# Patient Record
Sex: FEMALE | Race: WHITE | Employment: FULL TIME | URBAN - METROPOLITAN AREA
[De-identification: names, ages, dates, MRNs, and addresses within clinical notes are randomized per-mention and may not be internally consistent; named-entity substitution may affect disease eponyms.]

---

## 2017-04-19 DIAGNOSIS — F41.1 ANXIETY STATE: ICD-10-CM

## 2017-04-19 RX ORDER — ALPRAZOLAM 0.5 MG
TABLET ORAL
Qty: 10 TABLET | Refills: 3 | Status: SHIPPED | OUTPATIENT
Start: 2017-04-19 | End: 2017-08-25

## 2017-04-19 NOTE — TELEPHONE ENCOUNTER
Controlled Substance Refill Request for ALPRAZolam (XANAX) 0.5 MG tablet  Problem List Complete:  Yes    Last Written Prescription Date:  9/29/2016  Last Fill Quantity: 10,   # refills: 5    Last Office Visit with [facility] primary care provider: 9/29/2016    Clinic visit frequency required: unspecified     Future Office visit:     Controlled substance agreement on file: No.     Processing:  May be called or faxed   checked in past 6 months?  No, route to RN Chronic pain not on problem list [place]  review not required    Thank you,  Cordell Cavazos RN

## 2017-04-19 NOTE — TELEPHONE ENCOUNTER
I faxed Rx for Alprazolam (Xanax) 0.5 MG tablet to [address].  Start Date: 04/19/2017  Fax #: [phone]    Codie Alves MA

## 2017-05-23 ENCOUNTER — TELEPHONE (OUTPATIENT)
Dept: FAMILY MEDICINE | Facility: CLINIC | Age: 52
End: 2017-05-23

## 2017-05-23 ENCOUNTER — OFFICE VISIT (OUTPATIENT)
Dept: FAMILY MEDICINE | Facility: CLINIC | Age: 52
End: 2017-05-23
Payer: COMMERCIAL

## 2017-05-23 VITALS
RESPIRATION RATE: 18 BRPM | HEART RATE: 74 BPM | WEIGHT: 179 LBS | BODY MASS INDEX: 29.56 KG/M2 | TEMPERATURE: 98.1 F | DIASTOLIC BLOOD PRESSURE: 80 MMHG | SYSTOLIC BLOOD PRESSURE: 121 MMHG | OXYGEN SATURATION: 100 %

## 2017-05-23 DIAGNOSIS — F41.1 ANXIETY STATE: ICD-10-CM

## 2017-05-23 DIAGNOSIS — Z12.11 SPECIAL SCREENING FOR MALIGNANT NEOPLASMS, COLON: ICD-10-CM

## 2017-05-23 DIAGNOSIS — F33.1 MAJOR DEPRESSIVE DISORDER, RECURRENT EPISODE, MODERATE (H): Primary | ICD-10-CM

## 2017-05-23 PROCEDURE — 99214 OFFICE O/P EST MOD 30 MIN: CPT | Performed by: NURSE PRACTITIONER

## 2017-05-23 RX ORDER — ALPRAZOLAM 0.5 MG
TABLET ORAL
Qty: 10 TABLET | Refills: 3 | Status: CANCELLED | OUTPATIENT
Start: 2017-05-23

## 2017-05-23 RX ORDER — BUPROPION HYDROCHLORIDE 150 MG/1
150 TABLET ORAL EVERY MORNING
Qty: 60 TABLET | Refills: 1 | Status: SHIPPED | OUTPATIENT
Start: 2017-05-23 | End: 2017-07-17

## 2017-05-23 NOTE — NURSING NOTE
"Chief Complaint   Patient presents with     Depression       Initial /80  Pulse 74  Temp 98.1  F (36.7  C) (Oral)  Resp 18  Wt 179 lb (81.2 kg)  SpO2 100%  BMI 29.56 kg/m2 Estimated body mass index is 29.56 kg/(m^2) as calculated from the following:    Height as of 11/8/16: 5' 5.25\" (1.657 m).    Weight as of this encounter: 179 lb (81.2 kg).  Medication Reconciliation: complete     Codie Alves MA      "

## 2017-05-23 NOTE — MR AVS SNAPSHOT
After Visit Summary   5/23/2017    Adrienne Ivory    MRN: [id number]           Patient Information     Date Of Birth          1965        Visit Information        Provider Department      5/23/2017 2:45 PM Kallie Ta NP [facility]        Today's Diagnoses     Major depressive disorder, recurrent episode, moderate (H)    -  1    Anxiety state        Special screening for malignant neoplasms, colon          Care Instructions    Start Wellbutrin one tablet daily in the AM for 1-2 weeks then increase to 2 tablets daily.  Follow up on MyChart in one month.         Follow-ups after your visit        Your next 10 appointments already scheduled     May 30, 2017  8:00 AM CDT   MA SCREENING DIGITAL BILATERAL with URBCMA1   [facility] ([facility])    [address] [place] [address], [address]  [facility] [address] [phone]           Do not use any powder, lotion or deodorant under your arms or on your breast. If you do, we will ask you to remove it before your exam.  Wear comfortable, two-piece clothing.  If you have any allergies, tell your care team.  Bring any previous mammograms from other facilities or have them mailed to the breast center.              Future tests that were ordered for you today     Open Future Orders        Priority Expected Expires Ordered    Fecal colorectal cancer screen (FIT) Routine 6/13/2017 8/15/2017 5/23/2017    *MA Screening Digital Bilateral Routine  5/23/2018 5/23/2017            Who to contact     If you have questions or need follow up information about today's clinic visit or your schedule please contact [facility] directly at [phone].  Normal or non-critical lab and imaging results will be communicated to you by MyChart, letter or phone within 4 business days after the clinic has received the results. If you do not hear from us within 7 days, please contact the clinic through MyChart or phone.  If you have a critical or abnormal lab result, we will notify you by phone as soon as possible.  Submit refill requests through [email or web address] or call your pharmacy and they will forward the refill request to us. Please allow 3 business days for your refill to be completed.          Additional Information About Your Visit        Art-Exchangehart Information     [email or web address] gives you secure access to your electronic health record. If you see a primary care provider, you can also send messages to your care team and make appointments. If you have questions, please call your primary care clinic.  If you do not have a primary care provider, please call [phone] and they will assist you.        Care EveryWhere ID     This is your Care EveryWhere ID. This could be used by other organizations to access your [facility] medical records  [id number]        Your Vitals Were     Pulse Temperature Respirations Pulse Oximetry BMI (Body Mass Index)       74 98.1  F (36.7  C) (Oral) 18 100% 29.56 kg/m2        Blood Pressure from Last 3 Encounters:   05/23/17 121/80   11/08/16 140/90   09/29/16 148/90    Weight from Last 3 Encounters:   05/23/17 179 lb (81.2 kg)   11/08/16 154 lb (69.9 kg)   09/29/16 155 lb (70.3 kg)                 Today's Medication Changes          These changes are accurate as of: 5/23/17  3:13 PM.  If you have any questions, ask your nurse or doctor.               Start taking these medicines.        Dose/Directions    buPROPion 150 MG 24 hr tablet   Commonly known as:  WELLBUTRIN XL   Used for:  Major depressive disorder, recurrent episode, moderate (H)   Started by:  Kallie Ta NP        Dose:  150 mg   Take 1 tablet (150 mg) by mouth every morning Take one tablet every morning for 1-2 weeks then increase to 2 tablets daily   Quantity:  60 tablet   Refills:  1            Where to get your medicines      Some of these will need a paper prescription and others can be bought over the counter.  Ask your nurse if you have  questions.     Bring a paper prescription for each of these medications     buPROPion 150 MG 24 hr tablet                Primary Care Provider Office Phone # Fax #    Kallie Ta [phone]       [facility] [address] [facility] [address]        Thank you!     Thank you for choosing [facility]  for your care. Our goal is always to provide you with excellent care. Hearing back from our patients is one way we can continue to improve our services. Please take a few minutes to complete the written survey that you may receive in the mail after your visit with us. Thank you!             Your Updated Medication List - Protect others around you: Learn how to safely use, store and throw away your medicines at www.disposemymeds.org.          This list is accurate as of: 5/23/17  3:13 PM.  Always use your most recent med list.                   Brand Name Dispense Instructions for use    ALPRAZolam 0.5 MG tablet    XANAX    10 tablet    TAKE 1 TABLET BY MOUTH AS NEEDED, NOT TO EXCEED 2 TABLETS PER WEEK       buPROPion 150 MG 24 hr tablet    WELLBUTRIN XL    60 tablet    Take 1 tablet (150 mg) by mouth every morning Take one tablet every morning for 1-2 weeks then increase to 2 tablets daily       MULTIVITAMIN PO      Take 1 tablet by mouth daily

## 2017-05-23 NOTE — TELEPHONE ENCOUNTER
Adrienne is calling requesting a visit today with Kallie Ta NP follow up for depression. Pt declines speaking with FNA. We did schedule pt for 11:30 today, but she is hoping she can be fit in this afternoon. Only same day slots avail/ short 15 slots. Please call her on Mobile at [phone] OK to SULEMA. Thank You.    Katerine Albert Scheduling

## 2017-05-23 NOTE — PROGRESS NOTES
SUBJECTIVE:                                                    Adrienne Ivory is a 51 year old female who presents to clinic today for the following health issues:      Depression Followup    Status since last visit: Worsened.     See PHQ-9 for current symptoms.  Other associated symptoms: None    Complicating factors:   Significant life event: emotional and not happy. Long term stress, stressful work environment    Current substance abuse:  None  Anxiety or Panic symptoms:  Yes-takes Xanax PRN    PHQ-9  English PHQ-9   Any Language        Fluoxetine was helpful in the past.       Amount of exercise or physical activity: 5 times per week.     Problems taking medications regularly: No    Medication side effects: none    Diet: eats mostly vegetarian and some beef    She feels that her depression has been worse over the past 6 weeks.  She was more blue over the past year.  She quit drinking last year and started eating more instead and gained weight.    She has been trying to work on her diet, is trying to do yoga.  She has less anxiety since she quit drinking.  Infrequently needs to use Xanax.   She is quite unhappy in her job.  Her partner is unemployed.  She denies any SI>       Problem list and histories reviewed & adjusted, as indicated.  Additional history: as documented        Reviewed and updated as needed this visit by clinical staff  Allergies  Meds       Reviewed and updated as needed this visit by Provider         ROS:  C: NEGATIVE for fever, chills, change in weight  E/M: NEGATIVE for ear, mouth and throat problems  R: NEGATIVE for significant cough or SOB  CV: NEGATIVE for chest pain, palpitations or peripheral edema  GI: NEGATIVE for nausea, abdominal pain, heartburn, or change in bowel habits  MUSCULOSKELETAL: NEGATIVE for significant arthralgias or myalgia  NEURO: NEGATIVE for weakness, dizziness or paresthesias  PSYCHIATRIC: see HPI    OBJECTIVE:                                                     /80  Pulse 74  Temp 98.1  F (36.7  C) (Oral)  Resp 18  Wt 179 lb (81.2 kg)  SpO2 100%  BMI 29.56 kg/m2  Body mass index is 29.56 kg/(m^2).  GENERAL: healthy, alert and no distress  PSYCH: mentation appears normal, affect weepy; PHQ-9 score of 21         ASSESSMENT/PLAN:                                                            1. Major depressive disorder, recurrent episode, moderate (H)  She is interested in trying Wellbutrin.  Discussed the use and indication of this medication as well as potential side effects.  She cannot afford to see a therapist at this time due to a large deductible.  She will follow up on Health system in one month or sooner PRN.   - buPROPion (WELLBUTRIN XL) 150 MG 24 hr tablet; Take 1 tablet (150 mg) by mouth every morning Take one tablet every morning for 1-2 weeks then increase to 2 tablets daily  Dispense: 60 tablet; Refill: 1    2. Anxiety state  She will take Xanax very sparingly.     3. Special screening for malignant neoplasms, colon    - Fecal colorectal cancer screen (FIT); Future        Kallie Ta NP  [facility]

## 2017-05-23 NOTE — PATIENT INSTRUCTIONS
Start Wellbutrin one tablet daily in the AM for 1-2 weeks then increase to 2 tablets daily.  Follow up on MyChart in one month.

## 2017-05-24 ASSESSMENT — PATIENT HEALTH QUESTIONNAIRE - PHQ9: SUM OF ALL RESPONSES TO PHQ QUESTIONS 1-9: 21

## 2017-06-26 ENCOUNTER — TELEPHONE (OUTPATIENT)
Dept: FAMILY MEDICINE | Facility: CLINIC | Age: 52
End: 2017-06-26

## 2017-06-26 NOTE — TELEPHONE ENCOUNTER
Call Regarding Preventive Health Screening Colonoscopy and Mammogram    Attempt 1    Message on voicemail     Comments:       Outreach   Danna Alvarenga

## 2017-07-13 ENCOUNTER — MYC MEDICAL ADVICE (OUTPATIENT)
Dept: FAMILY MEDICINE | Facility: CLINIC | Age: 52
End: 2017-07-13

## 2017-07-13 DIAGNOSIS — F33.1 MAJOR DEPRESSIVE DISORDER, RECURRENT EPISODE, MODERATE (H): ICD-10-CM

## 2017-07-13 RX ORDER — BUPROPION HYDROCHLORIDE 150 MG/1
150 TABLET ORAL EVERY MORNING
Qty: 60 TABLET | Refills: 1 | Status: CANCELLED | OUTPATIENT
Start: 2017-07-13

## 2017-07-13 NOTE — TELEPHONE ENCOUNTER
Medication Detail      Disp Refills Start End KYRA   buPROPion (WELLBUTRIN XL) 150 MG 24 hr tablet 60 tablet 1 5/23/2017  --   Sig: Take 1 tablet (150 mg) by mouth every morning Take one tablet every morning for 1-2 weeks then increase to 2 tablets daily       Last Office Visit with [facility], P or [facility] prescribing provider:  5-23-17        Last PHQ-9 score on record=   PHQ-9 SCORE 5/23/2017   Total Score -   Total Score 21       No results found for: AST  No results found for: ALT

## 2017-07-14 ASSESSMENT — PATIENT HEALTH QUESTIONNAIRE - PHQ9
SUM OF ALL RESPONSES TO PHQ QUESTIONS 1-9: 7
10. IF YOU CHECKED OFF ANY PROBLEMS, HOW DIFFICULT HAVE THESE PROBLEMS MADE IT FOR YOU TO DO YOUR WORK, TAKE CARE OF THINGS AT HOME, OR GET ALONG WITH OTHER PEOPLE: SOMEWHAT DIFFICULT
SUM OF ALL RESPONSES TO PHQ QUESTIONS 1-9: 7

## 2017-07-14 NOTE — TELEPHONE ENCOUNTER
Patient reports taking 300 mg daily of Wellbutrin.    PHQ-9 SCORE 9/29/2016 5/23/2017 7/14/2017   Total Score - - -   Total Score MyChart - - 7 (Mild depression)   Total Score 3 21 7     Nini-Please review.    Thank you!  SAMANTHA Gonzalez, BSN, RN

## 2017-07-15 ASSESSMENT — PATIENT HEALTH QUESTIONNAIRE - PHQ9: SUM OF ALL RESPONSES TO PHQ QUESTIONS 1-9: 7

## 2017-07-17 RX ORDER — BUPROPION HYDROCHLORIDE 150 MG/1
TABLET ORAL
Qty: 180 TABLET | Refills: 0 | Status: SHIPPED | OUTPATIENT
Start: 2017-07-17 | End: 2017-10-21

## 2017-08-02 NOTE — TELEPHONE ENCOUNTER
Type of outreach:  Sent letter.  Health Maintenance Due   Topic Date Due     MAMMO SCREEN Q2 YR (SYSTEM ASSIGNED)  12/18/2016     COLON CANCER SCREEN (SYSTEM ASSIGNED)  12/18/2016   sent mary Alves MA

## 2017-08-25 DIAGNOSIS — F41.1 ANXIETY STATE: ICD-10-CM

## 2017-08-25 NOTE — TELEPHONE ENCOUNTER
Controlled Substance Refill Request for ALPRAZOLAM  Problem List Complete:  No     PROVIDER TO CONSIDER COMPLETION OF PROBLEM LIST AND OVERVIEW/CONTROLLED SUBSTANCE AGREEMENT    Medication Detail      Disp Refills Start End KYRA   ALPRAZolam (XANAX) 0.5 MG tablet 10 tablet 3 4/19/2017  No   Sig: TAKE 1 TABLET BY MOUTH AS NEEDED, NOT TO EXCEED 2 TABLETS PER WEEK       Last Office Visit with [facility] primary care provider: 5-23-17    Future Office visit:     Controlled substance agreement on file: No.     Processing:  .     checked in past 6 months?  No, route to RN

## 2017-08-29 RX ORDER — ALPRAZOLAM 0.5 MG
TABLET ORAL
Qty: 10 TABLET | Refills: 0 | Status: SHIPPED | OUTPATIENT
Start: 2017-08-29 | End: 2017-09-28

## 2017-08-29 NOTE — TELEPHONE ENCOUNTER
Routing refill request to provider for review/approval because:  Drug not on the [facility] refill protocol     Nini-Please review and sign if agree.    Thank you!  SUHA KramerN, RN

## 2017-09-28 DIAGNOSIS — F41.1 ANXIETY STATE: ICD-10-CM

## 2017-09-28 RX ORDER — ALPRAZOLAM 0.5 MG
TABLET ORAL
Qty: 10 TABLET | Refills: 1 | Status: SHIPPED | OUTPATIENT
Start: 2017-09-28 | End: 2018-01-24

## 2017-09-28 NOTE — TELEPHONE ENCOUNTER
Faxed Rx for ALPRAZolam (XANAX) 0.5 MG tablet to [facility]. Fax:[phone].    Agnes Blanco MA

## 2017-10-21 DIAGNOSIS — F33.1 MAJOR DEPRESSIVE DISORDER, RECURRENT EPISODE, MODERATE (H): ICD-10-CM

## 2017-11-03 RX ORDER — BUPROPION HYDROCHLORIDE 150 MG/1
TABLET ORAL
Qty: 60 TABLET | Refills: 0 | Status: SHIPPED | OUTPATIENT
Start: 2017-11-03 | End: 2017-11-06

## 2017-11-03 NOTE — TELEPHONE ENCOUNTER
Medication is being filled for 1 time refill only due to:  needing a follo w up visit.sent a phq 9 and also asked her to make an E visit.  Sangita Gonzalez RN

## 2017-12-16 ENCOUNTER — HEALTH MAINTENANCE LETTER (OUTPATIENT)
Age: 52
End: 2017-12-16

## 2018-01-20 ENCOUNTER — HEALTH MAINTENANCE LETTER (OUTPATIENT)
Age: 53
End: 2018-01-20

## 2018-01-24 ENCOUNTER — OFFICE VISIT (OUTPATIENT)
Dept: FAMILY MEDICINE | Facility: CLINIC | Age: 53
End: 2018-01-24
Payer: COMMERCIAL

## 2018-01-24 VITALS
BODY MASS INDEX: 30.16 KG/M2 | HEIGHT: 65 IN | TEMPERATURE: 98.4 F | OXYGEN SATURATION: 97 % | DIASTOLIC BLOOD PRESSURE: 78 MMHG | SYSTOLIC BLOOD PRESSURE: 117 MMHG | RESPIRATION RATE: 18 BRPM | WEIGHT: 181 LBS | HEART RATE: 79 BPM

## 2018-01-24 DIAGNOSIS — Z00.00 ROUTINE GENERAL MEDICAL EXAMINATION AT A HEALTH CARE FACILITY: Primary | ICD-10-CM

## 2018-01-24 DIAGNOSIS — B97.7 HIGH RISK HPV INFECTION: ICD-10-CM

## 2018-01-24 DIAGNOSIS — F33.1 MAJOR DEPRESSIVE DISORDER, RECURRENT EPISODE, MODERATE (H): ICD-10-CM

## 2018-01-24 DIAGNOSIS — Z12.11 SCREEN FOR COLON CANCER: ICD-10-CM

## 2018-01-24 DIAGNOSIS — F41.9 ANXIETY: ICD-10-CM

## 2018-01-24 PROCEDURE — 87624 HPV HI-RISK TYP POOLED RSLT: CPT | Performed by: NURSE PRACTITIONER

## 2018-01-24 PROCEDURE — 88175 CYTOPATH C/V AUTO FLUID REDO: CPT | Performed by: NURSE PRACTITIONER

## 2018-01-24 PROCEDURE — 99396 PREV VISIT EST AGE 40-64: CPT | Performed by: NURSE PRACTITIONER

## 2018-01-24 RX ORDER — ALPRAZOLAM 0.5 MG
TABLET ORAL
Qty: 10 TABLET | Refills: 2 | Status: SHIPPED | OUTPATIENT
Start: 2018-01-24 | End: 2018-05-08

## 2018-01-24 NOTE — PROGRESS NOTES
SUBJECTIVE:   CC: Adrienne Ivory is an 52 year old woman who presents for preventive health visit.     Physical   Annual:     Getting at least 3 servings of Calcium per day::  Yes    Bi-annual eye exam::  Yes    Dental care twice a year::  NO    Sleep apnea or symptoms of sleep apnea::  Daytime drowsiness    Diet::  Regular (no restrictions)    Frequency of exercise::  2-3 days/week    Duration of exercise::  15-30 minutes    Taking medications regularly::  Yes    Medication side effects::  Lightheadedness and Other    Additional concerns today::  YES            Skin check  On left cheek and left ear    Medication check: buPROPion (WELLBUTRIN XL) 150 MG 24 hr tablet  She feels like Wellbutrin is causing some forgetfulness, thinks Prozac worked better and would like to change back.            Today's PHQ-2 Score:   PHQ-2 ( 1999 Pfizer) 1/24/2018   Q1: Little interest or pleasure in doing things 1   Q2: Feeling down, depressed or hopeless 1   PHQ-2 Score 2   Q1: Little interest or pleasure in doing things Several days   Q2: Feeling down, depressed or hopeless Several days   PHQ-2 Score 2       Abuse: Current or Past(Physical, Sexual or Emotional)- No  Do you feel safe in your environment - Yes    Social History   Substance Use Topics     Smoking status: Never Smoker     Smokeless tobacco: Never Used     Alcohol use No     Alcohol Use 1/24/2018   If you drink alcohol, do you typically have greater than 3 drinks per day OR greater than 7 drinks per week?   Not applicable   No flowsheet data found.    Reviewed orders with patient.  Reviewed health maintenance and updated orders accordingly - Yes          Pertinent mammograms are reviewed under the imaging tab.  History of abnormal Pap smear: YES - updated in Problem List and Health Maintenance accordingly    Reviewed and updated as needed this visit by clinical staff  Tobacco  Meds  Med Hx  Surg Hx  Fam Hx  Soc Hx        Reviewed and updated as needed this  "visit by Provider            Review of Systems  C: NEGATIVE for fever, chills, change in weight  I: NEGATIVE for worrisome rashes, moles or lesions  E: NEGATIVE for vision changes or irritation  ENT: NEGATIVE for ear, mouth and throat problems  R: NEGATIVE for significant cough or SOB  B: NEGATIVE for masses, tenderness or discharge  CV: NEGATIVE for chest pain, palpitations or peripheral edema  GI: NEGATIVE for nausea, abdominal pain, heartburn, or change in bowel habits  : NEGATIVE for unusual urinary or vaginal symptoms.   M: NEGATIVE for significant arthralgias or myalgia  N: NEGATIVE for weakness, dizziness or paresthesias  P: anxiety     OBJECTIVE:   /78  Pulse 79  Temp 98.4  F (36.9  C) (Oral)  Resp 18  Ht 5' 5\" (1.651 m)  Wt 181 lb (82.1 kg)  SpO2 97%  BMI 30.12 kg/m2  Physical Exam  GENERAL: healthy, alert and no distress  EYES: Eyes grossly normal to inspection, PERRL and conjunctivae and sclerae normal  HENT: ear canals and TM's normal, nose and mouth without ulcers or lesions  NECK: no adenopathy, no asymmetry, masses, or scars and thyroid normal to palpation  RESP: lungs clear to auscultation - no rales, rhonchi or wheezes  BREAST: normal without masses, tenderness or nipple discharge and no palpable axillary masses or adenopathy  CV: regular rate and rhythm, normal S1 S2, no S3 or S4, no murmur, click or rub, no peripheral edema and peripheral pulses strong  ABDOMEN: soft, nontender, no hepatosplenomegaly, no masses and bowel sounds normal   (female): normal female external genitalia, normal urethral meatus, vaginal mucosa pink, moist, well rugated, and normal cervix/adnexa/uterus without masses or discharge  MS: no gross musculoskeletal defects noted, no edema  SKIN: no suspicious lesions or rashes  NEURO: Normal strength and tone, mentation intact and speech normal  PSYCH: mentation appears normal, affect normal/bright    ASSESSMENT/PLAN:   1. Routine general medical examination at a " "health care facility    - DERMATOLOGY REFERRAL    2. Major depressive disorder, recurrent episode, moderate (H)  Will stop Wellbutrin and start Prozac again.   - FLUoxetine (PROZAC) 20 MG capsule; Take 1 capsule (20 mg) by mouth daily  Dispense: 90 capsule; Refill: PRN    3. Anxiety  She takes sparingly.  Refills given.   - ALPRAZolam (XANAX) 0.5 MG tablet; TAKE 1 TABLET BY MOUTH AS NEEDED. MAX OF 2 TABLETS PER WEEK.  Dispense: 10 tablet; Refill: 2    4. High risk HPV infection    - Pap imaged thin layer diagnostic with HPV (select HPV order below)  - HPV High Risk Types DNA Cervical    5. Screen for colon cancer    - Fecal colorectal cancer screen (FIT); Future    COUNSELING:  Reviewed preventive health counseling, as reflected in patient instructions         reports that she has never smoked. She has never used smokeless tobacco.    Estimated body mass index is 30.12 kg/(m^2) as calculated from the following:    Height as of this encounter: 5' 5\" (1.651 m).    Weight as of this encounter: 181 lb (82.1 kg).   Weight management plan: Discussed healthy diet and exercise guidelines and patient will follow up in 12 months in clinic to re-evaluate.    Counseling Resources:  ATP IV Guidelines  Pooled Cohorts Equation Calculator  Breast Cancer Risk Calculator  FRAX Risk Assessment  ICSI Preventive Guidelines  Dietary Guidelines for Americans, 2010  USDA's MyPlate  ASA Prophylaxis  Lung CA Screening    Kallie Ta NP  [facility]  Answers for HPI/ROS submitted by the patient on 1/24/2018   PHQ-2 Score: 2    "

## 2018-01-24 NOTE — LETTER
February 2, 2018    Adrienne Ivory  [address]  [place] [address]    Dear Adrienne,  We are happy to inform you that your PAP smear result from 01/24/18 is normal.  We are now able to do a follow up test on PAP smears. The DNA test is for HPV (Human Papilloma Virus). Cervical cancer is closely linked with certain types of HPV. Your result showed no evidence of high risk HPV.  Therefore we recommend you return in 1 year for your next pap smear and HPV test.  You will also need to return to the clinic every year for an annual exam and other preventive tests.  Please contact the clinic at [phone] with any questions.  Sincerely,    Kallie Ta NP/river

## 2018-01-29 LAB
COPATH REPORT: NORMAL
PAP: NORMAL

## 2018-01-31 LAB
FINAL DIAGNOSIS: NORMAL
HPV HR 12 DNA CVX QL NAA+PROBE: NEGATIVE
HPV16 DNA SPEC QL NAA+PROBE: NEGATIVE
HPV18 DNA SPEC QL NAA+PROBE: NEGATIVE
SPECIMEN DESCRIPTION: NORMAL
SPECIMEN SOURCE CVX/VAG CYTO: NORMAL

## 2018-02-12 ENCOUNTER — TRANSFERRED RECORDS (OUTPATIENT)
Dept: HEALTH INFORMATION MANAGEMENT | Facility: CLINIC | Age: 53
End: 2018-02-12

## 2018-02-27 ENCOUNTER — TRANSFERRED RECORDS (OUTPATIENT)
Dept: HEALTH INFORMATION MANAGEMENT | Facility: CLINIC | Age: 53
End: 2018-02-27

## 2018-03-07 ENCOUNTER — MYC MEDICAL ADVICE (OUTPATIENT)
Dept: FAMILY MEDICINE | Facility: CLINIC | Age: 53
End: 2018-03-07

## 2018-03-07 DIAGNOSIS — Z80.0 FAMILY HISTORY OF COLON CANCER: Primary | ICD-10-CM

## 2018-03-07 DIAGNOSIS — F33.1 MAJOR DEPRESSIVE DISORDER, RECURRENT EPISODE, MODERATE (H): ICD-10-CM

## 2018-03-07 RX ORDER — FLUOXETINE 40 MG/1
40 CAPSULE ORAL DAILY
Qty: 90 CAPSULE | Status: SHIPPED | OUTPATIENT
Start: 2018-03-07 | End: 2019-08-29

## 2018-03-07 NOTE — TELEPHONE ENCOUNTER
Sign off on colonoscopy. Do you want her to follow up with you via my chart for her increase in Prozac?

## 2018-03-16 ENCOUNTER — TRANSFERRED RECORDS (OUTPATIENT)
Dept: HEALTH INFORMATION MANAGEMENT | Facility: CLINIC | Age: 53
End: 2018-03-16

## 2018-05-08 DIAGNOSIS — F41.9 ANXIETY: ICD-10-CM

## 2018-05-08 RX ORDER — ALPRAZOLAM 0.5 MG
TABLET ORAL
Qty: 10 TABLET | Refills: 0 | Status: SHIPPED | OUTPATIENT
Start: 2018-05-08 | End: 2018-06-07

## 2018-05-08 NOTE — TELEPHONE ENCOUNTER
Alprazolam      Last Written Prescription Date:  1/24/18  Last Fill Quantity: 10,   # refills: 2  Last Office Visit: 1/24/18  Future Office visit:       Routing refill request to provider for review/approval because:  Drug not on the [facility], P or [facility] refill protocol or controlled substance

## 2018-05-09 NOTE — TELEPHONE ENCOUNTER
I faxed Rx for Xanax 0.5 MG tablet to [facility]  Start date: 05/08/2018    Codie Alves MA

## 2018-06-07 DIAGNOSIS — F41.9 ANXIETY: ICD-10-CM

## 2018-06-08 NOTE — TELEPHONE ENCOUNTER
Controlled Substance Refill Request for ALPRAZolam (XANAX) 0.5 MG tablet  Problem List Complete:  No     PROVIDER TO CONSIDER COMPLETION OF PROBLEM LIST AND OVERVIEW/CONTROLLED SUBSTANCE AGREEMENT    Last Written Prescription Date:  5/8/18  Last Fill Quantity: 10 TABLET,   # refills: 0    Last Office Visit with [facility] primary care provider: 01/24/2018 WITH BOTTEM    Future Office visit:     Controlled substance agreement on file: No.     Processing:  Fax Rx to [facility] [place] pharmacy   checked in past 6 months?  Yes 01/24/2018

## 2018-06-11 RX ORDER — ALPRAZOLAM 0.5 MG
TABLET ORAL
Qty: 10 TABLET | Refills: 2 | Status: SHIPPED | OUTPATIENT
Start: 2018-06-11 | End: 2018-10-29

## 2018-06-12 ENCOUNTER — OFFICE VISIT (OUTPATIENT)
Dept: FAMILY MEDICINE | Facility: CLINIC | Age: 53
End: 2018-06-12
Payer: COMMERCIAL

## 2018-06-12 VITALS
RESPIRATION RATE: 17 BRPM | HEART RATE: 76 BPM | TEMPERATURE: 97.7 F | OXYGEN SATURATION: 94 % | DIASTOLIC BLOOD PRESSURE: 70 MMHG | SYSTOLIC BLOOD PRESSURE: 126 MMHG | BODY MASS INDEX: 31.95 KG/M2 | WEIGHT: 192 LBS

## 2018-06-12 DIAGNOSIS — R05.9 COUGH: Primary | ICD-10-CM

## 2018-06-12 DIAGNOSIS — R09.82 POSTNASAL DRIP: ICD-10-CM

## 2018-06-12 PROCEDURE — 99213 OFFICE O/P EST LOW 20 MIN: CPT | Performed by: FAMILY MEDICINE

## 2018-06-12 RX ORDER — BENZONATATE 200 MG/1
200 CAPSULE ORAL 3 TIMES DAILY PRN
Qty: 30 CAPSULE | Refills: 3 | Status: SHIPPED | OUTPATIENT
Start: 2018-06-12 | End: 2018-09-09

## 2018-06-12 NOTE — TELEPHONE ENCOUNTER
I faxed Rx for Xanax 0.5 MG tablet to [facility]  Dispense quantity: 10 Tablets   Refill: 2  Fax #: [phone]    Codie Alves MA

## 2018-06-12 NOTE — PROGRESS NOTES
SUBJECTIVE:   Adrienne Ivory is a 52 year old female who presents to clinic today for the following health issues:    HPI     Presents with worsening cough that seems to be exacerbated when lying down in past months.  No productive cough.  No sinus congestion or URI symptoms.  A tickle in back of throat seems to be triggering this repeat dry cough.    Is on no new medications.  Does not have a history of seasonal allergies.  Otherwise feels well.    Problem list and histories reviewed & adjusted, as indicated.  Additional history: as documented        Patient Active Problem List   Diagnosis     Anxiety state     Premenstrual tension syndrome     High risk HPV infection     Herpes simplex virus (HSV) infection     Insomnia     Dysmenorrhea     ADHD (attention deficit hyperactivity disorder)     CARDIOVASCULAR SCREENING; LDL GOAL LESS THAN 160     Left ankle pain     Major depressive disorder, recurrent episode, moderate (H)     Anxiety     Past Surgical History:   Procedure Laterality Date     C IUD,MIRENA  2/08-3/11    removed for cramping     DILATION AND CURETTAGE, ABLATE ENDOMETRIUM NOVASURE, COMBINED N/A 12/31/2015    Procedure: COMBINED DILATION AND CURETTAGE, ABLATE ENDOMETRIUM NOVASURE;  Surgeon: Shakira Penaloza MD;  [facility]     HYSTEROSCOPY DIAGNOSTIC N/A 12/31/2015    Procedure: HYSTEROSCOPY DIAGNOSTIC;  Surgeon: Shakira Penaloza MD;  Location[facility]     LEEP TX, CERVICAL  4/3/15    ARNAV 2-3, negative margins     NO HISTORY OF SURGERY         Social History   Substance Use Topics     Smoking status: Never Smoker     Smokeless tobacco: Never Used     Alcohol use No     Family History   Problem Relation Age of Onset     C.A.D. Father      double bypass,preventative     Skin Cancer Father      Psychotic Disorder Mother      mild schizophrenia     DIABETES Mother      type 2     Hypertension Mother      ?     Skin Cancer Brother      CEREBROVASCULAR DISEASE Maternal Grandfather       MARGIE Paternal Grandfather      Cancer - colorectal No family hx of      Breast Cancer No family hx of          Current Outpatient Prescriptions   Medication Sig Dispense Refill     benzonatate (TESSALON) 200 MG capsule Take 1 capsule (200 mg) by mouth 3 times daily as needed for cough 30 capsule 3     ALPRAZolam (XANAX) 0.5 MG tablet TAKE 1 TABLET BY MOUTH AS NEEDED MAXIMUM OF 2 TABLETS PER WEEK 10 tablet 2     FLUoxetine (PROZAC) 40 MG capsule Take 1 capsule (40 mg) by mouth daily 90 capsule PRN     Multiple Vitamins-Minerals (MULTIVITAMIN PO) Take 1 tablet by mouth daily        Allergies   Allergen Reactions     Anesthetic Ether      Codeine      Gets depressed and clogs he rthoughts     BP Readings from Last 3 Encounters:   06/12/18 126/70   01/24/18 117/78   05/23/17 121/80    Wt Readings from Last 3 Encounters:   06/12/18 192 lb (87.1 kg)   01/24/18 181 lb (82.1 kg)   05/23/17 179 lb (81.2 kg)                    ROS:  Constitutional, HEENT, cardiovascular, pulmonary, gi and gu systems are negative, except as otherwise noted.    OBJECTIVE:     /70 (BP Location: Right arm, Patient Position: Chair, Cuff Size: Adult Regular)  Pulse 76  Temp 97.7  F (36.5  C) (Oral)  Resp 17  Wt 192 lb (87.1 kg)  SpO2 94%  BMI 31.95 kg/m2  Body mass index is 31.95 kg/(m^2).  GENERAL: healthy, alert and no distress  EYES: Eyes grossly normal to inspection, PERRL and conjunctivae and sclerae normal  HENT: ear canals and TM's normal, nose and mouth without ulcers or lesions, swollen anterior turbinates bilaterally with clear rhinorrhea, no sinus tenderness on palpation. + postnasal drip  NECK: no adenopathy, no asymmetry, masses, or scars and thyroid normal to palpation  RESP: lungs clear to auscultation - no rales, rhonchi or wheezes  CV: regular rate and rhythm, normal S1 S2, no S3 or S4, no murmur, click or rub, no peripheral edema and peripheral pulses strong  ABDOMEN: soft, nontender, no hepatosplenomegaly, no masses  and bowel sounds normal  MS: no gross musculoskeletal defects noted, no edema  PSYCH: mentation appears normal, affect normal/bright    Diagnostic Test Results:  none     ASSESSMENT/PLAN:     1. Cough    - benzonatate (TESSALON) 200 MG capsule; Take 1 capsule (200 mg) by mouth 3 times daily as needed for cough  Dispense: 30 capsule; Refill: 3    Recommend increased fluids, lozenges and Tessalon Perles to help decrease the cough reflex.    2. Postnasal drip    Would recommend using Claritin 10mg qAM or bid in addition to Flonase to decrease the allergic rhinitis causing the postnasal drip triggering this cough.  FU if no change or worsening symptoms prn.        Keke Mcmanus MD  [facility]

## 2018-06-12 NOTE — MR AVS SNAPSHOT
After Visit Summary   6/12/2018    Adrienne Ivory    MRN: [id number]           Patient Information     Date Of Birth          1965        Visit Information        Provider Department      6/12/2018 4:40 PM Keke Mcmanus MD [facility]        Today's Diagnoses     Cough    -  1      Care Instructions    Flonase 1 spray in each nostril in AM and PM  And   Claritin 10 mg in AM          Follow-ups after your visit        Your next 10 appointments already scheduled     Jun 12, 2018  4:40 PM CDT   Office Visit with Keke Mcmanus MD   [facility] ([facility])    [address] [address] [phone]           Bring a current list of meds and any records pertaining to this visit. For Physicals, please bring immunization records and any forms needing to be filled out. Please arrive 10 minutes early to complete paperwork.              Who to contact     If you have questions or need follow up information about today's clinic visit or your schedule please contact [facility] directly at [phone].  Normal or non-critical lab and imaging results will be communicated to you by [employer], letter or phone within 4 business days after the clinic has received the results. If you do not hear from us within 7 days, please contact the clinic through [employer] or phone. If you have a critical or abnormal lab result, we will notify you by phone as soon as possible.  Submit refill requests through [employer] or call your pharmacy and they will forward the refill request to us. Please allow 3 business days for your refill to be completed.          Additional Information About Your Visit        MyChart Information     [employer] gives you secure access to your electronic health record. If you see a primary care provider, you can also send messages to your care team and make appointments.  If you have questions, please call your primary care clinic.  If you do not have a primary care provider, please call [phone] and they will assist you.        Care EveryWhere ID     This is your Care EveryWhere ID. This could be used by other organizations to access your [facility] medical records  [id number]        Your Vitals Were     Pulse Temperature Respirations Pulse Oximetry BMI (Body Mass Index)       76 97.7  F (36.5  C) (Oral) 17 94% 31.95 kg/m2        Blood Pressure from Last 3 Encounters:   06/12/18 126/70   01/24/18 117/78   05/23/17 121/80    Weight from Last 3 Encounters:   06/12/18 192 lb (87.1 kg)   01/24/18 181 lb (82.1 kg)   05/23/17 179 lb (81.2 kg)              Today, you had the following     No orders found for display         Today's Medication Changes          These changes are accurate as of 6/12/18  4:39 PM.  If you have any questions, ask your nurse or doctor.               Start taking these medicines.        Dose/Directions    benzonatate 200 MG capsule   Commonly known as:  TESSALON   Used for:  Cough   Started by:  Keke Mcmanus MD        Dose:  200 mg   Take 1 capsule (200 mg) by mouth 3 times daily as needed for cough   Quantity:  30 capsule   Refills:  3            Where to get your medicines      These medications were sent to [id number] - [place], [place] - [address], [place] [address]     Phone:  [phone]     benzonatate 200 MG capsule                Primary Care Provider Office Phone # Fax #    Kallie Ta [phone] [address] [facility] [address]        Equal Access to Services     UNIQUE JOVEL : Quoc Rodriguez, adriana abbasi, freddie kaalmaofe henson, kee del angel. So [facility] [phone].    ATENCIÓN: Si habla español, tiene a ortiz disposición servicios gratuitos de asistencia lingüística.  Domingo hart [phone].    We comply with applicable federal civil rights laws and [place] laws. We do not discriminate on the basis of race, color, national origin, age, disability, sex, sexual orientation, or gender identity.            Thank you!     Thank you for choosing [facility]  for your care. Our goal is always to provide you with excellent care. Hearing back from our patients is one way we can continue to improve our services. Please take a few minutes to complete the written survey that you may receive in the mail after your visit with us. Thank you!             Your Updated Medication List - Protect others around you: Learn how to safely use, store and throw away your medicines at www.disposemymeds.org.          This list is accurate as of 6/12/18  4:39 PM.  Always use your most recent med list.                   Brand Name Dispense Instructions for use Diagnosis    ALPRAZolam 0.5 MG tablet    XANAX    10 tablet    TAKE 1 TABLET BY MOUTH AS NEEDED MAXIMUM OF 2 TABLETS PER WEEK    Anxiety       benzonatate 200 MG capsule    TESSALON    30 capsule    Take 1 capsule (200 mg) by mouth 3 times daily as needed for cough    Cough       FLUoxetine 40 MG capsule    PROzac    90 capsule    Take 1 capsule (40 mg) by mouth daily    Major depressive disorder, recurrent episode, moderate (H)       MULTIVITAMIN PO      Take 1 tablet by mouth daily

## 2018-06-13 ASSESSMENT — PATIENT HEALTH QUESTIONNAIRE - PHQ9: SUM OF ALL RESPONSES TO PHQ QUESTIONS 1-9: 14

## 2018-10-29 DIAGNOSIS — F41.9 ANXIETY: ICD-10-CM

## 2018-10-29 RX ORDER — ALPRAZOLAM 0.5 MG
TABLET ORAL
Qty: 10 TABLET | Refills: 2 | Status: SHIPPED | OUTPATIENT
Start: 2018-10-29 | End: 2019-08-29

## 2018-10-30 NOTE — TELEPHONE ENCOUNTER
I faxed Rx for Xanax 0.5 MG to [facility]  Start date: 10/29/2018    Codie Alves MA

## 2019-01-31 ENCOUNTER — TELEPHONE (OUTPATIENT)
Dept: OBGYN | Facility: CLINIC | Age: 54
End: 2019-01-31

## 2019-01-31 DIAGNOSIS — B97.7 HIGH RISK HPV INFECTION: ICD-10-CM

## 2019-03-09 ENCOUNTER — HEALTH MAINTENANCE LETTER (OUTPATIENT)
Age: 54
End: 2019-03-09

## 2019-04-19 ENCOUNTER — HEALTH MAINTENANCE LETTER (OUTPATIENT)
Age: 54
End: 2019-04-19

## 2019-05-28 ENCOUNTER — OFFICE VISIT (OUTPATIENT)
Dept: URGENT CARE | Facility: URGENT CARE | Age: 54
End: 2019-05-28
Payer: COMMERCIAL

## 2019-05-28 VITALS
HEART RATE: 94 BPM | DIASTOLIC BLOOD PRESSURE: 70 MMHG | OXYGEN SATURATION: 97 % | WEIGHT: 190 LBS | BODY MASS INDEX: 31.62 KG/M2 | TEMPERATURE: 98.7 F | SYSTOLIC BLOOD PRESSURE: 106 MMHG

## 2019-05-28 DIAGNOSIS — J06.9 VIRAL URI WITH COUGH: Primary | ICD-10-CM

## 2019-05-28 PROCEDURE — 99213 OFFICE O/P EST LOW 20 MIN: CPT | Performed by: PHYSICIAN ASSISTANT

## 2019-05-28 NOTE — PROGRESS NOTES
"SUBJECTIVE:   Adrienne Ivory is a 53 year old female presenting with a chief complaint of   Chief Complaint   Patient presents with     Urgent Care     URI     sick x 5 days, really bad coughing, feeling worse. post nasal drip   .    URI Adult    Onset of symptoms was 5 day(s) ago.  Course of illness is worsening.    Severity moderately severe  Current and Associated symptoms: cough, usually dry, comes in fits  Irritated by talking and laying down  Admits to postnasals drip.  No nasal congestion, runny nose. Admits to sneezing.  Feels achy all over- states \"due to coughing so hard.\"  No fever. Admits to feeling wheezy and more short of breath with activity.  No hemoptysis. No leg swelling, chest pain. No nausea, vomiting.  Treatment measures tried include: tessalon perles, without improvement.  Predisposing factors include: no known ill contacts.  Started to have seasonal allergies last year.    ROS   See HPI    PMH:  Past Medical History:   Diagnosis Date     Abnormal Pap smear, can't excl hi gd sq intraepithelial lesion (ASC-H) 3/2015    LSIL also     Anxiety state, unspecified      Diabetes (H)     Mother     Herpes simplex without mention of complication      HSIL on Pap smear of cervix 7/2014    colp - WNL     Human papillomavirus in conditions classified elsewhere and of unspecified site 11/2010    + HPV 45 & 82 with ASCUS     Hx of colposcopy with cervical biopsy 11/08/16    [place] ECC neg.      Hypertension     father     PONV (postoperative nausea and vomiting)      Premenstrual tension syndromes      Patient Active Problem List   Diagnosis     Anxiety state     Premenstrual tension syndrome     High risk HPV infection     Herpes simplex virus (HSV) infection     Insomnia     Dysmenorrhea     ADHD (attention deficit hyperactivity disorder)     CARDIOVASCULAR SCREENING; LDL GOAL LESS THAN 160     Left ankle pain     Major depressive disorder, recurrent episode, moderate (H)     Anxiety       Current " Medications:  Current Outpatient Medications   Medication Sig Dispense Refill     albuterol (PROAIR RESPICLICK) 108 (90 Base) MCG/ACT inhaler Inhale 2 puffs into the lungs every 6 hours as needed for shortness of breath / dyspnea or wheezing 1 each 0     benzonatate (TESSALON) 200 MG capsule Take 1 capsule (200 mg) by mouth 3 times daily as needed for cough 30 capsule 3     FLUoxetine (PROZAC) 40 MG capsule Take 1 capsule (40 mg) by mouth daily 90 capsule PRN     ALPRAZolam (XANAX) 0.5 MG tablet TAKE 1 TABLET BY MOUTH AS NEEDED. MAX OF 2 TABLETS PER WEEK. 10 tablet 2     Multiple Vitamins-Minerals (MULTIVITAMIN PO) Take 1 tablet by mouth daily          Surgical History:  Past Surgical History:   Procedure Laterality Date     C IUD,MIRENA  2/08-3/11    removed for cramping     DILATION AND CURETTAGE, ABLATE ENDOMETRIUM NOVASURE, COMBINED N/A 12/31/2015    Procedure: COMBINED DILATION AND CURETTAGE, ABLATE ENDOMETRIUM NOVASURE;  Surgeon: Shakira Penaloza MD;  [facility]     HYSTEROSCOPY DIAGNOSTIC N/A 12/31/2015    Procedure: HYSTEROSCOPY DIAGNOSTIC;  Surgeon: Shakira Penaloza MD;  [facility]     LEEP TX, CERVICAL  4/3/15    ARNAV 2-3, negative margins     NO HISTORY OF SURGERY         Family History:  Family History   Problem Relation Age of Onset     C.A.D. Father         double bypass,preventative     Skin Cancer Father      Psychotic Disorder Mother         mild schizophrenia     Diabetes Mother         type 2     Hypertension Mother         ?     Skin Cancer Brother      Cerebrovascular Disease Maternal Grandfather      C.A.D. Paternal Grandfather      Cancer - colorectal No family hx of      Breast Cancer No family hx of        Social History:  Social History     Tobacco Use     Smoking status: Never Smoker     Smokeless tobacco: Never Used   Substance Use Topics     Alcohol use: No            OBJECTIVE  /70   Pulse 94   Temp 98.7  F (37.1  C) (Oral)   Wt 86.2 kg (190 lb)   SpO2 97%    BMI 31.62 kg/m      General: alert, appears well but fatigued, NAD. Afebrile.  Skin: no suspicious lesions or rashes.  HEENT: Normocephalic.   Eyes: conjunctiva clear.   Ears: TMs with serous effusion bilaterally.   Nose: no nasal polyps. + erythema of nasal mucosa. No rhinorrhea.  Oropharynx: MMM. No posterior pharyngeal erythema, petechiae, or exudate. No tonsillar hypertrophy. Uvula midline.    Neck: supple, no lymphadenopathy.  Respiratory: No distress. Infrequent dry cough heard. Equal inspiration to bilateral bases. No crackles wheeze, rhonchi, rales.   Cardiovascular: RRR. No murmurs, clicks, gallups, or rub. No peripheral edema.         Labs:  No results found for this or any previous visit (from the past 24 hour(s)).      X-Ray was not done.      ASSESSMENT/PLAN:    ICD-10-CM    1. Viral URI with cough J06.9 albuterol (PROAIR RESPICLICK) 108 (90 Base) MCG/ACT inhaler    B97.89            Medical Decision Making:    Serious Comorbid Conditions: none affecting MDM today    Differential Diagnosis:   Viral URI with cough- most suspect this, in form of acute laryngotracheitis based on her history.  Pertussis- doubt - has only had sxs for 5 days. Did not test  Pneumonia- lungs completely clear, no fever. Do not think CXR is indicated today.  Bronchitis- is not wheezy on lung exam today.      Recommend fluids, rest, Tessalon Perles (she already has some). I did prescribe albuterol inhaler for coughing spells, subjective wheezing. Recommend prn use.  Follow up with PCP if no improvement in 7-10 days, sooner if worsening.      At the end of the encounter, I discussed all available results, as well as the diagnosis and any associated medications. I discussed red flags for immediate return to clinic/ER, as well as indications for follow up. Refer to patient instructions below, which were all addressed with patient. Patient understood and agreed to plan. Patient was appropriate for discharge.      Patient  Instructions   Your symptoms are most likely due to a viral self-limiting infection that should clear spontaneously in the next 3-7 days.    Symptomatic cares recommended:  -nasal saline rinses/sprays 1-2 times daily (use distilled or previously boiled water)  -adequate rest  -copious hydration  -ibuprofen or acetaminophen for comfort  -Robitussin or Mucinex as needed for cough, nasal congestion  -throat lozenges as needed for sore throat  -Albuterol inhaler 2 puffs every 4- 6 hours as needed for coughing fits  -Tessalon perles up to every 8 hours  -antihistamine such as Claritin or Zyrtec daily    Follow-up with primary care provider if not improving in 7-10 days, sooner if worsening.     If you develop high fevers that do not go down with ibuprofen/Tylenol, shortness of breath, cough up any blood, chest pain, or any other new, concerning symptoms, please go to the ER for further evaluation.                Nadja Cardenas PA-C

## 2019-05-28 NOTE — PATIENT INSTRUCTIONS
Your symptoms are most likely due to a viral self-limiting infection that should clear spontaneously in the next 3-7 days.    Symptomatic cares recommended:  -nasal saline rinses/sprays 1-2 times daily (use distilled or previously boiled water)  -adequate rest  -copious hydration  -ibuprofen or acetaminophen for comfort  -Robitussin or Mucinex as needed for cough, nasal congestion  -throat lozenges as needed for sore throat  -Albuterol inhaler 2 puffs every 4- 6 hours as needed for coughing fits  -Tessalon perles up to every 8 hours  -antihistamine such as Claritin or Zyrtec daily    Follow-up with primary care provider if not improving in 7-10 days, sooner if worsening.     If you develop high fevers that do not go down with ibuprofen/Tylenol, shortness of breath, cough up any blood, chest pain, or any other new, concerning symptoms, please go to the ER for further evaluation.

## 2019-08-29 ENCOUNTER — OFFICE VISIT (OUTPATIENT)
Dept: FAMILY MEDICINE | Facility: CLINIC | Age: 54
End: 2019-08-29
Payer: COMMERCIAL

## 2019-08-29 VITALS
TEMPERATURE: 98.4 F | OXYGEN SATURATION: 98 % | DIASTOLIC BLOOD PRESSURE: 78 MMHG | BODY MASS INDEX: 33.57 KG/M2 | WEIGHT: 201.5 LBS | HEART RATE: 86 BPM | HEIGHT: 65 IN | RESPIRATION RATE: 18 BRPM | SYSTOLIC BLOOD PRESSURE: 119 MMHG

## 2019-08-29 DIAGNOSIS — Z23 NEED FOR SHINGLES VACCINE: ICD-10-CM

## 2019-08-29 DIAGNOSIS — F33.1 MAJOR DEPRESSIVE DISORDER, RECURRENT EPISODE, MODERATE (H): ICD-10-CM

## 2019-08-29 DIAGNOSIS — F41.9 ANXIETY: ICD-10-CM

## 2019-08-29 DIAGNOSIS — E66.811 CLASS 1 OBESITY WITHOUT SERIOUS COMORBIDITY WITH BODY MASS INDEX (BMI) OF 30.0 TO 30.9 IN ADULT, UNSPECIFIED OBESITY TYPE: ICD-10-CM

## 2019-08-29 DIAGNOSIS — Z00.00 ROUTINE GENERAL MEDICAL EXAMINATION AT A HEALTH CARE FACILITY: Primary | ICD-10-CM

## 2019-08-29 LAB
CHOLEST SERPL-MCNC: 236 MG/DL
GLUCOSE SERPL-MCNC: 85 MG/DL (ref 70–99)
HDLC SERPL-MCNC: 58 MG/DL
LDLC SERPL CALC-MCNC: 138 MG/DL
NONHDLC SERPL-MCNC: 178 MG/DL
TRIGL SERPL-MCNC: 200 MG/DL

## 2019-08-29 PROCEDURE — 90750 HZV VACC RECOMBINANT IM: CPT | Performed by: NURSE PRACTITIONER

## 2019-08-29 PROCEDURE — 36415 COLL VENOUS BLD VENIPUNCTURE: CPT | Performed by: NURSE PRACTITIONER

## 2019-08-29 PROCEDURE — 87624 HPV HI-RISK TYP POOLED RSLT: CPT | Performed by: NURSE PRACTITIONER

## 2019-08-29 PROCEDURE — 80061 LIPID PANEL: CPT | Performed by: NURSE PRACTITIONER

## 2019-08-29 PROCEDURE — G0145 SCR C/V CYTO,THINLAYER,RESCR: HCPCS | Performed by: NURSE PRACTITIONER

## 2019-08-29 PROCEDURE — 99396 PREV VISIT EST AGE 40-64: CPT | Mod: 25 | Performed by: NURSE PRACTITIONER

## 2019-08-29 PROCEDURE — 82947 ASSAY GLUCOSE BLOOD QUANT: CPT | Performed by: NURSE PRACTITIONER

## 2019-08-29 PROCEDURE — 90471 IMMUNIZATION ADMIN: CPT | Performed by: NURSE PRACTITIONER

## 2019-08-29 RX ORDER — FLUOXETINE 40 MG/1
40 CAPSULE ORAL DAILY
Qty: 30 CAPSULE | Refills: 12 | Status: SHIPPED | OUTPATIENT
Start: 2019-08-29 | End: 2020-12-27

## 2019-08-29 RX ORDER — ALPRAZOLAM 0.5 MG
TABLET ORAL
Qty: 10 TABLET | Refills: 2 | Status: SHIPPED | OUTPATIENT
Start: 2019-08-29 | End: 2020-01-30

## 2019-08-29 ASSESSMENT — ENCOUNTER SYMPTOMS
DIARRHEA: 0
EYE PAIN: 0
CONSTIPATION: 0
MYALGIAS: 0
HEADACHES: 0
COUGH: 0
NAUSEA: 0
HEMATURIA: 0
SORE THROAT: 0
PALPITATIONS: 0
DIZZINESS: 1
ARTHRALGIAS: 0
BREAST MASS: 0
HEMATOCHEZIA: 0
PARESTHESIAS: 0
CHILLS: 0
FREQUENCY: 0
JOINT SWELLING: 0
DYSURIA: 0
HEARTBURN: 0
NERVOUS/ANXIOUS: 1
ABDOMINAL PAIN: 0
SHORTNESS OF BREATH: 0
FEVER: 0
WEAKNESS: 0

## 2019-08-29 ASSESSMENT — MIFFLIN-ST. JEOR: SCORE: 1524.88

## 2019-08-29 ASSESSMENT — PATIENT HEALTH QUESTIONNAIRE - PHQ9
SUM OF ALL RESPONSES TO PHQ QUESTIONS 1-9: 4
10. IF YOU CHECKED OFF ANY PROBLEMS, HOW DIFFICULT HAVE THESE PROBLEMS MADE IT FOR YOU TO DO YOUR WORK, TAKE CARE OF THINGS AT HOME, OR GET ALONG WITH OTHER PEOPLE: SOMEWHAT DIFFICULT
SUM OF ALL RESPONSES TO PHQ QUESTIONS 1-9: 4

## 2019-08-29 NOTE — PROGRESS NOTES
[facility] [address]    [address]    4TH FLOOR    [facility] [address]    Phone:  [phone]    Fax:  [phone]       Thank You for choosing us for your health care visit. We are glad to serve you and happy to provide you with this summary of your visit. Please help us to ensure we have accurate records. If you find anything that needs to be changed, please let our staff know as soon as possible.          Your Demographic Information     Patient Name Sex DOB Wegener, Samantha A Female 1994       Ethnic Group Patient Race    Not of  or  Origin White      Your Visit Details     Date & Time Provider Department    2017 8:20 AM Cindi Phan MD [facility] [address]      Your To Do List     Follow-Up    Return if symptoms worsen or fail to improve.      Conditions Discussed Today or Order-Related Diagnoses        Comments    Postop check    -  Primary       Your Vitals Were     BP Height Weight LMP BMI Smoking Status    98/60 5' 8\" (1.727 m) 136 lb 4.8 oz (61.8 kg) 2016 (Exact Date) 20.72 kg/m2 Never Smoker      Medications Prescribed or Re-Ordered Today     None      Your Current Medications Are        Disp Refills Start End    fluconazole (DIFLUCAN) 100 MG tablet 30 tablet 3 2016     Sig - Route: Take 1 tablet by mouth daily. - Oral    Class: Eprescribe    Probiotic Product (PROBIOTIC DAILY PO)        Sig - Route: Take 1 tablet by mouth daily.  - Oral    Class: Historical Med      Discontinued Medications        Reason for Discontinue    HYDROcodone-acetaminophen (NORCO) 5-325 MG per tablet Therapy Completed    ketorolac (TORADOL) 10 MG tablet Therapy Completed      Allergies     No Known Allergies      Immunizations History as of 2017     Name Date    DTaP 2000, 10/15/1996, 1996, 1995, 10/17/1995    HIB 10/15/1996, 1996, 1995, 10/17/1995    Hepatitis A - Adult 2014    Hepatitis B Adolescent     SUBJECTIVE:   CC: Adrienne Ivory is an 53 year old woman who presents for preventive health visit.     Healthy Habits:     Getting at least 3 servings of Calcium per day:  Yes    Bi-annual eye exam:  Yes    Dental care twice a year:  NO    Sleep apnea or symptoms of sleep apnea:  None    Diet:  Regular (no restrictions)    Frequency of exercise:  6-7 days/week    Duration of exercise:  Greater than 60 minutes    Taking medications regularly:  Yes    Medication side effects:  Not applicable    PHQ-2 Total Score: 2    Additional concerns today:  No  hard time losing weight: working out everyday and eating right     Her mood is stable.  She has stopped Fluoxetine on her own and takes it just PRN, which she states works for her.  She rarely needs to use Ativan.              Today's PHQ-2 Score:   PHQ-2 ( 1999 Pfizer) 8/29/2019   Q1: Little interest or pleasure in doing things 1   Q2: Feeling down, depressed or hopeless 1   PHQ-2 Score 2   Q1: Little interest or pleasure in doing things Several days   Q2: Feeling down, depressed or hopeless Several days   PHQ-2 Score 2       Abuse: Current or Past(Physical, Sexual or Emotional)- No  Do you feel safe in your environment? Yes    Social History     Tobacco Use     Smoking status: Never Smoker     Smokeless tobacco: Never Used   Substance Use Topics     Alcohol use: No         Alcohol Use 8/29/2019   Prescreen: >3 drinks/day or >7 drinks/week? Not Applicable   Prescreen: >3 drinks/day or >7 drinks/week? -       Reviewed orders with patient.  Reviewed health maintenance and updated orders accordingly - Yes          Pertinent mammograms are reviewed under the imaging tab.  History of abnormal Pap smear: NO - age 30-65 PAP every 5 years with negative HPV co-testing recommended  PAP / HPV Latest Ref Rng & Units 1/24/2018 9/29/2016 3/3/2015   PAP - NIL NIL ASC-H(A)   HPV 16 DNA NEG:Negative Negative Negative -   HPV 18 DNA NEG:Negative Negative Negative -   OTHER HR HPV  "NEG:Negative Negative Positive(A) -     Reviewed and updated as needed this visit by clinical staff  Tobacco  Allergies  Meds  Med Hx  Surg Hx  Fam Hx  Soc Hx        Reviewed and updated as needed this visit by Provider            Review of Systems   Constitutional: Negative for chills and fever.   HENT: Negative for congestion, ear pain, hearing loss and sore throat.    Eyes: Negative for pain and visual disturbance.   Respiratory: Negative for cough and shortness of breath.    Cardiovascular: Negative for chest pain, palpitations and peripheral edema.   Gastrointestinal: Negative for abdominal pain, constipation, diarrhea, heartburn, hematochezia and nausea.   Breasts:  Negative for tenderness, breast mass and discharge.   Genitourinary: Negative for dysuria, frequency, genital sores, hematuria, pelvic pain, urgency, vaginal bleeding and vaginal discharge.   Musculoskeletal: Negative for arthralgias, joint swelling and myalgias.   Skin: Negative for rash.   Neurological: Positive for dizziness. Negative for weakness, headaches and paresthesias.   Psychiatric/Behavioral: Negative for mood changes. The patient is nervous/anxious.           OBJECTIVE:   /78   Pulse 86   Temp 98.4  F (36.9  C) (Oral)   Resp 18   Ht 1.651 m (5' 5\")   Wt 91.4 kg (201 lb 8 oz)   SpO2 98%   BMI 33.53 kg/m    Physical Exam  GENERAL: healthy, alert and no distress  EYES: Eyes grossly normal to inspection, PERRL and conjunctivae and sclerae normal  HENT: ear canals and TM's normal, nose and mouth without ulcers or lesions  NECK: no adenopathy, no asymmetry, masses, or scars and thyroid normal to palpation  RESP: lungs clear to auscultation - no rales, rhonchi or wheezes  BREAST: normal without masses, tenderness or nipple discharge and no palpable axillary masses or adenopathy  CV: regular rate and rhythm, normal S1 S2, no S3 or S4, no murmur, click or rub, no peripheral edema and peripheral pulses strong  ABDOMEN: soft, " 10/15/1996, 12/19/1995, 10/17/1995    MMR 6/20/2000, 10/15/1996    Meningococcal Conjugate MCV4P (Menactra) 7/21/2008    Polio, ORAL 6/20/2000, 2/20/1996, 12/19/1995, 10/17/1995    Tdap 7/21/2008    Varicella 8/25/1998      Problem List as of 1/16/2017     Barbara noe            Patient Instructions     None       "nontender, no hepatosplenomegaly, no masses and bowel sounds normal   (female): normal female external genitalia, normal urethral meatus, vaginal mucosa pink, moist, well rugated, and normal cervix/adnexa/uterus without masses or discharge  MS: no gross musculoskeletal defects noted, no edema  SKIN: no suspicious lesions or rashes  NEURO: Normal strength and tone, mentation intact and speech normal  PSYCH: mentation appears normal, affect normal/bright        ASSESSMENT/PLAN:   1. Routine general medical examination at a health care facility    - Pap imaged thin layer screen with HPV - recommended age 30 - 65 years (select HPV order below)  - HPV High Risk Types DNA Cervical  - Fecal colorectal cancer screen (FIT); Future  - Lipid panel reflex to direct LDL Fasting  - Glucose    2. Anxiety  Continue to take sparingly.   - ALPRAZolam (XANAX) 0.5 MG tablet; TAKE 1 TABLET BY MOUTH AS NEEDED. MAX OF 2 TABLETS PER WEEK.  Dispense: 10 tablet; Refill: 2    3. Major depressive disorder, recurrent episode, moderate (H)  In remission  - FLUoxetine (PROZAC) 40 MG capsule; Take 1 capsule (40 mg) by mouth daily  Dispense: 30 capsule; Refill: 12    4. Class 1 obesity without serious comorbidity with body mass index (BMI) of 30.0 to 30.9 in adult, unspecified obesity type  Referral to weight management clinic given.   - BARIATRIC ADULT REFERRAL    5. Need for shingles vaccine    - ZOSTER VACCINE RECOMBINANT ADJUVANTED IM NJX  - ADMIN 1st VACCINE    COUNSELING:  Reviewed preventive health counseling, as reflected in patient instructions    Estimated body mass index is 33.53 kg/m  as calculated from the following:    Height as of this encounter: 1.651 m (5' 5\").    Weight as of this encounter: 91.4 kg (201 lb 8 oz).    Weight management plan: Discussed healthy diet and exercise guidelines     reports that she has never smoked. She has never used smokeless tobacco.      Counseling Resources:  ATP IV Guidelines  Pooled Cohorts " Equation Calculator  Breast Cancer Risk Calculator  FRAX Risk Assessment  ICSI Preventive Guidelines  Dietary Guidelines for Americans, 2010  USDA's MyPlate  ASA Prophylaxis  Lung CA Screening    Kallie Ta, CAROL  [facility]

## 2019-08-29 NOTE — NURSING NOTE
Clinic Administered Medication Documentation    MEDICATION LIST:   Injectable Medication Documentation    Patient was given Shingrix. Prior to medication administration, verified patients identity using patient s name and date of birth. Patient instructed to remain in clinic for 15 minutes.    Prior to immunization administration, verified patients identity using patient s name and date of birth. Please see Immunization Activity for additional information.     Screening Questionnaire for Adult Immunization    Are you sick today?   No   Do you have allergies to medications, food, a vaccine component or latex?   No   Have you ever had a serious reaction after receiving a vaccination?   No   Do you have a long-term health problem with heart disease, lung disease, asthma, kidney disease, metabolic disease (e.g. diabetes), anemia, or other blood disorder?   No   Do you have cancer, leukemia, HIV/AIDS, or any other immune system problem?   No   In the past 3 months, have you taken medications that affect  your immune system, such as prednisone, other steroids, or anticancer drugs; drugs for the treatment of rheumatoid arthritis, Crohn s disease, or psoriasis; or have you had radiation treatments?   No   Have you had a seizure, or a brain or other nervous system problem?   No   During the past year, have you received a transfusion of blood or blood     products, or been given immune (gamma) globulin or antiviral drug?   No   For women: Are you pregnant or is there a chance you could become        pregnant during the next month?   No   Have you received any vaccinations in the past 4 weeks?   No     Immunization questionnaire answers were all negative.        Per orders of Kallie Ta, injection of Shingrix given by Codie Alves. Patient instructed to remain in clinic for 15 minutes afterwards, and to report any adverse reaction to me immediately.       Screening performed by Codie Alves on 8/29/2019 at 11:50 AM.

## 2019-08-29 NOTE — LETTER
September 6, 2019    Adrienne RAJ Dianelys  [address]  [place] [address]    Dear ,  This letter is regarding your recent Pap smear (cervical cancer screening) and Human Papillomavirus (HPV) test.  We are happy to inform you that your Pap smear result is normal. Cervical cancer is closely linked with certain types of HPV. Your results showed no evidence of high-risk HPV.  We recommend you have your next PAP smear and HPV test in 3 years.  You will still need to return to the clinic every year for an annual exam and other preventive tests.  If you have additional questions regarding this result, please call our registered nurse, Brianna at [phone].  Sincerely,    Kallie Ta NP /[facility]

## 2019-08-30 ASSESSMENT — PATIENT HEALTH QUESTIONNAIRE - PHQ9: SUM OF ALL RESPONSES TO PHQ QUESTIONS 1-9: 4

## 2019-09-03 LAB
COPATH REPORT: NORMAL
PAP: NORMAL

## 2019-09-16 ENCOUNTER — ALLIED HEALTH/NURSE VISIT (OUTPATIENT)
Dept: SURGERY | Facility: CLINIC | Age: 54
End: 2019-09-16
Attending: NURSE PRACTITIONER
Payer: COMMERCIAL

## 2019-09-16 ENCOUNTER — DOCUMENTATION ONLY (OUTPATIENT)
Dept: CARE COORDINATION | Facility: CLINIC | Age: 54
End: 2019-09-16

## 2019-09-16 ENCOUNTER — OFFICE VISIT (OUTPATIENT)
Dept: ENDOCRINOLOGY | Facility: CLINIC | Age: 54
End: 2019-09-16
Attending: NURSE PRACTITIONER
Payer: COMMERCIAL

## 2019-09-16 VITALS
SYSTOLIC BLOOD PRESSURE: 110 MMHG | WEIGHT: 202.3 LBS | HEART RATE: 53 BPM | DIASTOLIC BLOOD PRESSURE: 69 MMHG | BODY MASS INDEX: 33.7 KG/M2 | HEIGHT: 65 IN | OXYGEN SATURATION: 98 %

## 2019-09-16 DIAGNOSIS — E66.811 CLASS 1 OBESITY DUE TO EXCESS CALORIES WITHOUT SERIOUS COMORBIDITY IN ADULT, UNSPECIFIED BMI: Primary | ICD-10-CM

## 2019-09-16 DIAGNOSIS — E66.09 CLASS 1 OBESITY DUE TO EXCESS CALORIES WITHOUT SERIOUS COMORBIDITY IN ADULT, UNSPECIFIED BMI: Primary | ICD-10-CM

## 2019-09-16 ASSESSMENT — MIFFLIN-ST. JEOR: SCORE: 1528.51

## 2019-09-16 ASSESSMENT — PAIN SCALES - GENERAL: PAINLEVEL: NO PAIN (0)

## 2019-09-16 NOTE — PATIENT INSTRUCTIONS
"Nutrition Goals  1) Eat slowly (20-30 minutes per meal), chewing foods well (25 chews per bite/applesauce consistency)  2) 9\" Plate method (1/2 plate non-starchy vegetables/fruit, 1/4 plate lean protein, 1/4 plate whole grain starch - no more than 1/2 cup carb/meal)  3) Reduce caffeine/carbonation/calorie containing beverages - reduce by 50% (2 cups of tea/day)  4) Continue current activity regimen    Follow up with RD in one month    Kami Hernandez MS, RD, LD  If you need to schedule or reschedule with a dietitian please call [phone].  "

## 2019-09-16 NOTE — LETTER
"2019       RE: Adrienne Ivory  [address]  [place] [address]     Dear Colleague,    Thank you for referring your patient, Adrienne Ivory, to the [facility] at [facility]. Please see a copy of my visit note below.        New Medical Weight Management Consult    PATIENT:  Adrienne Ivory  MRN:         [id number]  :         1965  YIMI:         2019    Dear Kallie Ta NP,    I had the pleasure of seeing your patient, Adrienne Ivory.  Full intake/assessment done to determine barriers to weight loss success and develop a treatment plan.  Adrienne Ivory is a 53 year old female interested in treatment of medical problems associated with weight.  Her weight today is 202 lbs 4.8 oz, Body mass index is 33.66 kg/m ., and she has the following co-morbidities:     2019   I have the following co-morbidities associated with obesity: None of the above       Patient Goals Reviewed With Patient 2019   I am interested in attaining a healthier weight to diminish current health problems related to co-morbid conditions: No   I am interested in attaining a healthier weight in order to prevent future health problems: Yes       Referring Provider 2019   Please name the provider who referred you to Medical Weight Management.  If you do not know, please answer: \"I Don't Know\". Kallie Bledsoe       Wt Readings from Last 4 Encounters:   19 91.8 kg (202 lb 4.8 oz)   19 91.4 kg (201 lb 8 oz)   19 86.2 kg (190 lb)   18 87.1 kg (192 lb)       Weight History Reviewed With Patient 2019   How concerned are you about your weight? Somewhat Concerned   Would you describe your weight gain as gradual? No   I became overweight: As an Adult   The following factors have contributed to my weight gain:  After Stopping an Addictive Drug or Alcohol, Eating Too Much   I have tried the following methods to lose " weight: Watching Portions or Calories, Exercise   I have the following family history of obesity/being overweight:  My mother is overwieght, One or more of my siblings are overweight       Diet Recall Reviewed With Patient 9/13/2019   How many glasses of juice do you drink in a typical day? 0   How many of glasses of milk do you drink in a typical day? 0.5   How many 8oz glasses of sugar containing drinks such as Mayo-Aid/sweet tea do you drink in a day? 0   How many cans/bottles of diet pop/soda/tea or sports drink do you drink in a day? 4   How often do you have a drink of alcohol? Never       Eating Habits Reviewed With Patient 9/13/2019   Generally, my meals include foods like these: bread, pasta, rice, potatoes, corn, crackers, sweet dessert, pop, or juice. A Few Times a Week   Generally, my meals include foods like these: fried meats, brats, burgers, french fries, pizza, cheese, chips, or ice cream. Never   Eat at a buffet or sit-down restaurant. Never   Eat most of my meals in front of the TV or computer. Almost Everyday   Often skip meals, eat at random times, have no regular eating times. A Few Times a Week   Rarely sit down for a meal but snack or graze throughout.  Half of the Week   Eat extra snacks between meals. Almost Everyday   Eat most of my food at the end of the day. Almost Everyday   Eat in the middle of the night or wake up at night to eat. Never   Eat extra snacks to prevent or correct low blood sugar. Never   Eat to prevent acid reflux or stomach pain. Never   Worry about not having enough food to eat. Never   Have you been to the food shelf at least a few times this year? No   I eat when I am depressed, stressed, anxious, or bored. Everyday   I eat when I am happy or as a reward. Almost Everyday   I feel hungry all the time even if I just have eaten. Half of the Week   Feeling full is important to me. A Few Times a Week   Once I start eating, it is hard to stop. Never   I finish all the food  on my plate even if I am already full. Almost Everyday   I can't resist eating delicious food or walk past the good food/smell. A Few Times a Week   I eat/snack without noticing that I am eating. Almost Everyday   I eat when I am preparing the meal. Never   I eat more than usual when I see others eating. Never   I have trouble not eating sweets, ice cream, cookies, or chips if they are around the house. Everyday   I think about food all day. Half of the Week   What foods, if any, do you crave? Sweets/Candy/Chocolate   I feel out of control when eating. Almost Everyday   I eat a large amount of food, like a loaf of bread, a box of cookies, a pint/quart of ice cream, all at once. Never   I eat a large amount of food even when I am not hungry. Monthly   I eat rapidly. Almost Everyday   I eat alone because I feel embarrassed and do not want others to see how much I have eaten. Weekly   I eat until I am uncomfortably full. Monthly   I feel bad, disgusted, or guilty after I overeat. Almost Everyday   I make myself vomit what I have eaten or use laxatives to get rid of food. Never       Activity/Exercise History Reviewed With Patient 9/13/2019   How much of a typical 12 hour day do you spend sitting? Most of the Day   How much of a typical 12 hour day do you spend lying down? Less Than Half the Day   How much of a typical day do you spend walking/standing? Less Than Half the Day   How many hours (not including work) do you spend on the TV/Video Games/Computer/Tablet/Phone? 1 Hour or Less   How many times a week are you active for the purpose of exercise? 6-7 Times a Week   How many total minutes do you spend doing some activity for the purpose of exercising when you exercise? None       PAST MEDICAL HISTORY:  Past Medical History:   Diagnosis Date     Abnormal Pap smear, can't excl hi gd sq intraepithelial lesion (ASC-H) 3/2015    LSIL also     Anxiety state, unspecified      Diabetes (H)     Mother     Herpes simplex  without mention of complication      HSIL on Pap smear of cervix 7/2014    colp - WNL     Human papillomavirus in conditions classified elsewhere and of unspecified site 11/2010    + HPV 45 & 82 with ASCUS     Hx of colposcopy with cervical biopsy 11/08/16    [place] ECC neg.      Hypertension     father     PONV (postoperative nausea and vomiting)      Premenstrual tension syndromes        Work/Social History Reviewed With Patient 9/13/2019   My employment status is: Full-Time   My job is: Customer Service   How much of your job is spent on the computer or phone? 100%   What is your marital status? /In a Relationship   If in a relationship, is your significant other overweight? No   Do you have children? No   If you have children, are they overweight? No       Mental Health History Reviewed With Patient 9/13/2019   Have you ever been physically or sexually abused? No   How often in the past 2 weeks have you felt little interest or pleasure in doing things? Not at all   Over the past 2 weeks how often have you felt down, depressed, or hopeless? For Several Days       Sleep History Reviewed With Patient 9/13/2019   How many hours do you sleep at night? 6   Do you think that you snore loudly or has anybody ever heard you snore loudly (louder than talking or so loud it can be heard behind a shut door)? Yes   Has anyone seen or heard you stop breathing during your sleep? No   Do you often feel tired, fatigued, or sleepy during the day? No       MEDICATIONS:   Current Outpatient Medications   Medication Sig Dispense Refill     ALPRAZolam (XANAX) 0.5 MG tablet TAKE 1 TABLET BY MOUTH AS NEEDED. MAX OF 2 TABLETS PER WEEK. 10 tablet 2     FLUoxetine (PROZAC) 40 MG capsule Take 1 capsule (40 mg) by mouth daily 30 capsule 12     Multiple Vitamins-Minerals (MULTIVITAMIN PO) Take 1 tablet by mouth daily          ALLERGIES:   Allergies   Allergen Reactions     Anesthetic Ether      Codeine      Gets depressed and clogs he  "rthoughts       PHYSICAL EXAM:  /69 (BP Location: Left arm, Patient Position: Chair, Cuff Size: Adult Regular)   Pulse 53   Ht 1.651 m (5' 5\")   Wt 91.8 kg (202 lb 4.8 oz)   SpO2 98%   BMI 33.66 kg/m      A & O x 3  HEENT: NCAT, mucous membranes moist  Respirations unlabored  Location of obesity: Peripheral Obesity    ASSESSMENT:  Adrienne is a patient with mature onset obesity without significant element of familial/genetic influence and without current health consequences. She does not need aggressive weight loss plan due to less severe weight.      Adrienne Ivory eats a high carb diet.    Her problem is complicated by gender and short stature    Her ability to lose weight is impacted by lack of confidence.    PLAN:    Volumetrics eating plan  Meal planning - focus on no between meal snacking, aggressive lowering of starches and cheese    Programmatic/Healthy Living  Ancillary testing:  N/A.  Food Plan:  Volumetrics and High protein/low carbohydrate.  Activity Plan:  Activity journal.  Supplementary:   Dispel the popular myths about weight loss (eating many small meals, etc).    Medication:  Deferred    RTC:    12 weeks.  I spent 45 minutes with this patient face to face and explained the conditions and plans (more than 50% of time was counseling/coordination of weight management).    Sincerely,    Jose Vargas MD        "

## 2019-09-16 NOTE — PROGRESS NOTES
"New Weight Management Nutrition Consultation    Adrienne Ivory is a 53 year old female presents today for new weight management nutrition consultation.  Patient referred by Dr. Vargas on September 16, 2019.    Patient with Co-morbidities of obesity including:  Type II DM no  Renal Failure no  Sleep apnea no  Hypertension no   Dyslipidemia no  Joint pain no  Back pain no  GERD no     Anthropometrics:  Estimated body mass index is 33.66 kg/m  as calculated from the following:    Height as of an earlier encounter on 9/16/19: 1.651 m (5' 5\").    Weight as of an earlier encounter on 9/16/19: 91.8 kg (202 lb 4.8 oz).    MEDICATIONS FOR WEIGHT LOSS:  None at this time.    Nutrition history  See MD note for details.    Recent food recall:  Breakfast: protein shake (whey pro powder + 1/2 banana + 7 blueberries + cocoa powder + 1/4 cup yogurt)  Lunch: PB and jelly sandwich  Dinner: meat (1-2 oz) + vegetable  Snacks: hard boiled egg, 10-15 nuts  Beverages: water, sparkling water, 4-5 black tea with cream without sweetener  Dining out: 1x/week  Alcohol: None at this time.    Cravings: chocolate/sweets (stress eater)    PHYSICAL ACTIVITY:  Walking/cardio 6-7 days/week 60 minutes    MALNUTRITION  % Intake: No decreased intake noted  % Weight Loss: None noted  Subcutaneous Fat Loss: None observed  Muscle Loss: None observed  Fluid Accumulation/Edema: None noted  Malnutrition Diagnosis: Patient does not meet two of the above criteria necessary for diagnosing malnutrition    Nutrition Prescription  Recommended energy/nutrient modification.    Nutrition Diagnosis  Obesity r/t long history of self-monitoring deficit and excessive energy intake aeb BMI >30.    Nutrition Intervention  Materials/education provided on Volumetric eating to help satiety level on fewer calories; portion control and healthy food choices (Plate Method handout), sources of protein, and mindful eating. Encouraged pt to include more vegetables at her lunch " "meal and stick to only one serving of nuts/nut butter per day (so either eliminating her PBJ sandwich or the nuts as a snack). Discussed sources of protein she could replace PBJ sandwich with at her lunch meal. Also recommended reducing tea/caffeine throughout the day to help with hunger/satiety cues. Encouraged pt to find an alternative way to deal with stress rather than food/chocolate and provided examples. Encouraged her to continue activity and could try adding more strength/weight bearing activities. Provided pt with list of goals and RD contact information.     Patient Understanding: good  Expected Compliance: good  Follow-Up Plans: less nuts/nut butters, more vegetables at lunch with another source of protein (ex: dinner leftovers)     Nutrition Goals  1) Eat slowly (20-30 minutes per meal), chewing foods well (25 chews per bite/applesauce consistency)  2) 9\" Plate method (1/2 plate non-starchy vegetables/fruit, 1/4 plate lean protein, 1/4 plate whole grain starch - no more than 1/2 cup carb/meal)  3) Reduce caffeine/carbonation/calorie containing beverages - reduce by 50% (2 cups of tea/day)  4) Continue current activity regimen    Follow-Up:  1 month    Time spent with patient: 30 minutes.  Kami Hernandez, MS, RD, LD  "

## 2019-09-16 NOTE — NURSING NOTE
"Chief Complaint   Patient presents with     Weight Problem     new MWM pt       Vitals:    09/16/19 1020   BP: 110/69   BP Location: Left arm   Patient Position: Chair   Cuff Size: Adult Regular   Pulse: 53   SpO2: 98%   Weight: 91.8 kg (202 lb 4.8 oz)   Height: 1.651 m (5' 5\")       Body mass index is 33.66 kg/m .    NAEEM Cano    "

## 2019-09-16 NOTE — PROGRESS NOTES
"    New Medical Weight Management Consult    PATIENT:  Adrienne Ivory  MRN:         [id number]  :         1965  YIMI:         2019    Dear Kallie Ta NP,    I had the pleasure of seeing your patient, Adrienne Ivory.  Full intake/assessment done to determine barriers to weight loss success and develop a treatment plan.  Adrienne Ivory is a 53 year old female interested in treatment of medical problems associated with weight.  Her weight today is 202 lbs 4.8 oz, Body mass index is 33.66 kg/m ., and she has the following co-morbidities:     2019   I have the following co-morbidities associated with obesity: None of the above       Patient Goals Reviewed With Patient 2019   I am interested in attaining a healthier weight to diminish current health problems related to co-morbid conditions: No   I am interested in attaining a healthier weight in order to prevent future health problems: Yes       Referring Provider 2019   Please name the provider who referred you to Medical Weight Management.  If you do not know, please answer: \"I Don't Know\". Kallie Peterson Readings from Last 4 Encounters:   19 91.8 kg (202 lb 4.8 oz)   19 91.4 kg (201 lb 8 oz)   19 86.2 kg (190 lb)   18 87.1 kg (192 lb)       Weight History Reviewed With Patient 2019   How concerned are you about your weight? Somewhat Concerned   Would you describe your weight gain as gradual? No   I became overweight: As an Adult   The following factors have contributed to my weight gain:  After Stopping an Addictive Drug or Alcohol, Eating Too Much   I have tried the following methods to lose weight: Watching Portions or Calories, Exercise   I have the following family history of obesity/being overweight:  My mother is overwieght, One or more of my siblings are overweight       Diet Recall Reviewed With Patient 2019   How many glasses of juice do you drink in a typical day? 0   How " many of glasses of milk do you drink in a typical day? 0.5   How many 8oz glasses of sugar containing drinks such as Mayo-Aid/sweet tea do you drink in a day? 0   How many cans/bottles of diet pop/soda/tea or sports drink do you drink in a day? 4   How often do you have a drink of alcohol? Never       Eating Habits Reviewed With Patient 9/13/2019   Generally, my meals include foods like these: bread, pasta, rice, potatoes, corn, crackers, sweet dessert, pop, or juice. A Few Times a Week   Generally, my meals include foods like these: fried meats, brats, burgers, french fries, pizza, cheese, chips, or ice cream. Never   Eat at a buffet or sit-down restaurant. Never   Eat most of my meals in front of the TV or computer. Almost Everyday   Often skip meals, eat at random times, have no regular eating times. A Few Times a Week   Rarely sit down for a meal but snack or graze throughout.  Half of the Week   Eat extra snacks between meals. Almost Everyday   Eat most of my food at the end of the day. Almost Everyday   Eat in the middle of the night or wake up at night to eat. Never   Eat extra snacks to prevent or correct low blood sugar. Never   Eat to prevent acid reflux or stomach pain. Never   Worry about not having enough food to eat. Never   Have you been to the food shelf at least a few times this year? No   I eat when I am depressed, stressed, anxious, or bored. Everyday   I eat when I am happy or as a reward. Almost Everyday   I feel hungry all the time even if I just have eaten. Half of the Week   Feeling full is important to me. A Few Times a Week   Once I start eating, it is hard to stop. Never   I finish all the food on my plate even if I am already full. Almost Everyday   I can't resist eating delicious food or walk past the good food/smell. A Few Times a Week   I eat/snack without noticing that I am eating. Almost Everyday   I eat when I am preparing the meal. Never   I eat more than usual when I see others  eating. Never   I have trouble not eating sweets, ice cream, cookies, or chips if they are around the house. Everyday   I think about food all day. Half of the Week   What foods, if any, do you crave? Sweets/Candy/Chocolate   I feel out of control when eating. Almost Everyday   I eat a large amount of food, like a loaf of bread, a box of cookies, a pint/quart of ice cream, all at once. Never   I eat a large amount of food even when I am not hungry. Monthly   I eat rapidly. Almost Everyday   I eat alone because I feel embarrassed and do not want others to see how much I have eaten. Weekly   I eat until I am uncomfortably full. Monthly   I feel bad, disgusted, or guilty after I overeat. Almost Everyday   I make myself vomit what I have eaten or use laxatives to get rid of food. Never       Activity/Exercise History Reviewed With Patient 9/13/2019   How much of a typical 12 hour day do you spend sitting? Most of the Day   How much of a typical 12 hour day do you spend lying down? Less Than Half the Day   How much of a typical day do you spend walking/standing? Less Than Half the Day   How many hours (not including work) do you spend on the TV/Video Games/Computer/Tablet/Phone? 1 Hour or Less   How many times a week are you active for the purpose of exercise? 6-7 Times a Week   How many total minutes do you spend doing some activity for the purpose of exercising when you exercise? None       PAST MEDICAL HISTORY:  Past Medical History:   Diagnosis Date     Abnormal Pap smear, can't excl hi gd sq intraepithelial lesion (ASC-H) 3/2015    LSIL also     Anxiety state, unspecified      Diabetes (H)     Mother     Herpes simplex without mention of complication      HSIL on Pap smear of cervix 7/2014    colp - WNL     Human papillomavirus in conditions classified elsewhere and of unspecified site 11/2010    + HPV 45 & 82 with ASCUS     Hx of colposcopy with cervical biopsy 11/08/16    [facility] ECC neg.      Hypertension     father  "    PONV (postoperative nausea and vomiting)      Premenstrual tension syndromes        Work/Social History Reviewed With Patient 9/13/2019   My employment status is: Full-Time   My job is: Customer Service   How much of your job is spent on the computer or phone? 100%   What is your marital status? /In a Relationship   If in a relationship, is your significant other overweight? No   Do you have children? No   If you have children, are they overweight? No       Mental Health History Reviewed With Patient 9/13/2019   Have you ever been physically or sexually abused? No   How often in the past 2 weeks have you felt little interest or pleasure in doing things? Not at all   Over the past 2 weeks how often have you felt down, depressed, or hopeless? For Several Days       Sleep History Reviewed With Patient 9/13/2019   How many hours do you sleep at night? 6   Do you think that you snore loudly or has anybody ever heard you snore loudly (louder than talking or so loud it can be heard behind a shut door)? Yes   Has anyone seen or heard you stop breathing during your sleep? No   Do you often feel tired, fatigued, or sleepy during the day? No       MEDICATIONS:   Current Outpatient Medications   Medication Sig Dispense Refill     ALPRAZolam (XANAX) 0.5 MG tablet TAKE 1 TABLET BY MOUTH AS NEEDED. MAX OF 2 TABLETS PER WEEK. 10 tablet 2     FLUoxetine (PROZAC) 40 MG capsule Take 1 capsule (40 mg) by mouth daily 30 capsule 12     Multiple Vitamins-Minerals (MULTIVITAMIN PO) Take 1 tablet by mouth daily          ALLERGIES:   Allergies   Allergen Reactions     Anesthetic Ether      Codeine      Gets depressed and clogs he rthoughts       PHYSICAL EXAM:  /69 (BP Location: Left arm, Patient Position: Chair, Cuff Size: Adult Regular)   Pulse 53   Ht 1.651 m (5' 5\")   Wt 91.8 kg (202 lb 4.8 oz)   SpO2 98%   BMI 33.66 kg/m     A & O x 3  HEENT: NCAT, mucous membranes moist  Respirations unlabored  Location of " obesity: Peripheral Obesity    ASSESSMENT:  Adrienne is a patient with mature onset obesity without significant element of familial/genetic influence and without current health consequences. She does not need aggressive weight loss plan due to less severe weight.      Adrienne Ivory eats a high carb diet.    Her problem is complicated by gender and short stature    Her ability to lose weight is impacted by lack of confidence.    PLAN:    Volumetrics eating plan  Meal planning - focus on no between meal snacking, aggressive lowering of starches and cheese    Programmatic/Healthy Living  Ancillary testing:  N/A.  Food Plan:  Volumetrics and High protein/low carbohydrate.  Activity Plan:  Activity journal.  Supplementary:   Dispel the popular myths about weight loss (eating many small meals, etc).    Medication:  Deferred    RTC:    12 weeks.  I spent 45 minutes with this patient face to face and explained the conditions and plans (more than 50% of time was counseling/coordination of weight management).    Sincerely,    Jose Vargas MD

## 2019-11-04 ENCOUNTER — HEALTH MAINTENANCE LETTER (OUTPATIENT)
Age: 54
End: 2019-11-04

## 2020-01-06 ENCOUNTER — MYC MEDICAL ADVICE (OUTPATIENT)
Dept: FAMILY MEDICINE | Facility: CLINIC | Age: 55
End: 2020-01-06

## 2020-01-07 NOTE — TELEPHONE ENCOUNTER
I am not concerned.  If she has a resting heart rate during the day of <50, then she should follow up.

## 2020-01-07 NOTE — TELEPHONE ENCOUNTER
If it's only at night, that is typically not a concern.  Does she have symptoms of sleep apnea, such as daytime fatigue, not feeling rested when she wakes?

## 2020-01-07 NOTE — TELEPHONE ENCOUNTER
If for now you don't think she should worry about the low heart rate, she will continue on her path.  If he thinks my snoring is getting worse, she will reach out to you.

## 2020-05-10 ENCOUNTER — NURSE TRIAGE (OUTPATIENT)
Dept: NURSING | Facility: CLINIC | Age: 55
End: 2020-05-10

## 2020-05-10 NOTE — TELEPHONE ENCOUNTER
FNA triage call :   Presenting problem : Pt called. Scraped with right eyeball the N95 mask yesterday ,  Vision is blurry and having no fever but   Guideline used : Eye Injury A    Disposition and recommendations : Have someone drvie you to ED and Pt agrees to go to [place] and having someone drive her and advised to bring cell phone and charge  with her due to no visitor restrictions and put mask on on arrival ED  to protect , due to covid 19  Pandemic .   Caller verbalizes understanding and denies further questions and will call back if further symptoms to triage or questions  . Polly Muro RN  - [facility] Nurse Advisor     Reason for Disposition    Vision is blurred or lost in either eye    Additional Information    Negative: [1] Major bleeding (e.g., actively dripping or spurting) AND [2] can't be stopped    Negative: Knocked out (unconscious) > 1 minute    Negative: Difficult to awaken or acting confused (e.g., disoriented, slurred speech)    Negative: Severe neck pain    Negative: Sounds like a life-threatening emergency to the triager    Negative: Wound looks infected    Negative: Foreign body in the eye    Negative: Head injury is the primary problem    Negative: Neck injury is the primary problem    Protocols used: EYE INJURY-A-

## 2020-06-28 DIAGNOSIS — F41.9 ANXIETY: ICD-10-CM

## 2020-06-29 RX ORDER — ALPRAZOLAM 0.5 MG
TABLET ORAL
Qty: 10 TABLET | Refills: 1 | Status: SHIPPED | OUTPATIENT
Start: 2020-06-29 | End: 2020-12-30

## 2020-06-29 NOTE — TELEPHONE ENCOUNTER
Routing refill request to provider for review/approval because:  Drug not on the [facility] refill protocol      Last filled: 1/30/2020 #10 x 2 refills    Last visit: 8/29/2019

## 2020-11-09 ENCOUNTER — VIRTUAL VISIT (OUTPATIENT)
Dept: FAMILY MEDICINE | Facility: OTHER | Age: 55
End: 2020-11-09
Payer: COMMERCIAL

## 2020-11-09 DIAGNOSIS — Z20.822 SUSPECTED COVID-19 VIRUS INFECTION: Primary | ICD-10-CM

## 2020-11-09 PROCEDURE — 99421 OL DIG E/M SVC 5-10 MIN: CPT | Performed by: PHYSICIAN ASSISTANT

## 2020-11-09 NOTE — PROGRESS NOTES
"Date: 2020 11:55:42  Clinician: Anthony Toussaint  Clinician NPI: [id number]  Patient: Adrienne Ivory  Patient : 1965  Patient Address: [address] [place] [address]  Patient Phone: [phone]  Visit Protocol: URI  Patient Summary:  Adrienne is a 54 year old ( : 1965 ) female who initiated a [facility] Visit for COVID-19 (Coronavirus) evaluation and screening. When asked the question \"Please sign me up to receive news, health information and promotions from [facility].\", Adrienne responded \"No\".    When asked when her symptoms started, Adrienne reported that she does not have any symptoms.   She denies taking antibiotic medication in the past month and having recent facial or sinus surgery in the past 60 days.    Pertinent COVID-19 (Coronavirus) information  Adrienne does not work or volunteer as healthcare worker or a . In the past 14 days, Adrienne has not worked or volunteered at a healthcare facility or group living setting.   In the past 14 days, she also has not lived in a congregate living setting.   Adrienne has had a close contact with a laboratory-confirmed COVID-19 patient in the last 14 days. She was exposed at her work. Date Adrienne was exposed to the laboratory-confirmed COVID-19 patient: 2020   Additional information about contact with COVID-19 (Coronavirus) patient as reported by the patient (free text): Soua [employer]   Adrienne is not living in the same household with the COVID-19 positive patient. She was not in an enclosed space for greater than 15 minutes with the COVID-19 patient.   Since 2019, Adrienne has not been tested for COVID-19 and has not had upper respiratory infection or influenza-like illness.   Pertinent medical history  Adrienne does not get yeast infections when she takes antibiotics.   Adrienne needs a return to work/school note.   Weight: 200 lbs   Adrienne does not smoke or use smokeless tobacco.   Weight: 200 lbs    MEDICATIONS: fluoxetine " oral, ALLERGIES: NKDA  Clinician Response:  Dear Adrienne,   Based on your exposure to COVID-19 (coronavirus), we would like to test you for this virus.  1. Please call [phone] to schedule your visit. Explain that you were referred by [facility] to have a COVID-19 test. Be ready to share your [facility] visit ID number.  * If you need to schedule in [facility] please call [phone] or for [employer] employees please call [phone].   * If you need to schedule in the [place] area please call [phone]. [employer] employees call [phone].   The following will serve as your written order for this COVID Test, ordered by me, for the indication of suspected COVID [Z20.828]: The test will be ordered in [facility], our electronic health record, after you are scheduled. It will show as ordered and authorized by Miguel Ángel Klein MD.  Order: COVID-19 (coronavirus) PCR for ASYMPTOMATIC EXPOSURE testing from [facility].   If you know you have had close contact with someone who tested positive, you should be quarantined for 14 days after this exposure. You should stay in quarantine for the14 days even if the covid test is negative, the optimal time to test after exposure is 5-7 days from the exposure  Quarantine means   What should I do?  For safety, it's very important to follow these rules. Do this for 14 days after the date you were last exposed to the virus..  Stay home and away from others. Don't go to school or anywhere else. Generally quarantine means staying home from work but there are some exceptions to this. Please contact your workplace.   No hugging, kissing or shaking hands.  Don't let anyone visit.  Cover your mouth and nose with a mask, tissue or washcloth to avoid spreading germs.  Wash your hands and face often. Use soap and water.  What are the symptoms of COVID-19?  The most common symptoms are cough, fever and trouble breathing. Less common symptoms include headache, body aches, fatigue (feeling very tired), chills,  sore throat, stuffy or runny nose, diarrhea (loose poop), loss of taste or smell, belly pain, and nausea or vomiting (feeling sick to your stomach or throwing up).  After 14 days, if you have still don't have symptoms, you likely don't have this virus.  If you develop symptoms, follow these guidelines.  If you're normally healthy: Please start another [facility] visit to report your symptoms. Go to [email or web address].  If you have a serious health problem (like cancer, heart failure, an organ transplant or kidney disease): Call your specialty clinic. Let them know that you might have COVID-19.  2. When it's time for your COVID test:  Stay at least 6 feet away from others. (If someone will drive you to your test, stay in the backseat, as far away from the  as you can.)  Cover your mouth and nose with a mask, tissue or washcloth.  Go straight to the testing site. Don't make any stops on the way there or back.  Please note  Caregivers in these groups are at risk for severe illness due to COVID-19:  o People 65 years and older  o People who live in a nursing home or long-term care facility  o People with chronic disease (lung, heart, cancer, diabetes, kidney, liver, immunologic)  o People who have a weakened immune system, including those who:  Are in cancer treatment  Take medicine that weakens the immune system, such as corticosteroids  Had a bone marrow or organ transplant  Have an immune deficiency  Have poorly controlled HIV or AIDS  Are obese (body mass index of 40 or higher)  Smoke regularly  Where can I get more information?  [facility] -- About COVID-19: [email or web address]  CDC -- What to Do If You're Sick: www.cdc.gov/coronavirus/2019-ncov/about/steps-when-sick.html  CDC -- Ending Home Isolation: www.cdc.gov/coronavirus/2019-ncov/hcp/disposition-in-home-patients.html  CDC -- Caring for Someone: www.cdc.gov/coronavirus/2019-ncov/if-you-are-sick/care-for-someone.html  [facility] -- Interim Guidance for  Hospital Discharge to Home: www.health.[facility].[place]/diseases/coronavirus/hcp/hospdischarge.pdf  [facility] clinical trials (COVID-19 research studies): clinicalaffairs.[facility]/n-clinical-trials  Below are the COVID-19 hotlines at the [place] Department of Health ([facility]). Interpreters are available.  For health questions: Call 497-587-3532 or 1-567.714.4371 (7 a.m. to 7 p.m.)  For questions about schools and childcare: Call 334-149-7212 or 1-946.825.7554 (7 a.m. to 7 p.m.)    Diagnosis: Contact with and (suspected) exposure to other viral communicable diseases  Diagnosis ICD: Z20.828

## 2020-11-22 ENCOUNTER — HEALTH MAINTENANCE LETTER (OUTPATIENT)
Age: 55
End: 2020-11-22

## 2020-11-27 ENCOUNTER — TELEPHONE (OUTPATIENT)
Dept: GASTROENTEROLOGY | Facility: CLINIC | Age: 55
End: 2020-11-27

## 2020-11-27 DIAGNOSIS — Z11.59 ENCOUNTER FOR SCREENING FOR OTHER VIRAL DISEASES: Primary | ICD-10-CM

## 2020-11-27 NOTE — TELEPHONE ENCOUNTER
1st schedule attempt    Procedure: Lower Endoscopy    Lower Endoscopy Type: Colonoscopy    Purpose of Colonoscopy Procedure: Screening    Colonoscopy Sedation: Conscious/Moderate    Preferred Location: [facility]    Scheduling Instructions: If you have not heard from the scheduling office within 2 business days, please call [phone].           Associated Diagnoses    Special screening for malignant neoplasms, colon [Z12.11]  - Primary

## 2020-12-02 DIAGNOSIS — Z11.59 ENCOUNTER FOR SCREENING FOR OTHER VIRAL DISEASES: ICD-10-CM

## 2020-12-02 LAB
LABORATORY COMMENT REPORT: NORMAL
SARS-COV-2 RNA SPEC QL NAA+PROBE: NEGATIVE
SARS-COV-2 RNA SPEC QL NAA+PROBE: NORMAL
SPECIMEN SOURCE: NORMAL
SPECIMEN SOURCE: NORMAL

## 2020-12-02 PROCEDURE — U0003 INFECTIOUS AGENT DETECTION BY NUCLEIC ACID (DNA OR RNA); SEVERE ACUTE RESPIRATORY SYNDROME CORONAVIRUS 2 (SARS-COV-2) (CORONAVIRUS DISEASE [COVID-19]), AMPLIFIED PROBE TECHNIQUE, MAKING USE OF HIGH THROUGHPUT TECHNOLOGIES AS DESCRIBED BY CMS-2020-01-R: HCPCS | Performed by: INTERNAL MEDICINE

## 2020-12-04 ENCOUNTER — HOSPITAL ENCOUNTER (OUTPATIENT)
Facility: AMBULATORY SURGERY CENTER | Age: 55
Discharge: HOME | End: 2020-12-04
Attending: INTERNAL MEDICINE | Admitting: INTERNAL MEDICINE
Payer: COMMERCIAL

## 2020-12-04 VITALS
TEMPERATURE: 97.4 F | SYSTOLIC BLOOD PRESSURE: 120 MMHG | HEART RATE: 90 BPM | WEIGHT: 199 LBS | DIASTOLIC BLOOD PRESSURE: 78 MMHG | HEIGHT: 66 IN | RESPIRATION RATE: 16 BRPM | BODY MASS INDEX: 31.98 KG/M2 | OXYGEN SATURATION: 98 %

## 2020-12-04 LAB — COLONOSCOPY: NORMAL

## 2020-12-04 PROCEDURE — 88305 TISSUE EXAM BY PATHOLOGIST: CPT | Performed by: PATHOLOGY

## 2020-12-04 PROCEDURE — 45380 COLONOSCOPY AND BIOPSY: CPT | Mod: 33

## 2020-12-04 RX ORDER — ONDANSETRON 2 MG/ML
4 INJECTION INTRAMUSCULAR; INTRAVENOUS
Status: DISCONTINUED | OUTPATIENT
Start: 2020-12-04 | End: 2020-12-05 | Stop reason: HOSPADM

## 2020-12-04 RX ORDER — DIPHENHYDRAMINE HYDROCHLORIDE 50 MG/ML
INJECTION INTRAMUSCULAR; INTRAVENOUS PRN
Status: DISCONTINUED | OUTPATIENT
Start: 2020-12-04 | End: 2020-12-04 | Stop reason: HOSPADM

## 2020-12-04 RX ORDER — SIMETHICONE
LIQUID (ML) MISCELLANEOUS PRN
Status: DISCONTINUED | OUTPATIENT
Start: 2020-12-04 | End: 2020-12-04 | Stop reason: HOSPADM

## 2020-12-04 RX ORDER — FENTANYL CITRATE 50 UG/ML
INJECTION, SOLUTION INTRAMUSCULAR; INTRAVENOUS PRN
Status: DISCONTINUED | OUTPATIENT
Start: 2020-12-04 | End: 2020-12-04 | Stop reason: HOSPADM

## 2020-12-04 RX ORDER — LIDOCAINE 40 MG/G
CREAM TOPICAL
Status: DISCONTINUED | OUTPATIENT
Start: 2020-12-04 | End: 2020-12-05 | Stop reason: HOSPADM

## 2020-12-04 ASSESSMENT — MIFFLIN-ST. JEOR: SCORE: 1524.41

## 2020-12-07 LAB — COPATH REPORT: NORMAL

## 2020-12-15 DIAGNOSIS — F33.1 MAJOR DEPRESSIVE DISORDER, RECURRENT EPISODE, MODERATE (H): ICD-10-CM

## 2020-12-15 NOTE — RESULT ENCOUNTER NOTE
Adrienne,  During your colonoscopy we removed a polyp.  This polyp was assessed by our pathologists and found to be a hyperplastic polyp.  This type of  polyp is completely innocent and have no potential to become cancer even if left in the colon.  This polyp was removed completely anyway.    This information would not change the timing of your follow up colonoscopy which is determined by your risk.  If you are average risk (no family history of history of colon cancer, adenomas, or serrated polyps) you do not need a colonoscopy for 10 years.    This information has been communicated to your usual physician or any doctor that directly referred you to see me.       Moises Pride MD    [facility]   Division of Gastroenterology, Hepatology, and Nutrition

## 2020-12-15 NOTE — LETTER
December 27, 2020      Adrienne Ivory  [address]  [place] [address]        Saranya Deleon,      We received a refill request for FLUoxetine (PROZAC) 40 MG capsule; however, you are due for a visit to receive a refill. Please call us at [phone] at your earliest convenience to schedule an appointment.       We greatly appreciate the opportunity to serve you and thank you for trusting us with your health care.        Your health care team at [facility]             Sincerely,        Kallie Ta NP

## 2020-12-17 NOTE — TELEPHONE ENCOUNTER
Sent [email or web address]. Please call and assist with an appointment. Pt has not been seen since August of 2019 and medication was filled for 1 year at that time. Pt would be out by now. Shari Roque RN

## 2020-12-27 RX ORDER — FLUOXETINE 40 MG/1
40 CAPSULE ORAL DAILY
Qty: 30 CAPSULE | Refills: 0 | Status: SHIPPED | OUTPATIENT
Start: 2020-12-27 | End: 2021-01-26

## 2020-12-27 NOTE — TELEPHONE ENCOUNTER
LM to call back and schedule a visit for further refills. Sent [employer] message and letter.      Nneka SINGLETON  sally Lindsey      Routing to nurse team due to pending medication.

## 2020-12-29 DIAGNOSIS — F41.9 ANXIETY: ICD-10-CM

## 2020-12-30 RX ORDER — ALPRAZOLAM 0.5 MG
TABLET ORAL
Qty: 10 TABLET | Refills: 0 | Status: SHIPPED | OUTPATIENT
Start: 2020-12-30 | End: 2021-06-28

## 2020-12-30 NOTE — TELEPHONE ENCOUNTER
Routing refill request to provider for review/approval because:  Drug not on the [facility] refill protocol     Last filled: 6/29/2020 #10 x 1 refill    Last visit: 8/29/2019    Thank you

## 2021-02-13 ENCOUNTER — HEALTH MAINTENANCE LETTER (OUTPATIENT)
Age: 56
End: 2021-02-13

## 2021-06-28 ENCOUNTER — OFFICE VISIT (OUTPATIENT)
Dept: FAMILY MEDICINE | Facility: CLINIC | Age: 56
End: 2021-06-28
Payer: COMMERCIAL

## 2021-06-28 VITALS
DIASTOLIC BLOOD PRESSURE: 74 MMHG | RESPIRATION RATE: 16 BRPM | WEIGHT: 212 LBS | HEART RATE: 77 BPM | BODY MASS INDEX: 34.07 KG/M2 | HEIGHT: 66 IN | OXYGEN SATURATION: 99 % | SYSTOLIC BLOOD PRESSURE: 112 MMHG | TEMPERATURE: 97 F

## 2021-06-28 DIAGNOSIS — F33.1 MAJOR DEPRESSIVE DISORDER, RECURRENT EPISODE, MODERATE (H): ICD-10-CM

## 2021-06-28 DIAGNOSIS — K21.9 GASTROESOPHAGEAL REFLUX DISEASE WITHOUT ESOPHAGITIS: ICD-10-CM

## 2021-06-28 DIAGNOSIS — F41.9 ANXIETY: ICD-10-CM

## 2021-06-28 DIAGNOSIS — Z00.00 ROUTINE GENERAL MEDICAL EXAMINATION AT A HEALTH CARE FACILITY: Primary | ICD-10-CM

## 2021-06-28 LAB
HCV AB SERPL QL IA: NONREACTIVE
HIV 1+2 AB+HIV1 P24 AG SERPL QL IA: NONREACTIVE

## 2021-06-28 PROCEDURE — 90472 IMMUNIZATION ADMIN EACH ADD: CPT | Performed by: NURSE PRACTITIONER

## 2021-06-28 PROCEDURE — 84443 ASSAY THYROID STIM HORMONE: CPT | Performed by: NURSE PRACTITIONER

## 2021-06-28 PROCEDURE — 86803 HEPATITIS C AB TEST: CPT | Performed by: NURSE PRACTITIONER

## 2021-06-28 PROCEDURE — 87389 HIV-1 AG W/HIV-1&-2 AB AG IA: CPT | Performed by: NURSE PRACTITIONER

## 2021-06-28 PROCEDURE — 80061 LIPID PANEL: CPT | Performed by: NURSE PRACTITIONER

## 2021-06-28 PROCEDURE — 90471 IMMUNIZATION ADMIN: CPT | Performed by: NURSE PRACTITIONER

## 2021-06-28 PROCEDURE — 99214 OFFICE O/P EST MOD 30 MIN: CPT | Mod: 25 | Performed by: NURSE PRACTITIONER

## 2021-06-28 PROCEDURE — 82947 ASSAY GLUCOSE BLOOD QUANT: CPT | Performed by: NURSE PRACTITIONER

## 2021-06-28 PROCEDURE — 90715 TDAP VACCINE 7 YRS/> IM: CPT | Performed by: NURSE PRACTITIONER

## 2021-06-28 PROCEDURE — 36415 COLL VENOUS BLD VENIPUNCTURE: CPT | Performed by: NURSE PRACTITIONER

## 2021-06-28 PROCEDURE — 99396 PREV VISIT EST AGE 40-64: CPT | Mod: 25 | Performed by: NURSE PRACTITIONER

## 2021-06-28 PROCEDURE — 90750 HZV VACC RECOMBINANT IM: CPT | Performed by: NURSE PRACTITIONER

## 2021-06-28 RX ORDER — FLUOXETINE 40 MG/1
CAPSULE ORAL
Qty: 90 CAPSULE | Refills: 3 | Status: SHIPPED | OUTPATIENT
Start: 2021-06-28 | End: 2021-06-28

## 2021-06-28 RX ORDER — ALPRAZOLAM 0.5 MG
TABLET ORAL
Qty: 10 TABLET | Refills: 0 | Status: SHIPPED | OUTPATIENT
Start: 2021-06-28 | End: 2021-11-12

## 2021-06-28 ASSESSMENT — ENCOUNTER SYMPTOMS
PALPITATIONS: 0
MYALGIAS: 0
BREAST MASS: 0
NERVOUS/ANXIOUS: 0
DIARRHEA: 0
HEMATOCHEZIA: 0
JOINT SWELLING: 0
ARTHRALGIAS: 0
EYE PAIN: 0
CONSTIPATION: 0
HEMATURIA: 0
CHILLS: 0
COUGH: 0
FEVER: 0
HEADACHES: 0
NAUSEA: 0
DYSURIA: 0
SORE THROAT: 1
WEAKNESS: 0
HEARTBURN: 0
ABDOMINAL PAIN: 0
PARESTHESIAS: 0
SHORTNESS OF BREATH: 0
FREQUENCY: 0
DIZZINESS: 0

## 2021-06-28 ASSESSMENT — MIFFLIN-ST. JEOR: SCORE: 1578.38

## 2021-06-28 NOTE — NURSING NOTE
Prior to immunization administration, verified patients identity using patient s name and date of birth. Please see Immunization Activity for additional information.     Screening Questionnaire for Adult Immunization    Are you sick today?   No   Do you have allergies to medications, food, a vaccine component or latex?   No   Have you ever had a serious reaction after receiving a vaccination?   No   Do you have a long-term health problem with heart, lung, kidney, or metabolic disease (e.g., diabetes), asthma, a blood disorder, no spleen, complement component deficiency, a cochlear implant, or a spinal fluid leak?  Are you on long-term aspirin therapy?   No   Do you have cancer, leukemia, HIV/AIDS, or any other immune system problem?   No   Do you have a parent, brother, or sister with an immune system problem?   No   In the past 3 months, have you taken medications that affect  your immune system, such as prednisone, other steroids, or anticancer drugs; drugs for the treatment of rheumatoid arthritis, Crohn s disease, or psoriasis; or have you had radiation treatments?   No   Have you had a seizure, or a brain or other nervous system problem?   No   During the past year, have you received a transfusion of blood or blood    products, or been given immune (gamma) globulin or antiviral drug?   No   For women: Are you pregnant or is there a chance you could become       pregnant during the next month?   No   Have you received any vaccinations in the past 4 weeks?   No     Immunization questionnaire answers were all negative.        Per orders of Dr. Ta, injection of Tdap, Shingrix given by Mariela Carbajal. Patient instructed to remain in clinic for 15 minutes afterwards, and to report any adverse reaction to me immediately.       Screening performed by Mariela Carbajal on 6/28/2021 at 8:56 AM.

## 2021-06-28 NOTE — PROGRESS NOTES
SUBJECTIVE:   CC: Adrienne Ivory is an 55 year old woman who presents for preventive health visit.       Patient has been advised of split billing requirements and indicates understanding: Yes  Healthy Habits:     Getting at least 3 servings of Calcium per day:  Yes    Bi-annual eye exam:  NO    Dental care twice a year:  NO    Sleep apnea or symptoms of sleep apnea:  None    Diet:  Regular (no restrictions)    Frequency of exercise:  6-7 days/week    Duration of exercise:  45-60 minutes    Taking medications regularly:  Yes    Medication side effects:  None    PHQ-2 Total Score: 2    Additional concerns today:  Yes       Feels like she has something caught in her throat at times.  Occasional burping.  She has gained weight.    She has only been taking her Fluoxetine as needed.  She is still having some anxiety.  She is having a difficult time trying to eliminate sugar from her diet.         Today's PHQ-2 Score:   PHQ-2 ( 1999 Pfizer) 6/28/2021   Q1: Little interest or pleasure in doing things 1   Q2: Feeling down, depressed or hopeless 1   PHQ-2 Score 2   Q1: Little interest or pleasure in doing things Several days   Q2: Feeling down, depressed or hopeless Several days   PHQ-2 Score 2       Abuse: Current or Past (Physical, Sexual or Emotional) - No  Do you feel safe in your environment? Yes    Have you ever done Advance Care Planning? (For example, a Health Directive, POLST, or a discussion with a medical provider or your loved ones about your wishes): No, advance care planning information given to patient to review.  Patient declined advance care planning discussion at this time.    Social History     Tobacco Use     Smoking status: Never Smoker     Smokeless tobacco: Never Used   Substance Use Topics     Alcohol use: No     If you drink alcohol do you typically have >3 drinks per day or >7 drinks per week? No    Alcohol Use 6/28/2021   Prescreen: >3 drinks/day or >7 drinks/week? Not Applicable   Prescreen:  >3 drinks/day or >7 drinks/week? -   No flowsheet data found.    Reviewed orders with patient.  Reviewed health maintenance and updated orders accordingly - Yes      Breast Cancer Screening:    Breast CA Risk Assessment (FHS-7) 6/28/2021   Do you have a family history of breast, colon, or ovarian cancer? No / Unknown           Pertinent mammograms are reviewed under the imaging tab.    History of abnormal Pap smear: NO - age 30-65 PAP every 5 years with negative HPV co-testing recommended  PAP / HPV Latest Ref Rng & Units 8/29/2019 1/24/2018 9/29/2016   PAP - NIL NIL NIL   HPV 16 DNA NEG:Negative Negative Negative Negative   HPV 18 DNA NEG:Negative Negative Negative Negative   OTHER HR HPV NEG:Negative Negative Negative Positive(A)     Reviewed and updated as needed this visit by clinical staff  Tobacco  Allergies  Meds   Med Hx  Surg Hx  Fam Hx  Soc Hx        Reviewed and updated as needed this visit by Provider                    Review of Systems   Constitutional: Negative for chills and fever.   HENT: Positive for sore throat. Negative for congestion, ear pain and hearing loss.    Eyes: Negative for pain and visual disturbance.   Respiratory: Negative for cough and shortness of breath.    Cardiovascular: Negative for chest pain, palpitations and peripheral edema.   Gastrointestinal: Negative for abdominal pain, constipation, diarrhea, heartburn, hematochezia and nausea.   Breasts:  Negative for tenderness, breast mass and discharge.   Genitourinary: Negative for dysuria, frequency, genital sores, hematuria, pelvic pain, urgency, vaginal bleeding and vaginal discharge.   Musculoskeletal: Negative for arthralgias, joint swelling and myalgias.   Neurological: Negative for dizziness, weakness, headaches and paresthesias.   Psychiatric/Behavioral: Negative for mood changes. The patient is not nervous/anxious.           OBJECTIVE:   /74 (BP Location: Right arm, Patient Position: Chair, Cuff Size: Adult  "Large)   Pulse 77   Temp 97  F (36.1  C) (Tympanic)   Resp 16   Ht 1.676 m (5' 6\")   Wt 96.2 kg (212 lb)   SpO2 99%   BMI 34.22 kg/m    Physical Exam  GENERAL: healthy, alert and no distress  EYES: Eyes grossly normal to inspection, PERRL and conjunctivae and sclerae normal  HENT: ear canals and TM's normal, nose and mouth without ulcers or lesions  NECK: no adenopathy, no asymmetry, masses, or scars and thyroid normal to palpation  RESP: lungs clear to auscultation - no rales, rhonchi or wheezes  CV: regular rate and rhythm, normal S1 S2, no S3 or S4, no murmur, click or rub, no peripheral edema and peripheral pulses strong  ABDOMEN: soft, nontender, no hepatosplenomegaly, no masses and bowel sounds normal  MS: no gross musculoskeletal defects noted, no edema  SKIN: no suspicious lesions or rashes  NEURO: Normal strength and tone, mentation intact and speech normal  PSYCH: mentation appears normal, affect normal/bright        ASSESSMENT/PLAN:   (Z00.00) Routine general medical examination at a health care facility  (primary encounter diagnosis)  Comment:   Plan: Glucose, Lipid panel reflex to direct LDL         Fasting, TSH with free T4 reflex, HIV Antigen         Antibody Combo, Hepatitis C Screen Reflex to         HCV RNA Quant and Genotype            (F33.1) Major depressive disorder, recurrent episode, moderate (H)  Comment:   Plan: FLUoxetine (PROZAC) 20 MG capsule,         DISCONTINUED: FLUoxetine (PROZAC) 40 MG capsule        Discussed taking fluoxetine daily.  Will lower dose to 20 mg.    (F41.9) Anxiety  Comment:   Plan: ALPRAZolam (XANAX) 0.5 MG tablet        See above.     (K21.9) Gastroesophageal reflux disease without esophagitis  Comment: globus  Plan: Will check TSH, thyroid exam is normal.  May be also related to anxiety.  Most likely GERD.  Discussed dietary modifications, weight loss.  May try Famotidine.     Patient has been advised of split billing requirements and indicates " "understanding:   COUNSELING:  Reviewed preventive health counseling, as reflected in patient instructions    Estimated body mass index is 34.22 kg/m  as calculated from the following:    Height as of this encounter: 1.676 m (5' 6\").    Weight as of this encounter: 96.2 kg (212 lb).    Weight management plan: Discussed healthy diet and exercise guidelines    She reports that she has never smoked. She has never used smokeless tobacco.      Counseling Resources:  ATP IV Guidelines  Pooled Cohorts Equation Calculator  Breast Cancer Risk Calculator  BRCA-Related Cancer Risk Assessment: FHS-7 Tool  FRAX Risk Assessment  ICSI Preventive Guidelines  Dietary Guidelines for Americans, 2010  USDA's MyPlate  ASA Prophylaxis  Lung CA Screening    Kallie Ta NP  [facility]  "

## 2021-06-29 LAB
CHOLEST SERPL-MCNC: 260 MG/DL
GLUCOSE SERPL-MCNC: 91 MG/DL (ref 70–99)
HDLC SERPL-MCNC: 73 MG/DL
LDLC SERPL CALC-MCNC: 165 MG/DL
NONHDLC SERPL-MCNC: 187 MG/DL
TRIGL SERPL-MCNC: 108 MG/DL
TSH SERPL DL<=0.005 MIU/L-ACNC: 2.23 MU/L (ref 0.4–4)

## 2021-09-19 ENCOUNTER — HEALTH MAINTENANCE LETTER (OUTPATIENT)
Age: 56
End: 2021-09-19

## 2021-11-22 ENCOUNTER — IMMUNIZATION (OUTPATIENT)
Dept: NURSING | Facility: CLINIC | Age: 56
End: 2021-11-22
Payer: COMMERCIAL

## 2021-11-22 PROCEDURE — 90682 RIV4 VACC RECOMBINANT DNA IM: CPT

## 2021-11-22 PROCEDURE — 90471 IMMUNIZATION ADMIN: CPT

## 2021-11-22 PROCEDURE — 91300 PR COVID VAC PFIZER DIL RECON 30 MCG/0.3 ML IM: CPT

## 2021-11-22 PROCEDURE — 0004A PR COVID VAC PFIZER DIL RECON 30 MCG/0.3 ML IM: CPT

## 2022-01-19 ENCOUNTER — MYC MEDICAL ADVICE (OUTPATIENT)
Dept: FAMILY MEDICINE | Facility: CLINIC | Age: 57
End: 2022-01-19
Payer: COMMERCIAL

## 2022-03-02 DIAGNOSIS — Z82.49 FAMILY HISTORY OF ISCHEMIC HEART DISEASE: Primary | ICD-10-CM

## 2022-03-08 ENCOUNTER — APPOINTMENT (OUTPATIENT)
Dept: URGENT CARE | Facility: CLINIC | Age: 57
End: 2022-03-08
Payer: COMMERCIAL

## 2022-04-07 NOTE — TELEPHONE ENCOUNTER
Routing refill request to provider for review/approval because:  PHQ-9 > 4 and patient overdue for follow up via [employer], per 5/23/17 office visit note    Nini-Please sign if agree. Writer sent a [employer] message asking patient to clarify daily dose and complete PHQ-9 questionnaire.    Thank you!  SUHA KramerN, RN       The saphenous vein graft was selectively engaged, injected and visualized  Multiple views of the injected vessel were taken   TO OM2

## 2022-04-12 ENCOUNTER — IMMUNIZATION (OUTPATIENT)
Dept: NURSING | Facility: CLINIC | Age: 57
End: 2022-04-12
Payer: COMMERCIAL

## 2022-04-12 PROCEDURE — 0054A COVID-19,PF,PFIZER (12+ YRS): CPT

## 2022-04-12 PROCEDURE — 91305 COVID-19,PF,PFIZER (12+ YRS): CPT

## 2022-05-27 DIAGNOSIS — F41.9 ANXIETY: ICD-10-CM

## 2022-05-27 RX ORDER — ALPRAZOLAM 0.5 MG
TABLET ORAL
Qty: 10 TABLET | Refills: 5 | Status: SHIPPED | OUTPATIENT
Start: 2022-05-27 | End: 2022-11-08

## 2022-05-27 NOTE — TELEPHONE ENCOUNTER
Routing refill request to provider for review/approval because:  Drug not on the [facility] refill protocol     Last Written Prescription Date:  11/15/21  Last Fill Quantity: 10,  # refills: 5   Last office visit: 3/2/2022 with prescribing provider:  Kallie Ta   Future Office Visit:      Shelley Shabazz RN  [facility]

## 2022-07-11 ENCOUNTER — ANCILLARY PROCEDURE (OUTPATIENT)
Dept: MAMMOGRAPHY | Facility: CLINIC | Age: 57
End: 2022-07-11
Attending: NURSE PRACTITIONER
Payer: COMMERCIAL

## 2022-07-11 DIAGNOSIS — Z12.31 VISIT FOR SCREENING MAMMOGRAM: ICD-10-CM

## 2022-07-11 PROCEDURE — 77067 SCR MAMMO BI INCL CAD: CPT | Mod: 26 | Performed by: RADIOLOGY

## 2022-07-11 PROCEDURE — 77067 SCR MAMMO BI INCL CAD: CPT

## 2022-08-15 ENCOUNTER — VIRTUAL VISIT (OUTPATIENT)
Dept: FAMILY MEDICINE | Facility: CLINIC | Age: 57
End: 2022-08-15
Payer: COMMERCIAL

## 2022-08-15 DIAGNOSIS — F90.0 ATTENTION DEFICIT HYPERACTIVITY DISORDER (ADHD), PREDOMINANTLY INATTENTIVE TYPE: Primary | ICD-10-CM

## 2022-08-15 PROCEDURE — 99213 OFFICE O/P EST LOW 20 MIN: CPT | Mod: GT | Performed by: FAMILY MEDICINE

## 2022-08-15 RX ORDER — DEXTROAMPHETAMINE SACCHARATE, AMPHETAMINE ASPARTATE, DEXTROAMPHETAMINE SULFATE AND AMPHETAMINE SULFATE 1.25; 1.25; 1.25; 1.25 MG/1; MG/1; MG/1; MG/1
5 TABLET ORAL 2 TIMES DAILY
Qty: 60 TABLET | Refills: 0 | Status: SHIPPED | OUTPATIENT
Start: 2022-08-15 | End: 2022-09-14

## 2022-08-15 ASSESSMENT — ANXIETY QUESTIONNAIRES
5. BEING SO RESTLESS THAT IT IS HARD TO SIT STILL: NOT AT ALL
7. FEELING AFRAID AS IF SOMETHING AWFUL MIGHT HAPPEN: SEVERAL DAYS
2. NOT BEING ABLE TO STOP OR CONTROL WORRYING: SEVERAL DAYS
7. FEELING AFRAID AS IF SOMETHING AWFUL MIGHT HAPPEN: SEVERAL DAYS
IF YOU CHECKED OFF ANY PROBLEMS ON THIS QUESTIONNAIRE, HOW DIFFICULT HAVE THESE PROBLEMS MADE IT FOR YOU TO DO YOUR WORK, TAKE CARE OF THINGS AT HOME, OR GET ALONG WITH OTHER PEOPLE: NOT DIFFICULT AT ALL
8. IF YOU CHECKED OFF ANY PROBLEMS, HOW DIFFICULT HAVE THESE MADE IT FOR YOU TO DO YOUR WORK, TAKE CARE OF THINGS AT HOME, OR GET ALONG WITH OTHER PEOPLE?: NOT DIFFICULT AT ALL
GAD7 TOTAL SCORE: 6
3. WORRYING TOO MUCH ABOUT DIFFERENT THINGS: SEVERAL DAYS
4. TROUBLE RELAXING: SEVERAL DAYS
GAD7 TOTAL SCORE: 6
6. BECOMING EASILY ANNOYED OR IRRITABLE: SEVERAL DAYS
1. FEELING NERVOUS, ANXIOUS, OR ON EDGE: SEVERAL DAYS
GAD7 TOTAL SCORE: 6

## 2022-08-15 ASSESSMENT — PATIENT HEALTH QUESTIONNAIRE - PHQ9
10. IF YOU CHECKED OFF ANY PROBLEMS, HOW DIFFICULT HAVE THESE PROBLEMS MADE IT FOR YOU TO DO YOUR WORK, TAKE CARE OF THINGS AT HOME, OR GET ALONG WITH OTHER PEOPLE: NOT DIFFICULT AT ALL
SUM OF ALL RESPONSES TO PHQ QUESTIONS 1-9: 5
SUM OF ALL RESPONSES TO PHQ QUESTIONS 1-9: 5

## 2022-08-15 NOTE — PROGRESS NOTES
Adrienne is a 56 year old who is being evaluated via a billable video visit.      How would you like to obtain your AVS? MyChart  If the video visit is dropped, the invitation should be resent by: Text to cell phone: [phone]     Will anyone else be joining your video visit? No          Assessment & Plan     Attention deficit hyperactivity disorder (ADHD), predominantly inattentive type  -Diagnosed about 10 years ago. On pt's problem list since 2009.  -Discussed trial of low dose adderall twice a day. Reviewed side effects.   -Follow-up in 4 weeks for monitoring with me  -Discussed CSA, if medication is effective and pt chooses to continue will have pt sign CSA with PCP. Pt has in person appt in 6 weeks with PCP.  - amphetamine-dextroamphetamine (ADDERALL) 5 MG tablet  Dispense: 60 tablet; Refill: 0    No follow-ups on file.   Follow-up Visit   Expected date:  Sep 15, 2022 (Approximate)      Follow Up Appointment Details:     Follow-up with whom?: Me    Follow-Up for what?: Other (Office Visit)    Additional Details: ADHD follow-up    How?: Video                    Kb Obrien DO  [facility]    Subjective   Adrienne is a 56 year old, presenting for the following health issues:  No chief complaint on file.    PCP is Kallie Ta NP.    Diagnosed with ADHD about 10 years ago, never tried medication.  Has coworker recently diagnosed with ADHD and started medication, pt noticed significant change.   Thinks that some of her depression symptoms may related to ADHD.  Complex customer service.  Worrying more about things than starting them.  Home not as organized.  Feels like sometimes brain is blocked.  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   Feels like she is mostly functional at work and home.  Interested in trial of stimulant medication.    A.D.H.D    History of Present Illness       Reason for visit:  ADHD    She eats 2-3 servings of fruits and vegetables daily.She consumes 0 sweetened  beverage(s) daily.She exercises with enough effort to increase her heart rate 30 to 60 minutes per day.  She exercises with enough effort to increase her heart rate 5 days per week.   She is taking medications regularly.    Today's PHQ-9         PHQ-9 Total Score: 5    PHQ-9 Q9 Thoughts of better off dead/self-harm past 2 weeks :   Not at all    How difficult have these problems made it for you to do your work, take care of things at home, or get along with other people: Not difficult at all  Today's ZBIGNIEW-7 Score: 6     Review of Systems         Objective           Vitals:  No vitals were obtained today due to virtual visit.    Physical Exam   GENERAL: Healthy, alert and no distress  RESP: No audible wheeze, cough, or visible cyanosis.  No visible retractions or increased work of breathing.    PSYCH: Mentation appears normal, affect normal/bright, judgement and insight intact, normal speech and appearance well-groomed.        Video-Visit Details    Video Start Time: 8:02 AM    Type of service:  Video Visit    Video End Time:8:02 AM    Originating Location (pt. Location): Home    Distant Location (provider location):  [facility]     Platform used for Video Visit: [facility]    .  ..

## 2022-08-20 ENCOUNTER — HEALTH MAINTENANCE LETTER (OUTPATIENT)
Age: 57
End: 2022-08-20

## 2022-09-19 ENCOUNTER — VIRTUAL VISIT (OUTPATIENT)
Dept: FAMILY MEDICINE | Facility: CLINIC | Age: 57
End: 2022-09-19
Attending: FAMILY MEDICINE
Payer: COMMERCIAL

## 2022-09-19 DIAGNOSIS — F90.0 ATTENTION DEFICIT HYPERACTIVITY DISORDER (ADHD), PREDOMINANTLY INATTENTIVE TYPE: Primary | ICD-10-CM

## 2022-09-19 DIAGNOSIS — F43.21 SITUATIONAL DEPRESSION: ICD-10-CM

## 2022-09-19 PROCEDURE — 99214 OFFICE O/P EST MOD 30 MIN: CPT | Mod: GT | Performed by: FAMILY MEDICINE

## 2022-09-19 RX ORDER — DEXTROAMPHETAMINE SACCHARATE, AMPHETAMINE ASPARTATE, DEXTROAMPHETAMINE SULFATE AND AMPHETAMINE SULFATE 1.25; 1.25; 1.25; 1.25 MG/1; MG/1; MG/1; MG/1
5 TABLET ORAL 2 TIMES DAILY
Qty: 60 TABLET | Refills: 0 | Status: SHIPPED | OUTPATIENT
Start: 2022-09-19 | End: 2022-10-19

## 2022-09-19 RX ORDER — FLUOXETINE 10 MG/1
10 TABLET, FILM COATED ORAL DAILY
Qty: 90 TABLET | Refills: 3 | Status: SHIPPED | OUTPATIENT
Start: 2022-09-19 | End: 2022-11-08

## 2022-09-19 RX ORDER — DEXTROAMPHETAMINE SACCHARATE, AMPHETAMINE ASPARTATE, DEXTROAMPHETAMINE SULFATE AND AMPHETAMINE SULFATE 1.25; 1.25; 1.25; 1.25 MG/1; MG/1; MG/1; MG/1
5 TABLET ORAL 2 TIMES DAILY
COMMUNITY
End: 2022-09-19

## 2022-09-19 NOTE — PROGRESS NOTES
Adrienne is a 56 year old who is being evaluated via a billable video visit.      How would you like to obtain your AVS? MyChart  If the video visit is dropped, the invitation should be resent by: Send to e-mail at: [email or web address]  Will anyone else be joining your video visit? No          Assessment & Plan     Attention deficit hyperactivity disorder (ADHD), predominantly inattentive type  -Stable continue with Adderall 5 mg BID prn  -Has visit with PCP in Nov. CSA and UDS with PCP then. Further refills and medication management with PCP.  - reviewed, no concerning activity  - amphetamine-dextroamphetamine (ADDERALL) 5 MG tablet  Dispense: 60 tablet; Refill: 0    Situational depression  -Discussed restarting fluoxetine at lower dosage  -Follow-up as scheduled with PCP for monitoring in about 6 weeks  - FLUoxetine (PROZAC) 10 MG tablet  Dispense: 90 tablet; Refill: 3      Kb Obrien DO  [facility]    Subjective   Adrienne is a 56 year old, presenting for the following health issues:  Medication Follow-up    Pt here to follow-up on starting medication for ADHD.   Partner with multiple myeloma. Has not been as focused with spouses office visits. Overall feels like the adderall 5 mg twice day is working. Wants to know if it is okay to skip doses if she does not need attention and focus.     Hx of depression. Stopped fluoxetine a few months back as she was doing better. States that mood has worsened slightly with spouses health issues. Would like to go back on fluoxetine. Was on 20 mg daily, states made her a bit mentally foggy but did help with mood.     History of Present Illness       Reason for visit:  Talk about the Aderall    She eats 2-3 servings of fruits and vegetables daily.She consumes 0 sweetened beverage(s) daily.She exercises with enough effort to increase her heart rate 20 to 29 minutes per day.  She exercises with enough effort to increase her heart rate 4 days per week.    She is taking medications regularly.       Medication Followup of Adderall and Prozac    Taking Medication as prescribed: NO- was concerned about taking the 2 together and unsure if she could take both at the same time    Side Effects:  None    Medication Helping Symptoms:  yes      Review of Systems         Objective           Vitals:  No vitals were obtained today due to virtual visit.    Physical Exam   GENERAL: Healthy, alert and no distress  EYES: Eyes grossly normal to inspection.  No discharge or erythema, or obvious scleral/conjunctival abnormalities.  RESP: No audible wheeze, cough, or visible cyanosis.  No visible retractions or increased work of breathing.    SKIN: Visible skin clear. No significant rash, abnormal pigmentation or lesions.  NEURO: Cranial nerves grossly intact.  Mentation and speech appropriate for age.  PSYCH: Mentation appears normal, affect normal/bright, judgement and insight intact, normal speech and appearance well-groomed.        Video-Visit Details    Video Start Time: 7:41 AM    Type of service:  Video Visit    Video End Time:7:41 AM    Originating Location (pt. Location): Home    Distant Location (provider location):  [facility]     Platform used for Video Visit: [employer]

## 2022-10-02 ENCOUNTER — NURSE TRIAGE (OUTPATIENT)
Dept: NURSING | Facility: CLINIC | Age: 57
End: 2022-10-02

## 2022-10-03 ENCOUNTER — VIRTUAL VISIT (OUTPATIENT)
Dept: URGENT CARE | Facility: CLINIC | Age: 57
End: 2022-10-03
Payer: COMMERCIAL

## 2022-10-03 DIAGNOSIS — U07.1 CLINICAL DIAGNOSIS OF COVID-19: Primary | ICD-10-CM

## 2022-10-03 PROCEDURE — 99213 OFFICE O/P EST LOW 20 MIN: CPT | Mod: CS

## 2022-10-03 NOTE — TELEPHONE ENCOUNTER
Coronavirus (COVID-19) Notification    Caller Name (Patient, parent, daughter/son, grandparent, etc)  Patient     Reason for call  Notify of Positive Coronavirus (COVID-19) lab results, assess symptoms,  review [facility] recommendations          Gather patient reported symptoms   Assessment   Current Symptoms at time of phone call, reported by patient: (if no symptoms, document No symptoms] Fever, cough    Date of Symptom(s) onset (if applicable) 9/30/22   Medical history: Anxiety, ADHD, depression   If at time of call, Patients symptoms hare worsened, the Patient should contact 911 or have someone drive them to Emergency Dept promptly:      If Patient calling 911, inform 911 personal that you have tested positive for the Coronavirus (COVID-19).  Place mask on and await 911 to arrive.    If Emergency Dept, If possible, please have another adult drive you to the Emergency Dept but you need to wear mask when in contact with other people.      Monoclonal Antibody Administration    You may be eligible to receive a new treatment with a monoclonal antibody for preventing hospitalization in patients at high risk for complications from COVID-19. This medication is still experimental and available on a limited basis; it is given through an IV and must be given at an infusion center. Please note that not all people who are eligible will receive the medication since it is in limited supply.  Is the patient symptomatic and onset of symptoms within the last 7 days?  Yes  Is the patient interested in a visit with a provider to discuss treatment options?: Yes  Is the patient seen at [facility]?  No: Warm transfer caller to [phone] to be scheduled with a virtual urgent provider.  During transfer, instruct  on appropriate time frame for visit     Review information with Patient    Your result was positive. This means you have COVID-19 (coronavirus).      How can I protect others?    These guidelines  are for isolating before returning to work, school or .       If you DO have symptoms:  o Stay home and away from others  - For at least 5 days after your symptoms started, AND   - You are fever free for 24 hours (with no medicine that reduces fever), AND  - Your other symptoms are better.  o Wear a mask for 10 full days any time you are around others.    If you DON'T have symptoms:  o Stay at home and away from others for at least 5 days after your positive test.  o Wear a mask for 10 full days any time you are around others.    There may be different guidelines for healthcare facilities. Please check with the specific sites before arriving.     If you've been told by a doctor that you were severely ill with COVID-19 or are immunocompromised, you should isolate for at least 10 days.    You should not go back to work until you meet the guidelines above for ending your home isolation. You don't need to be retested for COVID-19 before going back to work--studies show that you won't spread the virus if it's been at least 10 days since your symptoms started (or 20 days, if you have a weak immune system).    Employers, schools, and daycares: This is an official notice for this person's medical guidelines for returning in-person. They must meet the above guidelines before going back to work, school, or  in person.    You will receive a positive COVID-19 letter via [employer] or the mail soon with additional self-care information.      Would you like me to review some of that information with you now?  No    If you were tested for an upcoming procedure, please contact your provider for next steps.     Ruth Jacobo RN

## 2022-10-03 NOTE — PROGRESS NOTES
"Adrienne is a 56 year old who is being evaluated via a billable phone visit.      Sx started on Saturday.  Tested + last night.  COVID vaccinated and boosted.  Fever.  Poor sleep.    Assessment & Plan     Clinical diagnosis of COVID-19    - nirmatrelvir and ritonavir (PAXLOVID) therapy pack; Take 3 tablets by mouth 2 times daily for 5 days (Take 2 Nirmatrelvir tablets and 1 Ritonavir tablet twice daily for 5 days)[id number]}     COVID-19 positive patient.  Encounter for consideration of medication intervention. Patient does qualify for a prescription. Full discussion with patient including medication options, risks and benefits. Potential drug interactions reviewed with patient.     Treatment Planned Paxlovid RX sent to [facility] [address]    Temporary change to home medications:   Hold alprazolam while on Paxlovid x 5 days.    Estimated body mass index is 35.35 kg/m  as calculated from the following:    Height as of 6/28/21: 1.676 m (5' 6\").    Weight as of 3/2/22: 99.3 kg (219 lb).  GFR Estimate   Date Value Ref Range Status   09/28/2007 82 >60 mL/min/1.7m2 Final     Emiliana Mcmanus MD  Virtual Urgent Care  [facility] VIRTUAL [facility]RGENT [facility]ARE    Subjective   Adrienne is a 56 year old, presenting for the following health issues:  No chief complaint on file.      HPI     Sx started on Saturday.  Tested + last night.  COVID vaccinated and boosted.  Fever.  Poor sleep.      Review of Systems   Constitutional, HEENT, cardiovascular, pulmonary, GI, , musculoskeletal, neuro, skin, endocrine and psych systems are negative, except as otherwise noted.      Objective           Vitals:  No vitals were obtained today due to virtual visit.    Physical Exam   GENERAL: Healthy, alert and no distress  PSYCH: mentation appears normal and affect normal/bright    Phone duration #10 minutes.  "

## 2022-11-08 ENCOUNTER — OFFICE VISIT (OUTPATIENT)
Dept: FAMILY MEDICINE | Facility: CLINIC | Age: 57
End: 2022-11-08
Payer: COMMERCIAL

## 2022-11-08 VITALS
DIASTOLIC BLOOD PRESSURE: 79 MMHG | OXYGEN SATURATION: 96 % | WEIGHT: 229 LBS | BODY MASS INDEX: 38.15 KG/M2 | RESPIRATION RATE: 16 BRPM | SYSTOLIC BLOOD PRESSURE: 113 MMHG | TEMPERATURE: 97.9 F | HEART RATE: 82 BPM | HEIGHT: 65 IN

## 2022-11-08 DIAGNOSIS — F33.1 MAJOR DEPRESSIVE DISORDER, RECURRENT EPISODE, MODERATE (H): ICD-10-CM

## 2022-11-08 DIAGNOSIS — Z12.4 CERVICAL CANCER SCREENING: ICD-10-CM

## 2022-11-08 DIAGNOSIS — E66.01 MORBID OBESITY (H): ICD-10-CM

## 2022-11-08 DIAGNOSIS — Z00.00 ROUTINE GENERAL MEDICAL EXAMINATION AT A HEALTH CARE FACILITY: Primary | ICD-10-CM

## 2022-11-08 DIAGNOSIS — F41.9 ANXIETY: ICD-10-CM

## 2022-11-08 PROCEDURE — 87624 HPV HI-RISK TYP POOLED RSLT: CPT | Performed by: NURSE PRACTITIONER

## 2022-11-08 PROCEDURE — 99214 OFFICE O/P EST MOD 30 MIN: CPT | Mod: 25 | Performed by: NURSE PRACTITIONER

## 2022-11-08 PROCEDURE — G0145 SCR C/V CYTO,THINLAYER,RESCR: HCPCS | Performed by: NURSE PRACTITIONER

## 2022-11-08 PROCEDURE — 99396 PREV VISIT EST AGE 40-64: CPT | Mod: 25 | Performed by: NURSE PRACTITIONER

## 2022-11-08 PROCEDURE — 96127 BRIEF EMOTIONAL/BEHAV ASSMT: CPT | Performed by: NURSE PRACTITIONER

## 2022-11-08 PROCEDURE — 90682 RIV4 VACC RECOMBINANT DNA IM: CPT | Performed by: NURSE PRACTITIONER

## 2022-11-08 PROCEDURE — 90471 IMMUNIZATION ADMIN: CPT | Performed by: NURSE PRACTITIONER

## 2022-11-08 RX ORDER — SEMAGLUTIDE 0.25 MG/.5ML
0.25 INJECTION, SOLUTION SUBCUTANEOUS WEEKLY
Qty: 2 ML | Refills: 0 | Status: SHIPPED | OUTPATIENT
Start: 2022-11-08 | End: 2023-02-27

## 2022-11-08 RX ORDER — ALPRAZOLAM 0.5 MG
TABLET ORAL
Qty: 10 TABLET | Refills: 5 | Status: SHIPPED | OUTPATIENT
Start: 2022-11-08 | End: 2024-01-03

## 2022-11-08 ASSESSMENT — ENCOUNTER SYMPTOMS
PALPITATIONS: 0
WEAKNESS: 0
FREQUENCY: 0
NAUSEA: 0
HEMATOCHEZIA: 0
COUGH: 0
EYE PAIN: 0
HEADACHES: 0
DYSURIA: 0
HEARTBURN: 0
JOINT SWELLING: 0
CHILLS: 0
HEMATURIA: 0
ABDOMINAL PAIN: 0
PARESTHESIAS: 0
NERVOUS/ANXIOUS: 0
DIZZINESS: 0
SHORTNESS OF BREATH: 0
ARTHRALGIAS: 0
MYALGIAS: 0
DIARRHEA: 0
FEVER: 0
SORE THROAT: 0
CONSTIPATION: 0

## 2022-11-08 ASSESSMENT — PATIENT HEALTH QUESTIONNAIRE - PHQ9
SUM OF ALL RESPONSES TO PHQ QUESTIONS 1-9: 13
SUM OF ALL RESPONSES TO PHQ QUESTIONS 1-9: 13
10. IF YOU CHECKED OFF ANY PROBLEMS, HOW DIFFICULT HAVE THESE PROBLEMS MADE IT FOR YOU TO DO YOUR WORK, TAKE CARE OF THINGS AT HOME, OR GET ALONG WITH OTHER PEOPLE: NOT DIFFICULT AT ALL

## 2022-11-08 ASSESSMENT — PAIN SCALES - GENERAL: PAINLEVEL: NO PAIN (0)

## 2022-11-08 NOTE — PROGRESS NOTES
"   SUBJECTIVE:   CC: Adrienne is an 56 year old who presents for preventive health visit.   Patient has been advised of split billing requirements and indicates understanding: Yes  Healthy Habits:     Getting at least 3 servings of Calcium per day:  Yes    Bi-annual eye exam:  NO    Dental care twice a year:  NO    Sleep apnea or symptoms of sleep apnea:  None    Diet:  Regular (no restrictions)    Frequency of exercise:  2-3 days/week    Duration of exercise:  30-45 minutes    Taking medications regularly:  No    Barriers to taking medications:  Side effects    Medication side effects:  Lightheadedness    PHQ-2 Total Score: 4    Additional concerns today:  No  She has been struggling with several stressors in her life.  Her significant other recently had a bone marrow transplant, has been hospitalized.  Her cat, who was very much a mainstay of support,  suddenly 3 weeks ago.  She \"hates\" her job; struggles with any meaningful self-care.  She would very much like to see a therapist, saw one for awhile, but it wasn't a good fit.  She would prefer to see someone more in her own age cohort.   She is currently not on medication, fluoxetine caused dizziness.  She is needing Xanax infrequently.      She is frustrated regarding weight gain.  She has tried calorie counting in past.    Today's PHQ-2 Score:   PHQ-2 (  [employer]) 2022   Q1: Little interest or pleasure in doing things 2   Q2: Feeling down, depressed or hopeless 2   PHQ-2 Score 4   PHQ-2 Total Score (12-17 Years)- Positive if 3 or more points; Administer PHQ-A if positive -   Q1: Little interest or pleasure in doing things More than half the days   Q2: Feeling down, depressed or hopeless More than half the days   PHQ-2 Score 4     Answers for HPI/ROS submitted by the patient on 2022  If you checked off any problems, how difficult have these problems made it for you to do your work, take care of things at home, or get along with other people?: Not " difficult at all  PHQ9 TOTAL SCORE: 13      Abuse: Current or Past (Physical, Sexual or Emotional) - No  Do you feel safe in your environment? Yes        Social History     Tobacco Use     Smoking status: Never     Smokeless tobacco: Never   Substance Use Topics     Alcohol use: No     If you drink alcohol do you typically have >3 drinks per day or >7 drinks per week? No    Alcohol Use 11/8/2022   Prescreen: >3 drinks/day or >7 drinks/week? Not Applicable   Prescreen: >3 drinks/day or >7 drinks/week? -       Reviewed orders with patient.  Reviewed health maintenance and updated orders accordingly - Yes      Breast Cancer Screening:    Breast CA Risk Assessment (FHS-7) 6/28/2021   Do you have a family history of breast, colon, or ovarian cancer? No / Unknown           Pertinent mammograms are reviewed under the imaging tab.    History of abnormal Pap smear: NO - age 30-65 PAP every 5 years with negative HPV co-testing recommended  PAP / HPV Latest Ref Rng & Units 8/29/2019 1/24/2018 9/29/2016   PAP (Historical) - NIL NIL NIL   HPV16 NEG:Negative Negative Negative Negative   HPV18 NEG:Negative Negative Negative Negative   HRHPV NEG:Negative Negative Negative Positive(A)     Reviewed and updated as needed this visit by clinical staff   Tobacco  Allergies  Meds   Med Hx  Surg Hx  Fam Hx  Soc Hx        Reviewed and updated as needed this visit by Provider                     Review of Systems   Constitutional: Negative for chills and fever.   HENT: Negative for congestion, ear pain, hearing loss and sore throat.    Eyes: Negative for pain and visual disturbance.   Respiratory: Negative for cough and shortness of breath.    Cardiovascular: Negative for chest pain, palpitations and peripheral edema.   Gastrointestinal: Negative for abdominal pain, constipation, diarrhea, heartburn, hematochezia and nausea.   Genitourinary: Negative for dysuria, frequency, genital sores, hematuria and urgency.   Musculoskeletal:  "Negative for arthralgias, joint swelling and myalgias.   Skin: Negative for rash.   Neurological: Negative for dizziness, weakness, headaches and paresthesias.   Psychiatric/Behavioral: Negative for mood changes. The patient is not nervous/anxious.           OBJECTIVE:   /79   Pulse 82   Temp 97.9  F (36.6  C) (Temporal)   Resp 16   Ht 1.651 m (5' 5\")   Wt 103.9 kg (229 lb)   LMP  (LMP Unknown)   SpO2 96%   BMI 38.11 kg/m    Physical Exam  GENERAL: healthy, alert and no distress  EYES: Eyes grossly normal to inspection, PERRL and conjunctivae and sclerae normal  HENT: ear canals and TM's normal, nose and mouth without ulcers or lesions  NECK: no adenopathy, no asymmetry, masses, or scars and thyroid normal to palpation  RESP: lungs clear to auscultation - no rales, rhonchi or wheezes  BREAST: normal without masses, tenderness or nipple discharge and no palpable axillary masses or adenopathy  CV: regular rate and rhythm, normal S1 S2, no S3 or S4, no murmur, click or rub, no peripheral edema and peripheral pulses strong  ABDOMEN: soft, nontender, no hepatosplenomegaly, no masses and bowel sounds normal   (female): normal female external genitalia, normal urethral meatus, vaginal mucosa pink, moist, well rugated, and normal cervix/adnexa/uterus without masses or discharge  MS: no gross musculoskeletal defects noted, no edema  SKIN: no suspicious lesions or rashes  NEURO: Normal strength and tone, mentation intact and speech normal  PSYCH: mentation appears normal, affect teary        ASSESSMENT/PLAN:   (Z00.00) Routine general medical examination at a health care facility  (primary encounter diagnosis)  Comment:   Plan: Lipid panel reflex to direct LDL Fasting,         Glucose            (Z12.4) Cervical cancer screening  Comment:   Plan: Pap Screen with HPV - recommended age 30 - 65         years            (F33.1) Major depressive disorder, recurrent episode, moderate (H)  Comment:   Plan: Adult " "Mental Health  Referral        Will have her meet with Josse Murray.    (F41.9) Anxiety  Comment: stable  Plan: ALPRAZolam (XANAX) 0.5 MG tablet        Takes sparingly.     (E66.01) Morbid obesity (H)  Comment:   Plan: Semaglutide-Weight Management (WEGOVY) 0.25         MG/0.5ML SOAJ        Discussed the use and indication of this medication as well as potential side effects.  Follow up in one month via e-visit.             COUNSELING:  Reviewed preventive health counseling, as reflected in patient instructions    Estimated body mass index is 38.11 kg/m  as calculated from the following:    Height as of this encounter: 1.651 m (5' 5\").    Weight as of this encounter: 103.9 kg (229 lb).    Weight management plan: see above    She reports that she has never smoked. She has never used smokeless tobacco.      Counseling Resources:  ATP IV Guidelines  Pooled Cohorts Equation Calculator  Breast Cancer Risk Calculator  BRCA-Related Cancer Risk Assessment: FHS-7 Tool  FRAX Risk Assessment  ICSI Preventive Guidelines  Dietary Guidelines for Americans, 2010  USDA's MyPlate  ASA Prophylaxis  Lung CA Screening    Kallie Ta NP  [facility]  "

## 2022-11-10 LAB
BKR LAB AP GYN ADEQUACY: NORMAL
BKR LAB AP GYN INTERPRETATION: NORMAL
BKR LAB AP HPV REFLEX: NORMAL
BKR LAB AP PREVIOUS ABNORMAL: NORMAL
PATH REPORT.COMMENTS IMP SPEC: NORMAL
PATH REPORT.COMMENTS IMP SPEC: NORMAL
PATH REPORT.RELEVANT HX SPEC: NORMAL

## 2022-11-14 LAB
HUMAN PAPILLOMA VIRUS 16 DNA: NEGATIVE
HUMAN PAPILLOMA VIRUS 18 DNA: NEGATIVE
HUMAN PAPILLOMA VIRUS FINAL DIAGNOSIS: NORMAL
HUMAN PAPILLOMA VIRUS OTHER HR: NEGATIVE

## 2022-11-20 ENCOUNTER — MYC MEDICAL ADVICE (OUTPATIENT)
Dept: FAMILY MEDICINE | Facility: CLINIC | Age: 57
End: 2022-11-20

## 2022-11-20 DIAGNOSIS — E66.01 MORBID OBESITY (H): ICD-10-CM

## 2022-11-22 RX ORDER — SEMAGLUTIDE 1.34 MG/ML
INJECTION, SOLUTION SUBCUTANEOUS
Qty: 4.5 ML | Refills: 1 | Status: SHIPPED | OUTPATIENT
Start: 2022-11-22 | End: 2024-02-06

## 2022-11-22 NOTE — TELEPHONE ENCOUNTER
Nini- pt is wondering if there are other brand she could try for Wegovy. Pt wondering if she could try ozempic? Or if PCP has any other recommendation.    Payam Erazo Cem Say, BSN RN  [facility]

## 2022-11-23 ENCOUNTER — OFFICE VISIT (OUTPATIENT)
Dept: BEHAVIORAL HEALTH | Facility: CLINIC | Age: 57
End: 2022-11-23
Payer: COMMERCIAL

## 2022-11-23 DIAGNOSIS — F33.1 MAJOR DEPRESSIVE DISORDER, RECURRENT EPISODE, MODERATE (H): Primary | ICD-10-CM

## 2022-11-23 PROCEDURE — 90837 PSYTX W PT 60 MINUTES: CPT | Performed by: SOCIAL WORKER

## 2022-11-23 ASSESSMENT — COLUMBIA-SUICIDE SEVERITY RATING SCALE - C-SSRS
1. IN THE PAST MONTH, HAVE YOU WISHED YOU WERE DEAD OR WISHED YOU COULD GO TO SLEEP AND NOT WAKE UP?: NO
TOTAL  NUMBER OF INTERRUPTED ATTEMPTS LIFETIME: NO
ATTEMPT LIFETIME: NO
1. HAVE YOU WISHED YOU WERE DEAD OR WISHED YOU COULD GO TO SLEEP AND NOT WAKE UP?: YES
2. HAVE YOU ACTUALLY HAD ANY THOUGHTS OF KILLING YOURSELF?: NO
TOTAL  NUMBER OF ABORTED OR SELF INTERRUPTED ATTEMPTS LIFETIME: NO
REASONS FOR IDEATION LIFETIME: DOES NOT APPLY
REASONS FOR IDEATION PAST MONTH: DOES NOT APPLY
6. HAVE YOU EVER DONE ANYTHING, STARTED TO DO ANYTHING, OR PREPARED TO DO ANYTHING TO END YOUR LIFE?: NO
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
## 2022-11-25 ENCOUNTER — TELEPHONE (OUTPATIENT)
Dept: FAMILY MEDICINE | Facility: CLINIC | Age: 57
End: 2022-11-25

## 2022-11-25 NOTE — TELEPHONE ENCOUNTER
"PA started for semaglutide (OZEMPIC, 0.25 OR 0.5 MG/DOSE,) 2 MG/1.5ML SOPN pen.  To submit the PA:  1. Go to [email or web address] and click Enter a key  2. Enter the pt's last name and date of birth and the key   Patient last name:Dianelys   :-   Key:[id number]   3. Complete the form and click \"Send to Plan\"    "

## 2022-11-28 NOTE — TELEPHONE ENCOUNTER
Central Prior Authorization Team - Phone: [phone]     PA Initiation    Medication: semaglutide (OZEMPIC, 0.25 OR 0.5 MG/DOSE,) 2 MG/1.5ML SOPN- PA INITIATED  Insurance Company:    Pharmacy Filling the Rx: [id number] - [place], [place] - [address] [place] [address] & [facility]  Filling Pharmacy Phone: [phone]  Filling Pharmacy Fax:    Start Date: 11/28/2022

## 2022-11-29 NOTE — TELEPHONE ENCOUNTER
Insurance not paying for the ozempic. This is because semaglutide in the Ozempic is only for diabetes at this time. Correct?    Ashley Loza, RN, BSN  [facility]

## 2022-11-30 ENCOUNTER — OFFICE VISIT (OUTPATIENT)
Dept: BEHAVIORAL HEALTH | Facility: CLINIC | Age: 57
End: 2022-11-30
Payer: COMMERCIAL

## 2022-11-30 DIAGNOSIS — F33.1 MAJOR DEPRESSIVE DISORDER, RECURRENT EPISODE, MODERATE (H): Primary | ICD-10-CM

## 2022-11-30 PROCEDURE — 90834 PSYTX W PT 45 MINUTES: CPT | Performed by: SOCIAL WORKER

## 2022-11-30 NOTE — TELEPHONE ENCOUNTER
Writer responded via [employer].    SUHA OchoaN, RN-BC  [facility]

## 2022-11-30 NOTE — TELEPHONE ENCOUNTER
Central Prior Authorization Team - Phone: [phone]     Prior Authorization Follow Up    Called [facility] at [phone]  to check status of ozempic case ID [id number]    Request is in review. Insurance will have a determination by 1-4 business days.    [place] DEPT  will follow up again on status in 1 to 2 business days.
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
## 2022-12-02 NOTE — TELEPHONE ENCOUNTER
Central Prior Authorization Team - Phone: [phone]     Prior Authorization Follow Up    Called [facility]  to check status of Ozempic case ID [id number]    Request is still under review.     [facility] DEPT  will follow up again on status in 1 to 2 business days.

## 2022-12-06 NOTE — TELEPHONE ENCOUNTER
Central Prior Authorization Team - Phone: [phone]     Prior Authorization Follow Up    Called [facility] at [phone]  to check status of Ozempic  Representative stated request is pending final review.Patient plan has a dedicated PA team and they transferred me to number [phone] and spoke to another representative. They Stated they were waiting on clinical questions form that was faxed to provider office, I have not received such form so requested they refax and gave/verified PA dept fax number (also was on original PA request submitted)    Request is in review.     [facility]  will follow up again on status in 1 to 2 business days.

## 2022-12-07 ENCOUNTER — OFFICE VISIT (OUTPATIENT)
Dept: BEHAVIORAL HEALTH | Facility: CLINIC | Age: 57
End: 2022-12-07
Payer: COMMERCIAL

## 2022-12-07 DIAGNOSIS — F33.1 MAJOR DEPRESSIVE DISORDER, RECURRENT EPISODE, MODERATE (H): Primary | ICD-10-CM

## 2022-12-07 PROCEDURE — 90834 PSYTX W PT 45 MINUTES: CPT | Performed by: SOCIAL WORKER
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
## 2022-12-07 NOTE — TELEPHONE ENCOUNTER
Central Prior Authorization Team - Phone: [phone]     PRIOR AUTHORIZATION DENIED    Medication: semaglutide (OZEMPIC, 0.25 OR 0.5 MG/DOSE,) 2 MG/1.5ML SOPN- [place] denied    Denial Date: 12/7/2022    Denial Rational: not covered for diagnosis: this medication is for treatment of  Diabetes mellitus, not FDA approved for weight loss.            Appeal Information: available on [facility]

## 2022-12-15 ASSESSMENT — PATIENT HEALTH QUESTIONNAIRE - PHQ9
10. IF YOU CHECKED OFF ANY PROBLEMS, HOW DIFFICULT HAVE THESE PROBLEMS MADE IT FOR YOU TO DO YOUR WORK, TAKE CARE OF THINGS AT HOME, OR GET ALONG WITH OTHER PEOPLE: SOMEWHAT DIFFICULT
SUM OF ALL RESPONSES TO PHQ QUESTIONS 1-9: 4
SUM OF ALL RESPONSES TO PHQ QUESTIONS 1-9: 4

## 2022-12-16 ENCOUNTER — OFFICE VISIT (OUTPATIENT)
Dept: BEHAVIORAL HEALTH | Facility: CLINIC | Age: 57
End: 2022-12-16
Payer: COMMERCIAL

## 2022-12-16 DIAGNOSIS — F33.1 MAJOR DEPRESSIVE DISORDER, RECURRENT EPISODE, MODERATE (H): Primary | ICD-10-CM

## 2022-12-16 PROCEDURE — 90832 PSYTX W PT 30 MINUTES: CPT | Performed by: SOCIAL WORKER

## 2022-12-19 NOTE — PROGRESS NOTES
[facility]: Integrated Behavioral Health  December 16, 2022      Behavioral Health Clinician Progress Note    Patient Name: Adrienne Ivory           Service Type:  Individual      Service Location:   Face to Face in Clinic     Session Start Time: 230pm   Session End Time: 300pm      Session Length: 16 - 37      Attendees: Client     Service Modality:  In-person    Visit Activities (Refresh list every visit): NEW    Diagnostic Assessment Date: To be completed  Treatment Plan Date: To be completed  PROMIS (reviewed every 90 days):     Assessments:  The following assessments were completed by patient for this visit:  PHQ9:   PHQ-9 SCORE 12/11/2017 12/12/2017 6/12/2018 8/29/2019 8/15/2022 11/8/2022 12/15/2022   PHQ-9 Total Score - - - - - - -   PHQ-9 Total Score MyChart 5 (Mild depression) - - 4 (Minimal depression) 5 (Mild depression) 13 (Moderate depression) 4 (Minimal depression)   PHQ-9 Total Score 5 5 14 4 5 13 4     GAD7:   ZBIGNIEW-7 SCORE 2/29/2012 7/30/2012 8/6/2012 8/9/2012 11/25/2015 9/29/2016 8/15/2022   Total Score 12 11 - 11 - - -   Total Score - - 11 - - - 6 (mild anxiety)   Total Score - - - - 12 8 6     PROMIS 10-Global Health (only subscores and total score):   PROMIS-10 Scores Only 9/13/2019   Global Mental Health Score 12   Global Physical Health Score 16   PROMIS TOTAL - SUBSCORES 28     [place] Suicide Severity Rating Scale (Lifetime/Recent)  [place] Suicide Severity Rating (Lifetime/Recent) 11/23/2022   1. Wish to be Dead (Lifetime) 1   Wish to be Dead Description (Lifetime) overhwlmed of caring for others, feel i do not have a life   1. Wish to be Dead (Past 1 Month) 0   2. Non-Specific Active Suicidal Thoughts (Lifetime) 0   Most Severe Ideation Rating (Lifetime) 1   Most Severe Ideation Rating (Past 1 Month) (No Data)   Frequency (Lifetime) 1   Frequency (Past 1 Month) (No Data)   Duration (Lifetime) 1   Duration (Past 1 Month) (No Data)  "  Controllability (Lifetime) 1   Deterrents (Lifetime) 1   Deterrents (Past 1 Month) 0   Reasons for Ideation (Lifetime) 0   Reasons for Ideation (Past 1 Month) 0   Actual Attempt (Lifetime) 0   Has subject engaged in non-suicidal self-injurious behavior? (Lifetime) 0   Interrupted Attempts (Lifetime) 0   Aborted or Self-Interrupted Attempt (Lifetime) 0   Preparatory Acts or Behavior (Lifetime) 0   Calculated C-SSRS Risk Score (Lifetime/Recent) No Risk Indicated     Previous PHQ-9:   PHQ-9 SCORE 8/15/2022 11/8/2022 12/15/2022   PHQ-9 Total Score - - -   PHQ-9 Total Score MyChart 5 (Mild depression) 13 (Moderate depression) 4 (Minimal depression)   PHQ-9 Total Score 5 13 4     Previous ZBIGNIEW-7:   ZBIGNIEW-7 SCORE 11/25/2015 9/29/2016 8/15/2022   Total Score - - -   Total Score - - 6 (mild anxiety)   Total Score 12 8 6       JAYRO LEVEL:  JAYRO Score (Last Two) 3/3/2011 9/13/2019   JAYRO Raw Score 46 37   Activation Score 75.3 79.2   JAYRO Level 4 4       DATA  Extended Session (60+ minutes): No  Interactive Complexity: No  Crisis: No  [facility] Patient: No    Treatment Objective(s) Addressed in This Session:    Depressed Mood: Increase interest, engagement, and pleasure in doing things  Decrease frequency and intensity of feeling down, depressed, hopeless  Improve quantity and quality of night time sleep / decrease daytime naps  Identify negative self-talk and behaviors: challenge core beliefs, myths, and actions  Improve concentration, focus, and mindfulness in daily activities     Current Stressors / Issues:    Patient reports she is been focusing on performance and effort.  Patient is noticing how much effort and house chores she is being completing in their 30-year marriage.  Patient noted she \"lost it\" the other day when her partner made a comment about her body image.  Explored with patient underlying emotions that were being triggered by her.  In addition, discussed possible messages her body was trying to say to her.  Patient " began to realize that her body will try to protect her from being in an unhealthy unsupportive relationship.  Patient expressed disappointment in herself and what she has accomplished in the past 30 years.  Suggested to patient to reflect not so much on what she has done but who she is as a woman.  Patient began to notice and give herself credit for being caring, kind, compassionate, loyal etc.    Plan    Continue to engage patient in a mindful exercise to begin to start noticing the physical sensations of her thoughts and emotions.  Need to complete diagnostic assessment and treatment plan neck session      Progress on Treatment Objective(s) / Homework:  Minimal progress - ACTION (Actively working towards change); Intervened by reinforcing change plan / affirming steps taken    Motivational Interviewing    MI Intervention: Supported Autonomy, Collaboration, Evocation, Permission to raise concern or advise and Open-ended questions     Change Talk Expressed by the Patient: Need to change    Provider Response to Change Talk: E - Evoked more info from patient about behavior change, A - Affirmed patient's thoughts, decisions, or attempts at behavior change, R - Reflected patient's change talk and S - Summarized patient's change talk statements    MINDFULLNESS-BASED-STRATEGIES:  Discussed skills based on development and application of mindfulness, Skills drawn from dialectical behavior therapy, mindfulness-based stress reduction, mindfulness-based cognitive therapy, etc.  ACCEPTANCE AND COMMITMENT THERAPY: Explored and identified important values in patient's life, Discussed ways to commit to behavioral activation around these values    Care Plan review completed: No    Medication Review:  No changes to current psychiatric medication(s)    Medication Compliance:  Yes    Changes in Health Issues:   None reported    Chemical Use Review:   Substance Use: Chemical use reviewed, no active concerns identified      Tobacco Use:  No current tobacco use.      Patient reports a history of alcohol abuse to self manage her depression.  However, patient reports she supplemented sugar for self-care.  Patient reports she she is overweight due to her sugar intake.  Patient reports her partner is constantly critical of her weight.        Assessment: Current Emotional / Mental Status (status of significant symptoms):  Risk status (Self / Other harm or suicidal ideation)  Patient has had a history of suicidal ideation: See above  Patient denies current fears or concerns for personal safety.  Patient denies current or recent suicidal ideation or behaviors.  Patient denies current or recent homicidal ideation or behaviors.  Patient denies current or recent self injurious behavior or ideation.  Patient denies other safety concerns.  A safety and risk management plan has not been developed at this time, however patient was encouraged to call [place] [phone] should there be a change in any of these risk factors.    Patient declined the need for a safety plan.      Appearance:   Appropriate   Eye Contact:   Fair   Psychomotor Behavior: Retarded (Slowed)   Attitude:   Cooperative   Orientation:   All  Speech   Rate / Production: Talkative   Volume:  Soft   Mood:    Depressed  Sad   Affect:    Flat  Tearful  Thought Content:  Clear   Thought Form:  Coherent  Goal Directed  Logical   Insight:    Fair     Diagnoses:  1. Major depressive disorder, recurrent episode, moderate (H)        Collateral Reports Completed:  Routed note to PCP    Plan: (Homework, other):  Patient was given information about behavioral services and encouraged to schedule a follow up appointment with the clinic [facility] in 1 week.  She was also given dgmresources2: information about mental health symptoms and treatment options , information on ACT values exercise. and information on ACT -introduction and websites .  CD Recommendations: Maintain Sobriety.     MIGUELANGEL Tatum, [facility]

## 2022-12-21 ENCOUNTER — VIRTUAL VISIT (OUTPATIENT)
Dept: BEHAVIORAL HEALTH | Facility: CLINIC | Age: 57
End: 2022-12-21
Payer: COMMERCIAL

## 2022-12-21 DIAGNOSIS — F33.1 MAJOR DEPRESSIVE DISORDER, RECURRENT EPISODE, MODERATE (H): Primary | ICD-10-CM

## 2022-12-21 PROCEDURE — 90834 PSYTX W PT 45 MINUTES: CPT | Mod: GT | Performed by: SOCIAL WORKER

## 2022-12-23 NOTE — PROGRESS NOTES
[facility]: Integrated Behavioral Health  December 21, 2022      Behavioral Health Clinician Progress Note    Patient Name: Adrienne Ivory           Service Type:  Individual      Service Location:   Face to Face in Clinic     Session Start Time: 430pm   Session End Time: 515pm      Session Length: 38 - 52      Attendees: Client     Service Modality:  In-person    Visit Activities (Refresh list every visit): NEW    Diagnostic Assessment Date: To be completed  Treatment Plan Date: To be completed  PROMIS (reviewed every 90 days):     Assessments:  The following assessments were completed by patient for this visit:  PHQ9:   PHQ-9 SCORE 12/11/2017 12/12/2017 6/12/2018 8/29/2019 8/15/2022 11/8/2022 12/15/2022   PHQ-9 Total Score - - - - - - -   PHQ-9 Total Score MyChart 5 (Mild depression) - - 4 (Minimal depression) 5 (Mild depression) 13 (Moderate depression) 4 (Minimal depression)   PHQ-9 Total Score 5 5 14 4 5 13 4     GAD7:   ZBIGNIEW-7 SCORE 2/29/2012 7/30/2012 8/6/2012 8/9/2012 11/25/2015 9/29/2016 8/15/2022   Total Score 12 11 - 11 - - -   Total Score - - 11 - - - 6 (mild anxiety)   Total Score - - - - 12 8 6     PROMIS 10-Global Health (only subscores and total score):   PROMIS-10 Scores Only 9/13/2019   Global Mental Health Score 12   Global Physical Health Score 16   PROMIS TOTAL - SUBSCORES 28     [place] Suicide Severity Rating Scale (Lifetime/Recent)  [place] Suicide Severity Rating (Lifetime/Recent) 11/23/2022   1. Wish to be Dead (Lifetime) 1   Wish to be Dead Description (Lifetime) overhwlmed of caring for others, feel i do not have a life   1. Wish to be Dead (Past 1 Month) 0   2. Non-Specific Active Suicidal Thoughts (Lifetime) 0   Most Severe Ideation Rating (Lifetime) 1   Most Severe Ideation Rating (Past 1 Month) (No Data)   Frequency (Lifetime) 1   Frequency (Past 1 Month) (No Data)   Duration (Lifetime) 1   Duration (Past 1 Month) (No Data)    Controllability (Lifetime) 1   Deterrents (Lifetime) 1   Deterrents (Past 1 Month) 0   Reasons for Ideation (Lifetime) 0   Reasons for Ideation (Past 1 Month) 0   Actual Attempt (Lifetime) 0   Has subject engaged in non-suicidal self-injurious behavior? (Lifetime) 0   Interrupted Attempts (Lifetime) 0   Aborted or Self-Interrupted Attempt (Lifetime) 0   Preparatory Acts or Behavior (Lifetime) 0   Calculated C-SSRS Risk Score (Lifetime/Recent) No Risk Indicated     Previous PHQ-9:   PHQ-9 SCORE 8/15/2022 11/8/2022 12/15/2022   PHQ-9 Total Score - - -   PHQ-9 Total Score MyChart 5 (Mild depression) 13 (Moderate depression) 4 (Minimal depression)   PHQ-9 Total Score 5 13 4     Previous ZBIGNIEW-7:   ZBIGNIEW-7 SCORE 11/25/2015 9/29/2016 8/15/2022   Total Score - - -   Total Score - - 6 (mild anxiety)   Total Score 12 8 6       JAYRO LEVEL:  JAYRO Score (Last Two) 3/3/2011 9/13/2019   JAYRO Raw Score 46 37   Activation Score 75.3 79.2   JAYRO Level 4 4       DATA  Extended Session (60+ minutes): No  Interactive Complexity: No  Crisis: No  [facility] Patient: No    Treatment Objective(s) Addressed in This Session:    Depressed Mood: Increase interest, engagement, and pleasure in doing things  Decrease frequency and intensity of feeling down, depressed, hopeless  Improve quantity and quality of night time sleep / decrease daytime naps  Identify negative self-talk and behaviors: challenge core beliefs, myths, and actions  Improve concentration, focus, and mindfulness in daily activities     Current Stressors / Issues:    Patient reports she is continue to focus on self-care and that she deserves to feel validated and cared for.  Patient reports she had an enjoyable birthday.  Patient noted that she became irritable and frustrated and waiting for hours for a medical appointment with [facility].  Discussed different mindful exercises to be present in the moment and not caught up in 1 is negative, judgmental or self-defeating thoughts.  Discussed with  "patient the metaphor of the elephant taking her parking spot.  About perception.  Patient felt to be helpful to have cats and other pleasant pictures on her phone to change her \"internal channel\".    Patient wants to focus on how to listen to what she wants.  Discussed how to set goals that are attainable and provide a sense of purpose and meaning.    Plan    Continue to engage patient in a mindful exercise to begin to start noticing the physical sensations of her thoughts and emotions.  Need to complete diagnostic assessment and treatment plan neck session      Progress on Treatment Objective(s) / Homework:  Minimal progress - ACTION (Actively working towards change); Intervened by reinforcing change plan / affirming steps taken    Motivational Interviewing    MI Intervention: Supported Autonomy, Collaboration, Evocation, Permission to raise concern or advise and Open-ended questions     Change Talk Expressed by the Patient: Need to change    Provider Response to Change Talk: E - Evoked more info from patient about behavior change, A - Affirmed patient's thoughts, decisions, or attempts at behavior change, R - Reflected patient's change talk and S - Summarized patient's change talk statements    MINDFULLNESS-BASED-STRATEGIES:  Discussed skills based on development and application of mindfulness, Skills drawn from dialectical behavior therapy, mindfulness-based stress reduction, mindfulness-based cognitive therapy, etc.  ACCEPTANCE AND COMMITMENT THERAPY: Explored and identified important values in patient's life, Discussed ways to commit to behavioral activation around these values    Care Plan review completed: No    Medication Review:  No changes to current psychiatric medication(s)    Medication Compliance:  Yes    Changes in Health Issues:   None reported    Chemical Use Review:   Substance Use: Chemical use reviewed, no active concerns identified      Tobacco Use: No current tobacco use.      Patient reports a " history of alcohol abuse to self manage her depression.  However, patient reports she supplemented sugar for self-care.  Patient reports she she is overweight due to her sugar intake.  Patient reports her partner is constantly critical of her weight.        Assessment: Current Emotional / Mental Status (status of significant symptoms):  Risk status (Self / Other harm or suicidal ideation)  Patient has had a history of suicidal ideation: See above  Patient denies current fears or concerns for personal safety.  Patient denies current or recent suicidal ideation or behaviors.  Patient denies current or recent homicidal ideation or behaviors.  Patient denies current or recent self injurious behavior or ideation.  Patient denies other safety concerns.  A safety and risk management plan has not been developed at this time, however patient was encouraged to call [place] [phone] should there be a change in any of these risk factors.    Patient declined the need for a safety plan.      Appearance:   Appropriate   Eye Contact:   Fair   Psychomotor Behavior: Retarded (Slowed)   Attitude:   Cooperative   Orientation:   All  Speech   Rate / Production: Talkative   Volume:  Soft   Mood:    Depressed  Sad   Affect:    Flat  Tearful  Thought Content:  Clear   Thought Form:  Coherent  Goal Directed  Logical   Insight:    Fair     Diagnoses:  1. Major depressive disorder, recurrent episode, moderate (H)        Collateral Reports Completed:  Routed note to PCP    Plan: (Homework, other):  Patient was given information about behavioral services and encouraged to schedule a follow up appointment with the clinic [facility] in 1 week.  She was also given dgmresources2: information about mental health symptoms and treatment options , information on ACT values exercise. and information on ACT -introduction and websites .  CD Recommendations: Maintain Sobriety.     MIGUELANGEL Tatum, [facility]

## 2023-01-11 ENCOUNTER — VIRTUAL VISIT (OUTPATIENT)
Dept: BEHAVIORAL HEALTH | Facility: CLINIC | Age: 58
End: 2023-01-11
Payer: COMMERCIAL

## 2023-01-11 DIAGNOSIS — F33.1 MAJOR DEPRESSIVE DISORDER, RECURRENT EPISODE, MODERATE (H): Primary | ICD-10-CM

## 2023-01-11 PROCEDURE — 90832 PSYTX W PT 30 MINUTES: CPT | Mod: GT | Performed by: SOCIAL WORKER

## 2023-01-11 NOTE — PROGRESS NOTES
[facility] Primary Care: Integrated Behavioral Health  2022      Behavioral Health Clinician Progress Note    Patient Name: Adrienne Ivory           Service Type:  Individual      Service Location:   Face to Face in Clinic     Session Start Time: 200pm   Session End Time: 230pm      Session Length: 16 - 37      Attendees: Client     Service Modality:  Video Visit:      Provider verified identity through the following two step process.  Patient provided:  Patient photo and Patient     Telemedicine Visit: The patient's condition can be safely assessed and treated via synchronous audio and visual telemedicine encounter.      Reason for Telemedicine Visit: Patient has requested telehealth visit    Originating Site (Patient Location): Patient's place of employment    Distant Site (Provider Location): Provider Remote Setting- Home Office    Consent:  The patient/guardian has verbally consented to: the potential risks and benefits of telemedicine (video visit) versus in person care; bill my insurance or make self-payment for services provided; and responsibility for payment of non-covered services.     Patient would like the video invitation sent by:  Send to e-mail at: [email or web address]    Mode of Communication:  Video Conference via Amwell    Distant Location (Provider):  Off-site    As the provider I attest to compliance with applicable laws and regulations related to telemedicine.    Visit Activities (Refresh list every visit): [facility] Only    Diagnostic Assessment Date: To be completed  Treatment Plan Date: To be completed  PROMIS (reviewed every 90 days):     Assessments:  The following assessments were completed by patient for this visit:  PHQ9:   PHQ-9 SCORE 2017 2017 2018 2019 8/15/2022 2022 12/15/2022   PHQ-9 Total Score - - - - - - -   PHQ-9 Total Score [facility] 5 (Mild depression) - - 4 (Minimal depression) 5 (Mild depression) 13 (Moderate  depression) 4 (Minimal depression)   PHQ-9 Total Score 5 5 14 4 5 13 4     GAD7:   ZBIGNIEW-7 SCORE 2/29/2012 7/30/2012 8/6/2012 8/9/2012 11/25/2015 9/29/2016 8/15/2022   Total Score 12 11 - 11 - - -   Total Score - - 11 - - - 6 (mild anxiety)   Total Score - - - - 12 8 6     PROMIS 10-Global Health (only subscores and total score):   PROMIS-10 Scores Only 9/13/2019   Global Mental Health Score 12   Global Physical Health Score 16   PROMIS TOTAL - SUBSCORES 28     [place] Suicide Severity Rating Scale (Lifetime/Recent)  [place] Suicide Severity Rating (Lifetime/Recent) 11/23/2022   1. Wish to be Dead (Lifetime) 1   Wish to be Dead Description (Lifetime) overhwlmed of caring for others, feel i do not have a life   1. Wish to be Dead (Past 1 Month) 0   2. Non-Specific Active Suicidal Thoughts (Lifetime) 0   Most Severe Ideation Rating (Lifetime) 1   Most Severe Ideation Rating (Past 1 Month) (No Data)   Frequency (Lifetime) 1   Frequency (Past 1 Month) (No Data)   Duration (Lifetime) 1   Duration (Past 1 Month) (No Data)   Controllability (Lifetime) 1   Deterrents (Lifetime) 1   Deterrents (Past 1 Month) 0   Reasons for Ideation (Lifetime) 0   Reasons for Ideation (Past 1 Month) 0   Actual Attempt (Lifetime) 0   Has subject engaged in non-suicidal self-injurious behavior? (Lifetime) 0   Interrupted Attempts (Lifetime) 0   Aborted or Self-Interrupted Attempt (Lifetime) 0   Preparatory Acts or Behavior (Lifetime) 0   Calculated C-SSRS Risk Score (Lifetime/Recent) No Risk Indicated     Previous PHQ-9:   PHQ-9 SCORE 8/15/2022 11/8/2022 12/15/2022   PHQ-9 Total Score - - -   PHQ-9 Total Score MyChart 5 (Mild depression) 13 (Moderate depression) 4 (Minimal depression)   PHQ-9 Total Score 5 13 4     Previous ZBIGNIEW-7:   ZBIGNIEW-7 SCORE 11/25/2015 9/29/2016 8/15/2022   Total Score - - -   Total Score - - 6 (mild anxiety)   Total Score 12 8 6       JAYRO LEVEL:  JAYRO Score (Last Two) 3/3/2011 9/13/2019   JAYRO Raw Score 46 37   Activation  "Score 75.3 79.2   JAYRO Level 4 4       DATA  Extended Session (60+ minutes): No  Interactive Complexity: No  Crisis: No  H Patient: No    Treatment Objective(s) Addressed in This Session:    Depressed Mood: Increase interest, engagement, and pleasure in doing things  Decrease frequency and intensity of feeling down, depressed, hopeless  Improve quantity and quality of night time sleep / decrease daytime naps  Identify negative self-talk and behaviors: challenge core beliefs, myths, and actions  Improve concentration, focus, and mindfulness in daily activities     Current Stressors / Issues:    Patient reports she has been focusing on \"internal channel\" and finding different ways to nurture herself.  Patient ist exercising now not so much to lose weight but rather to get healthy.    Patient also shared the process of finding a new job.  Patient noted a lot of external influences but was able to tap into what is right for \"Adrienne\".    Patient adds that her partner Alexandre has to have colon surgery in the coming weeks and instead of being resentful, she is not doing it so much for him but rather as part of her value of caring and compassion.    Discussed with patient measuring her activity based on the energy she has that day.  Still give 100% but of the available energy of that day.  Patient felt this would be helpful reframe.    Encouraged patient to read wild speculum woman by [place] is a 2-year.  In addition, provided resources of get [email or web address].    The back to patient how her mood had changed.  Plan    Continue to engage patient in a mindful exercise to begin to start noticing the physical sensations of her thoughts and emotions.  Need to complete diagnostic assessment and treatment plan neck session      Progress on Treatment Objective(s) / Homework:  Minimal progress - ACTION (Actively working towards change); Intervened by reinforcing change plan / affirming steps taken    Motivational Interviewing    MI " Intervention: Supported Autonomy, Collaboration, Evocation, Permission to raise concern or advise and Open-ended questions     Change Talk Expressed by the Patient: Need to change    Provider Response to Change Talk: E - Evoked more info from patient about behavior change, A - Affirmed patient's thoughts, decisions, or attempts at behavior change, R - Reflected patient's change talk and S - Summarized patient's change talk statements    MINDFULLNESS-BASED-STRATEGIES:  Discussed skills based on development and application of mindfulness, Skills drawn from dialectical behavior therapy, mindfulness-based stress reduction, mindfulness-based cognitive therapy, etc.  ACCEPTANCE AND COMMITMENT THERAPY: Explored and identified important values in patient's life, Discussed ways to commit to behavioral activation around these values    Care Plan review completed: No    Medication Review:  No changes to current psychiatric medication(s)    Medication Compliance:  Yes    Changes in Health Issues:   None reported    Chemical Use Review:   Substance Use: Chemical use reviewed, no active concerns identified      Tobacco Use: No current tobacco use.      Patient reports a history of alcohol abuse to self manage her depression.  However, patient reports she supplemented sugar for self-care.  Patient reports she she is overweight due to her sugar intake.  Patient reports her partner is constantly critical of her weight.        Assessment: Current Emotional / Mental Status (status of significant symptoms):  Risk status (Self / Other harm or suicidal ideation)  Patient has had a history of suicidal ideation: See above  Patient denies current fears or concerns for personal safety.  Patient denies current or recent suicidal ideation or behaviors.  Patient denies current or recent homicidal ideation or behaviors.  Patient denies current or recent self injurious behavior or ideation.  Patient denies other safety concerns.  A safety and  risk management plan has not been developed at this time, however patient was encouraged to call [facility] should there be a change in any of these risk factors.    Patient declined the need for a safety plan.      Appearance:   Appropriate   Eye Contact:   Fair   Psychomotor Behavior: Normal   Attitude:   Cooperative   Orientation:   All  Speech   Rate / Production: Talkative   Volume:  Soft   Mood:    Elevated   Affect:    Appropriate  Bright   Thought Content:  Clear   Thought Form:  Coherent  Goal Directed  Logical   Insight:    Fair     Diagnoses:  1. Major depressive disorder, recurrent episode, moderate (H)        Collateral Reports Completed:  Routed note to PCP    Plan: (Homework, other):  Patient was given information about behavioral services and encouraged to schedule a follow up appointment with the clinic [facility] in 1 week.  She was also given dgmresources2: information about mental health symptoms and treatment options , information on ACT values exercise. and information on ACT -introduction and websites .  CD Recommendations: Maintain Sobriety.     MIGUELANGEL Tatum, [facility]

## 2023-01-26 ENCOUNTER — VIRTUAL VISIT (OUTPATIENT)
Dept: BEHAVIORAL HEALTH | Facility: CLINIC | Age: 58
End: 2023-01-26
Payer: COMMERCIAL

## 2023-01-26 DIAGNOSIS — F33.1 MAJOR DEPRESSIVE DISORDER, RECURRENT EPISODE, MODERATE (H): Primary | ICD-10-CM

## 2023-01-26 PROCEDURE — 90834 PSYTX W PT 45 MINUTES: CPT | Mod: GT | Performed by: SOCIAL WORKER

## 2023-01-26 ASSESSMENT — PATIENT HEALTH QUESTIONNAIRE - PHQ9
SUM OF ALL RESPONSES TO PHQ QUESTIONS 1-9: 2
10. IF YOU CHECKED OFF ANY PROBLEMS, HOW DIFFICULT HAVE THESE PROBLEMS MADE IT FOR YOU TO DO YOUR WORK, TAKE CARE OF THINGS AT HOME, OR GET ALONG WITH OTHER PEOPLE: NOT DIFFICULT AT ALL
SUM OF ALL RESPONSES TO PHQ QUESTIONS 1-9: 2
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
## 2023-02-08 ENCOUNTER — VIRTUAL VISIT (OUTPATIENT)
Dept: BEHAVIORAL HEALTH | Facility: CLINIC | Age: 58
End: 2023-02-08
Payer: COMMERCIAL

## 2023-02-08 DIAGNOSIS — F33.1 MAJOR DEPRESSIVE DISORDER, RECURRENT EPISODE, MODERATE (H): Primary | ICD-10-CM

## 2023-02-08 PROCEDURE — 90791 PSYCH DIAGNOSTIC EVALUATION: CPT | Mod: VID | Performed by: SOCIAL WORKER
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
## 2023-02-23 ENCOUNTER — VIRTUAL VISIT (OUTPATIENT)
Dept: BEHAVIORAL HEALTH | Facility: CLINIC | Age: 58
End: 2023-02-23
Payer: COMMERCIAL

## 2023-02-23 DIAGNOSIS — F33.1 MAJOR DEPRESSIVE DISORDER, RECURRENT EPISODE, MODERATE (H): Primary | ICD-10-CM

## 2023-02-23 PROCEDURE — 90832 PSYTX W PT 30 MINUTES: CPT | Mod: VID | Performed by: SOCIAL WORKER
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
## 2023-03-08 ENCOUNTER — VIRTUAL VISIT (OUTPATIENT)
Dept: BEHAVIORAL HEALTH | Facility: CLINIC | Age: 58
End: 2023-03-08
Payer: COMMERCIAL

## 2023-03-08 DIAGNOSIS — F33.1 MAJOR DEPRESSIVE DISORDER, RECURRENT EPISODE, MODERATE (H): Primary | ICD-10-CM

## 2023-03-08 PROCEDURE — 90834 PSYTX W PT 45 MINUTES: CPT | Mod: VID | Performed by: SOCIAL WORKER

## 2023-03-08 ASSESSMENT — PATIENT HEALTH QUESTIONNAIRE - PHQ9
10. IF YOU CHECKED OFF ANY PROBLEMS, HOW DIFFICULT HAVE THESE PROBLEMS MADE IT FOR YOU TO DO YOUR WORK, TAKE CARE OF THINGS AT HOME, OR GET ALONG WITH OTHER PEOPLE: NOT DIFFICULT AT ALL
SUM OF ALL RESPONSES TO PHQ QUESTIONS 1-9: 3
SUM OF ALL RESPONSES TO PHQ QUESTIONS 1-9: 3
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
## 2023-03-22 ENCOUNTER — VIRTUAL VISIT (OUTPATIENT)
Dept: BEHAVIORAL HEALTH | Facility: CLINIC | Age: 58
End: 2023-03-22
Payer: COMMERCIAL

## 2023-03-22 DIAGNOSIS — F33.1 MAJOR DEPRESSIVE DISORDER, RECURRENT EPISODE, MODERATE (H): Primary | ICD-10-CM

## 2023-03-22 PROCEDURE — 90832 PSYTX W PT 30 MINUTES: CPT | Mod: VID | Performed by: SOCIAL WORKER
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
## 2023-04-10 ENCOUNTER — VIRTUAL VISIT (OUTPATIENT)
Dept: BEHAVIORAL HEALTH | Facility: CLINIC | Age: 58
End: 2023-04-10
Payer: COMMERCIAL

## 2023-04-10 DIAGNOSIS — F33.1 MAJOR DEPRESSIVE DISORDER, RECURRENT EPISODE, MODERATE (H): Primary | ICD-10-CM

## 2023-04-10 PROCEDURE — 90834 PSYTX W PT 45 MINUTES: CPT | Mod: VID | Performed by: SOCIAL WORKER
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
## 2023-04-19 ENCOUNTER — OFFICE VISIT (OUTPATIENT)
Dept: BEHAVIORAL HEALTH | Facility: CLINIC | Age: 58
End: 2023-04-19
Payer: COMMERCIAL

## 2023-04-19 DIAGNOSIS — F33.1 MAJOR DEPRESSIVE DISORDER, RECURRENT EPISODE, MODERATE (H): Primary | ICD-10-CM

## 2023-04-19 PROCEDURE — 90834 PSYTX W PT 45 MINUTES: CPT | Performed by: SOCIAL WORKER
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
## 2023-05-19 ENCOUNTER — TELEPHONE (OUTPATIENT)
Dept: FAMILY MEDICINE | Facility: CLINIC | Age: 58
End: 2023-05-19
Payer: COMMERCIAL

## 2023-05-19 DIAGNOSIS — F41.9 ANXIETY: ICD-10-CM

## 2023-05-19 RX ORDER — ALPRAZOLAM 0.5 MG
TABLET ORAL
Qty: 10 TABLET | Refills: 5 | Status: CANCELLED | OUTPATIENT
Start: 2023-05-19

## 2023-05-30 ENCOUNTER — VIRTUAL VISIT (OUTPATIENT)
Dept: BEHAVIORAL HEALTH | Facility: CLINIC | Age: 58
End: 2023-05-30
Payer: COMMERCIAL

## 2023-05-30 DIAGNOSIS — F33.1 MAJOR DEPRESSIVE DISORDER, RECURRENT EPISODE, MODERATE (H): Primary | ICD-10-CM

## 2023-05-30 PROCEDURE — 90832 PSYTX W PT 30 MINUTES: CPT | Mod: VID | Performed by: SOCIAL WORKER
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
## 2023-06-05 ENCOUNTER — OFFICE VISIT (OUTPATIENT)
Dept: BEHAVIORAL HEALTH | Facility: CLINIC | Age: 58
End: 2023-06-05
Payer: COMMERCIAL

## 2023-06-05 DIAGNOSIS — F33.1 MAJOR DEPRESSIVE DISORDER, RECURRENT EPISODE, MODERATE (H): Primary | ICD-10-CM

## 2023-06-05 PROCEDURE — 90834 PSYTX W PT 45 MINUTES: CPT | Performed by: SOCIAL WORKER

## 2023-06-05 ASSESSMENT — PATIENT HEALTH QUESTIONNAIRE - PHQ9
SUM OF ALL RESPONSES TO PHQ QUESTIONS 1-9: 5
SUM OF ALL RESPONSES TO PHQ QUESTIONS 1-9: 5
10. IF YOU CHECKED OFF ANY PROBLEMS, HOW DIFFICULT HAVE THESE PROBLEMS MADE IT FOR YOU TO DO YOUR WORK, TAKE CARE OF THINGS AT HOME, OR GET ALONG WITH OTHER PEOPLE: SOMEWHAT DIFFICULT
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
## 2023-06-14 ENCOUNTER — OFFICE VISIT (OUTPATIENT)
Dept: BEHAVIORAL HEALTH | Facility: CLINIC | Age: 58
End: 2023-06-14
Payer: COMMERCIAL

## 2023-06-14 DIAGNOSIS — F33.1 MAJOR DEPRESSIVE DISORDER, RECURRENT EPISODE, MODERATE (H): Primary | ICD-10-CM

## 2023-06-14 PROCEDURE — 90834 PSYTX W PT 45 MINUTES: CPT | Performed by: SOCIAL WORKER
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
## 2023-06-21 ENCOUNTER — OFFICE VISIT (OUTPATIENT)
Dept: BEHAVIORAL HEALTH | Facility: CLINIC | Age: 58
End: 2023-06-21
Payer: COMMERCIAL

## 2023-06-21 DIAGNOSIS — F33.1 MAJOR DEPRESSIVE DISORDER, RECURRENT EPISODE, MODERATE (H): Primary | ICD-10-CM

## 2023-06-21 DIAGNOSIS — F90.0 ATTENTION DEFICIT HYPERACTIVITY DISORDER (ADHD), PREDOMINANTLY INATTENTIVE TYPE: ICD-10-CM

## 2023-06-21 PROCEDURE — 90834 PSYTX W PT 45 MINUTES: CPT | Performed by: SOCIAL WORKER
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
## 2023-06-30 ENCOUNTER — OFFICE VISIT (OUTPATIENT)
Dept: BEHAVIORAL HEALTH | Facility: CLINIC | Age: 58
End: 2023-06-30
Payer: COMMERCIAL

## 2023-06-30 DIAGNOSIS — F33.1 MAJOR DEPRESSIVE DISORDER, RECURRENT EPISODE, MODERATE (H): Primary | ICD-10-CM

## 2023-06-30 PROCEDURE — 90834 PSYTX W PT 45 MINUTES: CPT | Performed by: SOCIAL WORKER
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
## 2023-07-14 ENCOUNTER — OFFICE VISIT (OUTPATIENT)
Dept: BEHAVIORAL HEALTH | Facility: CLINIC | Age: 58
End: 2023-07-14
Payer: COMMERCIAL

## 2023-07-14 DIAGNOSIS — F33.1 MAJOR DEPRESSIVE DISORDER, RECURRENT EPISODE, MODERATE (H): Primary | ICD-10-CM

## 2023-07-14 PROCEDURE — 90832 PSYTX W PT 30 MINUTES: CPT | Performed by: SOCIAL WORKER
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
## 2023-07-28 ENCOUNTER — OFFICE VISIT (OUTPATIENT)
Dept: BEHAVIORAL HEALTH | Facility: CLINIC | Age: 58
End: 2023-07-28
Payer: COMMERCIAL

## 2023-07-28 DIAGNOSIS — F33.1 MAJOR DEPRESSIVE DISORDER, RECURRENT EPISODE, MODERATE (H): Primary | ICD-10-CM

## 2023-07-28 PROCEDURE — 90834 PSYTX W PT 45 MINUTES: CPT | Performed by: SOCIAL WORKER
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
## 2023-08-30 ENCOUNTER — OFFICE VISIT (OUTPATIENT)
Dept: BEHAVIORAL HEALTH | Facility: CLINIC | Age: 58
End: 2023-08-30
Payer: COMMERCIAL

## 2023-08-30 DIAGNOSIS — F33.1 MAJOR DEPRESSIVE DISORDER, RECURRENT EPISODE, MODERATE (H): Primary | ICD-10-CM

## 2023-08-30 PROCEDURE — 90832 PSYTX W PT 30 MINUTES: CPT | Performed by: SOCIAL WORKER
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
## 2023-09-06 ENCOUNTER — OFFICE VISIT (OUTPATIENT)
Dept: BEHAVIORAL HEALTH | Facility: CLINIC | Age: 58
End: 2023-09-06
Payer: COMMERCIAL

## 2023-09-06 DIAGNOSIS — F33.1 MAJOR DEPRESSIVE DISORDER, RECURRENT EPISODE, MODERATE (H): Primary | ICD-10-CM

## 2023-09-06 PROCEDURE — 90837 PSYTX W PT 60 MINUTES: CPT | Performed by: SOCIAL WORKER
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
## 2023-09-13 ENCOUNTER — OFFICE VISIT (OUTPATIENT)
Dept: BEHAVIORAL HEALTH | Facility: CLINIC | Age: 58
End: 2023-09-13
Payer: COMMERCIAL

## 2023-09-13 DIAGNOSIS — F33.1 MAJOR DEPRESSIVE DISORDER, RECURRENT EPISODE, MODERATE (H): Primary | ICD-10-CM

## 2023-09-13 PROCEDURE — 90837 PSYTX W PT 60 MINUTES: CPT | Performed by: SOCIAL WORKER
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
## 2023-09-20 ENCOUNTER — OFFICE VISIT (OUTPATIENT)
Dept: BEHAVIORAL HEALTH | Facility: CLINIC | Age: 58
End: 2023-09-20
Payer: COMMERCIAL

## 2023-09-20 DIAGNOSIS — F33.1 MAJOR DEPRESSIVE DISORDER, RECURRENT EPISODE, MODERATE (H): Primary | ICD-10-CM

## 2023-09-20 PROCEDURE — 90837 PSYTX W PT 60 MINUTES: CPT | Performed by: SOCIAL WORKER
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
## 2023-10-09 ENCOUNTER — PATIENT OUTREACH (OUTPATIENT)
Dept: CARE COORDINATION | Facility: CLINIC | Age: 58
End: 2023-10-09
Payer: COMMERCIAL

## 2023-10-23 ENCOUNTER — PATIENT OUTREACH (OUTPATIENT)
Dept: CARE COORDINATION | Facility: CLINIC | Age: 58
End: 2023-10-23
Payer: COMMERCIAL

## 2023-10-25 ENCOUNTER — OFFICE VISIT (OUTPATIENT)
Dept: BEHAVIORAL HEALTH | Facility: CLINIC | Age: 58
End: 2023-10-25
Payer: COMMERCIAL

## 2023-10-25 DIAGNOSIS — F33.1 MAJOR DEPRESSIVE DISORDER, RECURRENT EPISODE, MODERATE (H): Primary | ICD-10-CM

## 2023-10-25 PROCEDURE — 90837 PSYTX W PT 60 MINUTES: CPT | Performed by: SOCIAL WORKER

## 2023-10-25 ASSESSMENT — PATIENT HEALTH QUESTIONNAIRE - PHQ9
SUM OF ALL RESPONSES TO PHQ QUESTIONS 1-9: 6
10. IF YOU CHECKED OFF ANY PROBLEMS, HOW DIFFICULT HAVE THESE PROBLEMS MADE IT FOR YOU TO DO YOUR WORK, TAKE CARE OF THINGS AT HOME, OR GET ALONG WITH OTHER PEOPLE: SOMEWHAT DIFFICULT
SUM OF ALL RESPONSES TO PHQ QUESTIONS 1-9: 6
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
## 2023-11-01 ENCOUNTER — OFFICE VISIT (OUTPATIENT)
Dept: BEHAVIORAL HEALTH | Facility: CLINIC | Age: 58
End: 2023-11-01
Payer: COMMERCIAL

## 2023-11-01 DIAGNOSIS — F33.1 MAJOR DEPRESSIVE DISORDER, RECURRENT EPISODE, MODERATE (H): Primary | ICD-10-CM

## 2023-11-01 PROCEDURE — 90837 PSYTX W PT 60 MINUTES: CPT | Performed by: SOCIAL WORKER
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
## 2023-11-08 ENCOUNTER — OFFICE VISIT (OUTPATIENT)
Dept: BEHAVIORAL HEALTH | Facility: CLINIC | Age: 58
End: 2023-11-08
Payer: COMMERCIAL

## 2023-11-08 DIAGNOSIS — F90.0 ATTENTION DEFICIT HYPERACTIVITY DISORDER (ADHD), PREDOMINANTLY INATTENTIVE TYPE: ICD-10-CM

## 2023-11-08 DIAGNOSIS — F33.1 MAJOR DEPRESSIVE DISORDER, RECURRENT EPISODE, MODERATE (H): Primary | ICD-10-CM

## 2023-11-08 PROCEDURE — 90837 PSYTX W PT 60 MINUTES: CPT | Performed by: SOCIAL WORKER
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
## 2023-11-15 ENCOUNTER — OFFICE VISIT (OUTPATIENT)
Dept: BEHAVIORAL HEALTH | Facility: CLINIC | Age: 58
End: 2023-11-15
Payer: COMMERCIAL

## 2023-11-15 DIAGNOSIS — F90.8 ATTENTION DEFICIT HYPERACTIVITY DISORDER (ADHD), OTHER TYPE: ICD-10-CM

## 2023-11-15 DIAGNOSIS — N87.1 MODERATE DYSPLASIA OF CERVIX (CIN II): Primary | ICD-10-CM

## 2023-11-15 PROCEDURE — 90837 PSYTX W PT 60 MINUTES: CPT | Performed by: SOCIAL WORKER
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
## 2023-11-22 ENCOUNTER — OFFICE VISIT (OUTPATIENT)
Dept: BEHAVIORAL HEALTH | Facility: CLINIC | Age: 58
End: 2023-11-22
Payer: COMMERCIAL

## 2023-11-22 DIAGNOSIS — F33.1 MAJOR DEPRESSIVE DISORDER, RECURRENT EPISODE, MODERATE (H): Primary | ICD-10-CM

## 2023-11-22 PROCEDURE — 90837 PSYTX W PT 60 MINUTES: CPT | Performed by: SOCIAL WORKER
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
## 2023-11-29 ENCOUNTER — OFFICE VISIT (OUTPATIENT)
Dept: BEHAVIORAL HEALTH | Facility: CLINIC | Age: 58
End: 2023-11-29
Payer: COMMERCIAL

## 2023-11-29 DIAGNOSIS — F33.1 MAJOR DEPRESSIVE DISORDER, RECURRENT EPISODE, MODERATE (H): Primary | ICD-10-CM

## 2023-11-29 PROCEDURE — 90837 PSYTX W PT 60 MINUTES: CPT | Performed by: SOCIAL WORKER
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
## 2023-12-06 ENCOUNTER — OFFICE VISIT (OUTPATIENT)
Dept: BEHAVIORAL HEALTH | Facility: CLINIC | Age: 58
End: 2023-12-06
Payer: COMMERCIAL

## 2023-12-06 DIAGNOSIS — F90.8 ATTENTION DEFICIT HYPERACTIVITY DISORDER (ADHD), OTHER TYPE: ICD-10-CM

## 2023-12-06 DIAGNOSIS — F33.1 MAJOR DEPRESSIVE DISORDER, RECURRENT EPISODE, MODERATE (H): Primary | ICD-10-CM

## 2023-12-06 PROCEDURE — 90837 PSYTX W PT 60 MINUTES: CPT | Performed by: SOCIAL WORKER
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
## 2023-12-13 ENCOUNTER — OFFICE VISIT (OUTPATIENT)
Dept: BEHAVIORAL HEALTH | Facility: CLINIC | Age: 58
End: 2023-12-13
Payer: COMMERCIAL

## 2023-12-13 DIAGNOSIS — F33.1 MAJOR DEPRESSIVE DISORDER, RECURRENT EPISODE, MODERATE (H): Primary | ICD-10-CM

## 2023-12-13 PROCEDURE — 90837 PSYTX W PT 60 MINUTES: CPT | Performed by: SOCIAL WORKER
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
## 2024-01-03 ENCOUNTER — OFFICE VISIT (OUTPATIENT)
Dept: BEHAVIORAL HEALTH | Facility: CLINIC | Age: 59
End: 2024-01-03
Payer: COMMERCIAL

## 2024-01-03 DIAGNOSIS — F33.1 MAJOR DEPRESSIVE DISORDER, RECURRENT EPISODE, MODERATE (H): Primary | ICD-10-CM

## 2024-01-03 PROCEDURE — 90837 PSYTX W PT 60 MINUTES: CPT | Performed by: SOCIAL WORKER
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
## 2024-01-10 ENCOUNTER — OFFICE VISIT (OUTPATIENT)
Dept: BEHAVIORAL HEALTH | Facility: CLINIC | Age: 59
End: 2024-01-10
Payer: COMMERCIAL

## 2024-01-10 DIAGNOSIS — F33.1 MAJOR DEPRESSIVE DISORDER, RECURRENT EPISODE, MODERATE (H): Primary | ICD-10-CM

## 2024-01-10 DIAGNOSIS — F90.8 ATTENTION DEFICIT HYPERACTIVITY DISORDER (ADHD), OTHER TYPE: ICD-10-CM

## 2024-01-10 PROCEDURE — 90837 PSYTX W PT 60 MINUTES: CPT | Performed by: SOCIAL WORKER

## 2024-01-10 ASSESSMENT — ANXIETY QUESTIONNAIRES
GAD7 TOTAL SCORE: 13
7. FEELING AFRAID AS IF SOMETHING AWFUL MIGHT HAPPEN: SEVERAL DAYS
8. IF YOU CHECKED OFF ANY PROBLEMS, HOW DIFFICULT HAVE THESE MADE IT FOR YOU TO DO YOUR WORK, TAKE CARE OF THINGS AT HOME, OR GET ALONG WITH OTHER PEOPLE?: SOMEWHAT DIFFICULT
6. BECOMING EASILY ANNOYED OR IRRITABLE: NEARLY EVERY DAY
IF YOU CHECKED OFF ANY PROBLEMS ON THIS QUESTIONNAIRE, HOW DIFFICULT HAVE THESE PROBLEMS MADE IT FOR YOU TO DO YOUR WORK, TAKE CARE OF THINGS AT HOME, OR GET ALONG WITH OTHER PEOPLE: SOMEWHAT DIFFICULT
GAD7 TOTAL SCORE: 13
3. WORRYING TOO MUCH ABOUT DIFFERENT THINGS: SEVERAL DAYS
1. FEELING NERVOUS, ANXIOUS, OR ON EDGE: MORE THAN HALF THE DAYS
4. TROUBLE RELAXING: NEARLY EVERY DAY
7. FEELING AFRAID AS IF SOMETHING AWFUL MIGHT HAPPEN: SEVERAL DAYS
GAD7 TOTAL SCORE: 13
2. NOT BEING ABLE TO STOP OR CONTROL WORRYING: SEVERAL DAYS
5. BEING SO RESTLESS THAT IT IS HARD TO SIT STILL: MORE THAN HALF THE DAYS

## 2024-01-10 ASSESSMENT — PATIENT HEALTH QUESTIONNAIRE - PHQ9
SUM OF ALL RESPONSES TO PHQ QUESTIONS 1-9: 9
10. IF YOU CHECKED OFF ANY PROBLEMS, HOW DIFFICULT HAVE THESE PROBLEMS MADE IT FOR YOU TO DO YOUR WORK, TAKE CARE OF THINGS AT HOME, OR GET ALONG WITH OTHER PEOPLE: SOMEWHAT DIFFICULT
SUM OF ALL RESPONSES TO PHQ QUESTIONS 1-9: 9
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
## 2024-01-15 ASSESSMENT — ENCOUNTER SYMPTOMS
PARESTHESIAS: 0
FREQUENCY: 0
WEAKNESS: 0
HEMATURIA: 0
BREAST MASS: 0
HEADACHES: 0
ABDOMINAL PAIN: 0
HEARTBURN: 0
JOINT SWELLING: 0
FEVER: 0
NAUSEA: 0
NERVOUS/ANXIOUS: 1
MYALGIAS: 0
SORE THROAT: 0
PALPITATIONS: 0
ARTHRALGIAS: 0
DIZZINESS: 0
COUGH: 0
SHORTNESS OF BREATH: 0
EYE PAIN: 0
DIARRHEA: 0
DYSURIA: 0
CHILLS: 0
CONSTIPATION: 0
HEMATOCHEZIA: 0

## 2024-01-17 ENCOUNTER — OFFICE VISIT (OUTPATIENT)
Dept: BEHAVIORAL HEALTH | Facility: CLINIC | Age: 59
End: 2024-01-17
Payer: COMMERCIAL

## 2024-01-17 DIAGNOSIS — F33.1 MAJOR DEPRESSIVE DISORDER, RECURRENT EPISODE, MODERATE (H): Primary | ICD-10-CM

## 2024-01-17 PROCEDURE — 90837 PSYTX W PT 60 MINUTES: CPT | Performed by: SOCIAL WORKER
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
## 2024-01-24 ENCOUNTER — OFFICE VISIT (OUTPATIENT)
Dept: BEHAVIORAL HEALTH | Facility: CLINIC | Age: 59
End: 2024-01-24
Payer: COMMERCIAL

## 2024-01-24 DIAGNOSIS — F33.1 MAJOR DEPRESSIVE DISORDER, RECURRENT EPISODE, MODERATE (H): Primary | ICD-10-CM

## 2024-01-24 DIAGNOSIS — F90.8 ATTENTION DEFICIT HYPERACTIVITY DISORDER (ADHD), OTHER TYPE: ICD-10-CM

## 2024-01-24 PROCEDURE — 90837 PSYTX W PT 60 MINUTES: CPT | Performed by: SOCIAL WORKER
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
## 2024-02-03 ENCOUNTER — HEALTH MAINTENANCE LETTER (OUTPATIENT)
Age: 59
End: 2024-02-03

## 2024-02-06 DIAGNOSIS — F33.1 MAJOR DEPRESSIVE DISORDER, RECURRENT EPISODE, MODERATE (H): Primary | ICD-10-CM

## 2024-02-06 RX ORDER — FLUOXETINE 10 MG/1
10 CAPSULE ORAL DAILY
Qty: 90 CAPSULE | Refills: 1 | Status: SHIPPED | OUTPATIENT
Start: 2024-02-06 | End: 2024-02-21

## 2024-02-07 ENCOUNTER — OFFICE VISIT (OUTPATIENT)
Dept: BEHAVIORAL HEALTH | Facility: CLINIC | Age: 59
End: 2024-02-07
Payer: COMMERCIAL

## 2024-02-07 DIAGNOSIS — F33.1 MAJOR DEPRESSIVE DISORDER, RECURRENT EPISODE, MODERATE (H): Primary | ICD-10-CM

## 2024-02-07 DIAGNOSIS — F90.0 ATTENTION DEFICIT HYPERACTIVITY DISORDER (ADHD), PREDOMINANTLY INATTENTIVE TYPE: ICD-10-CM

## 2024-02-07 PROCEDURE — 90837 PSYTX W PT 60 MINUTES: CPT | Performed by: SOCIAL WORKER
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
## 2024-02-14 ENCOUNTER — OFFICE VISIT (OUTPATIENT)
Dept: BEHAVIORAL HEALTH | Facility: CLINIC | Age: 59
End: 2024-02-14
Payer: COMMERCIAL

## 2024-02-14 DIAGNOSIS — F33.1 MAJOR DEPRESSIVE DISORDER, RECURRENT EPISODE, MODERATE (H): Primary | ICD-10-CM

## 2024-02-14 PROCEDURE — 90834 PSYTX W PT 45 MINUTES: CPT | Performed by: SOCIAL WORKER

## 2024-02-14 ASSESSMENT — PATIENT HEALTH QUESTIONNAIRE - PHQ9
SUM OF ALL RESPONSES TO PHQ QUESTIONS 1-9: 11
10. IF YOU CHECKED OFF ANY PROBLEMS, HOW DIFFICULT HAVE THESE PROBLEMS MADE IT FOR YOU TO DO YOUR WORK, TAKE CARE OF THINGS AT HOME, OR GET ALONG WITH OTHER PEOPLE: SOMEWHAT DIFFICULT
SUM OF ALL RESPONSES TO PHQ QUESTIONS 1-9: 11
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
## 2024-02-20 SDOH — HEALTH STABILITY: PHYSICAL HEALTH: ON AVERAGE, HOW MANY MINUTES DO YOU ENGAGE IN EXERCISE AT THIS LEVEL?: 0 MIN

## 2024-02-20 SDOH — HEALTH STABILITY: PHYSICAL HEALTH: ON AVERAGE, HOW MANY DAYS PER WEEK DO YOU ENGAGE IN MODERATE TO STRENUOUS EXERCISE (LIKE A BRISK WALK)?: 0 DAYS

## 2024-02-20 ASSESSMENT — SOCIAL DETERMINANTS OF HEALTH (SDOH): HOW OFTEN DO YOU GET TOGETHER WITH FRIENDS OR RELATIVES?: THREE TIMES A WEEK

## 2024-02-20 NOTE — COMMUNITY RESOURCES LIST (ENGLISH)
02/20/2024   [facility] [employer]  N/A  For questions about this resource list or additional care needs, please contact your primary care clinic or care manager.  Phone: [phone]   Email: N/A   Address: [address] [place] [address]   Hours: N/A        Exercise and Recreation       Gym or workout facility  1  [employer] - [place] - [facility] [address] Distance: 3.4 miles      In-Person   [address] [place] [address]  Language: English  Hours: Mon - Sun Open 24 Hours  Fees: Free, Insurance, Self Pay   Phone: (945) 924-8393 Email: dayday@[employer] Website: https://www.[employer]/gyms/[facility]-[place]     2  [facility] [place] - [facility] - [employer] Distance: 3.55 miles      In-Person   [address] [place] [address]  Language: English  Hours: Mon - Thu 3:00 PM - 5:00 PM  Fees: Free, Self Pay   Phone: (836) 123-1507 Email: donna@.[facility]. Website: https://www.[facility]/facilities/bdwjk-rehw-jrnpqibwxc-Shannon          Hotlines and Helplines       Hotline - Housing crisis  3  [facility] Distance: 15.35 miles      Phone/Virtual   [address] [place] [address]  Language: English  Hours: Mon - Sun Open 24 Hours   Phone: (490) 605-2878 Email: communications@[facility].org Website: https://[facility]-[place].org/oursaviourshousing/     4  [facility] Distance: 16.95 miles      Phone/Virtual   [address] [place] [address]  Language: English  Hours: Mon - Sun Open 24 Hours   Phone: (819) 711-4988 Email: info@[facility].org Website: http://www.[facility].org          Housing       Coordinated Entry access point  [address] [place] [address] Distance: 7.76 miles      In-Person, Phone/Virtual   [address] [place] [address]  Language: English  Hours: Mon - Fri 8:30 AM - 4:30 PM  Fees: Free   Phone: (518) 455-4906 Email: info@[facility].org Website:  https://www.[facility].org/locations/[facility]-clinic/     6  [employer] [facility] - Housing Services Distance: 15.54 miles      In-Person   [address] [place] [address]  Language: English  Hours: Mon - Fri 9:00 AM - 5:00 PM  Fees: Free   Phone: (757) 499-9149 Email: housing@[facility].org Website: http://www.[facility].org/housing     Drop-in center or day shelter  7  [facility] Distance: 6.27 miles      In-Person   [address] [place] [address]  Language: English  Hours: Mon - Fri 9:00 AM - 4:00 PM  Fees: Free   Phone: (454) 915-6634 Email: frontcynthiak@[employer] Website: http://[employer]     8  [facility] Distance: 15.35 miles      In-Person   [address] [place] [address]  Language: English, Hungarian, Ukrainian  Hours: Mon - Sat 7:00 AM - 3:00 PM  Fees: Free, Self Pay   Phone: (169) 122-9315 Email: info@[employer] Website: https://www.[employer].org/locations/opportunity-center/     Housing search assistance  9  [facility] - Housing Resources Distance: 3.79 miles      In-Person, Phone/Virtual   [address] [place] [address]  Language: English  Hours: Mon - Fri 8:00 AM - 4:30 PM  Fees: Free   Phone: (898) 141-7422 Email: office@[employer] Website: http://[employer]     10  [employer] [facility] ([employer]) - Main Office Distance: 8.06 miles      Phone/Virtual   [address] [address]  Language: English, Lilli, [place] (Cook Islander), Ethiopian  Hours: Mon - Fri 9:00 AM - 5:00 PM Appt. Only  Fees: Free   Phone: (637) 239-6284 Email: info@[employer].org Website: http://www.[employer].org     Shelter for families  11  [facility] Distance: 12.6 miles      In-Person   [address] [facility] [place] [address]  Language: English  Hours: Mon - Fri 3:00 PM - 9:00 AM , Sat - Sun Open 24 Hours  Fees:  Free   Phone: (652) 717-5347 Ext.1 Website: https://www.[facility].org/2020/07/03/emergency-family-shelter/     12  [facility] Distance: 21.93 miles      In-Person   [address] [place] [address]  Language: English  Hours: Mon - Sun Open 24 Hours  Fees: Free, Self Pay   Phone: (583) 577-2104 Email: Nayana@[facility].org Website: http://www.[facility].org/     Shelter for individuals  13  [place] [facility] - Housing Help Distance: 7.44 miles      Phone/Virtual   [address] [facility] [address] [place] [address]  Language: English  Hours: Mon - Fri 8:00 AM - 5:00 PM   Phone: (449) 579-7197 Email: mn.concepción@[facility] Website: https://[facility].org/     14  [facility] [place] [place] [facility] - [facility] [place] [facility] Distance: 7.8 miles      In-Person   [address] [place] [address]  Language: English  Hours: Mon - Sun 5:00 PM - 10:00 AM  Fees: Free, Self Pay   Phone: (410) 624-9727 Email: info@[employer].org Website: https://www.[employer].org/locations/[facility]-[place]/          Important Numbers & Websites       Emergency Services   911  [facility]   311  Poison Control   (254) 560-7916  Suicide Prevention Lifeline   (788) 577-6998 (TALK)  Child Abuse Hotline   (400) 798-4562 (4-A-Child)  Sexual Assault Hotline   (482) 338-1028 (HOPE)  National Runaway Safeline   (755) 157-7798 (RUNAWAY)  All-Options Talkline   (699) 120-4967  Substance Abuse Referral   (102) 182-4444 (HELP)

## 2024-02-20 NOTE — COMMUNITY RESOURCES LIST (ENGLISH)
02/20/2024    [employer]  N/A  For questions about this resource list or additional care needs, please contact your primary care clinic or care manager.  Phone: [phone]   Email: N/A   Address: [address] [place] [address]   Hours: N/A        Exercise and Recreation       Gym or workout facility  1  [employer] - [place] - [address] Distance: 3.4 miles      In-Person   [address] [place] [address]  Language: English  Hours: Mon - Sun Open 24 Hours  Fees: Free, Insurance, Self Pay   Phone: (240) 406-3581 Email: ashishmn@[employer] Website: https://www.[employer]/gyms/[facility]-mn     2  [facility] [place] - [facility] - Open Gym Distance: 3.55 miles      In-Person   [address] [place] [address]  Language: English  Hours: Mon - Thu 3:00 PM - 5:00 PM  Fees: Free, Self Pay   Phone: (588) 470-4045 Email: donna@.[facility]. Website: https://www.[facility]/facilities/mcpmi-fhtl-dnaxzaeudn-[place]          Important Numbers & Websites       Emergency Services   911  City Services   311  Poison Control   (663) 536-5711  Suicide Prevention Lifeline   (802) 910-6309 (TALK)  Child Abuse Hotline   (295) 471-6528 (4-A-Child)  Sexual Assault Hotline   (880) 472-4773 (HOPE)  National Runaway Safeline   (893) 730-5821 (RUNAWAY)  All-Options Talkline   (830) 901-6451  Substance Abuse Referral   (829) 207-7997 (HELP)

## 2024-02-21 ENCOUNTER — OFFICE VISIT (OUTPATIENT)
Dept: BEHAVIORAL HEALTH | Facility: CLINIC | Age: 59
End: 2024-02-21
Payer: COMMERCIAL

## 2024-02-21 ENCOUNTER — OFFICE VISIT (OUTPATIENT)
Dept: FAMILY MEDICINE | Facility: CLINIC | Age: 59
End: 2024-02-21
Payer: COMMERCIAL

## 2024-02-21 VITALS
TEMPERATURE: 98.3 F | SYSTOLIC BLOOD PRESSURE: 118 MMHG | WEIGHT: 219 LBS | BODY MASS INDEX: 36.49 KG/M2 | DIASTOLIC BLOOD PRESSURE: 78 MMHG | RESPIRATION RATE: 18 BRPM | OXYGEN SATURATION: 97 % | HEIGHT: 65 IN | HEART RATE: 86 BPM

## 2024-02-21 DIAGNOSIS — F90.0 ATTENTION DEFICIT HYPERACTIVITY DISORDER (ADHD), PREDOMINANTLY INATTENTIVE TYPE: ICD-10-CM

## 2024-02-21 DIAGNOSIS — E66.812 CLASS 2 OBESITY DUE TO EXCESS CALORIES WITH BODY MASS INDEX (BMI) OF 36.0 TO 36.9 IN ADULT, UNSPECIFIED WHETHER SERIOUS COMORBIDITY PRESENT: ICD-10-CM

## 2024-02-21 DIAGNOSIS — F33.1 MAJOR DEPRESSIVE DISORDER, RECURRENT EPISODE, MODERATE (H): Primary | ICD-10-CM

## 2024-02-21 DIAGNOSIS — Z00.00 ROUTINE GENERAL MEDICAL EXAMINATION AT A HEALTH CARE FACILITY: Primary | ICD-10-CM

## 2024-02-21 DIAGNOSIS — F33.1 MAJOR DEPRESSIVE DISORDER, RECURRENT EPISODE, MODERATE (H): ICD-10-CM

## 2024-02-21 DIAGNOSIS — E66.09 CLASS 2 OBESITY DUE TO EXCESS CALORIES WITH BODY MASS INDEX (BMI) OF 36.0 TO 36.9 IN ADULT, UNSPECIFIED WHETHER SERIOUS COMORBIDITY PRESENT: ICD-10-CM

## 2024-02-21 LAB
ANION GAP SERPL CALCULATED.3IONS-SCNC: 10 MMOL/L (ref 7–15)
BUN SERPL-MCNC: 13.1 MG/DL (ref 6–20)
CALCIUM SERPL-MCNC: 9.6 MG/DL (ref 8.6–10)
CHLORIDE SERPL-SCNC: 104 MMOL/L (ref 98–107)
CHOLEST SERPL-MCNC: 247 MG/DL
CREAT SERPL-MCNC: 0.81 MG/DL (ref 0.51–0.95)
DEPRECATED HCO3 PLAS-SCNC: 26 MMOL/L (ref 22–29)
EGFRCR SERPLBLD CKD-EPI 2021: 84 ML/MIN/1.73M2
FASTING STATUS PATIENT QL REPORTED: YES
GLUCOSE SERPL-MCNC: 94 MG/DL (ref 70–99)
HDLC SERPL-MCNC: 63 MG/DL
LDLC SERPL CALC-MCNC: 160 MG/DL
NONHDLC SERPL-MCNC: 184 MG/DL
POTASSIUM SERPL-SCNC: 4 MMOL/L (ref 3.4–5.3)
SODIUM SERPL-SCNC: 140 MMOL/L (ref 135–145)
TRIGL SERPL-MCNC: 122 MG/DL

## 2024-02-21 PROCEDURE — 90837 PSYTX W PT 60 MINUTES: CPT | Performed by: SOCIAL WORKER

## 2024-02-21 PROCEDURE — 80048 BASIC METABOLIC PNL TOTAL CA: CPT | Performed by: NURSE PRACTITIONER

## 2024-02-21 PROCEDURE — 99214 OFFICE O/P EST MOD 30 MIN: CPT | Mod: 25 | Performed by: NURSE PRACTITIONER

## 2024-02-21 PROCEDURE — 90686 IIV4 VACC NO PRSV 0.5 ML IM: CPT | Performed by: NURSE PRACTITIONER

## 2024-02-21 PROCEDURE — 91320 SARSCV2 VAC 30MCG TRS-SUC IM: CPT | Performed by: NURSE PRACTITIONER

## 2024-02-21 PROCEDURE — 90480 ADMN SARSCOV2 VAC 1/ONLY CMP: CPT | Performed by: NURSE PRACTITIONER

## 2024-02-21 PROCEDURE — 99396 PREV VISIT EST AGE 40-64: CPT | Mod: 25 | Performed by: NURSE PRACTITIONER

## 2024-02-21 PROCEDURE — 90471 IMMUNIZATION ADMIN: CPT | Performed by: NURSE PRACTITIONER

## 2024-02-21 PROCEDURE — 80061 LIPID PANEL: CPT | Performed by: NURSE PRACTITIONER

## 2024-02-21 PROCEDURE — 36415 COLL VENOUS BLD VENIPUNCTURE: CPT | Performed by: NURSE PRACTITIONER

## 2024-02-21 RX ORDER — FLUOXETINE 10 MG/1
10 CAPSULE ORAL DAILY
Qty: 90 CAPSULE | Refills: 3 | Status: SHIPPED | OUTPATIENT
Start: 2024-02-21

## 2024-02-21 ASSESSMENT — PAIN SCALES - GENERAL: PAINLEVEL: MILD PAIN (2)

## 2024-02-21 NOTE — PATIENT INSTRUCTIONS
Preventive Care Advice   This is general advice given by our system to help you stay healthy. However, your care team may have specific advice just for you. Please talk to your care team about your preventive care needs.  Nutrition  Eat 5 or more servings of fruits and vegetables each day.  Try wheat bread, brown rice and whole grain pasta (instead of white bread, rice, and pasta).  Get enough calcium and vitamin D. Check the label on foods and aim for 100% of the RDA (recommended daily allowance).  Lifestyle  Exercise at least 150 minutes each week  (30 minutes a day, 5 days a week).  Do muscle strengthening activities 2 days a week. These help control your weight and prevent disease.  No smoking.  Wear sunscreen to prevent skin cancer.  Have a dental exam and cleaning every 6 months.  Yearly exams  See your health care team every year to talk about:  Any changes in your health.  Any medicines your care team has prescribed.  Preventive care, family planning, and ways to prevent chronic diseases.  Shots (vaccines)   HPV shots (up to age 26), if you've never had them before.  Hepatitis B shots (up to age 59), if you've never had them before.  COVID-19 shot: Get this shot when it's due.  Flu shot: Get a flu shot every year.  Tetanus shot: Get a tetanus shot every 10 years.  Pneumococcal, hepatitis A, and RSV shots: Ask your care team if you need these based on your risk.  Shingles shot (for age 50 and up)  General health tests  Diabetes screening:  Starting at age 35, Get screened for diabetes at least every 3 years.  If you are younger than age 35, ask your care team if you should be screened for diabetes.  Cholesterol test: At age 39, start having a cholesterol test every 5 years, or more often if advised.  Bone density scan (DEXA): At age 50, ask your care team if you should have this scan for osteoporosis (brittle bones).  Hepatitis C: Get tested at least once in your life.  STIs (sexually transmitted  infections)  Before age 24: Ask your care team if you should be screened for STIs.  After age 24: Get screened for STIs if you're at risk. You are at risk for STIs (including HIV) if:  You are sexually active with more than one person.  You don't use condoms every time.  You or a partner was diagnosed with a sexually transmitted infection.  If you are at risk for HIV, ask about PrEP medicine to prevent HIV.  Get tested for HIV at least once in your life, whether you are at risk for HIV or not.  Cancer screening tests  Cervical cancer screening: If you have a cervix, begin getting regular cervical cancer screening tests starting at age 21.  Breast cancer scan (mammogram): If you've ever had breasts, begin having regular mammograms starting at age 40. This is a scan to check for breast cancer.  Colon cancer screening: It is important to start screening for colon cancer at age 45.  Have a colonoscopy test every 10 years (or more often if you're at risk) Or, ask your provider about stool tests like a FIT test every year or Cologuard test every 3 years.  To learn more about your testing options, visit:   https://www.[email or web address]/504819.pdf.  For help making a decision, visit:   https://bit.ly/lb73075.  Prostate cancer screening test: If you have a prostate, ask your care team if a prostate cancer screening test (PSA) at age 55 is right for you.  Lung cancer screening: If you are a current or former smoker ages 50 to 80, ask your care team if ongoing lung cancer screenings are right for you.  For informational purposes only. Not to replace the advice of your health care provider. Copyright   2023 [facility]. All rights reserved. Clinically reviewed by the [facility]itions [facility]. Staaff [id number] - REV 01/24.    Learning About Stress  What is stress?     Stress is your body's response to a hard situation. Your body can have a physical, emotional, or mental response. Stress is a fact of life for  most people, and it affects everyone differently. What causes stress for you may not be stressful for someone else.  A lot of things can cause stress. You may feel stress when you go on a job interview, take a test, or run a race. This kind of short-term stress is normal and even useful. It can help you if you need to work hard or react quickly. For example, stress can help you finish an important job on time.  Long-term stress is caused by ongoing stressful situations or events. Examples of long-term stress include long-term health problems, ongoing problems at work, or conflicts in your family. Long-term stress can harm your health.  How does stress affect your health?  When you are stressed, your body responds as though you are in danger. It makes hormones that speed up your heart, make you breathe faster, and give you a burst of energy. This is called the fight-or-flight stress response. If the stress is over quickly, your body goes back to normal and no harm is done.  But if stress happens too often or lasts too long, it can have bad effects. Long-term stress can make you more likely to get sick, and it can make symptoms of some diseases worse. If you tense up when you are stressed, you may develop neck, shoulder, or low back pain. Stress is linked to high blood pressure and heart disease.  Stress also harms your emotional health. It can make you spicer, tense, or depressed. Your relationships may suffer, and you may not do well at work or school.  What can you do to manage stress?  You can try these things to help manage stress:   Do something active. Exercise or activity can help reduce stress. Walking is a great way to get started. Even everyday activities such as housecleaning or yard work can help.  Try yoga or humaira chi. These techniques combine exercise and meditation. You may need some training at first to learn them.  Do something you enjoy. For example, listen to music or go to a movie. Practice your  "hobby or do volunteer work.  Meditate. This can help you relax, because you are not worrying about what happened before or what may happen in the future.  Do guided imagery. Imagine yourself in any setting that helps you feel calm. You can use online videos, books, or a teacher to guide you.  Do breathing exercises. For example:  From a standing position, bend forward from the waist with your knees slightly bent. Let your arms dangle close to the floor.  Breathe in slowly and deeply as you return to a standing position. Roll up slowly and lift your head last.  Hold your breath for just a few seconds in the standing position.  Breathe out slowly and bend forward from the waist.  Let your feelings out. Talk, laugh, cry, and express anger when you need to. Talking with supportive friends or family, a counselor, or a jaclyn leader about your feelings is a healthy way to relieve stress. Avoid discussing your feelings with people who make you feel worse.  Write. It may help to write about things that are bothering you. This helps you find out how much stress you feel and what is causing it. When you know this, you can find better ways to cope.  What can you do to prevent stress?  You might try some of these things to help prevent stress:  Manage your time. This helps you find time to do the things you want and need to do.  Get enough sleep. Your body recovers from the stresses of the day while you are sleeping.  Get support. Your family, friends, and community can make a difference in how you experience stress.  Limit your news feed. Avoid or limit time on social media or news that may make you feel stressed.  Do something active. Exercise or activity can help reduce stress. Walking is a great way to get started.  Where can you learn more?  Go to https://www.healthwise.net/patiented  Enter N032 in the search box to learn more about \"Learning About Stress.\"  Current as of: February 26, 2023               Content Version: " 13.8    8291-3072 [employer].   Care instructions adapted under license by your healthcare professional. If you have questions about a medical condition or this instruction, always ask your healthcare professional. [employer] disclaims any warranty or liability for your use of this information.

## 2024-02-21 NOTE — PROGRESS NOTES
"Preventive Care Visit  [facility]  Kallie Ta, NP, Nurse Practitioner - Family  Feb 21, 2024    Assessment & Plan     (Z00.00) Routine general medical examination at a health care facility  (primary encounter diagnosis)  Comment:   Plan: Lipid panel reflex to direct LDL Fasting, Basic        metabolic panel  (Ca, Cl, CO2, Creat, Gluc, K,         Na, BUN)            (E66.09,  Z68.36) Class 2 obesity due to excess calories with body mass index (BMI) of 36.0 to 36.9 in adult, unspecified whether serious comorbidity present  Comment:   Plan: tirzepatide-Weight Management (ZEPBOUND) 2.5         MG/0.5ML prefilled pen, tirzepatide-Weight         Management (ZEPBOUND) 7.5 MG/0.5ML prefilled         pen, tirzepatide-Weight Management (ZEPBOUND) 5        MG/0.5ML prefilled pen        Will start Zepbound.  Discussed the use and indication of this medication as well as potential side effects.  Follow up 3 months after starting the 7.5 mg dose.     (F33.1) Major depressive disorder, recurrent episode, moderate (H)  Comment: stable  Plan: FLUoxetine (PROZAC) 10 MG capsule        The current medical regimen is effective;  continue present plan and medications.               BMI  Estimated body mass index is 36.62 kg/m  as calculated from the following:    Height as of this encounter: 1.647 m (5' 4.84\").    Weight as of this encounter: 99.3 kg (219 lb).       Counseling  Appropriate preventive services were discussed with this patient, including applicable screening as appropriate for fall prevention, nutrition, physical activity, Tobacco-use cessation, weight loss and cognition.  Checklist reviewing preventive services available has been given to the patient.  Reviewed patient's diet, addressing concerns and/or questions.   The patient was instructed to see the dentist every 6 months.           Wolfgang Deleon is a 58 year old, presenting for the following:  Physical        2/21/2024     " 6:56 AM   Additional Questions   Roomed by Kristen   Accompanied by Self         2/21/2024     6:56 AM   Patient Reported Additional Medications   Patient reports taking the following new medications None        Health Care Directive  Patient does not have a Health Care Directive or Living Will: Discussed advance care planning with patient; information given to patient to review.    Healthy Habits:     Getting at least 3 servings of Calcium per day:  Yes    Bi-annual eye exam:  Yes    Dental care twice a year:  NO    Sleep apnea or symptoms of sleep apnea:  None    Diet:  Vegetarian/vegan    Frequency of exercise:  4-5 days/week    Duration of exercise:  30-45 minutes    Taking medications regularly:  Yes    Medication side effects:  None    Additional concerns today:  Yes    Her  recently passed away from multiple myeloma.  She is coping well with grief - reading books, sees Josse Murray weekly, which is very helpful.  Trying to focus on her own health now and is interested in trying a medication to help with weight loss.  She has tried Weight Watchers in the past.             2/20/2024   General Health   How would you rate your overall physical health? (!) FAIR   Feel stress (tense, anxious, or unable to sleep) Rather much   (!) STRESS CONCERN      2/20/2024   Nutrition   Three or more servings of calcium each day? Yes   Diet: Vegetarian/vegan   How many servings of fruit and vegetables per day? (!) 2-3   How many sweetened beverages each day? 0-1         2/20/2024   Exercise   Days per week of moderate/strenous exercise 0 days   Average minutes spent exercising at this level 0 min   (!) EXERCISE CONCERN      2/20/2024   Social Factors   Frequency of gathering with friends or relatives Three times a week   Worry food won't last until get money to buy more No    No   Food not last or not have enough money for food? No    No   Do you have housing?  Yes    No   Are you worried about losing your housing? No    No    Lack of transportation? No    No   Unable to get utilities (heat,electricity)? No    No   Want help with housing or utility concern? No         2/20/2024   Fall Risk   Fallen 2 or more times in the past year? No   Trouble with walking or balance? No          2/20/2024   Dental   Dentist two times every year? (!) NO         2/20/2024   TB Screening   Were you born outside of [place]?  No                 2/20/2024   Substance Use   Alcohol more than 3/day or more than 7/wk No   Do you use any other substances recreationally? No     Social History     Tobacco Use    Smoking status: Never     Passive exposure: Past    Smokeless tobacco: Never   Vaping Use    Vaping Use: Never used   Substance Use Topics    Alcohol use: No    Drug use: No             2/20/2024   Breast Cancer Screening   Family history of breast, colon, or ovarian cancer? No / Unknown          No data to display                         2/20/2024   STI Screening   New sexual partner(s) since last STI/HIV test? No     History of abnormal Pap smear: NO - age 30-65 PAP every 5 years with negative HPV co-testing recommended        Latest Ref Rng & Units 11/8/2022     3:39 PM 8/29/2019    11:34 AM 8/29/2019    11:32 AM   PAP / HPV   PAP  Negative for Intraepithelial Lesion or Malignancy (NILM)      PAP (Historical)   NIL     HPV 16 DNA Negative Negative   Negative    HPV 18 DNA Negative Negative   Negative    Other HR HPV Negative Negative   Negative      The 10-year ASCVD risk score (Jabari ACUÑA, et al., 2019) is: 2.1%    Values used to calculate the score:      Age: 58 years      Sex: Female      Is Non- : No      Diabetic: No      Tobacco smoker: No      Systolic Blood Pressure: 118 mmHg      Is BP treated: No      HDL Cholesterol: 73 mg/dL      Total Cholesterol: 260 mg/dL           Reviewed and updated as needed this visit by Provider                             Objective    Exam  /78 (BP Location: Right arm, Patient Position:  "Sitting, Cuff Size: Adult Large)   Pulse 86   Temp 98.3  F (36.8  C) (Temporal)   Resp 18   Ht 1.647 m (5' 4.84\")   Wt 99.3 kg (219 lb)   SpO2 97%   Breastfeeding No   BMI 36.62 kg/m     Estimated body mass index is 36.62 kg/m  as calculated from the following:    Height as of this encounter: 1.647 m (5' 4.84\").    Weight as of this encounter: 99.3 kg (219 lb).    Physical Exam  GENERAL: alert and no distress  EYES: Eyes grossly normal to inspection, PERRL and conjunctivae and sclerae normal  HENT: ear canals and TM's normal, nose and mouth without ulcers or lesions  NECK: no adenopathy, no asymmetry, masses, or scars  RESP: lungs clear to auscultation - no rales, rhonchi or wheezes  BREAST: normal without masses, tenderness or nipple discharge and no palpable axillary masses or adenopathy  CV: regular rate and rhythm, normal S1 S2, no S3 or S4, no murmur, click or rub, no peripheral edema  ABDOMEN: soft, nontender, no hepatosplenomegaly, no masses and bowel sounds normal  MS: no gross musculoskeletal defects noted, no edema  SKIN: no suspicious lesions or rashes  NEURO: Normal strength and tone, mentation intact and speech normal  PSYCH: mentation appears normal, affect normal/bright      Signed Electronically by: Kallie Ta NP    Answers submitted by the patient for this visit:  Annual Preventive Visit (Submitted on 1/15/2024)  Chief Complaint: Annual Exam:    "
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
## 2024-02-28 ENCOUNTER — OFFICE VISIT (OUTPATIENT)
Dept: BEHAVIORAL HEALTH | Facility: CLINIC | Age: 59
End: 2024-02-28
Payer: COMMERCIAL

## 2024-02-28 DIAGNOSIS — F90.0 ATTENTION DEFICIT HYPERACTIVITY DISORDER (ADHD), PREDOMINANTLY INATTENTIVE TYPE: ICD-10-CM

## 2024-02-28 DIAGNOSIS — F33.1 MAJOR DEPRESSIVE DISORDER, RECURRENT EPISODE, MODERATE (H): Primary | ICD-10-CM

## 2024-02-28 PROCEDURE — 90837 PSYTX W PT 60 MINUTES: CPT | Performed by: SOCIAL WORKER
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
## 2024-03-06 ENCOUNTER — OFFICE VISIT (OUTPATIENT)
Dept: BEHAVIORAL HEALTH | Facility: CLINIC | Age: 59
End: 2024-03-06
Payer: COMMERCIAL

## 2024-03-06 DIAGNOSIS — F33.1 MAJOR DEPRESSIVE DISORDER, RECURRENT EPISODE, MODERATE (H): Primary | ICD-10-CM

## 2024-03-06 DIAGNOSIS — F90.0 ATTENTION DEFICIT HYPERACTIVITY DISORDER (ADHD), PREDOMINANTLY INATTENTIVE TYPE: ICD-10-CM

## 2024-03-06 DIAGNOSIS — Z63.4 BEREAVEMENT: ICD-10-CM

## 2024-03-06 PROCEDURE — 90837 PSYTX W PT 60 MINUTES: CPT | Performed by: SOCIAL WORKER

## 2024-03-06 SDOH — SOCIAL STABILITY - SOCIAL INSECURITY: DISSAPEARANCE AND DEATH OF FAMILY MEMBER: Z63.4
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
## 2024-03-11 ENCOUNTER — OFFICE VISIT (OUTPATIENT)
Dept: BEHAVIORAL HEALTH | Facility: CLINIC | Age: 59
End: 2024-03-11
Payer: COMMERCIAL

## 2024-03-11 DIAGNOSIS — F90.2 ATTENTION DEFICIT HYPERACTIVITY DISORDER (ADHD), COMBINED TYPE: ICD-10-CM

## 2024-03-11 DIAGNOSIS — F33.1 MAJOR DEPRESSIVE DISORDER, RECURRENT EPISODE, MODERATE (H): Primary | ICD-10-CM

## 2024-03-11 PROCEDURE — 90837 PSYTX W PT 60 MINUTES: CPT | Performed by: SOCIAL WORKER
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
## 2024-03-14 ENCOUNTER — MYC MEDICAL ADVICE (OUTPATIENT)
Dept: FAMILY MEDICINE | Facility: CLINIC | Age: 59
End: 2024-03-14
Payer: COMMERCIAL

## 2024-03-14 NOTE — LETTER
[facility]  [address]  [address]  [place] [address]  [phone]        March 14, 2024    Regarding:  Adrienne Ivory  [address]  [place] [address]              To Whom It May Concern:   As the primary care provider for this patient, it is medically necessary for therapeutic massage therapy to manage her stress and grief.          Sincerely,        Kallie Ta, CAROL/arl

## 2024-03-20 ENCOUNTER — OFFICE VISIT (OUTPATIENT)
Dept: BEHAVIORAL HEALTH | Facility: CLINIC | Age: 59
End: 2024-03-20
Payer: COMMERCIAL

## 2024-03-20 DIAGNOSIS — Z63.4 BEREAVEMENT: ICD-10-CM

## 2024-03-20 DIAGNOSIS — F33.1 MAJOR DEPRESSIVE DISORDER, RECURRENT EPISODE, MODERATE (H): Primary | ICD-10-CM

## 2024-03-20 DIAGNOSIS — F90.8 ATTENTION DEFICIT HYPERACTIVITY DISORDER (ADHD), OTHER TYPE: ICD-10-CM

## 2024-03-20 PROCEDURE — 90837 PSYTX W PT 60 MINUTES: CPT | Performed by: SOCIAL WORKER

## 2024-03-20 SDOH — SOCIAL STABILITY - SOCIAL INSECURITY: DISSAPEARANCE AND DEATH OF FAMILY MEMBER: Z63.4

## 2024-03-20 ASSESSMENT — PATIENT HEALTH QUESTIONNAIRE - PHQ9
SUM OF ALL RESPONSES TO PHQ QUESTIONS 1-9: 15
10. IF YOU CHECKED OFF ANY PROBLEMS, HOW DIFFICULT HAVE THESE PROBLEMS MADE IT FOR YOU TO DO YOUR WORK, TAKE CARE OF THINGS AT HOME, OR GET ALONG WITH OTHER PEOPLE: VERY DIFFICULT
SUM OF ALL RESPONSES TO PHQ QUESTIONS 1-9: 15
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
## 2024-03-27 ENCOUNTER — OFFICE VISIT (OUTPATIENT)
Dept: BEHAVIORAL HEALTH | Facility: CLINIC | Age: 59
End: 2024-03-27
Payer: COMMERCIAL

## 2024-03-27 DIAGNOSIS — Z63.4 BEREAVEMENT: ICD-10-CM

## 2024-03-27 DIAGNOSIS — F33.1 MAJOR DEPRESSIVE DISORDER, RECURRENT EPISODE, MODERATE (H): Primary | ICD-10-CM

## 2024-03-27 DIAGNOSIS — F90.8 ATTENTION DEFICIT HYPERACTIVITY DISORDER (ADHD), OTHER TYPE: ICD-10-CM

## 2024-03-27 PROCEDURE — 90837 PSYTX W PT 60 MINUTES: CPT | Performed by: SOCIAL WORKER

## 2024-03-27 SDOH — SOCIAL STABILITY - SOCIAL INSECURITY: DISSAPEARANCE AND DEATH OF FAMILY MEMBER: Z63.4
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
## 2024-04-03 ENCOUNTER — OFFICE VISIT (OUTPATIENT)
Dept: BEHAVIORAL HEALTH | Facility: CLINIC | Age: 59
End: 2024-04-03
Payer: COMMERCIAL

## 2024-04-03 DIAGNOSIS — F33.1 MAJOR DEPRESSIVE DISORDER, RECURRENT EPISODE, MODERATE (H): Primary | ICD-10-CM

## 2024-04-03 DIAGNOSIS — Z63.4 BEREAVEMENT: ICD-10-CM

## 2024-04-03 PROCEDURE — 90837 PSYTX W PT 60 MINUTES: CPT | Performed by: SOCIAL WORKER

## 2024-04-03 SDOH — SOCIAL STABILITY - SOCIAL INSECURITY: DISSAPEARANCE AND DEATH OF FAMILY MEMBER: Z63.4
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
## 2024-04-10 ENCOUNTER — OFFICE VISIT (OUTPATIENT)
Dept: BEHAVIORAL HEALTH | Facility: CLINIC | Age: 59
End: 2024-04-10
Payer: COMMERCIAL

## 2024-04-10 DIAGNOSIS — Z63.4 BEREAVEMENT: Primary | ICD-10-CM

## 2024-04-10 DIAGNOSIS — F33.1 MAJOR DEPRESSIVE DISORDER, RECURRENT EPISODE, MODERATE (H): ICD-10-CM

## 2024-04-10 DIAGNOSIS — F90.2 ATTENTION DEFICIT HYPERACTIVITY DISORDER (ADHD), COMBINED TYPE: ICD-10-CM

## 2024-04-10 PROCEDURE — 90837 PSYTX W PT 60 MINUTES: CPT | Performed by: SOCIAL WORKER

## 2024-04-10 SDOH — SOCIAL STABILITY - SOCIAL INSECURITY: DISSAPEARANCE AND DEATH OF FAMILY MEMBER: Z63.4
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
## 2024-04-17 ENCOUNTER — OFFICE VISIT (OUTPATIENT)
Dept: BEHAVIORAL HEALTH | Facility: CLINIC | Age: 59
End: 2024-04-17
Payer: COMMERCIAL

## 2024-04-17 DIAGNOSIS — F90.8 ATTENTION DEFICIT HYPERACTIVITY DISORDER (ADHD), OTHER TYPE: ICD-10-CM

## 2024-04-17 DIAGNOSIS — F33.1 MAJOR DEPRESSIVE DISORDER, RECURRENT EPISODE, MODERATE (H): Primary | ICD-10-CM

## 2024-04-17 PROCEDURE — 90837 PSYTX W PT 60 MINUTES: CPT | Performed by: SOCIAL WORKER
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
## 2024-04-24 ENCOUNTER — OFFICE VISIT (OUTPATIENT)
Dept: BEHAVIORAL HEALTH | Facility: CLINIC | Age: 59
End: 2024-04-24
Payer: COMMERCIAL

## 2024-04-24 DIAGNOSIS — F33.1 MAJOR DEPRESSIVE DISORDER, RECURRENT EPISODE, MODERATE (H): Primary | ICD-10-CM

## 2024-04-24 DIAGNOSIS — F90.8 ATTENTION DEFICIT HYPERACTIVITY DISORDER (ADHD), OTHER TYPE: ICD-10-CM

## 2024-04-24 PROCEDURE — 90837 PSYTX W PT 60 MINUTES: CPT | Performed by: SOCIAL WORKER

## 2024-04-24 ASSESSMENT — PATIENT HEALTH QUESTIONNAIRE - PHQ9
10. IF YOU CHECKED OFF ANY PROBLEMS, HOW DIFFICULT HAVE THESE PROBLEMS MADE IT FOR YOU TO DO YOUR WORK, TAKE CARE OF THINGS AT HOME, OR GET ALONG WITH OTHER PEOPLE: SOMEWHAT DIFFICULT
SUM OF ALL RESPONSES TO PHQ QUESTIONS 1-9: 6
SUM OF ALL RESPONSES TO PHQ QUESTIONS 1-9: 6
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
## 2024-05-08 ENCOUNTER — OFFICE VISIT (OUTPATIENT)
Dept: BEHAVIORAL HEALTH | Facility: CLINIC | Age: 59
End: 2024-05-08
Payer: COMMERCIAL

## 2024-05-08 DIAGNOSIS — F90.8 ATTENTION DEFICIT HYPERACTIVITY DISORDER (ADHD), OTHER TYPE: ICD-10-CM

## 2024-05-08 DIAGNOSIS — Z63.4 BEREAVEMENT: ICD-10-CM

## 2024-05-08 DIAGNOSIS — F33.1 MAJOR DEPRESSIVE DISORDER, RECURRENT EPISODE, MODERATE (H): Primary | ICD-10-CM

## 2024-05-08 PROCEDURE — 90837 PSYTX W PT 60 MINUTES: CPT | Performed by: SOCIAL WORKER

## 2024-05-08 SDOH — SOCIAL STABILITY - SOCIAL INSECURITY: DISSAPEARANCE AND DEATH OF FAMILY MEMBER: Z63.4
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
## 2024-05-22 ENCOUNTER — OFFICE VISIT (OUTPATIENT)
Dept: BEHAVIORAL HEALTH | Facility: CLINIC | Age: 59
End: 2024-05-22
Payer: COMMERCIAL

## 2024-05-22 DIAGNOSIS — Z63.4 BEREAVEMENT: ICD-10-CM

## 2024-05-22 DIAGNOSIS — F33.1 MAJOR DEPRESSIVE DISORDER, RECURRENT EPISODE, MODERATE (H): Primary | ICD-10-CM

## 2024-05-22 DIAGNOSIS — F90.8 ATTENTION DEFICIT HYPERACTIVITY DISORDER (ADHD), OTHER TYPE: ICD-10-CM

## 2024-05-22 PROCEDURE — 90837 PSYTX W PT 60 MINUTES: CPT | Performed by: SOCIAL WORKER

## 2024-05-22 SDOH — SOCIAL STABILITY - SOCIAL INSECURITY: DISSAPEARANCE AND DEATH OF FAMILY MEMBER: Z63.4
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
## 2024-06-05 ENCOUNTER — OFFICE VISIT (OUTPATIENT)
Dept: BEHAVIORAL HEALTH | Facility: CLINIC | Age: 59
End: 2024-06-05
Payer: COMMERCIAL

## 2024-06-05 DIAGNOSIS — F33.1 MAJOR DEPRESSIVE DISORDER, RECURRENT EPISODE, MODERATE (H): Primary | ICD-10-CM

## 2024-06-05 DIAGNOSIS — F90.8 ATTENTION DEFICIT HYPERACTIVITY DISORDER (ADHD), OTHER TYPE: ICD-10-CM

## 2024-06-05 PROCEDURE — 90837 PSYTX W PT 60 MINUTES: CPT | Performed by: SOCIAL WORKER

## 2024-06-05 ASSESSMENT — PATIENT HEALTH QUESTIONNAIRE - PHQ9
SUM OF ALL RESPONSES TO PHQ QUESTIONS 1-9: 7
SUM OF ALL RESPONSES TO PHQ QUESTIONS 1-9: 7
10. IF YOU CHECKED OFF ANY PROBLEMS, HOW DIFFICULT HAVE THESE PROBLEMS MADE IT FOR YOU TO DO YOUR WORK, TAKE CARE OF THINGS AT HOME, OR GET ALONG WITH OTHER PEOPLE: SOMEWHAT DIFFICULT
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
## 2024-06-11 ENCOUNTER — PATIENT OUTREACH (OUTPATIENT)
Dept: CARE COORDINATION | Facility: CLINIC | Age: 59
End: 2024-06-11
Payer: COMMERCIAL

## 2024-06-19 ENCOUNTER — OFFICE VISIT (OUTPATIENT)
Dept: BEHAVIORAL HEALTH | Facility: CLINIC | Age: 59
End: 2024-06-19
Payer: COMMERCIAL

## 2024-06-19 DIAGNOSIS — Z63.4 BEREAVEMENT: ICD-10-CM

## 2024-06-19 DIAGNOSIS — F33.1 MAJOR DEPRESSIVE DISORDER, RECURRENT EPISODE, MODERATE (H): Primary | ICD-10-CM

## 2024-06-19 DIAGNOSIS — F90.8 ATTENTION DEFICIT HYPERACTIVITY DISORDER (ADHD), OTHER TYPE: ICD-10-CM

## 2024-06-19 PROCEDURE — 90837 PSYTX W PT 60 MINUTES: CPT | Performed by: SOCIAL WORKER

## 2024-06-19 SDOH — SOCIAL STABILITY - SOCIAL INSECURITY: DISSAPEARANCE AND DEATH OF FAMILY MEMBER: Z63.4
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
## 2024-07-03 ENCOUNTER — OFFICE VISIT (OUTPATIENT)
Dept: BEHAVIORAL HEALTH | Facility: CLINIC | Age: 59
End: 2024-07-03
Payer: COMMERCIAL

## 2024-07-03 DIAGNOSIS — F33.1 MAJOR DEPRESSIVE DISORDER, RECURRENT EPISODE, MODERATE (H): Primary | ICD-10-CM

## 2024-07-03 DIAGNOSIS — F90.8 ATTENTION DEFICIT HYPERACTIVITY DISORDER (ADHD), OTHER TYPE: ICD-10-CM

## 2024-07-03 PROCEDURE — 90837 PSYTX W PT 60 MINUTES: CPT | Performed by: SOCIAL WORKER
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
## 2024-07-09 ENCOUNTER — PATIENT OUTREACH (OUTPATIENT)
Dept: CARE COORDINATION | Facility: CLINIC | Age: 59
End: 2024-07-09
Payer: COMMERCIAL

## 2024-07-17 ENCOUNTER — OFFICE VISIT (OUTPATIENT)
Dept: BEHAVIORAL HEALTH | Facility: CLINIC | Age: 59
End: 2024-07-17
Payer: COMMERCIAL

## 2024-07-17 DIAGNOSIS — F33.1 MAJOR DEPRESSIVE DISORDER, RECURRENT EPISODE, MODERATE (H): Primary | ICD-10-CM

## 2024-07-17 PROCEDURE — 90837 PSYTX W PT 60 MINUTES: CPT | Performed by: SOCIAL WORKER
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
## 2024-08-14 ENCOUNTER — OFFICE VISIT (OUTPATIENT)
Dept: BEHAVIORAL HEALTH | Facility: CLINIC | Age: 59
End: 2024-08-14
Payer: COMMERCIAL

## 2024-08-14 DIAGNOSIS — F33.1 MAJOR DEPRESSIVE DISORDER, RECURRENT EPISODE, MODERATE (H): Primary | ICD-10-CM

## 2024-08-14 PROCEDURE — 90837 PSYTX W PT 60 MINUTES: CPT | Performed by: SOCIAL WORKER

## 2024-08-14 ASSESSMENT — ANXIETY QUESTIONNAIRES
GAD7 TOTAL SCORE: 8
8. IF YOU CHECKED OFF ANY PROBLEMS, HOW DIFFICULT HAVE THESE MADE IT FOR YOU TO DO YOUR WORK, TAKE CARE OF THINGS AT HOME, OR GET ALONG WITH OTHER PEOPLE?: SOMEWHAT DIFFICULT
5. BEING SO RESTLESS THAT IT IS HARD TO SIT STILL: NOT AT ALL
2. NOT BEING ABLE TO STOP OR CONTROL WORRYING: SEVERAL DAYS
6. BECOMING EASILY ANNOYED OR IRRITABLE: MORE THAN HALF THE DAYS
1. FEELING NERVOUS, ANXIOUS, OR ON EDGE: MORE THAN HALF THE DAYS
4. TROUBLE RELAXING: SEVERAL DAYS
GAD7 TOTAL SCORE: 8
7. FEELING AFRAID AS IF SOMETHING AWFUL MIGHT HAPPEN: SEVERAL DAYS
GAD7 TOTAL SCORE: 8
7. FEELING AFRAID AS IF SOMETHING AWFUL MIGHT HAPPEN: SEVERAL DAYS
3. WORRYING TOO MUCH ABOUT DIFFERENT THINGS: SEVERAL DAYS
IF YOU CHECKED OFF ANY PROBLEMS ON THIS QUESTIONNAIRE, HOW DIFFICULT HAVE THESE PROBLEMS MADE IT FOR YOU TO DO YOUR WORK, TAKE CARE OF THINGS AT HOME, OR GET ALONG WITH OTHER PEOPLE: SOMEWHAT DIFFICULT

## 2024-08-14 ASSESSMENT — PATIENT HEALTH QUESTIONNAIRE - PHQ9
SUM OF ALL RESPONSES TO PHQ QUESTIONS 1-9: 3
10. IF YOU CHECKED OFF ANY PROBLEMS, HOW DIFFICULT HAVE THESE PROBLEMS MADE IT FOR YOU TO DO YOUR WORK, TAKE CARE OF THINGS AT HOME, OR GET ALONG WITH OTHER PEOPLE: SOMEWHAT DIFFICULT
SUM OF ALL RESPONSES TO PHQ QUESTIONS 1-9: 3
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
## 2024-08-28 ENCOUNTER — OFFICE VISIT (OUTPATIENT)
Dept: BEHAVIORAL HEALTH | Facility: CLINIC | Age: 59
End: 2024-08-28
Payer: COMMERCIAL

## 2024-08-28 DIAGNOSIS — F33.1 MAJOR DEPRESSIVE DISORDER, RECURRENT EPISODE, MODERATE (H): Primary | ICD-10-CM

## 2024-08-28 DIAGNOSIS — F90.8 ATTENTION DEFICIT HYPERACTIVITY DISORDER (ADHD), OTHER TYPE: ICD-10-CM

## 2024-08-28 PROCEDURE — 90837 PSYTX W PT 60 MINUTES: CPT | Performed by: SOCIAL WORKER
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
## 2024-09-20 ENCOUNTER — TRANSFERRED RECORDS (OUTPATIENT)
Dept: HEALTH INFORMATION MANAGEMENT | Facility: CLINIC | Age: 59
End: 2024-09-20

## 2024-10-03 ENCOUNTER — TELEPHONE (OUTPATIENT)
Dept: FAMILY MEDICINE | Facility: CLINIC | Age: 59
End: 2024-10-03
Payer: COMMERCIAL

## 2024-10-03 NOTE — TELEPHONE ENCOUNTER
General Call      Reason for Call:  RED Garcias from [facility] D[facility]atology.  [phone] called as an FYI.     What are your questions or concerns:  skin biopsy on left thigh. Result came back as stage 1 melanoma skin cancer.  Patient is scheduled for surgery with [facility] for 10/4/24.     Date of last appointment with provider: 2/21/2024       Augie Pimentel RN  [facility]

## 2024-10-04 ENCOUNTER — TRANSFERRED RECORDS (OUTPATIENT)
Dept: HEALTH INFORMATION MANAGEMENT | Facility: CLINIC | Age: 59
End: 2024-10-04

## 2024-10-22 ENCOUNTER — MYC MEDICAL ADVICE (OUTPATIENT)
Dept: FAMILY MEDICINE | Facility: CLINIC | Age: 59
End: 2024-10-22
Payer: COMMERCIAL

## 2024-10-22 DIAGNOSIS — B00.9 HERPES SIMPLEX: Primary | ICD-10-CM

## 2024-10-23 RX ORDER — VALACYCLOVIR HYDROCHLORIDE 500 MG/1
500 TABLET, FILM COATED ORAL DAILY
Qty: 90 TABLET | Refills: 4 | Status: SHIPPED | OUTPATIENT
Start: 2024-10-23

## 2024-10-23 NOTE — TELEPHONE ENCOUNTER
Nini - see MyChart and rx request. Appreciate advisement.    SAMANTHA Soliz, SUHAN, RN (she/her)  [facility] RN

## 2024-10-23 NOTE — TELEPHONE ENCOUNTER
See RN response to patient's mychart message re:antiviral for HSV    SUHA KoN RN  [facility]

## 2024-10-23 NOTE — TELEPHONE ENCOUNTER
Nini: I find results from 48 hours to the 10 days for acute outbreaks, at both 500 mg and 1 gram daily dosages.  Could you weigh in?     If she agrees without a visit do you know how long it takes on the more preventative medicine to kind of do it's job even though it's never 100%?    Aga DAVIS BSN, PHN, RN-[facility]  [phone]

## 2024-10-30 ENCOUNTER — TRANSFERRED RECORDS (OUTPATIENT)
Dept: HEALTH INFORMATION MANAGEMENT | Facility: CLINIC | Age: 59
End: 2024-10-30

## 2024-11-12 ENCOUNTER — ANCILLARY PROCEDURE (OUTPATIENT)
Dept: MAMMOGRAPHY | Facility: CLINIC | Age: 59
End: 2024-11-12
Attending: NURSE PRACTITIONER
Payer: COMMERCIAL

## 2024-11-12 DIAGNOSIS — Z12.31 VISIT FOR SCREENING MAMMOGRAM: ICD-10-CM

## 2024-11-12 PROCEDURE — 77063 BREAST TOMOSYNTHESIS BI: CPT | Mod: TC | Performed by: STUDENT IN AN ORGANIZED HEALTH CARE EDUCATION/TRAINING PROGRAM

## 2024-11-12 PROCEDURE — 77067 SCR MAMMO BI INCL CAD: CPT | Mod: TC | Performed by: STUDENT IN AN ORGANIZED HEALTH CARE EDUCATION/TRAINING PROGRAM

## 2024-11-15 ENCOUNTER — ANCILLARY PROCEDURE (OUTPATIENT)
Dept: MAMMOGRAPHY | Facility: CLINIC | Age: 59
End: 2024-11-15
Attending: NURSE PRACTITIONER
Payer: COMMERCIAL

## 2024-11-15 DIAGNOSIS — R92.8 ABNORMAL MAMMOGRAM: ICD-10-CM

## 2024-11-15 PROCEDURE — 76642 ULTRASOUND BREAST LIMITED: CPT | Mod: LT

## 2024-11-15 PROCEDURE — 77065 DX MAMMO INCL CAD UNI: CPT | Mod: LT

## 2024-11-15 PROCEDURE — 77061 BREAST TOMOSYNTHESIS UNI: CPT | Mod: LT

## 2024-11-18 ENCOUNTER — ANCILLARY PROCEDURE (OUTPATIENT)
Dept: MAMMOGRAPHY | Facility: CLINIC | Age: 59
End: 2024-11-18
Attending: NURSE PRACTITIONER
Payer: COMMERCIAL

## 2024-11-18 DIAGNOSIS — R92.8 ABNORMAL MAMMOGRAM: ICD-10-CM

## 2024-11-18 PROCEDURE — 272N000431

## 2024-11-18 PROCEDURE — A4648 IMPLANTABLE TISSUE MARKER: HCPCS

## 2024-11-18 PROCEDURE — 88360 TUMOR IMMUNOHISTOCHEM/MANUAL: CPT | Mod: 26 | Performed by: PATHOLOGY

## 2024-11-18 PROCEDURE — 272N000431 US BREAST BIOPSY CORE NEEDLE LEFT

## 2024-11-18 PROCEDURE — 999N000065 MA POST PROCEDURE LEFT

## 2024-11-18 PROCEDURE — 250N000009 HC RX 250: Performed by: NURSE PRACTITIONER

## 2024-11-18 PROCEDURE — 88342 IMHCHEM/IMCYTCHM 1ST ANTB: CPT | Mod: 26 | Performed by: PATHOLOGY

## 2024-11-18 PROCEDURE — 88341 IMHCHEM/IMCYTCHM EA ADD ANTB: CPT | Mod: TC | Performed by: NURSE PRACTITIONER

## 2024-11-18 PROCEDURE — 88341 IMHCHEM/IMCYTCHM EA ADD ANTB: CPT | Mod: 26 | Performed by: PATHOLOGY

## 2024-11-18 PROCEDURE — 88360 TUMOR IMMUNOHISTOCHEM/MANUAL: CPT | Mod: TC | Performed by: NURSE PRACTITIONER

## 2024-11-18 PROCEDURE — 88305 TISSUE EXAM BY PATHOLOGIST: CPT | Mod: 26 | Performed by: PATHOLOGY

## 2024-11-18 PROCEDURE — 19083 BX BREAST 1ST LESION US IMAG: CPT | Mod: LT

## 2024-11-18 PROCEDURE — 272N000031

## 2024-11-18 RX ADMIN — LIDOCAINE HYDROCHLORIDE 10 ML: 10 INJECTION, SOLUTION INFILTRATION; PERINEURAL at 08:25

## 2024-11-18 NOTE — DISCHARGE INSTRUCTIONS
After Your Breast Biopsy    Bleeding, bruising, and pain  Breast tenderness and some bruising is normal and may last several days. You may wear your bra overnight to support the breast.  You may use an ice pack for pain. Place it over the area for 15 to 20 minutes, several times a day.  You may take over-the-counter pain medicine:  On the day of the biopsy, we recommend Tylenol (acetaminophen) because it does not raise your risk of bleeding.  The next day, you may take an anti-inflammatory medicine (aspirin, ibuprofen, Motrin, Aleve, Advil), unless your doctor tells you not to.  Bandages and showering  Keep your bandage in place until tomorrow morning. Don't get it wet.  If you have small pieces of tape on the skin, leave them in place. They will fall off on their own, or you can remove them after 5 days.  You may shower the next morning after your biopsy.  Activity  No heavy activity (no running, no gym workouts, no lifting, no vacuuming, etc.) on the day of your biopsy.  You may go back to normal activity the next day. But limit what you do if you still have pain or discomfort.  Infection  Infection is rare. Signs of infection include:  Fever (including sweats and chills)  Redness  Pain that gets worse  Fluid draining from the biopsy site  Biopsy results  Results may take up to 5 business days.  A nurse or doctor from the Breast Center will call with your results. The system sends the results to the doctor that ordered your biopsy & MyChart automatically.     If you have not gotten your results in 5 days, please call the Breast Center.  Call the Breast Center with questions or if:   You have bleeding that lasts more than 20 minutes.  You have pain that you can't control.  You have signs of infection (fever, sweats, chills, redness, increasing pain, or drainage).  After hours, please call the doctor who ordered your biopsy.     [facility] Nurse  [phone]    For informational purposes only. Not to replace the  advice of your health care provider. Copyright   2010 [employer] [facility]. All rights reserved. Clinically reviewed by Noemi Flores, Director, [facility]. [employer] [id number] - REV 08/23.

## 2024-11-21 ENCOUNTER — TRANSFERRED RECORDS (OUTPATIENT)
Dept: HEALTH INFORMATION MANAGEMENT | Facility: CLINIC | Age: 59
End: 2024-11-21
Payer: COMMERCIAL

## 2024-11-21 LAB
PATH REPORT.COMMENTS IMP SPEC: ABNORMAL
PATH REPORT.COMMENTS IMP SPEC: YES
PATH REPORT.FINAL DX SPEC: ABNORMAL
PATH REPORT.GROSS SPEC: ABNORMAL
PATH REPORT.MICROSCOPIC SPEC OTHER STN: ABNORMAL
PATH REPORT.RELEVANT HX SPEC: ABNORMAL
PATHOLOGY SYNOPTIC REPORT: ABNORMAL
PHOTO IMAGE: ABNORMAL

## 2024-11-22 NOTE — H&P (VIEW-ONLY)
History:  This is a 58 year old female who I'm asked to see by Kallie Ta NP for evaluation of a left breast cancer.  She presents with partner, Desmond.  This was picked up by screening mammogram.  A new asymmetry was identified within the left breast compared to mammogram from 2022.  She then underwent diagnostic workup.  A needle biopsy was done which shows a grade II invasive ductal carcinoma.  It is estrogen receptor positive, progesterone receptor negative, and HER-2 positive.  She denies having any breast symptoms such as palpable masses, skin changes, or nipple discharge.  She has never needed any breast biopsies prior to this.    Past medical history:  Anxiety  Obesity  Melanoma    Past surgical history:  Endometrial ablation  LEEP    Medications:    ALPRAZolam (XANAX) 0.5 MG tablet, TAKE 1 TABLET BY MOUTH DAILY AS NEEDED. MAX 2 TABLETS EVERY WEEK (Patient taking differently: as needed. TAKE 1 TABLET BY MOUTH DAILY AS NEEDED. MAX 2 TABLETS EVERY WEEK), Disp: 10 tablet, Rfl: 5    dexAMETHasone (DECADRON) 4 MG tablet, Take 2 tablets (8 mg) by mouth 2 times daily (with meals). Start evening of Docetaxel infusion and continue for a total of 3 doses., Disp: 6 tablet, Rfl: 5    ondansetron (ZOFRAN) 8 MG tablet, Take 1 tablet (8 mg) by mouth every 8 hours as needed for nausea (vomiting)., Disp: 30 tablet, Rfl: 2    prochlorperazine (COMPAZINE) 10 MG tablet, Take 1 tablet (10 mg) by mouth every 6 hours as needed for nausea or vomiting., Disp: 30 tablet, Rfl: 2    valACYclovir (VALTREX) 500 MG tablet, Take 1 tablet (500 mg) by mouth daily., Disp: 90 tablet, Rfl: 4    Allergies:  Morphine  Codeine    Social History:  Denies alcohol, tobacco, marijuana, and illicit drug use    Family History:  She has no family history of breast cancer.  Father had prostate cancer.    Review of systems:  General ROS: No complaints or constitutional symptoms  Skin: No complaints or symptoms   Hematologic/Lymphatic: No symptoms or  "complaints  Psychiatric: No symptoms or complaints  Endocrine: No excessive fatigue, no hypermetabolic symptoms reported  Respiratory ROS: No cough, shortness of breath, or wheezing  Cardiovascular ROS: No chest pain or dyspnea on exertion  Breast ROS: No complaints  Gastrointestinal ROS: No abdominal pain, nausea, diarrhea, or constipation  Musculoskeletal ROS: No recent injuries reported  Neurological ROS: No focal neurologic defects reported.      Physical exam:  Ht 1.645 m (5' 4.75\")   Wt 99.3 kg (219 lb)   BMI 36.73 kg/m    General: Alert, cooperative, appears stated age   Skin: Skin color, texture, turgor normal, no rashes or lesions   Lymphatic: No obvious adenopathy, no swelling   Eyes: No scleral icterus, pupils equal  HENT: No traumatic injury to the head or face, no gross abnormalities  Lungs: Normal respiratory effort, breath sounds equal bilaterally  Heart: Regular rate and rhythm  Breasts: No visible abnormalities.  No palpable masses to the right breast.  Left breast 2:00 zone 2 has a 2 cm vague fullness consistent with her known carcinoma  Abdomen: Soft, non-distended and non-tender to palpation  Neurologic: Grossly intact    Imaging:  Pertinent images personally reviewed by myself and discussed with the patient.    Radiology reports:  EXAM: MA DIAGNOSTIC LEFT W/ ELDER, US BREAST LEFT LIMITED 1-3  QUADRANTS, 11/15/2024 10:57 AM     COMPARISONS: 11/12/2024, 7/11/2022     HISTORY: Possible asymmetry in the left breast in the 2:00 position in  the posterior depth     BREAST DENSITY: The breasts are heterogeneously dense, which may  obscure small masses.     FINDINGS: There are a few scattered benign-appearing round  calcifications. On the additional tomographic images persistent focal  asymmetry and subtle distortion are present in the 2:00 position in  the posterior depth.      ULTRASOUND: Targeted left breast ultrasound was performed by both the  ultrasonographer and the radiologist. In the 2:00 " position 7 cm from  the nipple there was focal dense glandular tissue with central  hypoechoic shadowing area measuring 20 x 17 x 24 mm correlating in  size, shape, and location with the mammographic abnormality.                                                                   IMPRESSION: BI-RADS CATEGORY: 4 - Suspicious.  Subtle increased  density in the left breast in the 12:00 position with correlating  abnormality seen on ultrasound. Ultrasound-guided core biopsy for  tissue diagnosis is recommended.    My interpretation:  Abnormal appearing mass seen in the left breast upper outer quadrant.  Biopsy clip in good location.    Pathology:  BREAST BIOPSY WITH NEOPLASTIC LESION:        -  BIOPSY TYPE: ULTRASOUND-GUIDED CORE BIOPSIES        -  BIOPSY SITE: LEFT BREAST, 2:00, 7 CM FROM NIPPLE        -  HISTOLOGIC TYPE: INVASIVE DUCTAL CARCINOMA        -  CRIS HISTOLOGIC SCORE:              -  TUBULAR DIFFERENTIATION SCORE 3              -  NUCLEAR PLEOMORPHISM SCORE 2              -  MITOTIC RATE SCORE 1              -  OVERALL GRADE 2 (TOTAL SCORE 6/9)        -  EXTENT OF TUMOR: 9 MM TUMOR LENGTH        -  DUCTAL CARCINOMA IN SITU (DCIS): NOT IDENTIFIED        -  MICROCALCIFICATIONS: RARE, ASSOCIATED WITH BENIGN EPITHELIUM        -  NEGATIVE FOR SMALL-VESSEL LYMPHATIC INVASION        -  ADDITIONAL FINDINGS: SCLEROSIS OF BENIGN STROMA        -  ADDITIONAL STUDIES: SEE BREAST BIOMARKER REPORTING TEMPLATE              -  SUMMARY OF TEMPLATE:                    -  ESTROGEN RECEPTORS POSITIVE (80% OF TUMOR NUCLEI STAIN STRONGLY)                    -  PROGESTERONE RECEPTORS NEGATIVE (NO STAINING OF TUMOR NUCLEI;                            BENIGN DUCT EPITHELIUM STAINS POSITIVELY)                    -  HER2/FIDE RECEPTORS POSITIVE (3+ MEMBRANE STAINING)    IMPRESSION:  Left breast invasive ductal carcinoma    - Grade II, T2, N0, ER+, SC-, HER2+    PLAN:   Discussed the surgical options for treatment of breast cancer  which generally are a lumpectomy (partial mastectomy) combined with radiation versus a mastectomy.  Explained that the survival benefit is the same for both.  The difference is in local recurrence risk.  The patient is a good candidate for a lumpectomy.  I ultimately anticipate it requiring wire localization.  Discussed SLN biopsy.  The procedure and rationale were explained.  Discussed that with her HER2 positivity, immunotherapy and chemotherapy will also be part of her treatment plan.  This can be administered in the neoadjuvant or adjuvant setting.  We discussed risks and benefits for both of these.  She understands that receiving these medications over the course of a year can be challenging on the peripheral veins, so a port placement is recommended.  Since the tumor is estrogen receptor positive, she will be a candidate for endocrine therapy.    After our discussion, all questions were answered to satisfaction.  She met with Dr. Harkins this morning to discuss systemic therapies.  They are planning neoadjuvant chemotherapy.  Her axilla needs to be imaged.  A breast MRI was scheduled.  She will also continue her workup with him.  Surgically she is interested in breast preservation therapy.  This will occur after neoadjuvant chemotherapy.  She is in agreement with having a port placed.  Preoperative orders have been placed for port placement under ultrasound and fluoroscopic guidance.  We will plan on this at the outpatient surgery center next week.  The risks and benefits of surgery were explained.  Also talked about expected recovery time.  She would then follow-up with myself around the last round of her chemotherapy to discuss and plan the breast surgery.    Katerine Matthews DO  General Surgeon  [facility]  Breast [address] [address]  Office: [phone]  Employed by - [employer] [facility]

## 2024-11-22 NOTE — PROGRESS NOTES
History:  This is a 58 year old female who I'm asked to see by Kallie Ta NP for evaluation of a left breast cancer.  She presents with partner, Desmond.  This was picked up by screening mammogram.  A new asymmetry was identified within the left breast compared to mammogram from 2022.  She then underwent diagnostic workup.  A needle biopsy was done which shows a grade II invasive ductal carcinoma.  It is estrogen receptor positive, progesterone receptor negative, and HER-2 positive.  She denies having any breast symptoms such as palpable masses, skin changes, or nipple discharge.  She has never needed any breast biopsies prior to this.    Past medical history:  Anxiety  Obesity  Melanoma    Past surgical history:  Endometrial ablation  LEEP    Medications:    ALPRAZolam (XANAX) 0.5 MG tablet, TAKE 1 TABLET BY MOUTH DAILY AS NEEDED. MAX 2 TABLETS EVERY WEEK (Patient taking differently: as needed. TAKE 1 TABLET BY MOUTH DAILY AS NEEDED. MAX 2 TABLETS EVERY WEEK), Disp: 10 tablet, Rfl: 5    dexAMETHasone (DECADRON) 4 MG tablet, Take 2 tablets (8 mg) by mouth 2 times daily (with meals). Start evening of Docetaxel infusion and continue for a total of 3 doses., Disp: 6 tablet, Rfl: 5    ondansetron (ZOFRAN) 8 MG tablet, Take 1 tablet (8 mg) by mouth every 8 hours as needed for nausea (vomiting)., Disp: 30 tablet, Rfl: 2    prochlorperazine (COMPAZINE) 10 MG tablet, Take 1 tablet (10 mg) by mouth every 6 hours as needed for nausea or vomiting., Disp: 30 tablet, Rfl: 2    valACYclovir (VALTREX) 500 MG tablet, Take 1 tablet (500 mg) by mouth daily., Disp: 90 tablet, Rfl: 4    Allergies:  Morphine  Codeine    Social History:  Denies alcohol, tobacco, marijuana, and illicit drug use    Family History:  She has no family history of breast cancer.  Father had prostate cancer.    Review of systems:  General ROS: No complaints or constitutional symptoms  Skin: No complaints or symptoms   Hematologic/Lymphatic: No symptoms or  "complaints  Psychiatric: No symptoms or complaints  Endocrine: No excessive fatigue, no hypermetabolic symptoms reported  Respiratory ROS: No cough, shortness of breath, or wheezing  Cardiovascular ROS: No chest pain or dyspnea on exertion  Breast ROS: No complaints  Gastrointestinal ROS: No abdominal pain, nausea, diarrhea, or constipation  Musculoskeletal ROS: No recent injuries reported  Neurological ROS: No focal neurologic defects reported.      Physical exam:  Ht 1.645 m (5' 4.75\")   Wt 99.3 kg (219 lb)   BMI 36.73 kg/m    General: Alert, cooperative, appears stated age   Skin: Skin color, texture, turgor normal, no rashes or lesions   Lymphatic: No obvious adenopathy, no swelling   Eyes: No scleral icterus, pupils equal  HENT: No traumatic injury to the head or face, no gross abnormalities  Lungs: Normal respiratory effort, breath sounds equal bilaterally  Heart: Regular rate and rhythm  Breasts: No visible abnormalities.  No palpable masses to the right breast.  Left breast 2:00 zone 2 has a 2 cm vague fullness consistent with her known carcinoma  Abdomen: Soft, non-distended and non-tender to palpation  Neurologic: Grossly intact    Imaging:  Pertinent images personally reviewed by myself and discussed with the patient.    Radiology reports:  EXAM: MA DIAGNOSTIC LEFT W/ ELDER, US BREAST LEFT LIMITED 1-3  QUADRANTS, 11/15/2024 10:57 AM     COMPARISONS: 11/12/2024, 7/11/2022     HISTORY: Possible asymmetry in the left breast in the 2:00 position in  the posterior depth     BREAST DENSITY: The breasts are heterogeneously dense, which may  obscure small masses.     FINDINGS: There are a few scattered benign-appearing round  calcifications. On the additional tomographic images persistent focal  asymmetry and subtle distortion are present in the 2:00 position in  the posterior depth.      ULTRASOUND: Targeted left breast ultrasound was performed by both the  ultrasonographer and the radiologist. In the 2:00 " position 7 cm from  the nipple there was focal dense glandular tissue with central  hypoechoic shadowing area measuring 20 x 17 x 24 mm correlating in  size, shape, and location with the mammographic abnormality.                                                                   IMPRESSION: BI-RADS CATEGORY: 4 - Suspicious.  Subtle increased  density in the left breast in the 12:00 position with correlating  abnormality seen on ultrasound. Ultrasound-guided core biopsy for  tissue diagnosis is recommended.    My interpretation:  Abnormal appearing mass seen in the left breast upper outer quadrant.  Biopsy clip in good location.    Pathology:  BREAST BIOPSY WITH NEOPLASTIC LESION:        -  BIOPSY TYPE: ULTRASOUND-GUIDED CORE BIOPSIES        -  BIOPSY SITE: LEFT BREAST, 2:00, 7 CM FROM NIPPLE        -  HISTOLOGIC TYPE: INVASIVE DUCTAL CARCINOMA        -  CRIS HISTOLOGIC SCORE:              -  TUBULAR DIFFERENTIATION SCORE 3              -  NUCLEAR PLEOMORPHISM SCORE 2              -  MITOTIC RATE SCORE 1              -  OVERALL GRADE 2 (TOTAL SCORE 6/9)        -  EXTENT OF TUMOR: 9 MM TUMOR LENGTH        -  DUCTAL CARCINOMA IN SITU (DCIS): NOT IDENTIFIED        -  MICROCALCIFICATIONS: RARE, ASSOCIATED WITH BENIGN EPITHELIUM        -  NEGATIVE FOR SMALL-VESSEL LYMPHATIC INVASION        -  ADDITIONAL FINDINGS: SCLEROSIS OF BENIGN STROMA        -  ADDITIONAL STUDIES: SEE BREAST BIOMARKER REPORTING TEMPLATE              -  SUMMARY OF TEMPLATE:                    -  ESTROGEN RECEPTORS POSITIVE (80% OF TUMOR NUCLEI STAIN STRONGLY)                    -  PROGESTERONE RECEPTORS NEGATIVE (NO STAINING OF TUMOR NUCLEI;                            BENIGN DUCT EPITHELIUM STAINS POSITIVELY)                    -  HER2/FIDE RECEPTORS POSITIVE (3+ MEMBRANE STAINING)    IMPRESSION:  Left breast invasive ductal carcinoma    - Grade II, T2, N0, ER+, IA-, HER2+    PLAN:   Discussed the surgical options for treatment of breast cancer  which generally are a lumpectomy (partial mastectomy) combined with radiation versus a mastectomy.  Explained that the survival benefit is the same for both.  The difference is in local recurrence risk.  The patient is a good candidate for a lumpectomy.  I ultimately anticipate it requiring wire localization.  Discussed SLN biopsy.  The procedure and rationale were explained.  Discussed that with her HER2 positivity, immunotherapy and chemotherapy will also be part of her treatment plan.  This can be administered in the neoadjuvant or adjuvant setting.  We discussed risks and benefits for both of these.  She understands that receiving these medications over the course of a year can be challenging on the peripheral veins, so a port placement is recommended.  Since the tumor is estrogen receptor positive, she will be a candidate for endocrine therapy.    After our discussion, all questions were answered to satisfaction.  She met with Dr. Harkins this morning to discuss systemic therapies.  They are planning neoadjuvant chemotherapy.  Her axilla needs to be imaged.  A breast MRI was scheduled.  She will also continue her workup with him.  Surgically she is interested in breast preservation therapy.  This will occur after neoadjuvant chemotherapy.  She is in agreement with having a port placed.  Preoperative orders have been placed for port placement under ultrasound and fluoroscopic guidance.  We will plan on this at the outpatient surgery center next week.  The risks and benefits of surgery were explained.  Also talked about expected recovery time.  She would then follow-up with myself around the last round of her chemotherapy to discuss and plan the breast surgery.    Katerine Matthews DO  General Surgeon  [facility]  Breast [address] [address]  Office: [phone]  Employed by - [facility]

## 2024-11-25 ENCOUNTER — OFFICE VISIT (OUTPATIENT)
Dept: SURGERY | Facility: CLINIC | Age: 59
End: 2024-11-25
Attending: NURSE PRACTITIONER
Payer: COMMERCIAL

## 2024-11-25 ENCOUNTER — PATIENT OUTREACH (OUTPATIENT)
Dept: ONCOLOGY | Facility: HOSPITAL | Age: 59
End: 2024-11-25

## 2024-11-25 ENCOUNTER — ONCOLOGY VISIT (OUTPATIENT)
Dept: ONCOLOGY | Facility: HOSPITAL | Age: 59
End: 2024-11-25
Attending: NURSE PRACTITIONER
Payer: COMMERCIAL

## 2024-11-25 VITALS — HEIGHT: 65 IN | WEIGHT: 219 LBS | BODY MASS INDEX: 36.49 KG/M2

## 2024-11-25 VITALS
HEART RATE: 89 BPM | RESPIRATION RATE: 16 BRPM | DIASTOLIC BLOOD PRESSURE: 92 MMHG | TEMPERATURE: 98.5 F | OXYGEN SATURATION: 98 % | WEIGHT: 219 LBS | HEIGHT: 65 IN | SYSTOLIC BLOOD PRESSURE: 175 MMHG | BODY MASS INDEX: 36.49 KG/M2

## 2024-11-25 DIAGNOSIS — F41.9 ANXIETY: ICD-10-CM

## 2024-11-25 DIAGNOSIS — Z17.0 MALIGNANT NEOPLASM OF UPPER-OUTER QUADRANT OF LEFT BREAST IN FEMALE, ESTROGEN RECEPTOR POSITIVE (H): Primary | ICD-10-CM

## 2024-11-25 DIAGNOSIS — C50.412 MALIGNANT NEOPLASM OF UPPER-OUTER QUADRANT OF LEFT BREAST IN FEMALE, ESTROGEN RECEPTOR POSITIVE (H): Primary | ICD-10-CM

## 2024-11-25 DIAGNOSIS — Z17.0 MALIGNANT NEOPLASM OF UPPER-INNER QUADRANT OF LEFT BREAST IN FEMALE, ESTROGEN RECEPTOR POSITIVE (H): Primary | ICD-10-CM

## 2024-11-25 DIAGNOSIS — C50.912 INVASIVE DUCTAL CARCINOMA OF LEFT BREAST (H): Primary | ICD-10-CM

## 2024-11-25 DIAGNOSIS — C50.212 MALIGNANT NEOPLASM OF UPPER-INNER QUADRANT OF LEFT BREAST IN FEMALE, ESTROGEN RECEPTOR POSITIVE (H): Primary | ICD-10-CM

## 2024-11-25 PROBLEM — C50.919 BREAST CANCER (H): Status: ACTIVE | Noted: 2024-11-25

## 2024-11-25 PROCEDURE — 99204 OFFICE O/P NEW MOD 45 MIN: CPT | Performed by: INTERNAL MEDICINE

## 2024-11-25 PROCEDURE — G2211 COMPLEX E/M VISIT ADD ON: HCPCS | Performed by: SURGERY

## 2024-11-25 PROCEDURE — 99204 OFFICE O/P NEW MOD 45 MIN: CPT | Performed by: SURGERY

## 2024-11-25 PROCEDURE — 99213 OFFICE O/P EST LOW 20 MIN: CPT | Mod: 27 | Performed by: SURGERY

## 2024-11-25 PROCEDURE — 99213 OFFICE O/P EST LOW 20 MIN: CPT | Performed by: INTERNAL MEDICINE

## 2024-11-25 RX ORDER — DEXAMETHASONE 4 MG/1
8 TABLET ORAL 2 TIMES DAILY WITH MEALS
Qty: 6 TABLET | Refills: 5 | Status: SHIPPED | OUTPATIENT
Start: 2024-11-25 | End: 2024-11-27

## 2024-11-25 RX ORDER — ALPRAZOLAM 0.5 MG
TABLET ORAL
Qty: 10 TABLET | Refills: 5 | Status: CANCELLED | OUTPATIENT
Start: 2024-11-25

## 2024-11-25 RX ORDER — ALPRAZOLAM 0.25 MG/1
TABLET ORAL
Qty: 30 TABLET | Refills: 0 | Status: SHIPPED | OUTPATIENT
Start: 2024-11-25

## 2024-11-25 RX ORDER — PROCHLORPERAZINE MALEATE 10 MG
10 TABLET ORAL EVERY 6 HOURS PRN
Qty: 30 TABLET | Refills: 2 | Status: SHIPPED | OUTPATIENT
Start: 2024-11-25 | End: 2024-11-27

## 2024-11-25 RX ORDER — ONDANSETRON 8 MG/1
8 TABLET, FILM COATED ORAL EVERY 8 HOURS PRN
Qty: 30 TABLET | Refills: 2 | Status: SHIPPED | OUTPATIENT
Start: 2024-11-25 | End: 2024-11-27

## 2024-11-25 ASSESSMENT — PAIN SCALES - GENERAL: PAINLEVEL_OUTOF10: NO PAIN (0)

## 2024-11-25 NOTE — NURSING NOTE
Adrienne presents to [facility] today for a surgical consult with Dr. Matthews  regarding her newly diagnosed left breast Her2+ cancer.  She is accompanied by her partner for consult.  RN assessment and EMR update.  Patient met with Dr. Harkins for medical oncology consult earlier today and plans NAC. Patient given a Breast Cancer Packet, contents reviewed.  She met with Dr. Matthews  see dictation for details of visit. She will plan breast MRI, scheduled this. Told her I will attempt to move up the MRI.  She will also plan port placement with Dr. Matthews.  Pre and post op teaching complete.  Walked her to Enio/Haresh for surgery scheduling.  Support provided, invited calls.

## 2024-11-25 NOTE — PROGRESS NOTES
[facility]: Cancer Care Plan of Care Education Note                                    Discussion with Patient:                                                    Patient was seen in clinic  today for  new patient consult, diagnosis Breast Cancer. Chemo orders placed for:  Neoadjuvant - DOCEtaxel / CARBOplatin / TRASTuzumab /     Order was placed for Port to be  placed . She will wait on  emla cream prescription  at this time and will send in the future if patient determines that it is needed. Port teaching done with the Demo  book      -Reviewed treatment plan, 21 day cycle, will need labs and provider visit prior to each cycle. Shared anticipated length of time for her chemo  and asked her to allow extra time as there may be a delay with getting chemo from pharmacy, waiting for labs to result, etc.  Recommended to have a  for her chemo apt. Educated about what to bring to her infusion visit, light lunch or snacks that are peanut free, any special drinks that she prefers. Shared that snacks, coffee, tea  juice and soda are available in the infusion area.  Also suggested that she may wish to bring light reading material, ear- phones, phone, computer and/or charging cords. Warm blankets are provided.     -Reviewed premeds that are given prior to her chemo to help manage nausea: aloxi, emend, tylenol, benadryl     -Reviewed take home meds listed in treatment plan: Ondansetron, Prochlorperazine, and dexamethasone and how/when to take.  Dexamethasone:  Take 2 tablets (8 mg) by mouth 2 times daily (with meals) Start evening of Docetaxel infusion and continue for a total of 3 doses . Instructed  should be taken with food as it may cause indigestion, may increase your appetite, can make you feel more alert and prevent sleep.  Dexamethasone tablets can alter your mood causing you to feel low, irritable or agitated     -Labs ordered for each cycle are: CBC, CMP  Explained rationale for checking these labs, bone  "marrow suppression, electrolyte changes,-Reviewed home Instructions after chemo handout and when to call your care team.  Contact information provided for nurse triage number at [facility] and after hour contact information.     -chemo folder provided at last clinic visit and includes :  Home instructions post chemo, Getting Ready for Chemo Comparing Chemotherapy, immunotherapy and Targeted Therapy, Understanding Clinical Trials, Your Cancer Care Team and My Chart, Integrative Therapies during Chemo, Oncology Supportive Care Services\", Honoring Choices and Hair loss resources.      Hair loss-she does not think that she will get a wig at this time, may change her mind  Copy of [facility] Drug information DOCEtaxel / CARBOplatin / TRASTuzumab  to be provided a    -Will call a couple days post chemo to check in to see how she is doing.    Imaging, port and chemo still need to be scheduled at time this note is being written. Will monitor for scheduling.               Assessment:                                                        Evaluation of Learning       Plan of Care Education Review:   Assessment completed with:: Caregiver    Plan of Care Education   Yearly learning assessment completed?: Yes (see Education tab)  Diagnosis:: Breast  Tx plan/regimen:: DOCEtaxel / CARBOplatin / TRASTuzumab  Does patient understand treatment plan/regimen?: Yes  Preparing for treatment:: Reviewed treatment preparation information with patient (vascular access, day of chemo, visitor policy, what to bring, etc.)  Vascular access education provided for:: Port (port teaching done with demo book)  Side effect education:: Diarrhea/Constipation;Fatigue;Hair loss;Immune-mediated effects;Infection;Lab value monitoring (anemia, neutropenia, thrombocytopenia);Mouth sores;Mylosuppression;Nausea/Vomiting;Neuropathy;Skin changes;Urinary  Supportive services education provided for:: Nutrition;Oncology behavioral health;Social work;Support " group  Safety/self care at home reviewed with patient:: Yes  Coping - concerns/fears reviewed with patient:: Yes  Plan of Care:: EFRAÍN follow-up appointment;Lab appointment;Imaging;MD follow-up appointment;Treatment schedule  When to call provider:: Increased shortness of breath;New/worsening pain;Bleeding;Shaking chills;Temperature >100.4F;Uncontrolled diarrhea/constipation;Uncontrolled nausea/vomiting  Reasons for deferring treatment reviewed with patient:: Yes  Additional education provided for: : Steroid therapy  Procedure education provided for: : Port/PICC placement    Evaluation of Learning  Patient Education Provided: Yes  Readiness:: Eager  Method:: Booklet/Handout;Literature;Demonstration  Response:: Verbalizes understanding           Intervention/Education provided during outreach:                                                       See above. Will continue to monitor for scheduling    Confirmed patient has clinic and triage numbers    Signature:  Radha Stephen RN

## 2024-11-25 NOTE — LETTER
"11/25/2024      Adrienne Ivory  [address]  [place] [address]      Dear Colleague,    Thank you for referring your patient, Adrienne Ivory, to the [facility]. Please see a copy of my visit note below.    Oncology Rooming Note    November 25, 2024 9:59 AM   Adrienne Ivory is a 58 year old female who presents for:    Chief Complaint   Patient presents with     Oncology Clinic Visit     New consult breast cancer     Initial Vitals: BP (!) 175/92 (BP Location: Left arm, Patient Position: Sitting, Cuff Size: Adult Large)   Pulse 89   Temp 98.5  F (36.9  C) (Tympanic)   Resp 16   Ht 1.645 m (5' 4.75\")   Wt 99.3 kg (219 lb)   SpO2 98%   BMI 36.73 kg/m   Estimated body mass index is 36.73 kg/m  as calculated from the following:    Height as of this encounter: 1.645 m (5' 4.75\").    Weight as of this encounter: 99.3 kg (219 lb). Body surface area is 2.13 meters squared.  No Pain (0) Comment: Data Unavailable   No LMP recorded. Patient has had an ablation.  Allergies reviewed: Yes  Medications reviewed: Yes    Medications: Medication refills not needed today.  Pharmacy name entered into [facility]:    [id number] - [place], [place] - [address] AT [facility]  [id number] - [place], [place] - [address] [facility] - [place], [place] - [address] [facility]    Frailty Screening:   Is the patient here for a new oncology consult visit in cancer care? 1. Yes. Over the past month, have you experienced difficulty or required a caregiver to assist with:   1. Balance, walking or general mobility (including any falls)? NO  2. Completion of self-care tasks such as bathing, dressing, toileting, grooming/hygiene?  NO  3. Concentration or memory that affects your daily life?  NO       Clinical concerns:  new consult breast cancer      Floridalma Navarrete              [facility] Consult " Note    Patient: Adrienne Ivory  MRN: [id number]  Date of Service: 2024       Reason for Visit    Chief Complaint   Patient presents with     Oncology Clinic Visit     New consult breast cancer             ECOG Performance 0 - Independent        _____________________________________________________________________________    History Of Present Illness    Ms. Adrienne Ivory is a very pleasant 58 year old female who was in her regular state of health when she had her yearly screening mammogram done on 2024.  A possible asymmetry was seen in the left breast at the 2 o'clock position and further imaging was recommended and patient had a diagnostic mammogram with ultrasound on 15 November which showed an abnormal area about 2.4 into 2.2 to 1.7 cm at the 2 o'clock position which was suspicious and biopsy was recommended.  This was done and was found to be consistent with invasive ductal carcinoma grade 2 ER positive ME negative HER2/wolf 3+.  Patient has now been referred to me for further workup and management.  She is accompanied by her partner and looks very anxious.  She denies having any significant family history for breast cancer or any other cancers.  She however does report that she had a skin lesion taken off from the back of her leg which was consistent with a melanoma.    Patient's menarche was at 12 she was having menstrual cycle at 51 when she had uterine ablation done.  She hasno history of using hormone replacement therapy she tried birth control pills for less than a year.  She has no pregnancies.    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  She was accompanied by her new partner.    Review of systems.  Apart from describing in history, the remainder of comprehensive ROS was negative.    Past History    Past Medical History:   Diagnosis Date     Abnormal Pap smear, can't excl hi gd sq intraepithelial lesion (ASC-H) 3/2015    LSIL also      Anxiety state, unspecified      Diabetes (H)     Mother     Herpes simplex without mention of complication      HSIL on Pap smear of cervix 7/2014    colp - WNL     Human papillomavirus in conditions classified elsewhere and of unspecified site 11/2010    + HPV 45 & 82 with ASCUS     Hx of colposcopy with cervical biopsy 11/08/16    [facility] ECC neg.      Hypertension     father     PONV (postoperative nausea and vomiting)      Premenstrual tension syndromes      Past Surgical History:   Procedure Laterality Date     C IUD,MIRENA  2/08-3/11    removed for cramping     COLONOSCOPY N/A 12/4/2020    Procedure: COLONOSCOPY, WITH POLYPECTOMY;  Surgeon: Moises Pride MD;  Location: [facility] OR     DILATION AND CURETTAGE, ABLATE ENDOMETRIUM NOVASURE, COMBINED N/A 12/31/2015    Procedure: COMBINED DILATION AND CURETTAGE, ABLATE ENDOMETRIUM NOVASURE;  Surgeon: Shakira Penaloza MD;  Location: [facility] OR     HYSTEROSCOPY DIAGNOSTIC N/A 12/31/2015    Procedure: HYSTEROSCOPY DIAGNOSTIC;  Surgeon: Shakira Penaloza MD;  Location: [facility] OR     LEEP TX, CERVICAL  4/3/15    ARNAV 2-3, negative margins     NO HISTORY OF SURGERY       Family History   Problem Relation Age of Onset     C.A.D. Father         double bypass,preventative     Skin Cancer Father      Psychotic Disorder Mother         mild schizophrenia     Diabetes Mother         type 2     Hypertension Mother         ?     Skin Cancer Brother      Cerebrovascular Disease Maternal Grandfather      C.A.D. Paternal Grandfather      Cancer - colorectal No family hx of      Breast Cancer No family hx of      Social History     Socioeconomic History     Marital status: Single     Number of children: 0   Occupational History     Occupation: new business rep/artist   Tobacco Use     Smoking status: Never     Passive exposure: Past     Smokeless tobacco: Never   Vaping Use     Vaping status: Never Used   Substance and Sexual Activity     Alcohol use: No     Drug use: No     Sexual  activity: Not Currently     Partners: Male     Birth control/protection: None   Other Topics Concern     Parent/sibling w/ CABG, MI or angioplasty before 65F 55M? No     Social Drivers of Health     Financial Resource Strain: Low Risk  (2/20/2024)    Financial Resource Strain      Within the past 12 months, have you or your family members you live with been unable to get utilities (heat, electricity) when it was really needed?: No   Food Insecurity: Low Risk  (2/20/2024)    Food Insecurity      Within the past 12 months, did you worry that your food would run out before you got money to buy more?: No      Within the past 12 months, did the food you bought just not last and you didn t have money to get more?: No   Transportation Needs: Low Risk  (2/20/2024)    Transportation Needs      Within the past 12 months, has lack of transportation kept you from medical appointments, getting your medicines, non-medical meetings or appointments, work, or from getting things that you need?: No   Physical Activity: Inactive (2/20/2024)    Exercise Vital Sign      Days of Exercise per Week: 0 days      Minutes of Exercise per Session: 0 min   Stress: Stress Concern Present (2/20/2024)    [place] [place] [employer] - Occupational Stress Questionnaire      Feeling of Stress : Rather much   Social Connections: Unknown (2/20/2024)    Social Connection and Isolation Panel [NHANES]      Frequency of Social Gatherings with Friends and Family: Three times a week   Interpersonal Safety: Low Risk  (2/21/2024)    Interpersonal Safety      Do you feel physically and emotionally safe where you currently live?: Yes      Within the past 12 months, have you been hit, slapped, kicked or otherwise physically hurt by someone?: No      Within the past 12 months, have you been humiliated or emotionally abused in other ways by your partner or ex-partner?: No   Housing Stability: Low Risk  (2/20/2024)    Housing Stability      Do you have  "housing? : Yes      Are you worried about losing your housing?: No       Allergies    No Known Allergies    Physical Exam    BP (!) 175/92 (BP Location: Left arm, Patient Position: Sitting, Cuff Size: Adult Large)   Pulse 89   Temp 98.5  F (36.9  C) (Tympanic)   Resp 16   Ht 1.645 m (5' 4.75\")   Wt 99.3 kg (219 lb)   SpO2 98%   BMI 36.73 kg/m      General: alert, awake, not in acute distress  HEENT: Head: Normal, normocephalic, atraumatic.  Eye: Normal external eye, conjunctiva, lids cornea, SITA.  Nose: Normal external nose, mucus membranes and septum.  Pharynx: Normal buccal mucosa. Normal pharynx.  Neck / Thyroid: Supple, no masses, nodes, nodules or enlargement.  Lymphatics: No abnormally enlarged lymph nodes.  Chest: Normal chest wall and respirations. Clear to auscultation.  Heart: S1 S2 RRR, no murmur.   Abdomen: abdomen is soft without significant tenderness, masses, organomegaly or guarding  Extremities: normal strength, tone, and muscle mass  Skin: normal. no rash or abnormalities  CNS: non focal.    Bilateral breast exam was done and faint deep mass was palpable in the upper outer quadrant of her left breast    Lab Results    No results found for this or any previous visit (from the past week).    Imaging Results    US Breast Biopsy Core Needle Left    Addendum Date: 11/25/2024    BRYAN SHI [id number] FINAL DIAGNOSIS: Invasive ductal carcinoma. Please see final path report. Result is concordant.  Surgical and oncologic consultation will be arranged.  Geovanny Bhandari MD   Date of Addendum: 11/25/2024     Result Date: 11/25/2024  US Breast Biopsy Core Needle Left INDICATION: Left breast mass. PROCEDURE: Informed consent was obtained from the patient. Ultrasound was used to localize the mass at the 2 o'clock position of the left breast, 7 cm from the nipple.  The skin was marked, then prepped and draped in a sterile fashion. 1% lidocaine was used for local anesthesia. Under direct sonographic " guidance, a 14-gauge coaxial needle was used to obtain 4 core biopsies.  A biopsy marking clip was then placed.  Digital mammograms performed in a separate room, using separate equipment, to document clip placement. The mammogram showed the clip to be in good position. The patient tolerated this well; there are no immediate complications.    IMPRESSION: 1. Ultrasound-guided biopsy of mass in the left breast. Pathology pending. 2. Post procedure mammogram for marker placement     MA Post Procedure Left    Addendum Date: 11/25/2024    BRYAN SHI [id number] FINAL DIAGNOSIS: Invasive ductal carcinoma. Please see final path report. Result is concordant.  Surgical and oncologic consultation will be arranged.  Geovanny Bhandari MD   Date of Addendum: 11/25/2024     Result Date: 11/25/2024  US Breast Biopsy Core Needle Left INDICATION: Left breast mass. PROCEDURE: Informed consent was obtained from the patient. Ultrasound was used to localize the mass at the 2 o'clock position of the left breast, 7 cm from the nipple.  The skin was marked, then prepped and draped in a sterile fashion. 1% lidocaine was used for local anesthesia. Under direct sonographic guidance, a 14-gauge coaxial needle was used to obtain 4 core biopsies.  A biopsy marking clip was then placed.  Digital mammograms performed in a separate room, using separate equipment, to document clip placement. The mammogram showed the clip to be in good position. The patient tolerated this well; there are no immediate complications.    IMPRESSION: 1. Ultrasound-guided biopsy of mass in the left breast. Pathology pending. 2. Post procedure mammogram for marker placement     US Breast Left    Result Date: 11/15/2024  EXAM: MA DIAGNOSTIC LEFT W/ ELDER, US BREAST LEFT LIMITED 1-3 QUADRANTS, 11/15/2024 10:57 AM COMPARISONS: 11/12/2024, 7/11/2022 HISTORY: Possible asymmetry in the left breast in the 2:00 position in the posterior depth BREAST DENSITY: The breasts are  heterogeneously dense, which may obscure small masses. FINDINGS: There are a few scattered benign-appearing round calcifications. On the additional tomographic images persistent focal asymmetry and subtle distortion are present in the 2:00 position in the posterior depth. ULTRASOUND: Targeted left breast ultrasound was performed by both the ultrasonographer and the radiologist. In the 2:00 position 7 cm from the nipple there was focal dense glandular tissue with central hypoechoic shadowing area measuring 20 x 17 x 24 mm correlating in size, shape, and location with the mammographic abnormality.     IMPRESSION: BI-RADS CATEGORY: 4 - Suspicious.  Subtle increased density in the left breast in the 12:00 position with correlating abnormality seen on ultrasound. Ultrasound-guided core biopsy for tissue diagnosis is recommended. RECOMMENDED FOLLOW-UP: Biopsy. The findings and the recommendations were discussed with the patient at the time of exam. We are helping her with scheduling the biopsy. EDUARDO CHIU MD   SYSTEM ID:  [id number]    MA Diagnostic Left w/Elder    Result Date: 11/15/2024  EXAM: MA DIAGNOSTIC LEFT W/ ELDER, US BREAST LEFT LIMITED 1-3 QUADRANTS, 11/15/2024 10:57 AM COMPARISONS: 11/12/2024, 7/11/2022 HISTORY: Possible asymmetry in the left breast in the 2:00 position in the posterior depth BREAST DENSITY: The breasts are heterogeneously dense, which may obscure small masses. FINDINGS: There are a few scattered benign-appearing round calcifications. On the additional tomographic images persistent focal asymmetry and subtle distortion are present in the 2:00 position in the posterior depth. ULTRASOUND: Targeted left breast ultrasound was performed by both the ultrasonographer and the radiologist. In the 2:00 position 7 cm from the nipple there was focal dense glandular tissue with central hypoechoic shadowing area measuring 20 x 17 x 24 mm correlating in size, shape, and location with the  mammographic abnormality.     IMPRESSION: BI-RADS CATEGORY: 4 - Suspicious.  Subtle increased density in the left breast in the 12:00 position with correlating abnormality seen on ultrasound. Ultrasound-guided core biopsy for tissue diagnosis is recommended. RECOMMENDED FOLLOW-UP: Biopsy. The findings and the recommendations were discussed with the patient at the time of exam. We are helping her with scheduling the biopsy. EDUARDO CHIU MD   SYSTEM ID:  [id number]    MA Screening Bilateral w/ Nicko    Result Date: 11/12/2024  BILATERAL FULL FIELD DIGITAL SCREENING MAMMOGRAM WITH TOMOSYNTHESIS Performed on: 11/12/24 Compared to: 07/11/2022 Technique:  This study was evaluated with the assistance of Computer-Aided Detection.  Breast Tomosynthesis was used in interpretation. Findings: The breasts are heterogeneously dense, which may obscure small masses. There is a possible asymmetry in the left breast at the 2 o'clock position, posterior depth. The remainder of the breast tissue is unremarkable. There is no suspicious finding of the right breast.    IMPRESSION: BI-RADS CATEGORY: 0 - Incomplete - Need additional imaging evaluation. RECOMMENDED FOLLOW-UP: Additional mammographic images and possible ultrasound of the left breast. The results and recommendations of this examination will be communicated to the patient and the imaging center will attempt to contact the patient within 2-3 business days to schedule follow-up imaging. Denis Butterfield          Assessment/Plan    Clinical stage T2 N0 M0 stage IIa invasive ductal carcinoma of the breast ER positive MD negative HER2/wolf 3+    The diagnosis prognosis treatment options for HER2 positive early stage breast cancer was discussed with the patient in detail.  The natural history of the disease was discussed, the particular characteristics of her cancer was outlined and the treatment plan was discussed.  I explained to her that patients who have HER2 positive  breast cancer which is at least T2 need initially to be treated with neoadjuvant chemotherapy as the degree of pathologic response determines the treatment that will be given after surgery has taken place.  Patients with a complete pathologic response can finish 1 year of treatment with trastuzumab however patients who have anything less then a complete pathologic response needs to be switched to TDM-1 based on the result of the Ragini trial which showed that patients who had residual cancer after neoadjuvant chemotherapy when switched to TDM 1 had a 50% decrease risk of cancer recurrence as compared to trastuzumab.    Also had an extensive discussion on the choice of neoadjuvant chemotherapy as the choices include anthracycline-based regimen versus a known anthracycline-based regimen.  They are found to be incurable and so we usually would use an old anthracycline-based regimen.  In her case there is a question of single HER2 directed therapy with trastuzumab only with docetaxel and carboplatin versus the addition of pratuzumab to the regimen.  In the news fair neoadjuvant trial double HER2 directed therapy increase the complete pathologic response rate to more than 40% as compared to 29% but this did not materialize into PFS or OS benefit.  In the adjuvant Affinity trial which compared to chemotherapy plus trastuzumab versus chemotherapy plus trastuzumab and pratuzumab definite disease-free survival benefit was seen at 6 years in the node positive group but in the node-negative group there was no disease-free survival seen as a disease-free survival was 95% versus 94.9% and the 2 groups.  Considering that I will plan to treat her with single HER2 directed therapy with trastuzumab along with docetaxel and carboplatinum unless new information is available from the MRI of the breast about involvement of lymph nodes.    As far as further workup is concerned I will plan to get a cardiac echo to get a baseline  cardiac function, MRI of the breast as she does has dense breast to rule out any other lesion and characterize the lesion seen on mammogram, due systemic staging with CT scans as the patient is extremely anxious about that and have a port placed to facilitate chemotherapy.    Patient was also recently diagnosed with what she describes as a melanoma on the back of her leg I will plan to get the pathology report from the dermatologist office to review that to make sure that no intervention is needed for that.    All these issues were discussed with her at length she had many questions today all of them were answered to her satisfaction.  She is due to see surgery later today and we will make a final treatment plan after that plan to hopefully start neoadjuvant treatment in the next 1 to 2 weeks.    1) complete workup by getting an MRI of the breast, systemic staging with CT scans, port placement and a cardiac echo  2) obtain pathology report from recent surgery on the back of her leg as according to her melanoma was removed from there to make sure no intervention is needed for that.  3) plan to treat with neoadjuvant chemotherapy  along with HER2 directed therapy prior to surgery.  If no evidence of lymph node involvement on MRI would consider single targeted therapy and use the TCH regimen.  4) Postsurgical therapy to be based on thedegree of pathologic response, if patient has a partial pathologic response we will plan to switch to TDM 1 after surgery  5) various surgical options discussed and patient favors a lumpectomy which would be reasonable if no contraindications found.  Patient will need radiation after surgical management is complete if patient undergoes a lumpectomy.      Again, thank you for allowing me to participate in the care of your patient.        Sincerely,        Josseline Harkins MD

## 2024-11-25 NOTE — LETTER
11/25/2024      Adrienne Ivory  [address]  [place] [address]      Dear Colleague,    Thank you for referring your patient, Adrienne Ivory, to the [facility] [place]. Please see a copy of my visit note below.    History:  This is a 58 year old female who I'm asked to see by Kallie Ta NP for evaluation of a left breast cancer.  She presents with partner, Desmond.  This was picked up by screening mammogram.  A new asymmetry was identified within the left breast compared to mammogram from 2022.  She then underwent diagnostic workup.  A needle biopsy was done which shows a grade II invasive ductal carcinoma.  It is estrogen receptor positive, progesterone receptor negative, and HER-2 positive.  She denies having any breast symptoms such as palpable masses, skin changes, or nipple discharge.  She has never needed any breast biopsies prior to this.    Past medical history:  Anxiety  Obesity  Melanoma    Past surgical history:  Endometrial ablation  LEEP    Medications:     ALPRAZolam (XANAX) 0.5 MG tablet, TAKE 1 TABLET BY MOUTH DAILY AS NEEDED. MAX 2 TABLETS EVERY WEEK (Patient taking differently: as needed. TAKE 1 TABLET BY MOUTH DAILY AS NEEDED. MAX 2 TABLETS EVERY WEEK), Disp: 10 tablet, Rfl: 5     dexAMETHasone (DECADRON) 4 MG tablet, Take 2 tablets (8 mg) by mouth 2 times daily (with meals). Start evening of Docetaxel infusion and continue for a total of 3 doses., Disp: 6 tablet, Rfl: 5     ondansetron (ZOFRAN) 8 MG tablet, Take 1 tablet (8 mg) by mouth every 8 hours as needed for nausea (vomiting)., Disp: 30 tablet, Rfl: 2     prochlorperazine (COMPAZINE) 10 MG tablet, Take 1 tablet (10 mg) by mouth every 6 hours as needed for nausea or vomiting., Disp: 30 tablet, Rfl: 2     valACYclovir (VALTREX) 500 MG tablet, Take 1 tablet (500 mg) by mouth daily., Disp: 90 tablet, Rfl: 4    Allergies:  Morphine  Codeine    Social History:  Denies alcohol, tobacco, marijuana, and  "illicit drug use    Family History:  She has no family history of breast cancer.  Father had prostate cancer.    Review of systems:  General ROS: No complaints or constitutional symptoms  Skin: No complaints or symptoms   Hematologic/Lymphatic: No symptoms or complaints  Psychiatric: No symptoms or complaints  Endocrine: No excessive fatigue, no hypermetabolic symptoms reported  Respiratory ROS: No cough, shortness of breath, or wheezing  Cardiovascular ROS: No chest pain or dyspnea on exertion  Breast ROS: No complaints  Gastrointestinal ROS: No abdominal pain, nausea, diarrhea, or constipation  Musculoskeletal ROS: No recent injuries reported  Neurological ROS: No focal neurologic defects reported.      Physical exam:  Ht 1.645 m (5' 4.75\")   Wt 99.3 kg (219 lb)   BMI 36.73 kg/m    General: Alert, cooperative, appears stated age   Skin: Skin color, texture, turgor normal, no rashes or lesions   Lymphatic: No obvious adenopathy, no swelling   Eyes: No scleral icterus, pupils equal  HENT: No traumatic injury to the head or face, no gross abnormalities  Lungs: Normal respiratory effort, breath sounds equal bilaterally  Heart: Regular rate and rhythm  Breasts: No visible abnormalities.  No palpable masses to the right breast.  Left breast 2:00 zone 2 has a 2 cm vague fullness consistent with her known carcinoma  Abdomen: Soft, non-distended and non-tender to palpation  Neurologic: Grossly intact    Imaging:  Pertinent images personally reviewed by myself and discussed with the patient.    Radiology reports:  EXAM: MA DIAGNOSTIC LEFT W/ ELDER, US BREAST LEFT LIMITED 1-3  QUADRANTS, 11/15/2024 10:57 AM     COMPARISONS: 11/12/2024, 7/11/2022     HISTORY: Possible asymmetry in the left breast in the 2:00 position in  the posterior depth     BREAST DENSITY: The breasts are heterogeneously dense, which may  obscure small masses.     FINDINGS: There are a few scattered benign-appearing round  calcifications. On the " additional tomographic images persistent focal  asymmetry and subtle distortion are present in the 2:00 position in  the posterior depth.      ULTRASOUND: Targeted left breast ultrasound was performed by both the  ultrasonographer and the radiologist. In the 2:00 position 7 cm from  the nipple there was focal dense glandular tissue with central  hypoechoic shadowing area measuring 20 x 17 x 24 mm correlating in  size, shape, and location with the mammographic abnormality.                                                                   IMPRESSION: BI-RADS CATEGORY: 4 - Suspicious.  Subtle increased  density in the left breast in the 12:00 position with correlating  abnormality seen on ultrasound. Ultrasound-guided core biopsy for  tissue diagnosis is recommended.    My interpretation:  Abnormal appearing mass seen in the left breast upper outer quadrant.  Biopsy clip in good location.    Pathology:  BREAST BIOPSY WITH NEOPLASTIC LESION:        -  BIOPSY TYPE: ULTRASOUND-GUIDED CORE BIOPSIES        -  BIOPSY SITE: LEFT BREAST, 2:00, 7 CM FROM NIPPLE        -  HISTOLOGIC TYPE: INVASIVE DUCTAL CARCINOMA        -  CRIS HISTOLOGIC SCORE:              -  TUBULAR DIFFERENTIATION SCORE 3              -  NUCLEAR PLEOMORPHISM SCORE 2              -  MITOTIC RATE SCORE 1              -  OVERALL GRADE 2 (TOTAL SCORE 6/9)        -  EXTENT OF TUMOR: 9 MM TUMOR LENGTH        -  DUCTAL CARCINOMA IN SITU (DCIS): NOT IDENTIFIED        -  MICROCALCIFICATIONS: RARE, ASSOCIATED WITH BENIGN EPITHELIUM        -  NEGATIVE FOR SMALL-VESSEL LYMPHATIC INVASION        -  ADDITIONAL FINDINGS: SCLEROSIS OF BENIGN STROMA        -  ADDITIONAL STUDIES: SEE BREAST BIOMARKER REPORTING TEMPLATE              -  SUMMARY OF TEMPLATE:                    -  ESTROGEN RECEPTORS POSITIVE (80% OF TUMOR NUCLEI STAIN STRONGLY)                    -  PROGESTERONE RECEPTORS NEGATIVE (NO STAINING OF TUMOR NUCLEI;                            BENIGN DUCT  EPITHELIUM STAINS POSITIVELY)                    -  HER2/FIDE RECEPTORS POSITIVE (3+ MEMBRANE STAINING)    IMPRESSION:  Left breast invasive ductal carcinoma    - Grade II, T2, N0, ER+, NC-, HER2+    PLAN:   Discussed the surgical options for treatment of breast cancer which generally are a lumpectomy (partial mastectomy) combined with radiation versus a mastectomy.  Explained that the survival benefit is the same for both.  The difference is in local recurrence risk.  The patient is a good candidate for a lumpectomy.  I ultimately anticipate it requiring wire localization.  Discussed SLN biopsy.  The procedure and rationale were explained.  Discussed that with her HER2 positivity, immunotherapy and chemotherapy will also be part of her treatment plan.  This can be administered in the neoadjuvant or adjuvant setting.  We discussed risks and benefits for both of these.  She understands that receiving these medications over the course of a year can be challenging on the peripheral veins, so a port placement is recommended.  Since the tumor is estrogen receptor positive, she will be a candidate for endocrine therapy.    After our discussion, all questions were answered to satisfaction.  She met with Dr. Harkins this morning to discuss systemic therapies.  They are planning neoadjuvant chemotherapy.  Her axilla needs to be imaged.  A breast MRI was scheduled.  She will also continue her workup with him.  Surgically she is interested in breast preservation therapy.  This will occur after neoadjuvant chemotherapy.  She is in agreement with having a port placed.  Preoperative orders have been placed for port placement under ultrasound and fluoroscopic guidance.  We will plan on this at the outpatient surgery center next week.  The risks and benefits of surgery were explained.  Also talked about expected recovery time.  She would then follow-up with myself around the last round of her chemotherapy to discuss and plan the  breast surgery.    Katerine Matthews DO  General Surgeon  [facility]  Breast [address] [address]  Office: [phone]  Employed by - [employer]        Again, thank you for allowing me to participate in the care of your patient.        Sincerely,        Katerine Matthews DO

## 2024-11-25 NOTE — PROGRESS NOTES
[facility] Hematology and Oncology Consult Note    Patient: Adrienne Ivory  MRN: [id number]  Date of Service: 2024       Reason for Visit    Chief Complaint   Patient presents with    Oncology Clinic Visit     New consult breast cancer             ECOG Performance 0 - Independent        _____________________________________________________________________________    History Of Present Illness    Ms. Adrienne Ivory is a very pleasant 58 year old female who was in her regular state of health when she had her yearly screening mammogram done on 2024.  A possible asymmetry was seen in the left breast at the 2 o'clock position and further imaging was recommended and patient had a diagnostic mammogram with ultrasound on 15 November which showed an abnormal area about 2.4 into 2.2 to 1.7 cm at the 2 o'clock position which was suspicious and biopsy was recommended.  This was done and was found to be consistent with invasive ductal carcinoma grade 2 ER positive MO negative HER2/wolf 3+.  Patient has now been referred to me for further workup and management.  She is accompanied by her partner and looks very anxious.  She denies having any significant family history for breast cancer or any other cancers.  She however does report that she had a skin lesion taken off from the back of her leg which was consistent with a melanoma.    Patient's menarche was at 12 she was having menstrual cycle at 51 when she had uterine ablation done.  She hasno history of using hormone replacement therapy she tried birth control pills for less than a year.  She has no pregnancies.    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  She was accompanied by her new partner.    Review of systems.  Apart from describing in history, the remainder of comprehensive ROS was negative.    Past History    Past Medical History:   Diagnosis Date    Abnormal Pap smear, can't excl hi gd sq  intraepithelial lesion (ASC-H) 3/2015    LSIL also    Anxiety state, unspecified     Diabetes (H)     Mother    Herpes simplex without mention of complication     HSIL on Pap smear of cervix 7/2014    colp - WNL    Human papillomavirus in conditions classified elsewhere and of unspecified site 11/2010    + HPV 45 & 82 with ASCUS    Hx of colposcopy with cervical biopsy 11/08/16    [place] ECC neg.     Hypertension     father    PONV (postoperative nausea and vomiting)     Premenstrual tension syndromes      Past Surgical History:   Procedure Laterality Date    C IUD,MIRENA  2/08-3/11    removed for cramping    COLONOSCOPY N/A 12/4/2020    Procedure: COLONOSCOPY, WITH POLYPECTOMY;  Surgeon: Moises Pride MD;  Location: [facility] OR    DILATION AND CURETTAGE, ABLATE ENDOMETRIUM NOVASURE, COMBINED N/A 12/31/2015    Procedure: COMBINED DILATION AND CURETTAGE, ABLATE ENDOMETRIUM NOVASURE;  Surgeon: Shakira Penaloza MD;  Location: [facility] OR    HYSTEROSCOPY DIAGNOSTIC N/A 12/31/2015    Procedure: HYSTEROSCOPY DIAGNOSTIC;  Surgeon: Shakira Penaloza MD;  Location: [facility] OR    LEEP TX, CERVICAL  4/3/15    ARNAV 2-3, negative margins    NO HISTORY OF SURGERY       Family History   Problem Relation Age of Onset    C.A.D. Father         double bypass,preventative    Skin Cancer Father     Psychotic Disorder Mother         mild schizophrenia    Diabetes Mother         type 2    Hypertension Mother         ?    Skin Cancer Brother     Cerebrovascular Disease Maternal Grandfather     C.A.D. Paternal Grandfather     Cancer - colorectal No family hx of     Breast Cancer No family hx of      Social History     Socioeconomic History    Marital status: Single    Number of children: 0   Occupational History    Occupation: new business rep/artist   Tobacco Use    Smoking status: Never     Passive exposure: Past    Smokeless tobacco: Never   Vaping Use    Vaping status: Never Used   Substance and Sexual Activity    Alcohol use: No    Drug use:  No    Sexual activity: Not Currently     Partners: Male     Birth control/protection: None   Other Topics Concern    Parent/sibling w/ CABG, MI or angioplasty before 65F 55M? No     Social Drivers of Health     Financial Resource Strain: Low Risk  (2/20/2024)    Financial Resource Strain     Within the past 12 months, have you or your family members you live with been unable to get utilities (heat, electricity) when it was really needed?: No   Food Insecurity: Low Risk  (2/20/2024)    Food Insecurity     Within the past 12 months, did you worry that your food would run out before you got money to buy more?: No     Within the past 12 months, did the food you bought just not last and you didn t have money to get more?: No   Transportation Needs: Low Risk  (2/20/2024)    Transportation Needs     Within the past 12 months, has lack of transportation kept you from medical appointments, getting your medicines, non-medical meetings or appointments, work, or from getting things that you need?: No   Physical Activity: Inactive (2/20/2024)    Exercise Vital Sign     Days of Exercise per Week: 0 days     Minutes of Exercise per Session: 0 min   Stress: Stress Concern Present (2/20/2024)    [employer] [place] [employer] - Occupational Stress Questionnaire     Feeling of Stress : Rather much   Social Connections: Unknown (2/20/2024)    Social Connection and Isolation Panel [NHANES]     Frequency of Social Gatherings with Friends and Family: Three times a week   Interpersonal Safety: Low Risk  (2/21/2024)    Interpersonal Safety     Do you feel physically and emotionally safe where you currently live?: Yes     Within the past 12 months, have you been hit, slapped, kicked or otherwise physically hurt by someone?: No     Within the past 12 months, have you been humiliated or emotionally abused in other ways by your partner or ex-partner?: No   Housing Stability: Low Risk  (2/20/2024)    Housing Stability     Do you have  "housing? : Yes     Are you worried about losing your housing?: No       Allergies    No Known Allergies    Physical Exam    BP (!) 175/92 (BP Location: Left arm, Patient Position: Sitting, Cuff Size: Adult Large)   Pulse 89   Temp 98.5  F (36.9  C) (Tympanic)   Resp 16   Ht 1.645 m (5' 4.75\")   Wt 99.3 kg (219 lb)   SpO2 98%   BMI 36.73 kg/m      General: alert, awake, not in acute distress  HEENT: Head: Normal, normocephalic, atraumatic.  Eye: Normal external eye, conjunctiva, lids cornea, SITA.  Nose: Normal external nose, mucus membranes and septum.  Pharynx: Normal buccal mucosa. Normal pharynx.  Neck / Thyroid: Supple, no masses, nodes, nodules or enlargement.  Lymphatics: No abnormally enlarged lymph nodes.  Chest: Normal chest wall and respirations. Clear to auscultation.  Heart: S1 S2 RRR, no murmur.   Abdomen: abdomen is soft without significant tenderness, masses, organomegaly or guarding  Extremities: normal strength, tone, and muscle mass  Skin: normal. no rash or abnormalities  CNS: non focal.    Bilateral breast exam was done and faint deep mass was palpable in the upper outer quadrant of her left breast    Lab Results    No results found for this or any previous visit (from the past week).    Imaging Results    US Breast Biopsy Core Needle Left    Addendum Date: 11/25/2024    BRYAN SHI [id number] FINAL DIAGNOSIS: Invasive ductal carcinoma. Please see final path report. Result is concordant.  Surgical and oncologic consultation will be arranged.  Geovanny Bhandari MD   Date of Addendum: 11/25/2024     Result Date: 11/25/2024  US Breast Biopsy Core Needle Left INDICATION: Left breast mass. PROCEDURE: Informed consent was obtained from the patient. Ultrasound was used to localize the mass at the 2 o'clock position of the left breast, 7 cm from the nipple.  The skin was marked, then prepped and draped in a sterile fashion. 1% lidocaine was used for local anesthesia. Under direct sonographic " guidance, a 14-gauge coaxial needle was used to obtain 4 core biopsies.  A biopsy marking clip was then placed.  Digital mammograms performed in a separate room, using separate equipment, to document clip placement. The mammogram showed the clip to be in good position. The patient tolerated this well; there are no immediate complications.    IMPRESSION: 1. Ultrasound-guided biopsy of mass in the left breast. Pathology pending. 2. Post procedure mammogram for marker placement     MA Post Procedure Left    Addendum Date: 11/25/2024    BRYAN SHI [id number] FINAL DIAGNOSIS: Invasive ductal carcinoma. Please see final path report. Result is concordant.  Surgical and oncologic consultation will be arranged.  Geovanny Bhandari MD   Date of Addendum: 11/25/2024     Result Date: 11/25/2024  US Breast Biopsy Core Needle Left INDICATION: Left breast mass. PROCEDURE: Informed consent was obtained from the patient. Ultrasound was used to localize the mass at the 2 o'clock position of the left breast, 7 cm from the nipple.  The skin was marked, then prepped and draped in a sterile fashion. 1% lidocaine was used for local anesthesia. Under direct sonographic guidance, a 14-gauge coaxial needle was used to obtain 4 core biopsies.  A biopsy marking clip was then placed.  Digital mammograms performed in a separate room, using separate equipment, to document clip placement. The mammogram showed the clip to be in good position. The patient tolerated this well; there are no immediate complications.    IMPRESSION: 1. Ultrasound-guided biopsy of mass in the left breast. Pathology pending. 2. Post procedure mammogram for marker placement     US Breast Left    Result Date: 11/15/2024  EXAM: MA DIAGNOSTIC LEFT W/ ELDER, US BREAST LEFT LIMITED 1-3 QUADRANTS, 11/15/2024 10:57 AM COMPARISONS: 11/12/2024, 7/11/2022 HISTORY: Possible asymmetry in the left breast in the 2:00 position in the posterior depth BREAST DENSITY: The breasts are  heterogeneously dense, which may obscure small masses. FINDINGS: There are a few scattered benign-appearing round calcifications. On the additional tomographic images persistent focal asymmetry and subtle distortion are present in the 2:00 position in the posterior depth. ULTRASOUND: Targeted left breast ultrasound was performed by both the ultrasonographer and the radiologist. In the 2:00 position 7 cm from the nipple there was focal dense glandular tissue with central hypoechoic shadowing area measuring 20 x 17 x 24 mm correlating in size, shape, and location with the mammographic abnormality.     IMPRESSION: BI-RADS CATEGORY: 4 - Suspicious.  Subtle increased density in the left breast in the 12:00 position with correlating abnormality seen on ultrasound. Ultrasound-guided core biopsy for tissue diagnosis is recommended. RECOMMENDED FOLLOW-UP: Biopsy. The findings and the recommendations were discussed with the patient at the time of exam. We are helping her with scheduling the biopsy. EDUARDO CHIU MD   SYSTEM ID:  [id number]    MA Diagnostic Left w/Elder    Result Date: 11/15/2024  EXAM: MA DIAGNOSTIC LEFT W/ ELDER, US BREAST LEFT LIMITED 1-3 QUADRANTS, 11/15/2024 10:57 AM COMPARISONS: 11/12/2024, 7/11/2022 HISTORY: Possible asymmetry in the left breast in the 2:00 position in the posterior depth BREAST DENSITY: The breasts are heterogeneously dense, which may obscure small masses. FINDINGS: There are a few scattered benign-appearing round calcifications. On the additional tomographic images persistent focal asymmetry and subtle distortion are present in the 2:00 position in the posterior depth. ULTRASOUND: Targeted left breast ultrasound was performed by both the ultrasonographer and the radiologist. In the 2:00 position 7 cm from the nipple there was focal dense glandular tissue with central hypoechoic shadowing area measuring 20 x 17 x 24 mm correlating in size, shape, and location with the  mammographic abnormality.     IMPRESSION: BI-RADS CATEGORY: 4 - Suspicious.  Subtle increased density in the left breast in the 12:00 position with correlating abnormality seen on ultrasound. Ultrasound-guided core biopsy for tissue diagnosis is recommended. RECOMMENDED FOLLOW-UP: Biopsy. The findings and the recommendations were discussed with the patient at the time of exam. We are helping her with scheduling the biopsy. EDUARDO CHIU MD   SYSTEM ID:  [id number]    MA Screening Bilateral w/ Nicko    Result Date: 11/12/2024  BILATERAL FULL FIELD DIGITAL SCREENING MAMMOGRAM WITH TOMOSYNTHESIS Performed on: 11/12/24 Compared to: 07/11/2022 Technique:  This study was evaluated with the assistance of Computer-Aided Detection.  Breast Tomosynthesis was used in interpretation. Findings: The breasts are heterogeneously dense, which may obscure small masses. There is a possible asymmetry in the left breast at the 2 o'clock position, posterior depth. The remainder of the breast tissue is unremarkable. There is no suspicious finding of the right breast.    IMPRESSION: BI-RADS CATEGORY: 0 - Incomplete - Need additional imaging evaluation. RECOMMENDED FOLLOW-UP: Additional mammographic images and possible ultrasound of the left breast. The results and recommendations of this examination will be communicated to the patient and the imaging center will attempt to contact the patient within 2-3 business days to schedule follow-up imaging. Denis Butterfield          Assessment/Plan    Clinical stage T2 N0 M0 stage IIa invasive ductal carcinoma of the breast ER positive SC negative HER2/wlof 3+    The diagnosis prognosis treatment options for HER2 positive early stage breast cancer was discussed with the patient in detail.  The natural history of the disease was discussed, the particular characteristics of her cancer was outlined and the treatment plan was discussed.  I explained to her that patients who have HER2 positive  breast cancer which is at least T2 need initially to be treated with neoadjuvant chemotherapy as the degree of pathologic response determines the treatment that will be given after surgery has taken place.  Patients with a complete pathologic response can finish 1 year of treatment with trastuzumab however patients who have anything less then a complete pathologic response needs to be switched to TDM-1 based on the result of the Ragini trial which showed that patients who had residual cancer after neoadjuvant chemotherapy when switched to TDM 1 had a 50% decrease risk of cancer recurrence as compared to trastuzumab.    Also had an extensive discussion on the choice of neoadjuvant chemotherapy as the choices include anthracycline-based regimen versus a known anthracycline-based regimen.  They are found to be incurable and so we usually would use an old anthracycline-based regimen.  In her case there is a question of single HER2 directed therapy with trastuzumab only with docetaxel and carboplatin versus the addition of pratuzumab to the regimen.  In the news fair neoadjuvant trial double HER2 directed therapy increase the complete pathologic response rate to more than 40% as compared to 29% but this did not materialize into PFS or OS benefit.  In the adjuvant Affinity trial which compared to chemotherapy plus trastuzumab versus chemotherapy plus trastuzumab and pratuzumab definite disease-free survival benefit was seen at 6 years in the node positive group but in the node-negative group there was no disease-free survival seen as a disease-free survival was 95% versus 94.9% and the 2 groups.  Considering that I will plan to treat her with single HER2 directed therapy with trastuzumab along with docetaxel and carboplatinum unless new information is available from the MRI of the breast about involvement of lymph nodes.    As far as further workup is concerned I will plan to get a cardiac echo to get a baseline  cardiac function, MRI of the breast as she does has dense breast to rule out any other lesion and characterize the lesion seen on mammogram, due systemic staging with CT scans as the patient is extremely anxious about that and have a port placed to facilitate chemotherapy.    Patient was also recently diagnosed with what she describes as a melanoma on the back of her leg I will plan to get the pathology report from the dermatologist office to review that to make sure that no intervention is needed for that.    All these issues were discussed with her at length she had many questions today all of them were answered to her satisfaction.  She is due to see surgery later today and we will make a final treatment plan after that plan to hopefully start neoadjuvant treatment in the next 1 to 2 weeks.    1) complete workup by getting an MRI of the breast, systemic staging with CT scans, port placement and a cardiac echo  2) obtain pathology report from recent surgery on the back of her leg as according to her melanoma was removed from there to make sure no intervention is needed for that.  3) plan to treat with neoadjuvant chemotherapy  along with HER2 directed therapy prior to surgery.  If no evidence of lymph node involvement on MRI would consider single targeted therapy and use the TCH regimen.  4) Postsurgical therapy to be based on thedegree of pathologic response, if patient has a partial pathologic response we will plan to switch to TDM 1 after surgery  5) various surgical options discussed and patient favors a lumpectomy which would be reasonable if no contraindications found.  Patient will need radiation after surgical management is complete if patient undergoes a lumpectomy.

## 2024-11-25 NOTE — PROGRESS NOTES
"Oncology Rooming Note    November 25, 2024 9:59 AM   Adrienne Ivory is a 58 year old female who presents for:    Chief Complaint   Patient presents with    Oncology Clinic Visit     New consult breast cancer     Initial Vitals: BP (!) 175/92 (BP Location: Left arm, Patient Position: Sitting, Cuff Size: Adult Large)   Pulse 89   Temp 98.5  F (36.9  C) (Tympanic)   Resp 16   Ht 1.645 m (5' 4.75\")   Wt 99.3 kg (219 lb)   SpO2 98%   BMI 36.73 kg/m   Estimated body mass index is 36.73 kg/m  as calculated from the following:    Height as of this encounter: 1.645 m (5' 4.75\").    Weight as of this encounter: 99.3 kg (219 lb). Body surface area is 2.13 meters squared.  No Pain (0) Comment: Data Unavailable   No LMP recorded. Patient has had an ablation.  Allergies reviewed: Yes  Medications reviewed: Yes    Medications: Medication refills not needed today.  Pharmacy name entered into [facility]:    [id number] - [place], [place] - [address]  [id number] - [place], [place] - [address] [facility] [facility] [place] - [place], [place] - [address]    Frailty Screening:   Is the patient here for a new oncology consult visit in cancer care? 1. Yes. Over the past month, have you experienced difficulty or required a caregiver to assist with:   1. Balance, walking or general mobility (including any falls)? NO  2. Completion of self-care tasks such as bathing, dressing, toileting, grooming/hygiene?  NO  3. Concentration or memory that affects your daily life?  NO       Clinical concerns:  new consult breast cancer      Floridalma Navarrete            "

## 2024-11-27 ENCOUNTER — HOSPITAL ENCOUNTER (OUTPATIENT)
Dept: MRI IMAGING | Facility: HOSPITAL | Age: 59
Discharge: HOME | End: 2024-11-27
Attending: INTERNAL MEDICINE
Payer: COMMERCIAL

## 2024-11-27 DIAGNOSIS — C50.212 MALIGNANT NEOPLASM OF UPPER-INNER QUADRANT OF LEFT BREAST IN FEMALE, ESTROGEN RECEPTOR POSITIVE (H): Primary | ICD-10-CM

## 2024-11-27 DIAGNOSIS — Z17.0 MALIGNANT NEOPLASM OF UPPER-INNER QUADRANT OF LEFT BREAST IN FEMALE, ESTROGEN RECEPTOR POSITIVE (H): Primary | ICD-10-CM

## 2024-11-27 DIAGNOSIS — C50.412 MALIGNANT NEOPLASM OF UPPER-OUTER QUADRANT OF LEFT BREAST IN FEMALE, ESTROGEN RECEPTOR POSITIVE (H): ICD-10-CM

## 2024-11-27 DIAGNOSIS — Z17.0 MALIGNANT NEOPLASM OF UPPER-OUTER QUADRANT OF LEFT BREAST IN FEMALE, ESTROGEN RECEPTOR POSITIVE (H): ICD-10-CM

## 2024-11-27 PROCEDURE — 77049 MRI BREAST C-+ W/CAD BI: CPT

## 2024-11-27 PROCEDURE — 255N000002 HC RX 255 OP 636: Performed by: INTERNAL MEDICINE

## 2024-11-27 PROCEDURE — A9585 GADOBUTROL INJECTION: HCPCS | Performed by: INTERNAL MEDICINE

## 2024-11-27 RX ORDER — GADOBUTROL 604.72 MG/ML
0.1 INJECTION INTRAVENOUS ONCE
Status: COMPLETED | OUTPATIENT
Start: 2024-11-27 | End: 2024-11-27

## 2024-11-27 RX ADMIN — GADOBUTROL 10 ML: 604.72 INJECTION INTRAVENOUS at 09:47

## 2024-12-02 ENCOUNTER — HOSPITAL ENCOUNTER (OUTPATIENT)
Dept: CARDIOLOGY | Facility: CLINIC | Age: 59
Discharge: HOME | End: 2024-12-02
Attending: INTERNAL MEDICINE | Admitting: INTERNAL MEDICINE
Payer: COMMERCIAL

## 2024-12-02 DIAGNOSIS — Z17.0 MALIGNANT NEOPLASM OF UPPER-OUTER QUADRANT OF LEFT BREAST IN FEMALE, ESTROGEN RECEPTOR POSITIVE (H): ICD-10-CM

## 2024-12-02 DIAGNOSIS — C50.412 MALIGNANT NEOPLASM OF UPPER-OUTER QUADRANT OF LEFT BREAST IN FEMALE, ESTROGEN RECEPTOR POSITIVE (H): ICD-10-CM

## 2024-12-02 LAB — BI-PLANE LVEF ECHO: NORMAL

## 2024-12-02 PROCEDURE — 93356 MYOCRD STRAIN IMG SPCKL TRCK: CPT

## 2024-12-02 PROCEDURE — 93306 TTE W/DOPPLER COMPLETE: CPT | Mod: 26 | Performed by: INTERNAL MEDICINE

## 2024-12-02 PROCEDURE — 93356 MYOCRD STRAIN IMG SPCKL TRCK: CPT | Performed by: INTERNAL MEDICINE

## 2024-12-03 ENCOUNTER — HOSPITAL ENCOUNTER (OUTPATIENT)
Dept: CT IMAGING | Facility: CLINIC | Age: 59
Discharge: HOME | End: 2024-12-03
Attending: INTERNAL MEDICINE
Payer: COMMERCIAL

## 2024-12-03 DIAGNOSIS — C50.412 MALIGNANT NEOPLASM OF UPPER-OUTER QUADRANT OF LEFT BREAST IN FEMALE, ESTROGEN RECEPTOR POSITIVE (H): ICD-10-CM

## 2024-12-03 DIAGNOSIS — Z17.0 MALIGNANT NEOPLASM OF UPPER-OUTER QUADRANT OF LEFT BREAST IN FEMALE, ESTROGEN RECEPTOR POSITIVE (H): ICD-10-CM

## 2024-12-03 PROCEDURE — 71260 CT THORAX DX C+: CPT

## 2024-12-03 PROCEDURE — 250N000011 HC RX IP 250 OP 636: Performed by: INTERNAL MEDICINE

## 2024-12-03 PROCEDURE — 74177 CT ABD & PELVIS W/CONTRAST: CPT

## 2024-12-03 RX ORDER — IOPAMIDOL 755 MG/ML
90 INJECTION, SOLUTION INTRAVASCULAR ONCE
Status: COMPLETED | OUTPATIENT
Start: 2024-12-03 | End: 2024-12-03

## 2024-12-03 RX ADMIN — IOPAMIDOL 90 ML: 755 INJECTION, SOLUTION INTRAVENOUS at 17:46

## 2024-12-05 ENCOUNTER — PATIENT OUTREACH (OUTPATIENT)
Dept: ONCOLOGY | Facility: HOSPITAL | Age: 59
End: 2024-12-05
Payer: COMMERCIAL

## 2024-12-05 DIAGNOSIS — K76.9 LIVER LESION: Primary | ICD-10-CM

## 2024-12-05 NOTE — PROGRESS NOTES
[facility]: Cancer Care                                                                                          Called Adrienne and relayed that Dr. Harkins wanted to speak with her about her imaging result. She was transferred to Dr. Harkins. Dr. Harkins ordered liver MRI to further assess the liver lesions. MRI ordered stat and writer facilitated scheduling, Liver MRI scheduled on 12/5/24 at 415 p.m. Message sent to [facility] to monitor for the results on 12/6 and send Secure message to Dr. Harkins when scan results available.    Signature:  Radha Stephen RN

## 2024-12-06 ENCOUNTER — HOSPITAL ENCOUNTER (OUTPATIENT)
Facility: AMBULATORY SURGERY CENTER | Age: 59
Discharge: HOME | End: 2024-12-06
Attending: SURGERY
Payer: COMMERCIAL

## 2024-12-06 ENCOUNTER — MYC MEDICAL ADVICE (OUTPATIENT)
Dept: ONCOLOGY | Facility: HOSPITAL | Age: 59
End: 2024-12-06

## 2024-12-06 VITALS
RESPIRATION RATE: 16 BRPM | TEMPERATURE: 96.8 F | DIASTOLIC BLOOD PRESSURE: 61 MMHG | OXYGEN SATURATION: 97 % | HEART RATE: 85 BPM | SYSTOLIC BLOOD PRESSURE: 117 MMHG

## 2024-12-06 DIAGNOSIS — G89.18 POSTOPERATIVE PAIN: Primary | ICD-10-CM

## 2024-12-06 DIAGNOSIS — C50.912 INVASIVE DUCTAL CARCINOMA OF LEFT BREAST (H): ICD-10-CM

## 2024-12-06 PROCEDURE — 36556 INSERT NON-TUNNEL CV CATH: CPT | Performed by: SURGERY

## 2024-12-06 PROCEDURE — 76937 US GUIDE VASCULAR ACCESS: CPT | Mod: 26 | Performed by: SURGERY

## 2024-12-06 PROCEDURE — 77001 FLUOROGUIDE FOR VEIN DEVICE: CPT | Mod: 26 | Performed by: SURGERY

## 2024-12-06 RX ORDER — OXYCODONE HYDROCHLORIDE 10 MG/1
10 TABLET ORAL
Status: DISCONTINUED | OUTPATIENT
Start: 2024-12-06 | End: 2024-12-07 | Stop reason: HOSPADM

## 2024-12-06 RX ORDER — LIDOCAINE 40 MG/G
CREAM TOPICAL
Status: DISCONTINUED | OUTPATIENT
Start: 2024-12-06 | End: 2024-12-07 | Stop reason: HOSPADM

## 2024-12-06 RX ORDER — LIDOCAINE HYDROCHLORIDE AND EPINEPHRINE 10; 10 MG/ML; UG/ML
INJECTION, SOLUTION INFILTRATION; PERINEURAL PRN
Status: DISCONTINUED | OUTPATIENT
Start: 2024-12-06 | End: 2024-12-06 | Stop reason: HOSPADM

## 2024-12-06 RX ORDER — ONDANSETRON 4 MG/1
4 TABLET, ORALLY DISINTEGRATING ORAL EVERY 30 MIN PRN
Status: DISCONTINUED | OUTPATIENT
Start: 2024-12-06 | End: 2024-12-07 | Stop reason: HOSPADM

## 2024-12-06 RX ORDER — TRAMADOL HYDROCHLORIDE 50 MG/1
50 TABLET ORAL EVERY 6 HOURS PRN
Qty: 10 TABLET | Refills: 0 | Status: SHIPPED | OUTPATIENT
Start: 2024-12-06 | End: 2024-12-09

## 2024-12-06 RX ORDER — NALOXONE HYDROCHLORIDE 0.4 MG/ML
0.1 INJECTION, SOLUTION INTRAMUSCULAR; INTRAVENOUS; SUBCUTANEOUS
Status: DISCONTINUED | OUTPATIENT
Start: 2024-12-06 | End: 2024-12-07 | Stop reason: HOSPADM

## 2024-12-06 RX ORDER — SODIUM CHLORIDE, SODIUM LACTATE, POTASSIUM CHLORIDE, CALCIUM CHLORIDE 600; 310; 30; 20 MG/100ML; MG/100ML; MG/100ML; MG/100ML
INJECTION, SOLUTION INTRAVENOUS CONTINUOUS
Status: DISCONTINUED | OUTPATIENT
Start: 2024-12-06 | End: 2024-12-07 | Stop reason: HOSPADM

## 2024-12-06 RX ORDER — ACETAMINOPHEN 325 MG/1
975 TABLET ORAL ONCE
Status: COMPLETED | OUTPATIENT
Start: 2024-12-06 | End: 2024-12-06

## 2024-12-06 RX ORDER — CEFAZOLIN SODIUM 2 G/100ML
2 INJECTION, SOLUTION INTRAVENOUS
Status: COMPLETED | OUTPATIENT
Start: 2024-12-06 | End: 2024-12-06

## 2024-12-06 RX ORDER — OXYCODONE HYDROCHLORIDE 5 MG/1
5 TABLET ORAL
Status: COMPLETED | OUTPATIENT
Start: 2024-12-06 | End: 2024-12-06

## 2024-12-06 RX ORDER — FENTANYL CITRATE 0.05 MG/ML
25 INJECTION, SOLUTION INTRAMUSCULAR; INTRAVENOUS
Status: DISCONTINUED | OUTPATIENT
Start: 2024-12-06 | End: 2024-12-07 | Stop reason: HOSPADM

## 2024-12-06 RX ORDER — ONDANSETRON 2 MG/ML
4 INJECTION INTRAMUSCULAR; INTRAVENOUS EVERY 30 MIN PRN
Status: DISCONTINUED | OUTPATIENT
Start: 2024-12-06 | End: 2024-12-07 | Stop reason: HOSPADM

## 2024-12-06 RX ADMIN — OXYCODONE HYDROCHLORIDE 5 MG: 5 TABLET ORAL at 11:00

## 2024-12-06 RX ADMIN — ACETAMINOPHEN 975 MG: 325 TABLET ORAL at 09:07

## 2024-12-06 RX ADMIN — SODIUM CHLORIDE, SODIUM LACTATE, POTASSIUM CHLORIDE, CALCIUM CHLORIDE: 600; 310; 30; 20 INJECTION, SOLUTION INTRAVENOUS at 09:17

## 2024-12-06 NOTE — DISCHARGE INSTRUCTIONS
If you have any questions or concerns regarding your procedure please contact Dr. Katerine Matthews, her office number is [phone] .    You have received 975 mg of Acetaminophen (Tylenol) at 9:07 AM. Please do not take an additional dose of Tylenol until after 3:07 PM.     Do not exceed 4,000 mg of acetaminophen during a 24 hour period and keep in mind that acetaminophen can also be found in many over-the-counter cold medications as well as narcotics that may be given for pain.       Adult Discharge Orders & Instructions     For 24 hours after surgery    Get plenty of rest.  A responsible adult must stay with you for at least 24 hours after you leave the hospital.   Do not drive or use heavy equipment.  If you have weakness or tingling, don't drive or use heavy equipment until this feeling goes away.  Do not drink alcohol.  Avoid strenuous or risky activities.  Ask for help when climbing stairs.   You may feel lightheaded.  IF so, sit for a few minutes before standing.  Have someone help you get up.   If you have nausea (feel sick to your stomach): Drink only clear liquids such as apple juice, ginger ale, broth or 7-Up.  Rest may also help.  Be sure to drink enough fluids.  Move to a regular diet as you feel able.  You may have a slight fever. Call the doctor if your fever is over 100 F (37.7 C) (taken under the tongue) or lasts longer than 24 hours.  You may have a dry mouth, a sore throat, muscle aches or trouble sleeping.  These should go away after 24 hours.  Do not make important or legal decisions.     Call your doctor for any of the followin.  Signs of infection (fever, growing tenderness at the surgery site, a large amount of drainage or bleeding, severe pain, foul-smelling drainage, redness, swelling).    2. It has been over 8 to 10 hours since surgery and you are still not able to urinate (pass water).    3.  Headache for over 24 hours.

## 2024-12-06 NOTE — INTERVAL H&P NOTE
Patient seen in preop.  She has had further workup since I last saw her.  A breast MRI demonstrated multifocal disease along with level I/II/III and supraclavicular involvement.  Liver masses were seen on CT but were found to be hemangiomas on liver MRI.  She is wanting to get chemotherapy started as soon as possible.  She does have further biopsy is planned for next week to the right breast.  All questions have been answered regarding port placement.  She will follow-up with myself around the last round of her chemotherapy.    Katerine Matthews DO  General Surgeon  [facility]  Bre[facility]  [address] [place] [id number]  Office: [phone]  Employed by - [employer]

## 2024-12-09 ENCOUNTER — ANCILLARY PROCEDURE (OUTPATIENT)
Dept: MAMMOGRAPHY | Facility: CLINIC | Age: 59
End: 2024-12-09
Attending: INTERNAL MEDICINE
Payer: COMMERCIAL

## 2024-12-09 DIAGNOSIS — Z17.0 MALIGNANT NEOPLASM OF UPPER-INNER QUADRANT OF LEFT BREAST IN FEMALE, ESTROGEN RECEPTOR POSITIVE (H): ICD-10-CM

## 2024-12-09 DIAGNOSIS — C50.212 MALIGNANT NEOPLASM OF UPPER-INNER QUADRANT OF LEFT BREAST IN FEMALE, ESTROGEN RECEPTOR POSITIVE (H): ICD-10-CM

## 2024-12-09 PROCEDURE — 88342 IMHCHEM/IMCYTCHM 1ST ANTB: CPT | Mod: TC | Performed by: INTERNAL MEDICINE

## 2024-12-09 PROCEDURE — 88342 IMHCHEM/IMCYTCHM 1ST ANTB: CPT | Mod: 26 | Performed by: PATHOLOGY

## 2024-12-09 PROCEDURE — 88305 TISSUE EXAM BY PATHOLOGIST: CPT | Mod: 26 | Performed by: PATHOLOGY

## 2024-12-09 PROCEDURE — 999N000065 MA POST PROCEDURE RIGHT

## 2024-12-09 PROCEDURE — 88305 TISSUE EXAM BY PATHOLOGIST: CPT | Mod: TC | Performed by: INTERNAL MEDICINE

## 2024-12-09 PROCEDURE — 250N000009 HC RX 250: Performed by: INTERNAL MEDICINE

## 2024-12-09 PROCEDURE — 76642 ULTRASOUND BREAST LIMITED: CPT | Mod: 50

## 2024-12-09 PROCEDURE — 19083 BX BREAST 1ST LESION US IMAG: CPT | Mod: RT,XS

## 2024-12-09 PROCEDURE — 76942 ECHO GUIDE FOR BIOPSY: CPT

## 2024-12-09 PROCEDURE — 272N000431 US BREAST BIOPSY CORE NEEDLE LEFT

## 2024-12-09 PROCEDURE — 38505 NEEDLE BIOPSY LYMPH NODES: CPT | Mod: LT

## 2024-12-09 PROCEDURE — 88360 TUMOR IMMUNOHISTOCHEM/MANUAL: CPT | Mod: TC | Performed by: INTERNAL MEDICINE

## 2024-12-09 PROCEDURE — 88360 TUMOR IMMUNOHISTOCHEM/MANUAL: CPT | Mod: 26 | Performed by: PATHOLOGY

## 2024-12-09 PROCEDURE — 272N000717 US BREAST BIOPSY CORE LYMPH NODE LEFT

## 2024-12-09 PROCEDURE — 19083 BX BREAST 1ST LESION US IMAG: CPT | Mod: LT

## 2024-12-09 PROCEDURE — 272N000431 US BREAST BIOPSY CORE NEEDLE RIGHT

## 2024-12-09 PROCEDURE — 999N000065 MA POST PROCEDURE LEFT

## 2024-12-09 RX ADMIN — LIDOCAINE HYDROCHLORIDE 10 ML: 10 INJECTION, SOLUTION INFILTRATION; PERINEURAL at 09:20

## 2024-12-10 DIAGNOSIS — Z17.0 MALIGNANT NEOPLASM OF UPPER-INNER QUADRANT OF LEFT BREAST IN FEMALE, ESTROGEN RECEPTOR POSITIVE (H): Primary | ICD-10-CM

## 2024-12-10 DIAGNOSIS — C50.212 MALIGNANT NEOPLASM OF UPPER-INNER QUADRANT OF LEFT BREAST IN FEMALE, ESTROGEN RECEPTOR POSITIVE (H): Primary | ICD-10-CM

## 2024-12-10 RX ORDER — LIDOCAINE/PRILOCAINE 2.5 %-2.5%
CREAM (GRAM) TOPICAL
Qty: 30 G | Refills: 2 | Status: SHIPPED | OUTPATIENT
Start: 2024-12-10

## 2024-12-10 NOTE — TELEPHONE ENCOUNTER
Per patient request on 12/6/24 to triage nurse,  order for emla cream was placed and sent to her preferred pharmacy. Instructions on how to use was reviewed on 12/6 by triage nurse/Radha Stephen RN

## 2024-12-10 NOTE — TELEPHONE ENCOUNTER
Order for emla cream was placed and sent to patient's preferred pharmacy. Instructions on how to use was reviewed by triage nurse on 12/6/24/Radha Stephen RN

## 2024-12-11 ENCOUNTER — TELEPHONE (OUTPATIENT)
Dept: MAMMOGRAPHY | Facility: CLINIC | Age: 59
End: 2024-12-11
Payer: COMMERCIAL

## 2024-12-11 LAB
PATH REPORT.COMMENTS IMP SPEC: ABNORMAL
PATH REPORT.COMMENTS IMP SPEC: YES
PATH REPORT.FINAL DX SPEC: ABNORMAL
PATH REPORT.GROSS SPEC: ABNORMAL
PATH REPORT.MICROSCOPIC SPEC OTHER STN: ABNORMAL
PATHOLOGY SYNOPTIC REPORT: ABNORMAL
PHOTO IMAGE: ABNORMAL

## 2024-12-11 NOTE — TELEPHONE ENCOUNTER
[facility] Radiologist, Dr. Carias, has reviewed the following breast biopsy pathology report and confirmed that recent breast imaging is concordant with the final surgical pathology results.    I phoned patient, verified full name, date of birth, and informed patient of the following breast biopsy results:     Final Diagnosis   A) LEFT AXILLARY LYMPH NODE, ULTRASOUND-GUIDED CORE BIOPSY:    - LYMPH NODE WITH METASTATIC ADENOCARCINOMA, CONSISTENT WITH BREAST PRIMARY SOURCE        1.  LENGTH: 9 MM        2.  EXTRANODAL SOFT TISSUE EXTENSION NOT IDENTIFIED        3.  ANCILLARY STUDIES  A.  ESTROGEN RECEPTOR: POSITIVE (81-90%)  B.  PROGESTERONE RECEPTOR: NEGATIVE (LESS THAN 1%)  C.  HER2: POSITIVE (3+)  D.  GATA3: STRONG NUCLEAR STAINING; CONSISTENT WITH BREAST PRIMARY SOURCE     B) LEFT BREAST MASS, 6:00, 3 CM FROM NIPPLE, ULTRASOUND-GUIDED CORE BIOPSIES:        - BENIGN FIBROADIPOSE BREAST TISSUE     C) RIGHT BREAST MASS, 1:00, 2 CMFN, ULTRASOUND-GUIDED CORE BIOPSIES:        - BENIGN FIBROADIPOSE BREAST TISSUE        PLAN:    Patient is scheduled to start chemo tomorrow and will follow up with Dr. Harkins, her Oncologist.  She will follow up with Dr. Matthews after finishing chemo as planned.      Patient denies any concerns at her breast biopsy site.  All patient questions were answered and support provided.  Patient has my direct phone number if further questions.  Patient verbalized understanding and agrees with the above plan of care.    Ordering provider has been notified of the results and plan.       Noelle Harmon, RN, BSN, PHN, CBCN  [facility] Imaging Nurse Coordinator   [facility]  [address]  [place] [address]  [phone]

## 2024-12-12 ENCOUNTER — ONCOLOGY VISIT (OUTPATIENT)
Dept: ONCOLOGY | Facility: HOSPITAL | Age: 59
End: 2024-12-12
Attending: INTERNAL MEDICINE
Payer: COMMERCIAL

## 2024-12-12 ENCOUNTER — INFUSION THERAPY VISIT (OUTPATIENT)
Dept: INFUSION THERAPY | Facility: HOSPITAL | Age: 59
End: 2024-12-12
Attending: INTERNAL MEDICINE
Payer: COMMERCIAL

## 2024-12-12 ENCOUNTER — PATIENT OUTREACH (OUTPATIENT)
Dept: ONCOLOGY | Facility: HOSPITAL | Age: 59
End: 2024-12-12

## 2024-12-12 VITALS
SYSTOLIC BLOOD PRESSURE: 147 MMHG | DIASTOLIC BLOOD PRESSURE: 96 MMHG | BODY MASS INDEX: 35.65 KG/M2 | RESPIRATION RATE: 16 BRPM | TEMPERATURE: 98.5 F | OXYGEN SATURATION: 97 % | HEART RATE: 84 BPM | HEIGHT: 65 IN | WEIGHT: 214 LBS

## 2024-12-12 VITALS
SYSTOLIC BLOOD PRESSURE: 145 MMHG | DIASTOLIC BLOOD PRESSURE: 88 MMHG | TEMPERATURE: 98.1 F | OXYGEN SATURATION: 96 % | HEART RATE: 88 BPM | RESPIRATION RATE: 18 BRPM

## 2024-12-12 DIAGNOSIS — C50.212 MALIGNANT NEOPLASM OF UPPER-INNER QUADRANT OF LEFT BREAST IN FEMALE, ESTROGEN RECEPTOR POSITIVE (H): Primary | ICD-10-CM

## 2024-12-12 DIAGNOSIS — Z17.0 MALIGNANT NEOPLASM OF UPPER-INNER QUADRANT OF LEFT BREAST IN FEMALE, ESTROGEN RECEPTOR POSITIVE (H): Primary | ICD-10-CM

## 2024-12-12 DIAGNOSIS — R12 HEARTBURN: Primary | ICD-10-CM

## 2024-12-12 LAB
ALBUMIN SERPL BCG-MCNC: 4.2 G/DL (ref 3.5–5.2)
ALP SERPL-CCNC: 82 U/L (ref 40–150)
ALT SERPL W P-5'-P-CCNC: 40 U/L (ref 0–50)
ANION GAP SERPL CALCULATED.3IONS-SCNC: 12 MMOL/L (ref 7–15)
AST SERPL W P-5'-P-CCNC: 40 U/L (ref 0–45)
BASOPHILS # BLD AUTO: 0.1 10E3/UL (ref 0–0.2)
BASOPHILS NFR BLD AUTO: 1 %
BILIRUB SERPL-MCNC: 0.4 MG/DL
BUN SERPL-MCNC: 10.5 MG/DL (ref 6–20)
CALCIUM SERPL-MCNC: 9.4 MG/DL (ref 8.8–10.4)
CHLORIDE SERPL-SCNC: 104 MMOL/L (ref 98–107)
CREAT SERPL-MCNC: 0.79 MG/DL (ref 0.51–0.95)
EGFRCR SERPLBLD CKD-EPI 2021: 87 ML/MIN/1.73M2
EOSINOPHIL # BLD AUTO: 0.2 10E3/UL (ref 0–0.7)
EOSINOPHIL NFR BLD AUTO: 2 %
ERYTHROCYTE [DISTWIDTH] IN BLOOD BY AUTOMATED COUNT: 14.6 % (ref 10–15)
GLUCOSE SERPL-MCNC: 104 MG/DL (ref 70–99)
HCO3 SERPL-SCNC: 22 MMOL/L (ref 22–29)
HCT VFR BLD AUTO: 40.7 % (ref 35–47)
HGB BLD-MCNC: 13.8 G/DL (ref 11.7–15.7)
IMM GRANULOCYTES # BLD: 0 10E3/UL
IMM GRANULOCYTES NFR BLD: 0 %
LYMPHOCYTES # BLD AUTO: 2.5 10E3/UL (ref 0.8–5.3)
LYMPHOCYTES NFR BLD AUTO: 28 %
MCH RBC QN AUTO: 28.6 PG (ref 26.5–33)
MCHC RBC AUTO-ENTMCNC: 33.9 G/DL (ref 31.5–36.5)
MCV RBC AUTO: 84 FL (ref 78–100)
MONOCYTES # BLD AUTO: 0.6 10E3/UL (ref 0–1.3)
MONOCYTES NFR BLD AUTO: 7 %
NEUTROPHILS # BLD AUTO: 5.7 10E3/UL (ref 1.6–8.3)
NEUTROPHILS NFR BLD AUTO: 62 %
NRBC # BLD AUTO: 0 10E3/UL
NRBC BLD AUTO-RTO: 0 /100
PLATELET # BLD AUTO: 263 10E3/UL (ref 150–450)
POTASSIUM SERPL-SCNC: 4.1 MMOL/L (ref 3.4–5.3)
PROT SERPL-MCNC: 7.1 G/DL (ref 6.4–8.3)
RBC # BLD AUTO: 4.83 10E6/UL (ref 3.8–5.2)
SODIUM SERPL-SCNC: 138 MMOL/L (ref 135–145)
WBC # BLD AUTO: 9.1 10E3/UL (ref 4–11)

## 2024-12-12 PROCEDURE — 250N000011 HC RX IP 250 OP 636: Performed by: INTERNAL MEDICINE

## 2024-12-12 PROCEDURE — 36591 DRAW BLOOD OFF VENOUS DEVICE: CPT | Performed by: INTERNAL MEDICINE

## 2024-12-12 PROCEDURE — 84155 ASSAY OF PROTEIN SERUM: CPT | Performed by: INTERNAL MEDICINE

## 2024-12-12 PROCEDURE — 82247 BILIRUBIN TOTAL: CPT | Performed by: INTERNAL MEDICINE

## 2024-12-12 PROCEDURE — 258N000003 HC RX IP 258 OP 636: Performed by: INTERNAL MEDICINE

## 2024-12-12 PROCEDURE — 85014 HEMATOCRIT: CPT | Performed by: INTERNAL MEDICINE

## 2024-12-12 PROCEDURE — 82310 ASSAY OF CALCIUM: CPT | Performed by: INTERNAL MEDICINE

## 2024-12-12 PROCEDURE — 99214 OFFICE O/P EST MOD 30 MIN: CPT | Performed by: INTERNAL MEDICINE

## 2024-12-12 PROCEDURE — 250N000013 HC RX MED GY IP 250 OP 250 PS 637: Performed by: INTERNAL MEDICINE

## 2024-12-12 PROCEDURE — 85004 AUTOMATED DIFF WBC COUNT: CPT | Performed by: INTERNAL MEDICINE

## 2024-12-12 RX ORDER — EPINEPHRINE 1 MG/ML
0.3 INJECTION, SOLUTION INTRAMUSCULAR; SUBCUTANEOUS EVERY 5 MIN PRN
Status: DISCONTINUED | OUTPATIENT
Start: 2024-12-12 | End: 2024-12-12 | Stop reason: HOSPADM

## 2024-12-12 RX ORDER — EPINEPHRINE 1 MG/ML
0.3 INJECTION, SOLUTION INTRAMUSCULAR; SUBCUTANEOUS EVERY 5 MIN PRN
Status: CANCELLED | OUTPATIENT
Start: 2024-12-12

## 2024-12-12 RX ORDER — HEPARIN SODIUM (PORCINE) LOCK FLUSH IV SOLN 100 UNIT/ML 100 UNIT/ML
5 SOLUTION INTRAVENOUS
Status: DISCONTINUED | OUTPATIENT
Start: 2024-12-12 | End: 2024-12-12 | Stop reason: HOSPADM

## 2024-12-12 RX ORDER — ALBUTEROL SULFATE 90 UG/1
1-2 INHALANT RESPIRATORY (INHALATION)
Status: CANCELLED
Start: 2024-12-12

## 2024-12-12 RX ORDER — HEPARIN SODIUM,PORCINE 10 UNIT/ML
5-20 VIAL (ML) INTRAVENOUS DAILY PRN
Status: CANCELLED | OUTPATIENT
Start: 2024-12-12

## 2024-12-12 RX ORDER — ACETAMINOPHEN 325 MG/1
650 TABLET ORAL ONCE
Status: COMPLETED | OUTPATIENT
Start: 2024-12-12 | End: 2024-12-12

## 2024-12-12 RX ORDER — DIPHENHYDRAMINE HCL 50 MG
50 CAPSULE ORAL ONCE
Status: CANCELLED | OUTPATIENT
Start: 2024-12-12

## 2024-12-12 RX ORDER — DIPHENHYDRAMINE HYDROCHLORIDE 50 MG/ML
25 INJECTION INTRAMUSCULAR; INTRAVENOUS
Status: CANCELLED
Start: 2024-12-12

## 2024-12-12 RX ORDER — METHYLPREDNISOLONE SODIUM SUCCINATE 40 MG/ML
40 INJECTION INTRAMUSCULAR; INTRAVENOUS
Status: COMPLETED | OUTPATIENT
Start: 2024-12-12 | End: 2024-12-12

## 2024-12-12 RX ORDER — LORAZEPAM 2 MG/ML
0.5 INJECTION INTRAMUSCULAR EVERY 4 HOURS PRN
Status: CANCELLED | OUTPATIENT
Start: 2024-12-12

## 2024-12-12 RX ORDER — DIPHENHYDRAMINE HYDROCHLORIDE 50 MG/ML
25 INJECTION INTRAMUSCULAR; INTRAVENOUS
Status: COMPLETED | OUTPATIENT
Start: 2024-12-12 | End: 2024-12-12

## 2024-12-12 RX ORDER — MEPERIDINE HYDROCHLORIDE 50 MG/ML
25 INJECTION INTRAMUSCULAR; INTRAVENOUS; SUBCUTANEOUS
Status: CANCELLED | OUTPATIENT
Start: 2024-12-12

## 2024-12-12 RX ORDER — ALBUTEROL SULFATE 90 UG/1
1-2 INHALANT RESPIRATORY (INHALATION)
Status: DISCONTINUED | OUTPATIENT
Start: 2024-12-12 | End: 2024-12-12 | Stop reason: HOSPADM

## 2024-12-12 RX ORDER — PALONOSETRON 0.05 MG/ML
0.25 INJECTION, SOLUTION INTRAVENOUS ONCE
Status: CANCELLED | OUTPATIENT
Start: 2024-12-12

## 2024-12-12 RX ORDER — HEPARIN SODIUM (PORCINE) LOCK FLUSH IV SOLN 100 UNIT/ML 100 UNIT/ML
5 SOLUTION INTRAVENOUS
Status: CANCELLED | OUTPATIENT
Start: 2024-12-12

## 2024-12-12 RX ORDER — DIPHENHYDRAMINE HYDROCHLORIDE 50 MG/ML
50 INJECTION INTRAMUSCULAR; INTRAVENOUS
Status: CANCELLED
Start: 2024-12-12

## 2024-12-12 RX ORDER — ALBUTEROL SULFATE 0.83 MG/ML
2.5 SOLUTION RESPIRATORY (INHALATION)
Status: DISCONTINUED | OUTPATIENT
Start: 2024-12-12 | End: 2024-12-12 | Stop reason: HOSPADM

## 2024-12-12 RX ORDER — ACETAMINOPHEN 325 MG/1
650 TABLET ORAL ONCE
Status: CANCELLED | OUTPATIENT
Start: 2024-12-12

## 2024-12-12 RX ORDER — DIPHENHYDRAMINE HYDROCHLORIDE 50 MG/ML
50 INJECTION INTRAMUSCULAR; INTRAVENOUS ONCE
Status: COMPLETED | OUTPATIENT
Start: 2024-12-12 | End: 2024-12-12

## 2024-12-12 RX ORDER — OMEPRAZOLE 40 MG/1
40 CAPSULE, DELAYED RELEASE ORAL DAILY
Qty: 30 CAPSULE | Refills: 1 | Status: SHIPPED | OUTPATIENT
Start: 2024-12-12

## 2024-12-12 RX ORDER — ALBUTEROL SULFATE 0.83 MG/ML
2.5 SOLUTION RESPIRATORY (INHALATION)
Status: CANCELLED | OUTPATIENT
Start: 2024-12-12

## 2024-12-12 RX ORDER — DIPHENHYDRAMINE HYDROCHLORIDE 50 MG/ML
50 INJECTION INTRAMUSCULAR; INTRAVENOUS
Status: DISCONTINUED | OUTPATIENT
Start: 2024-12-12 | End: 2024-12-12 | Stop reason: HOSPADM

## 2024-12-12 RX ORDER — METHYLPREDNISOLONE SODIUM SUCCINATE 40 MG/ML
40 INJECTION INTRAMUSCULAR; INTRAVENOUS
Status: CANCELLED
Start: 2024-12-12

## 2024-12-12 RX ORDER — PALONOSETRON 0.05 MG/ML
0.25 INJECTION, SOLUTION INTRAVENOUS ONCE
Status: COMPLETED | OUTPATIENT
Start: 2024-12-12 | End: 2024-12-12

## 2024-12-12 RX ORDER — MEPERIDINE HYDROCHLORIDE 50 MG/ML
25 INJECTION INTRAMUSCULAR; INTRAVENOUS; SUBCUTANEOUS
Status: DISCONTINUED | OUTPATIENT
Start: 2024-12-12 | End: 2024-12-12 | Stop reason: HOSPADM

## 2024-12-12 RX ADMIN — METHYLPREDNISOLONE SODIUM SUCCINATE 40 MG: 40 INJECTION, POWDER, FOR SOLUTION INTRAMUSCULAR; INTRAVENOUS at 13:57

## 2024-12-12 RX ADMIN — SODIUM CHLORIDE 250 ML: 9 INJECTION, SOLUTION INTRAVENOUS at 10:09

## 2024-12-12 RX ADMIN — SODIUM CHLORIDE 790 MG: 9 INJECTION, SOLUTION INTRAVENOUS at 11:59

## 2024-12-12 RX ADMIN — FAMOTIDINE 20 MG: 10 INJECTION INTRAVENOUS at 13:52

## 2024-12-12 RX ADMIN — PALONOSETRON HYDROCHLORIDE 0.25 MG: 0.25 INJECTION, SOLUTION INTRAVENOUS at 10:14

## 2024-12-12 RX ADMIN — DOCETAXEL 160 MG: 160 INJECTION INTRAVENOUS at 13:40

## 2024-12-12 RX ADMIN — DIPHENHYDRAMINE HYDROCHLORIDE 25 MG: 50 INJECTION INTRAMUSCULAR; INTRAVENOUS at 13:51

## 2024-12-12 RX ADMIN — HEPARIN 5 ML: 100 SYRINGE at 15:55

## 2024-12-12 RX ADMIN — ACETAMINOPHEN 650 MG: 325 TABLET ORAL at 10:09

## 2024-12-12 RX ADMIN — SODIUM CHLORIDE 840 MG: 9 INJECTION, SOLUTION INTRAVENOUS at 10:55

## 2024-12-12 RX ADMIN — DEXAMETHASONE SODIUM PHOSPHATE: 10 INJECTION, SOLUTION INTRAMUSCULAR; INTRAVENOUS at 10:24

## 2024-12-12 RX ADMIN — DIPHENHYDRAMINE HYDROCHLORIDE 50 MG: 50 INJECTION INTRAMUSCULAR; INTRAVENOUS at 10:17

## 2024-12-12 RX ADMIN — CARBOPLATIN 690 MG: 10 INJECTION, SOLUTION INTRAVENOUS at 15:14

## 2024-12-12 NOTE — PROGRESS NOTES
"Infusion Nursing Note:  Adrienne Ivory presents today for D1C1 chemo.    Patient seen by provider today: Yes: Dr Harkins   present during visit today: Not Applicable.    Note: Reviewed plan of care. Pt oriented to clinic. Calming essential oil given to patient. Reviewed premedications and purpose of each medication. Pt premedicated with IVP diphenhydramine, Aloxi, po acetaminophen, and Emend/dex. Pt very \"woozy\" with 50 mg IVP diphenhydramine, SBA to BR. Discussed possible intimacy issues, strategies, and resources available; hand-outs given.     Pertuzumab infused over 60 min without incident. Trastuzumab infused over 90 min. Patient states she \"didn't feel well\" the last 20 min. Patient became scared & crying; Dr Harkins notified. Once docetaxel infusion started, pt immediately c/o cramping middle of chest, R medial superior breast. Infusion stopped and additional medications administered per MAR. RED Frazier chairside to evaluate.    Docetaxel re-started at half rate, and per RED Frazier, increased to full rate after approx 10 min. Patient tolerated remainder well. Carboplatin infused over 30 min without incident.    Medication hand-out for pegfilgrastim given to patient, with instructions to start Claritin today.    Intravenous Access:  Labs drawn and Implanted Port accessed by Maritza LEAHY; excellent return throughout infusions.    Treatment Conditions:  Lab Results   Component Value Date    HGB 13.8 12/12/2024    WBC 9.1 12/12/2024    ANEUTAUTO 5.7 12/12/2024     12/12/2024        Lab Results   Component Value Date     12/12/2024    POTASSIUM 4.1 12/12/2024    CR 0.79 12/12/2024    CONNIE 9.4 12/12/2024    BILITOTAL 0.4 12/12/2024    ALBUMIN 4.2 12/12/2024    ALT 40 12/12/2024    AST 40 12/12/2024       Results reviewed, labs MET treatment parameters, ok to proceed with treatment.      Post Infusion Assessment:  Patient tolerated infusion without incident.  Patient tolerated injection poorly due " "to : Hypersensitivity: Did patient have a hypersensitivity reaction? : Yes  Drug or Product name: trastuzumab and docetaxel  Were pre-meds administered?: Yes  What pre-meds were administered?: Acetaminophen (Tylenol), Diphenhydramine (Benadryl), Fosaprepitant (emend), Palonesetron (aloxi), Dexamethasone (decadron  First or Subsequent treatment: First time receiving  Rate of infusion when patient had hypersensitivity reaction: 90 min trastuzumab @ 208 ml/hr started to feel \"not well\" last 20 min of infusion. Docetaxel infused approx 15 actual medication before cramping sensation middle of chest, superior R breast  Time the hypersensitivity reaction was first recognized: 1345  Symptoms observed or reported (select all that apply): Other: (Comment), Hypertension, Nausea (cramping in chest)  Interventions/treatment following reaction: Infusion stopped, Hypersensitivity medications administered, Reduced rate of medication administration  What hypersensitivity medications were administered?: DiphendydrAMINE (benadryl), Methylprednisolone, Famotidine(Pepcid)  Name of provider notified: Dr Harkins  Time provider notified: 1350  Type of notification (select all that apply): Other: (Comment) (secure chat. RED Pedraza chairside to evaluate)  Was the patient re-challenged today?: Yes - tolerated well  Blood return noted pre and post infusion.  Site patent and intact, free from redness, edema or discomfort.  No evidence of extravasations.  Access discontinued per protocol.       Discharge Plan:   Discharge instructions including whom and when to call if issues reviewed with: Patient.  Patient verbalized understanding of discharge instructions and all questions answered. Reviewed how to take antiemetics, antidiarrheals prn.   Copy of AVS reviewed with patient and/or family.  Patient will return tomorrow for pegfilgrastim injection appointment.  Patient discharged in stable condition accompanied by: self.  Departure Mode: " Ambulatory.      Daylin Sal RN

## 2024-12-12 NOTE — PROGRESS NOTES
[facility]: Cancer Care Follow-Up Note                                    Discussion with Patient:                                                    Met with Adrienne when she was at clinic for D1 C1 TCHP.  She had relayed that she had melanoma excised from left thigh at [facility] Sept 2024. Shared that Dr. Harkins would like to review these records. YASH signed  to obtain records. Called [employer] and spoke with nurse line to make request. Signed YASH and Fax sheet with urgent record request was faxed to [phone] and receipt was confirmed by fax right at [phone]. Nurse will send message to the records department to fax records to our office urgently as patient is in our office at this time. Will monitor for records to arrive  -Met with Adrienne when she was in the infusion bay. She is feeling more relaxed today that when she was at clinic for her last clinic visit. She did  her take home meds from her pharmacy and understands to start the dex later today and then dose in a.m. and p.m. tomorrow. Anti-emetics also were reviewed today by infusion nurse. She did not use port cream today as her port is still healing. She relayed that Dr. Harkins plans to send a prescription for prilosec  to her pharmacy. She prefers the [facility] in [place].  She has 1 week symptom check with Anastasiya ALVARES at [facility] next week and future apts are scheduled.  Will call on 12/16 to follow up post chemo to check in.  She has contact information and will reach out if any concerns arise.       Dates of Treatment:                                                      Infusion given in last 28 days       None            Assessment:                                                      D1C1 TCHP     Intervention/Education provided during outreach:                                                       See documentation above    Confirmed patient has clinic and triage numbers. Will follow up on 12/16    Signature:  Radha Stephen RN

## 2024-12-12 NOTE — PROGRESS NOTES
"Oncology Rooming Note    December 12, 2024 8:44 AM   Adrienne Ivory is a 58 year old female who presents for:    Chief Complaint   Patient presents with    Oncology Clinic Visit     Malignant neoplasm of upper-inner quadrant of left breast in female, estrogen receptor positive.      Initial Vitals: BP (!) 147/96 (Patient Position: Sitting)   Pulse 84   Temp 98.5  F (36.9  C) (Oral)   Resp 16   Ht 1.645 m (5' 4.75\")   Wt 97.1 kg (214 lb)   SpO2 97%   BMI 35.89 kg/m   Estimated body mass index is 35.89 kg/m  as calculated from the following:    Height as of this encounter: 1.645 m (5' 4.75\").    Weight as of this encounter: 97.1 kg (214 lb). Body surface area is 2.11 meters squared.  Data Unavailable Comment: \"crappy\" per patient   No LMP recorded. Patient has had an ablation.  Allergies reviewed: Yes  Medications reviewed: Yes    Medications: Medication refills not needed today.  Pharmacy name entered into [facility]:    [facility] - [place], [place] - [address] [id number]    Frailty Screening:   Is the patient here for a new oncology consult visit in cancer care? 2. No      Clinical concerns: \"the unknown\"       Bre Short LPN            "

## 2024-12-12 NOTE — LETTER
"12/12/2024      Adrienne Ivory  [address]  [place] [address]      Dear Colleague,    Thank you for referring your patient, Adrienne Ivory, to the [facility]. Please see a copy of my visit note below.    Oncology Rooming Note    December 12, 2024 8:44 AM   Adrienne Ivory is a 58 year old female who presents for:    Chief Complaint   Patient presents with     Oncology Clinic Visit     Malignant neoplasm of upper-inner quadrant of left breast in female, estrogen receptor positive.      Initial Vitals: BP (!) 147/96 (Patient Position: Sitting)   Pulse 84   Temp 98.5  F (36.9  C) (Oral)   Resp 16   Ht 1.645 m (5' 4.75\")   Wt 97.1 kg (214 lb)   SpO2 97%   BMI 35.89 kg/m   Estimated body mass index is 35.89 kg/m  as calculated from the following:    Height as of this encounter: 1.645 m (5' 4.75\").    Weight as of this encounter: 97.1 kg (214 lb). Body surface area is 2.11 meters squared.  Data Unavailable Comment: \"crappy\" per patient   No LMP recorded. Patient has had an ablation.  Allergies reviewed: Yes  Medications reviewed: Yes    Medications: Medication refills not needed today.  Pharmacy name entered into [facility]:    [facility] - [place], [place] - [address] [id number] [address] AT [address]    Frailty Screening:   Is the patient here for a new oncology consult visit in cancer care? 2. No      Clinical concerns: \"the unknown\"       Bre Short LPN              [facility] Progress Note    Patient: Adrienne Ivory  MRN: [id number]  Date of Service: Dec 12, 2024             ECOG Performance    0 - Independent          ______________________________________________________________________________  Oncologic history  T2 N3 M0 stage IIIc invasive ductal carcinoma of the left " breast ER positive [facility] positive HER2/wolf 3+.  November 2024  Patient had multiple level 1 level 2 and level 3 axillary lymph nodes involved    Treatment  Patient started on neoadjuvant TCHP on 12/12/2024    History of Present Illness    Ms. Adrienne Ivory is here for follow-up.  She has completed her workup.  She is here to start treatment.  She seems to be in a much better frame of mind today and is much less anxious than she was on her first visit.  She has had a port placed, biopsies of the x-ray lymph nodes done, echocardiography done and systemic staging has been completed.    Review of systems  A comprehensive 12 point review of system was done that was negative except what is mentioned in the history of present illness    Past History    Past Medical History:   Diagnosis Date     Abnormal Pap smear, can't excl hi gd sq intraepithelial lesion (ASC-H) 03/01/2015    LSIL also     Anxiety state, unspecified      Herpes simplex without mention of complication      HSIL on Pap smear of cervix 07/01/2014    colp - WNL     Human papillomavirus in conditions classified elsewhere and of unspecified site 11/01/2010    + HPV 45 & 82 with ASCUS     Hx of colposcopy with cervical biopsy 11/08/2016    [place] ECC neg.      Obese      PONV (postoperative nausea and vomiting)      Premenstrual tension syndromes        Past Surgical History:   Procedure Laterality Date     C IUD,MIRENA  2/08-3/11    removed for cramping     COLONOSCOPY N/A 12/4/2020    Procedure: COLONOSCOPY, WITH POLYPECTOMY;  Surgeon: Moises Pride MD;  Location: [facility] OR     DILATION AND CURETTAGE, ABLATE ENDOMETRIUM NOVASURE, COMBINED N/A 12/31/2015    Procedure: COMBINED DILATION AND CURETTAGE, ABLATE ENDOMETRIUM NOVASURE;  Surgeon: Shakira Penaloza MD;  Location: [facility] OR     HYSTEROSCOPY DIAGNOSTIC N/A 12/31/2015    Procedure: HYSTEROSCOPY DIAGNOSTIC;  Surgeon: Shakira Penaloza MD;  Location: [facility] OR     LEEP TX, CERVICAL  4/3/15    ARNAV 2-3,  "negative margins     NO HISTORY OF SURGERY         Physical Exam    BP (!) 147/96 (Patient Position: Sitting)   Pulse 84   Temp 98.5  F (36.9  C) (Oral)   Resp 16   Ht 1.645 m (5' 4.75\")   Wt 97.1 kg (214 lb)   SpO2 97%   BMI 35.89 kg/m      General: alert, awake, not in acute distress  HEENT: Head: Normal, normocephalic, atraumatic.  Eye: Normal external eye, conjunctiva, lids cornea, SITA.  Nose: Normal external nose, mucus membranes and septum.  Pharynx: Normal buccal mucosa. Normal pharynx.  Neck / Thyroid: Supple, no masses, nodes, nodules or enlargement.  Lymphatics: No abnormally enlarged lymph nodes.  Chest: Normal chest wall and respirations. Clear to auscultation.  No crackles or rhonchi's  Heart: S1 S2 RRR, no murmur.   Abdomen: abdomen is soft without significant tenderness, masses, organomegaly or guarding  Extremities: normal strength, tone, and muscle mass  Skin: normal. no rash or abnormalities  CNS: non focal.    Lab Results    Recent Results (from the past 240 hours)   Echocardiogram Complete    Collection Time: 12/02/24 10:55 AM   Result Value Ref Range    Biplane LVEF 57%    Surgical Pathology Exam    Collection Time: 12/09/24  9:34 AM   Result Value Ref Range    Case Report       Surgical Pathology Report                         Case: [id number]                                  Authorizing Provider:  Hang Harkins       Collected:           12/09/2024 09:34 AM                                 MD Josseline                                                                    Ordering Location:     [facility]      Received:            12/09/2024 11:05 AM                                 [facility]                                                         Pathologist:           Berna Guzmán MD                                                           Specimens:   A) - Lymph Node(s), Axillary, Left, left axillary lymph node biopsy                                 B) - " Breast, Left, left breast mass, 6:00, 3 cm fn                                                  C) - Breast, Right, right breast mass, 1:00, 2 cm fn                                       Final Diagnosis       A) LEFT AXILLARY LYMPH NODE, ULTRASOUND-GUIDED CORE BIOPSY:    - LYMPH NODE WITH METASTATIC ADENOCARCINOMA, CONSISTENT WITH BREAST PRIMARY SOURCE        1.  LENGTH: 9 MM        2.  EXTRANODAL SOFT TISSUE EXTENSION NOT IDENTIFIED        3.  ANCILLARY STUDIES  A.  ESTROGEN RECEPTOR: POSITIVE (81-90%)  B.  PROGESTERONE RECEPTOR: NEGATIVE (LESS THAN 1%)  C.  HER2: POSITIVE (3+)  D.  GATA3: STRONG NUCLEAR STAINING; CONSISTENT WITH BREAST PRIMARY SOURCE    B) LEFT BREAST MASS, 6:00, 3 CM FROM NIPPLE, ULTRASOUND-GUIDED CORE BIOPSIES:        - BENIGN FIBROADIPOSE BREAST TISSUE    C) RIGHT BREAST MASS, 1:00, 2 CMFN, ULTRASOUND-GUIDED CORE BIOPSIES:        - BENIGN FIBROADIPOSE BREAST TISSUE        Comment       Dr. Gaby Head concurs with the diagnosis and stain interpretation.    The presence of blue ink is confirmed on specimen A, green ink on specimen B and yellow ink on specimen C.  Method for evaluation special stains: Manual estimation      Synoptic Checklist       Breast Biomarker Reporting Template   BREAST BIOMARKER REPORTING TEMPLATE - A   Protocol posted: 12/13/2023         Test(s) Performed:            Estrogen Receptor (ER) Status:    Positive (greater than 10% of cells demonstrate nuclear positivity)           Percentage of Cells with Nuclear Positivity:    81-90%           Average Intensity of Staining:    Strong         Test Type:    Food and Drug Administration (FDA) cleared (test / vendor): [employer]         Primary Antibody:    SP1         Scoring System:    No separate scoring system used       Test(s) Performed:            Progesterone Receptor (PgR) Status:    Negative (less than 1%)           :    Internal control cells absent         Test Type:    Food and Drug Administration (FDA) cleared  "(test / vendor): [place]         Primary Antibody:    1E2       Test(s) Performed:            HER2 by Immunohistochemistry:    Positive (Score 3+)           Percentage of Cells with Uniform Intense Complete Membrane Stainin %        Test Type:    Food and Drug Administration (FDA) cleared (test / vendor): [place]         Primary Antibody:    4B5       Cold Ischemia and Fixation Times:    Meet requirements specified in latest version of the ASCO / CAP Guidelines       METHODS      Fixative:    Formalin       Image Analysis:    Not performed       Gross Description       A(1). Lymph Node(s), Axillary, Left, left axillary lymph node biopsy:  The specimen is received in formalin with proper patient identification, labeled \"left axillary lymph node biopsy\".  The specimen consists of 3 tan-white soft tissue needle core biopsies, 0.2-1.2 cm in length by 0.1 cm in diameter.  The specimen is inked blue and entirely submitted in cassette A1.  Specimen is collected and placed in fixative at 934 on 2024.    Total formalin fixation time: 6: 26 serrated dysplasia just over so yellow who is on-call Dr. Gaby Griffin   B(1). Breast, Left, left breast mass, 6:00, 3 cm fn:  The specimen is received in formalin with proper patient identification, labeled \"left breast mass, 6:00, 3 cm FN\".  The specimen consists of 2 tan-yellow soft tissue needle core biopsies, 0.3-1.2 cm in length by 0.1 cm in diameter.  The specimen is inked green, wrapped and entirely submitted in cassette B1.  The specimen is collected and placed in fixative at 940 on 2024.   C(1). Breast, Right, right breast mass, 1:00, 2 cm fn:  The specimen is received in formalin with proper patient identification, labeled \"right breast mass, 1:00, 2 cm FN\".  The specimen consists of 3 tan-yellow soft tissue needle core biopsies, 0.4-1.7 cm in length by 0.1 cm in diameter.  The specimen is inked yellow, wrapped and entirely submitted in cassette C1.  The " specimen is collected and placed in fixative at 10:00 on 12/9/2024.   RED Franco        Microscopic Description       Microscopic examination  performed, substantiating the above diagnosis.      Disclaimer         The following assays; ALK (D5F3), ER, HER2, PD-L1, BRAF, CD20, CD30 AZF, CD30 Formalin, MLH-1, MSH-2, MSH-6 and PMS-2, have not been validated on decalcified tissues. Results should be interpreted with caution given the possibility of false negative results on decalcified specimens.          MCRS Yes (A) N/A    Performing Labs       The technical component of this testing was completed at [facility].    Stain controls for all stains resulted within this report have been reviewed and show appropriate reactivity.       Case Images     Surgical Pathology Exam    Collection Time: 12/09/24  9:40 AM   Result Value Ref Range    Case Report       Surgical Pathology Report                         Case: [id number]                                  Authorizing Provider:  Hang Harkins       Collected:           12/09/2024 09:34 AM                                 MD Josseline                                                                    Ordering Location:     [facility]      Received:            12/09/2024 11:05 AM                                 [facility]                                                         Pathologist:           Berna Guzmán MD                                                           Specimens:   A) - Lymph Node(s), Axillary, Left, left axillary lymph node biopsy                                 B) - Breast, Left, left breast mass, 6:00, 3 cm fn                                                  C) - Breast, Right, right breast mass, 1:00, 2 cm fn                                       Final Diagnosis       A) LEFT AXILLARY LYMPH NODE, ULTRASOUND-GUIDED CORE BIOPSY:    - LYMPH NODE WITH METASTATIC  ADENOCARCINOMA, CONSISTENT WITH BREAST PRIMARY SOURCE        1.  LENGTH: 9 MM        2.  EXTRANODAL SOFT TISSUE EXTENSION NOT IDENTIFIED        3.  ANCILLARY STUDIES  A.  ESTROGEN RECEPTOR: POSITIVE (81-90%)  B.  PROGESTERONE RECEPTOR: NEGATIVE (LESS THAN 1%)  C.  HER2: POSITIVE (3+)  D.  GATA3: STRONG NUCLEAR STAINING; CONSISTENT WITH BREAST PRIMARY SOURCE    B) LEFT BREAST MASS, 6:00, 3 CM FROM NIPPLE, ULTRASOUND-GUIDED CORE BIOPSIES:        - BENIGN FIBROADIPOSE BREAST TISSUE    C) RIGHT BREAST MASS, 1:00, 2 CMFN, ULTRASOUND-GUIDED CORE BIOPSIES:        - BENIGN FIBROADIPOSE BREAST TISSUE        Comment       Dr. Gaby Head concurs with the diagnosis and stain interpretation.    The presence of blue ink is confirmed on specimen A, green ink on specimen B and yellow ink on specimen C.  Method for evaluation special stains: Manual estimation      Synoptic Checklist       Breast Biomarker Reporting Template   BREAST BIOMARKER REPORTING TEMPLATE - A   Protocol posted: 2023         Test(s) Performed:            Estrogen Receptor (ER) Status:    Positive (greater than 10% of cells demonstrate nuclear positivity)           Percentage of Cells with Nuclear Positivity:    81-90%           Average Intensity of Staining:    Strong         Test Type:    Food and Drug Administration (FDA) cleared (test / vendor): [employer]         Primary Antibody:    SP1         Scoring System:    No separate scoring system used       Test(s) Performed:            Progesterone Receptor (PgR) Status:    Negative (less than 1%)           :    Internal control cells absent         Test Type:    Food and Drug Administration (FDA) cleared (test / vendor): [place]         Primary Antibody:    1E2       Test(s) Performed:            HER2 by Immunohistochemistry:    Positive (Score 3+)           Percentage of Cells with Uniform Intense Complete Membrane Stainin %        Test Type:    Food and Drug Administration (FDA) cleared (test  "/ vendor): [place]         Primary Antibody:    4B5       Cold Ischemia and Fixation Times:    Meet requirements specified in latest version of the ASCO / CAP Guidelines       METHODS      Fixative:    Formalin       Image Analysis:    Not performed       Gross Description       A(1). Lymph Node(s), Axillary, Left, left axillary lymph node biopsy:  The specimen is received in formalin with proper patient identification, labeled \"left axillary lymph node biopsy\".  The specimen consists of 3 tan-white soft tissue needle core biopsies, 0.2-1.2 cm in length by 0.1 cm in diameter.  The specimen is inked blue and entirely submitted in cassette A1.  Specimen is collected and placed in fixative at 934 on 12/9/2024.    Total formalin fixation time: 6: 26 serrated dysplasia just over so yellow who is on-call Dr. Gaby Griffin   B(1). Breast, Left, left breast mass, 6:00, 3 cm fn:  The specimen is received in formalin with proper patient identification, labeled \"left breast mass, 6:00, 3 cm FN\".  The specimen consists of 2 tan-yellow soft tissue needle core biopsies, 0.3-1.2 cm in length by 0.1 cm in diameter.  The specimen is inked green, wrapped and entirely submitted in cassette B1.  The specimen is collected and placed in fixative at 940 on 12/9/2024.   C(1). Breast, Right, right breast mass, 1:00, 2 cm fn:  The specimen is received in formalin with proper patient identification, labeled \"right breast mass, 1:00, 2 cm FN\".  The specimen consists of 3 tan-yellow soft tissue needle core biopsies, 0.4-1.7 cm in length by 0.1 cm in diameter.  The specimen is inked yellow, wrapped and entirely submitted in cassette C1.  The specimen is collected and placed in fixative at 10:00 on 12/9/2024.   RED Franco        Microscopic Description       Microscopic examination  performed, substantiating the above diagnosis.      Disclaimer         The following assays; ALK (D5F3), ER, HER2, PD-L1, BRAF, CD20, CD30 AZF, CD30 Formalin, " MLH-1, MSH-2, MSH-6 and PMS-2, have not been validated on decalcified tissues. Results should be interpreted with caution given the possibility of false negative results on decalcified specimens.          MCRS Yes (A) N/A    Performing Labs       The technical component of this testing was completed at [facility].    Stain controls for all stains resulted within this report have been reviewed and show appropriate reactivity.       Case Images     Surgical Pathology Exam    Collection Time: 12/09/24 10:00 AM   Result Value Ref Range    Case Report       Surgical Pathology Report                         Case: [id number]                                  Authorizing Provider:  Hang Harkins       Collected:           12/09/2024 09:34 AM                                 MD Josseline                                                                    Ordering Location:     [facility]      Received:            12/09/2024 11:05 AM                                 [facility]                                                         Pathologist:           Berna Guzmán MD                                                           Specimens:   A) - Lymph Node(s), Axillary, Left, left axillary lymph node biopsy                                 B) - Breast, Left, left breast mass, 6:00, 3 cm fn                                                  C) - Breast, Right, right breast mass, 1:00, 2 cm fn                                       Final Diagnosis       A) LEFT AXILLARY LYMPH NODE, ULTRASOUND-GUIDED CORE BIOPSY:    - LYMPH NODE WITH METASTATIC ADENOCARCINOMA, CONSISTENT WITH BREAST PRIMARY SOURCE        1.  LENGTH: 9 MM        2.  EXTRANODAL SOFT TISSUE EXTENSION NOT IDENTIFIED        3.  ANCILLARY STUDIES  A.  ESTROGEN RECEPTOR: POSITIVE (81-90%)  B.  PROGESTERONE RECEPTOR: NEGATIVE (LESS THAN 1%)  C.  HER2: POSITIVE (3+)  D.  GATA3: STRONG  NUCLEAR STAINING; CONSISTENT WITH BREAST PRIMARY SOURCE    B) LEFT BREAST MASS, 6:00, 3 CM FROM NIPPLE, ULTRASOUND-GUIDED CORE BIOPSIES:        - BENIGN FIBROADIPOSE BREAST TISSUE    C) RIGHT BREAST MASS, 1:00, 2 CMFN, ULTRASOUND-GUIDED CORE BIOPSIES:        - BENIGN FIBROADIPOSE BREAST TISSUE        Comment       Dr. Gaby Head concurs with the diagnosis and stain interpretation.    The presence of blue ink is confirmed on specimen A, green ink on specimen B and yellow ink on specimen C.  Method for evaluation special stains: Manual estimation      Synoptic Checklist       Breast Biomarker Reporting Template   BREAST BIOMARKER REPORTING TEMPLATE - A   Protocol posted: 2023         Test(s) Performed:            Estrogen Receptor (ER) Status:    Positive (greater than 10% of cells demonstrate nuclear positivity)           Percentage of Cells with Nuclear Positivity:    81-90%           Average Intensity of Staining:    Strong         Test Type:    Food and Drug Administration (FDA) cleared (test / vendor): [employer]         Primary Antibody:    SP1         Scoring System:    No separate scoring system used       Test(s) Performed:            Progesterone Receptor (PgR) Status:    Negative (less than 1%)           :    Internal control cells absent         Test Type:    Food and Drug Administration (FDA) cleared (test / vendor): [place]         Primary Antibody:    1E2       Test(s) Performed:            HER2 by Immunohistochemistry:    Positive (Score 3+)           Percentage of Cells with Uniform Intense Complete Membrane Stainin %        Test Type:    Food and Drug Administration (FDA) cleared (test / vendor): [place]         Primary Antibody:    4B5       Cold Ischemia and Fixation Times:    Meet requirements specified in latest version of the ASCO / CAP Guidelines       METHODS      Fixative:    Formalin       Image Analysis:    Not performed       Gross Description       A(1). Lymph Node(s),  "Axillary, Left, left axillary lymph node biopsy:  The specimen is received in formalin with proper patient identification, labeled \"left axillary lymph node biopsy\".  The specimen consists of 3 tan-white soft tissue needle core biopsies, 0.2-1.2 cm in length by 0.1 cm in diameter.  The specimen is inked blue and entirely submitted in cassette A1.  Specimen is collected and placed in fixative at 934 on 12/9/2024.    Total formalin fixation time: 6: 26 serrated dysplasia just over so yellow who is on-call Dr. Gaby Griffin   B(1). Breast, Left, left breast mass, 6:00, 3 cm fn:  The specimen is received in formalin with proper patient identification, labeled \"left breast mass, 6:00, 3 cm FN\".  The specimen consists of 2 tan-yellow soft tissue needle core biopsies, 0.3-1.2 cm in length by 0.1 cm in diameter.  The specimen is inked green, wrapped and entirely submitted in cassette B1.  The specimen is collected and placed in fixative at 940 on 12/9/2024.   C(1). Breast, Right, right breast mass, 1:00, 2 cm fn:  The specimen is received in formalin with proper patient identification, labeled \"right breast mass, 1:00, 2 cm FN\".  The specimen consists of 3 tan-yellow soft tissue needle core biopsies, 0.4-1.7 cm in length by 0.1 cm in diameter.  The specimen is inked yellow, wrapped and entirely submitted in cassette C1.  The specimen is collected and placed in fixative at 10:00 on 12/9/2024.   RED Franco        Microscopic Description       Microscopic examination  performed, substantiating the above diagnosis.      Disclaimer         The following assays; ALK (D5F3), ER, HER2, PD-L1, BRAF, CD20, CD30 AZF, CD30 Formalin, MLH-1, MSH-2, MSH-6 and PMS-2, have not been validated on decalcified tissues. Results should be interpreted with caution given the possibility of false negative results on decalcified specimens.          MCRS Yes (A) N/A    Performing Labs       The technical component of this testing was completed " at [facility].    Stain controls for all stains resulted within this report have been reviewed and show appropriate reactivity.       Case Images     Comprehensive metabolic panel    Collection Time: 12/12/24  8:21 AM   Result Value Ref Range    Sodium 138 135 - 145 mmol/L    Potassium 4.1 3.4 - 5.3 mmol/L    Carbon Dioxide (CO2) 22 22 - 29 mmol/L    Anion Gap 12 7 - 15 mmol/L    Urea Nitrogen 10.5 6.0 - 20.0 mg/dL    Creatinine 0.79 0.51 - 0.95 mg/dL    GFR Estimate 87 >60 mL/min/1.73m2    Calcium 9.4 8.8 - 10.4 mg/dL    Chloride 104 98 - 107 mmol/L    Glucose 104 (H) 70 - 99 mg/dL    Alkaline Phosphatase 82 40 - 150 U/L    AST 40 0 - 45 U/L    ALT 40 0 - 50 U/L    Protein Total 7.1 6.4 - 8.3 g/dL    Albumin 4.2 3.5 - 5.2 g/dL    Bilirubin Total 0.4 <=1.2 mg/dL   CBC with platelets and differential    Collection Time: 12/12/24  8:21 AM   Result Value Ref Range    WBC Count 9.1 4.0 - 11.0 10e3/uL    RBC Count 4.83 3.80 - 5.20 10e6/uL    Hemoglobin 13.8 11.7 - 15.7 g/dL    Hematocrit 40.7 35.0 - 47.0 %    MCV 84 78 - 100 fL    MCH 28.6 26.5 - 33.0 pg    MCHC 33.9 31.5 - 36.5 g/dL    RDW 14.6 10.0 - 15.0 %    Platelet Count 263 150 - 450 10e3/uL    % Neutrophils 62 %    % Lymphocytes 28 %    % Monocytes 7 %    % Eosinophils 2 %    % Basophils 1 %    % Immature Granulocytes 0 %    NRBCs per 100 WBC 0 <1 /100    Absolute Neutrophils 5.7 1.6 - 8.3 10e3/uL    Absolute Lymphocytes 2.5 0.8 - 5.3 10e3/uL    Absolute Monocytes 0.6 0.0 - 1.3 10e3/uL    Absolute Eosinophils 0.2 0.0 - 0.7 10e3/uL    Absolute Basophils 0.1 0.0 - 0.2 10e3/uL    Absolute Immature Granulocytes 0.0 <=0.4 10e3/uL    Absolute NRBCs 0.0 10e3/uL         Imaging    US Breast Biopsy Core Lymph Node Left    Addendum Date: 12/11/2024    Pathology shows metastatic adenocarcinoma. This is consistent with imaging findings. A verbal report was given to the patient. Surgical and oncological  consultation is recommended.    Result Date: 12/11/2024  LEFT LYMPH NODE ULTRASOUND GUIDED BIOPSY, CLIP PLACEMENT: INDICATIONS: Abnormal Lymph Node PROCEDURE: Informed consent was obtained from the patient. Ultrasound was used to localize the lymph node in the left axilla. The skin was prepped and draped in a sterile fashion. Lidocaine was used for local anesthesia. Under direct sonographic guidance, a 18 gauge coaxial needle was used to obtain 2 core biopises. A clip was then placed. Digital mammograms performed in separate room, using separate equipment to document clip placement, demonstrates the clip in appropriate position. The patient tolerated this well, there are no immediate complications.     IMPRESSION: Ultrasound guided biopsy of a suspicious lymph node in the left axilla. Pathology pending.     US Breast Biopsy Core Needle Left    Addendum Date: 12/11/2024    Pathology shows benign breast tissue. This is consistent with imaging findings. A verbal report was given to the patient. Surgical consultation is recommended for the patients know left breast cancer.    Result Date: 12/11/2024  US Breast Biopsy Core Needle Left INDICATION: Left breast mass. PROCEDURE: Informed consent was obtained from the patient. Ultrasound was used to localize the mass at the 6 o'clock position of the left breast, 3 cm from the nipple.  The skin was marked, then prepped and draped in a sterile fashion. 1% lidocaine was used for local anesthesia. Under direct sonographic guidance, a  18 -gauge coaxial needle was used to obtain 2 core biopsies.  A biopsy marking clip was then placed.  Digital mammograms performed in a separate room, using separate equipment, to document clip placement. The mammogram showed the clip to be in good position. The patient tolerated this well; there are no immediate complications.    IMPRESSION: 1. Ultrasound-guided biopsy of mass in the left breast. Pathology pending. 2. Post procedure mammogram for  marker placement     MA Post Procedure Left    Addendum Date: 12/11/2024    Pathology shows benign breast tissue. This is consistent with imaging findings. A verbal report was given to the patient. Surgical consultation is recommended for the patients know left breast cancer.    Result Date: 12/11/2024  US Breast Biopsy Core Needle Left INDICATION: Left breast mass. PROCEDURE: Informed consent was obtained from the patient. Ultrasound was used to localize the mass at the 6 o'clock position of the left breast, 3 cm from the nipple.  The skin was marked, then prepped and draped in a sterile fashion. 1% lidocaine was used for local anesthesia. Under direct sonographic guidance, a  18 -gauge coaxial needle was used to obtain 2 core biopsies.  A biopsy marking clip was then placed.  Digital mammograms performed in a separate room, using separate equipment, to document clip placement. The mammogram showed the clip to be in good position. The patient tolerated this well; there are no immediate complications.    IMPRESSION: 1. Ultrasound-guided biopsy of mass in the left breast. Pathology pending. 2. Post procedure mammogram for marker placement     US Breast Biopsy Core Needle Right    Addendum Date: 12/11/2024    Pathology shows benign breast tissue. This is consistent with imaging findings. A verbal report was given to the patient. Repeat routine screening mammogram in one year is recommended.    Result Date: 12/11/2024  US Breast Biopsy Core Needle Right INDICATION: Right breast mass. PROCEDURE: Informed consent was obtained from the patient. Ultrasound was used to localize the mass at the 1 o'clock position of the right breast, 2 cm from the nipple.  The skin was marked, then prepped and draped in a sterile fashion. 1% lidocaine was used for local anesthesia. Under direct sonographic guidance, a  18 -gauge coaxial needle was used to obtain 2 core biopsies.  A biopsy marking clip was then placed.  Digital mammograms  performed in a separate room, using separate equipment, to document clip placement. The mammogram showed the clip to be in good position. The patient tolerated this well; there are no immediate complications.    IMPRESSION: 1. Ultrasound-guided biopsy of mass in the right breast. Pathology pending. 2. Post procedure mammogram for marker placement     MA Post Procedure Right    Addendum Date: 12/11/2024    Pathology shows benign breast tissue. This is consistent with imaging findings. A verbal report was given to the patient. Repeat routine screening mammogram in one year is recommended.    Result Date: 12/11/2024  US Breast Biopsy Core Needle Right INDICATION: Right breast mass. PROCEDURE: Informed consent was obtained from the patient. Ultrasound was used to localize the mass at the 1 o'clock position of the right breast, 2 cm from the nipple.  The skin was marked, then prepped and draped in a sterile fashion. 1% lidocaine was used for local anesthesia. Under direct sonographic guidance, a  18 -gauge coaxial needle was used to obtain 2 core biopsies.  A biopsy marking clip was then placed.  Digital mammograms performed in a separate room, using separate equipment, to document clip placement. The mammogram showed the clip to be in good position. The patient tolerated this well; there are no immediate complications.    IMPRESSION: 1. Ultrasound-guided biopsy of mass in the right breast. Pathology pending. 2. Post procedure mammogram for marker placement     US Breast Bilateral Limited 1-3 Quadrants    Result Date: 12/9/2024  EXAM: US BREAST BILATERAL LIMITED 1-3 QUADRANTS, 12/9/2024 8:19 AM COMPARISONS: Breast MRI from 11/27/2024 and mammograms and breast ultrasounds from 11/18/2024, 11/15/2024, 11/12/2024 HISTORY: Known left breast cancer with solitary lesion seen in the right breast in the 12:00 position and 2 small lesions seen in the left breast on breast MRI. Lymphadenopathy noted on the breast MRI in the left  axilla FINDINGS: Targeted bilateral breast ultrasound was performed by both the ultrasonographer and the radiologist. In the right breast in the 1:00 position 2 cm from the nipple there was a hyperechoic oval circumscribed lesion with parallel orientation measuring 7 x 5 x 7 mm correlating with the abnormality seen in this location on the patient's breast MRI. In the left breast in the 6:00 position 3 cm from the nipple there is a similar-appearing lesion measuring 5 x 4 x 4 mm. Both have an appearance most consistent with a benign hemangioma and correlate with MRI abnormality seen in these locations. The additional left breast lesion seen on MRI is not identified on ultrasound but had a similar appearance to the other 2 lesions on the MRI. The left axilla was examined as well demonstrating multiple abnormal-appearing lymph nodes with cortical thickening to 6 mm.     IMPRESSION: BI-RADS CATEGORY: 6 - Known Biopsy-Proven Malignancy. Small lesions in both breasts have an appearance most consistent with benign hemangiomas. Given the patient's history of known left breast cancer, ultrasound-guided core biopsy of both lesions will be performed. Given that they have a similar appearance on MRI to the third lesion seen in the left breast on MRI MRI guided biopsy seems unnecessary. This was discussed with Dr. Bonilla. Ultrasound-guided core biopsy of one of the abnormal lymph nodes in the left axilla will also be performed. RECOMMENDED FOLLOW-UP: Biopsy. The findings and the recommendations were discussed with the patient at the time of exam. Ultrasound guided core biopsy of the hyperechoic lesions in the left breast in the 6:00 position 3 cm from the nipple and in the right breast in the 1:00 position 2 cm from the nipple as well as one of the left axillary lymph nodes will be performed today. EDUARDO CHIU MD   SYSTEM ID:  [id number]    MR Liver wo & w Contrast    Result Date: 12/5/2024  EXAM: MR LIVER W/O and W  CONTRAST LOCATION: [facility] DATE: 12/5/2024 INDICATION:  Liver lesion COMPARISON: CT dated 12/3/2024 TECHNIQUE: Routine MRI liver protocol including T1 in/out phase, diffusion, multiplane T2, and dynamic T1 with IV contrast.  CONTRAST: 10 mL Gadavist FINDINGS: LUNG BASES: Left breast mass measuring up to 2.3 cm, and adjacent adenopathy redemonstrated. LIVER: 2.4 cm left hepatic lobe cyst. Scattered additional low-attenuation lesion seen at CT are consistent with benign hemangiomas, largest centrally in the right hepatic lobe reaching 1.4 cm, image 28 series 16. GALLBLADDER/BILIARY: No biliary dilatation PANCREAS: No pancreatic ductal dilatation. SPLEEN: Unremarkable. ADRENALS: Unremarkable. KIDNEYS: Tiny left renal cyst for which no additional follow-up imaging recommended. BOWEL: Visualized bowel is nondilated. LYMPH NODES: No significant upper abdominal adenopathy. VASCULATURE: Unremarkable. MUSCULOSKELETAL: No acute bony abnormalities. OTHER: No upper abdominal ascites.     IMPRESSION: 1.  No suspicious hepatic mass lesions. 2.  Left breast mass with partially visualized left axillary adenopathy.    CT Chest/Abdomen/Pelvis w Contrast    Result Date: 12/4/2024  EXAM: CT CHEST/ABDOMEN/PELVIS W CONTRAST LOCATION: [facility] DATE: 12/3/2024 INDICATION: Left breast malignancy. COMPARISON: None available. TECHNIQUE: CT scan of the chest, abdomen, and pelvis was performed following injection of IV contrast. Multiplanar reformats were obtained. Dose reduction techniques were used. CONTRAST: 90 mL Isovue-370. FINDINGS: LUNGS AND PLEURA: Trachea and large airways are patent. No focal airspace consolidation. Mild dependent atelectasis.  No suspicious pulmonary nodule. No pneumothorax. No pleural effusion.  MEDIASTINUM/AXILLAE: Prominent and mildly enlarged left supraclavicular lymph nodes, largest measuring up to 10 mm in short axis, series 3 image 12. No  mediastinal/hilar adenopathy. Thoracic esophagus is unremarkable. Mild left axillary and subpectoral lymphadenopathy. For reference, an enlarged left axillary lymph node measures up to 11 mm in short axis, series 3 image 38. Asymmetric soft tissue density within the upper outer quadrant of the left breast, likely corresponding to known malignancy, series 3 image 43. No thoracic aortic aneurysm. Heart is normal in size. No pericardial effusion. CORONARY ARTERY CALCIFICATION: None. HEPATOBILIARY: There are a few low-attenuation hepatic lesions, which are indeterminate. Largest measures up to 14 mm in the central right hepatic lobe. Correlation with liver MR imaging is recommended, as metastases cannot be excluded. A larger low-attenuation lesion along the falciform ligament, series 3 image 124, measures up to 24 mm and favors a benign cyst. Attention on liver MR imaging follow-up. Gallbladder is normal. No intrahepatic or intrahepatic biliary ductal dilatation. PANCREAS: Enhances normally. No peripancreatic inflammatory fat stranding. SPLEEN: Enhances normally. Normal size. ADRENAL GLANDS: Normal. KIDNEYS: Both kidneys enhance symmetrically, without hydronephrosis. No nephroureterolithiasis. Urinary bladder is unremarkable. PELVIC ORGANS: No suspicious abnormality. Calcified uterine fibroid. BOWEL: No evidence of acute gastrointestinal inflammation or obstruction. No intraperitoneal free fluid or free air. LYMPH NODES: No suspicious abdominal or pelvic lymphadenopathy. VASCULATURE: No abdominal aortic aneurysm. MUSCULOSKELETAL: No suspicious abnormality. OTHER: No additionally significant abnormalities.     IMPRESSION: 1.  Asymmetric soft tissue density within the upper outer quadrant of the left breast, likely corresponding to known malignancy. 2.  Mild left axillary and subpectoral lymphadenopathy, likely metastatic. 3.  Prominent and mildly enlarged left supraclavicular lymph nodes, also likely metastatic. 4.   Indeterminate low-attenuation hepatic lesions, measuring up to 14 mm. Correlation with liver MR imaging is recommended, as metastases cannot be excluded.     Echocardiogram Complete    Result Date: 2024  [id number]^SEAMUS^CYRUS^[place] [address]  Name: BRYAN SHI MRN: [id number] : 1965 Study Date: 2024 10:19 AM Age: 58 yrs Gender: Female Patient Location: [facility] Reason For Study: Malignant neoplasm of upper-outer quadrant of left breast in fem Ordering Physician: CYRUS WAY Referring Physician: CYRUS WAY Performed By: KS  BSA: 2.1 m2 Height: 65 in Weight: 219 lb HR: 66 BP: 133/84 mmHg ______________________________________________________________________________ Procedure Complete Echo Adult. Myocardial Strain Imaging performed. Adequate quality two-dimensional was performed and interpreted. There is no comparison study available. No hemodynamically significant valvular abnormalities on 2D or color flow imaging. ______________________________________________________________________________ Interpretation Summary  The left ventricle is normal in size. Biplane LVEF is 57%. Regional wall motion is grossly normal but endocardial border definition is limited Global peak LV longitudinal strain is averaged at -20.5%. This is within reported normal limits (normal <-18%). No hemodynamically significant valvular abnormalities on 2D or color flow imaging. There is no comparison study available. ______________________________________________________________________________ Left Ventricle The left ventricle is normal in size. There is normal left ventricular wall thickness. Left ventricular diastolic function is normal. Diastolic Doppler findings (E/E' ratio and/or other parameters) suggest left ventricular filling pressures are normal. Biplane LVEF is 57%. Global peak LV longitudinal strain  is averaged at -20.5%. This is within reported normal limits (normal <-18%). Regional wall motion is grossly normal but endocardial border definition is limited.  Right Ventricle Normal right ventricle size and systolic function.  Atria Normal left atrial size. Right atrial size is normal. There is no color Doppler evidence of a PFO.  Mitral Valve The mitral valve leaflets are mildly thickened. There is no mitral regurgitation noted. There is no mitral valve stenosis.  Tricuspid Valve The tricuspid valve is not well visualized, but is grossly normal. There is trace tricuspid regurgitation. Right ventricular systolic pressure could not be approximated due to inadequate tricuspid regurgitation. There is no tricuspid stenosis.  Aortic Valve The aortic valve is trileaflet. No aortic regurgitation is present. No aortic stenosis is present.  Pulmonic Valve The pulmonic valve is not well visualized. There is no pulmonic valvular regurgitation. There is no pulmonic valvular stenosis.  Vessels The aorta root is normal. The thoracic aorta is normal. IVC diameter <2.1 cm collapsing >50% with sniff suggests a normal RA pressure of 3 mmHg.  Pericardium There is no pericardial effusion.  Rhythm Sinus rhythm was noted. ______________________________________________________________________________ MMode/2D Measurements & Calculations IVSd: 0.88 cm  LVIDd: 4.6 cm LVIDs: 3.0 cm LVPWd: 1.0 cm FS: 35.2 % LV mass(C)d: 150.3 grams LV mass(C)dI: 73.1 grams/m2 Ao root diam: 3.3 cm asc Aorta Diam: 3.5 cm LVOT diam: 2.3 cm LVOT area: 4.1 cm2 Ao root diam index Ht(cm/m): 2.0 Ao root diam index BSA (cm/m2): 1.6 Asc Ao diam index BSA (cm/m2): 1.7 Asc Ao diam index Ht(cm/m): 2.1 EF Biplane: 57.1 % LA Volume (BP): 44.5 ml  LA Volume Index (BP): 21.6 ml/m2 LA Volume Indexed (AL/bp): 22.4 ml/m2 RV Base: 3.9 cm RWT: 0.45 TAPSE: 2.1 cm  Time Measurements MM HR: 67.0 BPM  Doppler Measurements & Calculations MV E max layla: 92.2 cm/sec MV A max layla: 76.8  cm/sec MV E/A: 1.2 MV dec slope: 545.6 cm/sec2 MV dec time: 0.17 sec Ao V2 max: 121.0 cm/sec Ao max P.0 mmHg Ao V2 mean: 98.7 cm/sec Ao mean P.2 mmHg Ao V2 VTI: 29.5 cm YURY(I,D): 3.3 cm2 YURY(V,D): 3.6 cm2 LV V1 max P.5 mmHg LV V1 max: 105.6 cm/sec LV V1 VTI: 23.6 cm SV(LVOT): 96.0 ml SI(LVOT): 46.7 ml/m2 PA V2 max: 68.5 cm/sec PA max P.9 mmHg PA acc time: 0.12 sec AV Lee Ratio (DI): 0.87 YURY Index (cm2/m2): 1.6 E/E': 7.6 E/E' av.1 Lateral E/e': 7.6 Medial E/e': 10.5 Peak E' Lee: 12.2 cm/sec  RV S Lee: 14.8 cm/sec  Measurements from QLAB LV GLS Endo Peak A2C (AS): -21.1 % LV GLS Endo Peak A3C (AS): -20.4 % LV GLS Endo Peak A4C (AS): -19.8 % LV GLS Endo Peak Avg (AS): -20.5 % ______________________________________________________________________________ Report approved by: Solomon Laws 2024 12:44 PM       MR Breast Bilateral w/o & w Contrast    Result Date: 2024  EXAM: Bilateral Breast MRI with and without contrast, and computer aided kinetic analysis. LOCATION: [facility] DATE: 2024 HISTORY/INDICATION: 58-year-old woman with recent diagnosis left breast 2:00 invasive ductal carcinoma COMPARISON: 2024, 11/15/2024, 2022 TECHNIQUE: Multiplanar, multisequence imaging performed prior to contrast administration and at multiple time points after contrast administration. Post-processing including subtractions, MIPs and color encoding of post contrast dynamic acquisitions. IV contrast: Gadavist 10mL SEDATION: None FINDINGS: Right Breast: Breast composition: Scattered fibroglandular tissue Background parenchymal enhancement: Mild There is an oval 9 x 5 x 4 mm mildly enhancing mass at 12:00 posterior depth axial CADstream image 150. Right Axilla: No lymphadenopathy. Right Internal Mammary Chain: No lymphadenopathy. Left Breast: Breast composition: Scattered fibroglandular tissue Background parenchymal enhancement: Mild The biopsy-proven malignancy  is an irregular, avidly enhancing mass at 2:00 middle depth measuring 3.2 x 2.2 x 1.9 cm with associated biopsy clip. There are 2 additional smaller nonspecific oval enhancing masses in the retroareolar plane, middle depth measuring 6 x 4 x 3 mm, axial CADstream image 194, and posterior depth measuring 5 x 4 x 4 mm, axial CADstream image 197. Left Axilla: There is extensive axillary adenopathy, with approximately 9 abnormal level 1 lymph nodes measuring up to 3 cm, at least 5 abnormal level 2 lymph nodes, and approximately 4 asymmetric level 3 lymph nodes. Left Internal Mammary Chain: There are 2 diminutive, within normal limits medial internal mammary lymph nodes measuring 2 mm axial CADstream images 129 and 138, however asymmetric compared to the right. Evaluation of limited imaging through the chest and upper abdomen is unremarkable.     IMPRESSION: 1.  Biopsy-proven left 2:00 malignancy measuring 3.2 cm. 2.  Nonspecific small enhancing masses right 12:00 posterior depth, and left retroareolar plane middle and posterior depth, which given multiplicity and appearance are favored to be probably benign, and recommend at minimum a 6 month MRI follow-up if patient desires breast conserving/unilateral surgery. Of note, if there is patient/provider preference, MRI guided biopsies could be performed of the right side and a representative left site. 3.  Left level 1, level 2 and level 3 axillary adenopathy consistent with metastases. Targeted ultrasound with ultrasound-guided biopsy could be performed if this would affect patient management. 4.  A couple of diminutive left internal mammary lymph nodes are technically within normal limits, however asymmetric compared to the right and recommend attention to these on any follow-up imaging. Right Breast ACR BI-RADS Category: 3 - Probably Benign Finding-Short Interval Follow-Up Suggested. Left Breast ACR BI-RADS Category: 6 - Known Biopsy-Proven Malignancy-Appropriate Action  Should Be Taken. RECOMMENDATION: Surgical Consultation    US Breast Biopsy Core Needle Left    Addendum Date: 11/25/2024    BRYAN SHI [id number] FINAL DIAGNOSIS: Invasive ductal carcinoma. Please see final path report. Result is concordant.  Surgical and oncologic consultation will be arranged.  Geovanny Bhandari MD   Date of Addendum: 11/25/2024     Result Date: 11/25/2024  US Breast Biopsy Core Needle Left INDICATION: Left breast mass. PROCEDURE: Informed consent was obtained from the patient. Ultrasound was used to localize the mass at the 2 o'clock position of the left breast, 7 cm from the nipple.  The skin was marked, then prepped and draped in a sterile fashion. 1% lidocaine was used for local anesthesia. Under direct sonographic guidance, a 14-gauge coaxial needle was used to obtain 4 core biopsies.  A biopsy marking clip was then placed.  Digital mammograms performed in a separate room, using separate equipment, to document clip placement. The mammogram showed the clip to be in good position. The patient tolerated this well; there are no immediate complications.    IMPRESSION: 1. Ultrasound-guided biopsy of mass in the left breast. Pathology pending. 2. Post procedure mammogram for marker placement     MA Post Procedure Left    Addendum Date: 11/25/2024    BRYAN SHI [id number] FINAL DIAGNOSIS: Invasive ductal carcinoma. Please see final path report. Result is concordant.  Surgical and oncologic consultation will be arranged.  Geovanny Bhandari MD   Date of Addendum: 11/25/2024     Result Date: 11/25/2024  US Breast Biopsy Core Needle Left INDICATION: Left breast mass. PROCEDURE: Informed consent was obtained from the patient. Ultrasound was used to localize the mass at the 2 o'clock position of the left breast, 7 cm from the nipple.  The skin was marked, then prepped and draped in a sterile fashion. 1% lidocaine was used for local anesthesia. Under direct sonographic guidance, a 14-gauge coaxial  needle was used to obtain 4 core biopsies.  A biopsy marking clip was then placed.  Digital mammograms performed in a separate room, using separate equipment, to document clip placement. The mammogram showed the clip to be in good position. The patient tolerated this well; there are no immediate complications.    IMPRESSION: 1. Ultrasound-guided biopsy of mass in the left breast. Pathology pending. 2. Post procedure mammogram for marker placement     US Breast Left    Result Date: 11/15/2024  EXAM: MA DIAGNOSTIC LEFT W/ ELDER, US BREAST LEFT LIMITED 1-3 QUADRANTS, 11/15/2024 10:57 AM COMPARISONS: 11/12/2024, 7/11/2022 HISTORY: Possible asymmetry in the left breast in the 2:00 position in the posterior depth BREAST DENSITY: The breasts are heterogeneously dense, which may obscure small masses. FINDINGS: There are a few scattered benign-appearing round calcifications. On the additional tomographic images persistent focal asymmetry and subtle distortion are present in the 2:00 position in the posterior depth. ULTRASOUND: Targeted left breast ultrasound was performed by both the ultrasonographer and the radiologist. In the 2:00 position 7 cm from the nipple there was focal dense glandular tissue with central hypoechoic shadowing area measuring 20 x 17 x 24 mm correlating in size, shape, and location with the mammographic abnormality.     IMPRESSION: BI-RADS CATEGORY: 4 - Suspicious.  Subtle increased density in the left breast in the 12:00 position with correlating abnormality seen on ultrasound. Ultrasound-guided core biopsy for tissue diagnosis is recommended. RECOMMENDED FOLLOW-UP: Biopsy. The findings and the recommendations were discussed with the patient at the time of exam. We are helping her with scheduling the biopsy. EDUARDO CHIU MD   SYSTEM ID:  [id number]    MA Diagnostic Left w/Elder    Result Date: 11/15/2024  EXAM: MA DIAGNOSTIC LEFT W/ ELDER, US BREAST LEFT LIMITED 1-3 QUADRANTS, 11/15/2024 10:57  AM COMPARISONS: 11/12/2024, 7/11/2022 HISTORY: Possible asymmetry in the left breast in the 2:00 position in the posterior depth BREAST DENSITY: The breasts are heterogeneously dense, which may obscure small masses. FINDINGS: There are a few scattered benign-appearing round calcifications. On the additional tomographic images persistent focal asymmetry and subtle distortion are present in the 2:00 position in the posterior depth. ULTRASOUND: Targeted left breast ultrasound was performed by both the ultrasonographer and the radiologist. In the 2:00 position 7 cm from the nipple there was focal dense glandular tissue with central hypoechoic shadowing area measuring 20 x 17 x 24 mm correlating in size, shape, and location with the mammographic abnormality.     IMPRESSION: BI-RADS CATEGORY: 4 - Suspicious.  Subtle increased density in the left breast in the 12:00 position with correlating abnormality seen on ultrasound. Ultrasound-guided core biopsy for tissue diagnosis is recommended. RECOMMENDED FOLLOW-UP: Biopsy. The findings and the recommendations were discussed with the patient at the time of exam. We are helping her with scheduling the biopsy. EDUARDO CHIU MD   SYSTEM ID:  [id number]         Assessment and Plan    Stage IIIc breast cancer HER2 positive, ER/MS positive  Patient is here to start treatment.  Her workup is complete.  She had a CT scan done which had shown questionable lesions in the liver a follow-up MRI of the liver done which showed them to be hemangioma and benign there are no metastatic lesions in the liver all this imaging studies were personally reviewed by.  Patient is going to start neoadjuvant treatment today with TCHP.  The side effect of treatment including but not limited to alopecia tiredness fatigue nausea vomiting diarrhea mucositis neutropenia neutropenic fever and sepsis were all discussed with the patient in detail she was given an opportunity to ask questions which were  answered to her satisfaction.  She will get her first dose of chemotherapy today and will come back in 1 week for a symptom check.  Her second cycle will be given in 3 weeks.  I will plan to do imaging of her breast after 3 cycles.  Patient will be referred for surgery after 6 cycles are completed.  Post surgical treatment will depend on the degree of pathologic response.  1.  Patient to start TCHP today.  Symptom check in 1 week with our nurse practitioner second cycle in 3 weeks.  Breast imaging will be done after 3 cycles.  Patient to receive prophylactic Neulasta after every cycle  2.  All radiologic imaging including CT chest abdomen pelvis MRI of the liver and MRI of the breast were personally reviewed by me no evidence of metastatic disease  3. Still awaiting pathology report from her dermatologist of the lesion removed from her leg   4. patient will need cardiac monitoring and an echo will be done after the third cycle.  5.  Patient again had questions about a lumpectomy versus a mastectomy.  I explained to her that patient would have a choice between those surgeries at the end of neoadjuvant treatment and we will discuss this further.    Signed by: Josseline Harkins MD        CC: Kallie Ta NP     Again, thank you for allowing me to participate in the care of your patient.        Sincerely,        Josseline Harkins MD

## 2024-12-12 NOTE — PROGRESS NOTES
[facility] Hematology and Oncology Progress Note    Patient: Adrienne Ivory  MRN: [id number]  Date of Service: Dec 12, 2024             ECOG Performance    0 - Independent          ______________________________________________________________________________  Oncologic history  T2 N3 M0 stage IIIc invasive ductal carcinoma of the left breast ER positive UT positive HER2/wolf 3+.  November 2024  Patient had multiple level 1 level 2 and level 3 axillary lymph nodes involved    Treatment  Patient started on neoadjuvant TCHP on 12/12/2024    History of Present Illness    Ms. Adrienne Ivory is here for follow-up.  She has completed her workup.  She is here to start treatment.  She seems to be in a much better frame of mind today and is much less anxious than she was on her first visit.  She has had a port placed, biopsies of the x-ray lymph nodes done, echocardiography done and systemic staging has been completed.    Review of systems  A comprehensive 12 point review of system was done that was negative except what is mentioned in the history of present illness    Past History    Past Medical History:   Diagnosis Date    Abnormal Pap smear, can't excl hi gd sq intraepithelial lesion (ASC-H) 03/01/2015    LSIL also    Anxiety state, unspecified     Herpes simplex without mention of complication     HSIL on Pap smear of cervix 07/01/2014    colp - WNL    Human papillomavirus in conditions classified elsewhere and of unspecified site 11/01/2010    + HPV 45 & 82 with ASCUS    Hx of colposcopy with cervical biopsy 11/08/2016    [facility] ECC neg.     Obese     PONV (postoperative nausea and vomiting)     Premenstrual tension syndromes        Past Surgical History:   Procedure Laterality Date    C IUD,MIRENA  2/08-3/11    removed for cramping    COLONOSCOPY N/A 12/4/2020    Procedure: COLONOSCOPY, WITH POLYPECTOMY;  Surgeon: Moises Pride MD;  Location: [facility] OR    DILATION AND CURETTAGE, ABLATE ENDOMETRIUM NOVASURE,  "COMBINED N/A 12/31/2015    Procedure: COMBINED DILATION AND CURETTAGE, ABLATE ENDOMETRIUM NOVASURE;  Surgeon: Shakira Penaloza MD;  Location: [facility] OR    HYSTEROSCOPY DIAGNOSTIC N/A 12/31/2015    Procedure: HYSTEROSCOPY DIAGNOSTIC;  Surgeon: Shakira Penaloza MD;  Location: [facility] OR    LEEP TX, CERVICAL  4/3/15    ARNAV 2-3, negative margins    NO HISTORY OF SURGERY         Physical Exam    BP (!) 147/96 (Patient Position: Sitting)   Pulse 84   Temp 98.5  F (36.9  C) (Oral)   Resp 16   Ht 1.645 m (5' 4.75\")   Wt 97.1 kg (214 lb)   SpO2 97%   BMI 35.89 kg/m      General: alert, awake, not in acute distress  HEENT: Head: Normal, normocephalic, atraumatic.  Eye: Normal external eye, conjunctiva, lids cornea, SITA.  Nose: Normal external nose, mucus membranes and septum.  Pharynx: Normal buccal mucosa. Normal pharynx.  Neck / Thyroid: Supple, no masses, nodes, nodules or enlargement.  Lymphatics: No abnormally enlarged lymph nodes.  Chest: Normal chest wall and respirations. Clear to auscultation.  No crackles or rhonchi's  Heart: S1 S2 RRR, no murmur.   Abdomen: abdomen is soft without significant tenderness, masses, organomegaly or guarding  Extremities: normal strength, tone, and muscle mass  Skin: normal. no rash or abnormalities  CNS: non focal.    Lab Results    Recent Results (from the past 240 hours)   Echocardiogram Complete    Collection Time: 12/02/24 10:55 AM   Result Value Ref Range    Biplane LVEF 57%    Surgical Pathology Exam    Collection Time: 12/09/24  9:34 AM   Result Value Ref Range    Case Report       Surgical Pathology Report                         Case: [id number]                                  Authorizing Provider:  Hang Harkins       Collected:           12/09/2024 09:34 AM                                 MD Josseline                                                                    Ordering Location:     [facility]      Received:            12/09/2024 11:05 AM    "                              [facility]                                                         Pathologist:           Berna Guzmán MD                                                           Specimens:   A) - Lymph Node(s), Axillary, Left, left axillary lymph node biopsy                                 B) - Breast, Left, left breast mass, 6:00, 3 cm fn                                                  C) - Breast, Right, right breast mass, 1:00, 2 cm fn                                       Final Diagnosis       A) LEFT AXILLARY LYMPH NODE, ULTRASOUND-GUIDED CORE BIOPSY:    - LYMPH NODE WITH METASTATIC ADENOCARCINOMA, CONSISTENT WITH BREAST PRIMARY SOURCE        1.  LENGTH: 9 MM        2.  EXTRANODAL SOFT TISSUE EXTENSION NOT IDENTIFIED        3.  ANCILLARY STUDIES  A.  ESTROGEN RECEPTOR: POSITIVE (81-90%)  B.  PROGESTERONE RECEPTOR: NEGATIVE (LESS THAN 1%)  C.  HER2: POSITIVE (3+)  D.  GATA3: STRONG NUCLEAR STAINING; CONSISTENT WITH BREAST PRIMARY SOURCE    B) LEFT BREAST MASS, 6:00, 3 CM FROM NIPPLE, ULTRASOUND-GUIDED CORE BIOPSIES:        - BENIGN FIBROADIPOSE BREAST TISSUE    C) RIGHT BREAST MASS, 1:00, 2 CMFN, ULTRASOUND-GUIDED CORE BIOPSIES:        - BENIGN FIBROADIPOSE BREAST TISSUE        Comment       Dr. Gaby Head concurs with the diagnosis and stain interpretation.    The presence of blue ink is confirmed on specimen A, green ink on specimen B and yellow ink on specimen C.  Method for evaluation special stains: Manual estimation      Synoptic Checklist       Breast Biomarker Reporting Template   BREAST BIOMARKER REPORTING TEMPLATE - A   Protocol posted: 12/13/2023         Test(s) Performed:            Estrogen Receptor (ER) Status:    Positive (greater than 10% of cells demonstrate nuclear positivity)           Percentage of Cells with Nuclear Positivity:    81-90%           Average Intensity of Staining:    Strong         Test Type:    Food and Drug Administration (FDA) cleared (test  "/ vendor): [place]         Primary Antibody:    SP1         Scoring System:    No separate scoring system used       Test(s) Performed:            Progesterone Receptor (PgR) Status:    Negative (less than 1%)           :    Internal control cells absent         Test Type:    Food and Drug Administration (FDA) cleared (test / vendor): [place]         Primary Antibody:    1E2       Test(s) Performed:            HER2 by Immunohistochemistry:    Positive (Score 3+)           Percentage of Cells with Uniform Intense Complete Membrane Stainin %        Test Type:    Food and Drug Administration (FDA) cleared (test / vendor): [place]         Primary Antibody:    4B5       Cold Ischemia and Fixation Times:    Meet requirements specified in latest version of the ASCO / CAP Guidelines       METHODS      Fixative:    Formalin       Image Analysis:    Not performed       Gross Description       A(1). Lymph Node(s), Axillary, Left, left axillary lymph node biopsy:  The specimen is received in formalin with proper patient identification, labeled \"left axillary lymph node biopsy\".  The specimen consists of 3 tan-white soft tissue needle core biopsies, 0.2-1.2 cm in length by 0.1 cm in diameter.  The specimen is inked blue and entirely submitted in cassette A1.  Specimen is collected and placed in fixative at 934 on 2024.    Total formalin fixation time: 6: 26 serrated dysplasia just over so yellow who is on-call Dr. Gaby Griffin   B(1). Breast, Left, left breast mass, 6:00, 3 cm fn:  The specimen is received in formalin with proper patient identification, labeled \"left breast mass, 6:00, 3 cm FN\".  The specimen consists of 2 tan-yellow soft tissue needle core biopsies, 0.3-1.2 cm in length by 0.1 cm in diameter.  The specimen is inked green, wrapped and entirely submitted in cassette B1.  The specimen is collected and placed in fixative at 940 on 2024.   C(1). Breast, Right, right breast mass, 1:00, 2 cm fn:  The " "specimen is received in formalin with proper patient identification, labeled \"right breast mass, 1:00, 2 cm FN\".  The specimen consists of 3 tan-yellow soft tissue needle core biopsies, 0.4-1.7 cm in length by 0.1 cm in diameter.  The specimen is inked yellow, wrapped and entirely submitted in cassette C1.  The specimen is collected and placed in fixative at 10:00 on 12/9/2024.   RED Franco        Microscopic Description       Microscopic examination  performed, substantiating the above diagnosis.      Disclaimer         The following assays; ALK (D5F3), ER, HER2, PD-L1, BRAF, CD20, CD30 AZF, CD30 Formalin, MLH-1, MSH-2, MSH-6 and PMS-2, have not been validated on decalcified tissues. Results should be interpreted with caution given the possibility of false negative results on decalcified specimens.          MCRS Yes (A) N/A    Performing Labs       The technical component of this testing was completed at [facility].    Stain controls for all stains resulted within this report have been reviewed and show appropriate reactivity.       Case Images     Surgical Pathology Exam    Collection Time: 12/09/24  9:40 AM   Result Value Ref Range    Case Report       Surgical Pathology Report                         Case: [id number]                                  Authorizing Provider:  Hang Harkins       Collected:           12/09/2024 09:34 AM                                 MD Josseline                                                                    Ordering Location:     [facility]      Received:            12/09/2024 11:05 AM                                 [facility] [place]                                                         Pathologist:           Berna Guzmán MD                                                           Specimens:   A) - Lymph Node(s), Axillary, Left, left axillary lymph node biopsy                          "        B) - Breast, Left, left breast mass, 6:00, 3 cm fn                                                  C) - Breast, Right, right breast mass, 1:00, 2 cm fn                                       Final Diagnosis       A) LEFT AXILLARY LYMPH NODE, ULTRASOUND-GUIDED CORE BIOPSY:    - LYMPH NODE WITH METASTATIC ADENOCARCINOMA, CONSISTENT WITH BREAST PRIMARY SOURCE        1.  LENGTH: 9 MM        2.  EXTRANODAL SOFT TISSUE EXTENSION NOT IDENTIFIED        3.  ANCILLARY STUDIES  A.  ESTROGEN RECEPTOR: POSITIVE (81-90%)  B.  PROGESTERONE RECEPTOR: NEGATIVE (LESS THAN 1%)  C.  HER2: POSITIVE (3+)  D.  GATA3: STRONG NUCLEAR STAINING; CONSISTENT WITH BREAST PRIMARY SOURCE    B) LEFT BREAST MASS, 6:00, 3 CM FROM NIPPLE, ULTRASOUND-GUIDED CORE BIOPSIES:        - BENIGN FIBROADIPOSE BREAST TISSUE    C) RIGHT BREAST MASS, 1:00, 2 CMFN, ULTRASOUND-GUIDED CORE BIOPSIES:        - BENIGN FIBROADIPOSE BREAST TISSUE        Comment       Dr. Gaby Head concurs with the diagnosis and stain interpretation.    The presence of blue ink is confirmed on specimen A, green ink on specimen B and yellow ink on specimen C.  Method for evaluation special stains: Manual estimation      Synoptic Checklist       Breast Biomarker Reporting Template   BREAST BIOMARKER REPORTING TEMPLATE - A   Protocol posted: 12/13/2023         Test(s) Performed:            Estrogen Receptor (ER) Status:    Positive (greater than 10% of cells demonstrate nuclear positivity)           Percentage of Cells with Nuclear Positivity:    81-90%           Average Intensity of Staining:    Strong         Test Type:    Food and Drug Administration (FDA) cleared (test / vendor): [place]         Primary Antibody:    SP1         Scoring System:    No separate scoring system used       Test(s) Performed:            Progesterone Receptor (PgR) Status:    Negative (less than 1%)           :    Internal control cells absent         Test Type:    Food and Drug Administration (FDA)  "cleared (test / vendor): [place]         Primary Antibody:    1E2       Test(s) Performed:            HER2 by Immunohistochemistry:    Positive (Score 3+)           Percentage of Cells with Uniform Intense Complete Membrane Stainin %        Test Type:    Food and Drug Administration (FDA) cleared (test / vendor): [place]         Primary Antibody:    4B5       Cold Ischemia and Fixation Times:    Meet requirements specified in latest version of the ASCO / CAP Guidelines       METHODS      Fixative:    Formalin       Image Analysis:    Not performed       Gross Description       A(1). Lymph Node(s), Axillary, Left, left axillary lymph node biopsy:  The specimen is received in formalin with proper patient identification, labeled \"left axillary lymph node biopsy\".  The specimen consists of 3 tan-white soft tissue needle core biopsies, 0.2-1.2 cm in length by 0.1 cm in diameter.  The specimen is inked blue and entirely submitted in cassette A1.  Specimen is collected and placed in fixative at 934 on 2024.    Total formalin fixation time: 6: 26 serrated dysplasia just over so yellow who is on-call Dr. Gaby Griffin   B(1). Breast, Left, left breast mass, 6:00, 3 cm fn:  The specimen is received in formalin with proper patient identification, labeled \"left breast mass, 6:00, 3 cm FN\".  The specimen consists of 2 tan-yellow soft tissue needle core biopsies, 0.3-1.2 cm in length by 0.1 cm in diameter.  The specimen is inked green, wrapped and entirely submitted in cassette B1.  The specimen is collected and placed in fixative at 940 on 2024.   C(1). Breast, Right, right breast mass, 1:00, 2 cm fn:  The specimen is received in formalin with proper patient identification, labeled \"right breast mass, 1:00, 2 cm FN\".  The specimen consists of 3 tan-yellow soft tissue needle core biopsies, 0.4-1.7 cm in length by 0.1 cm in diameter.  The specimen is inked yellow, wrapped and entirely submitted in cassette C1.  The " specimen is collected and placed in fixative at 10:00 on 12/9/2024.   RED Franco        Microscopic Description       Microscopic examination  performed, substantiating the above diagnosis.      Disclaimer         The following assays; ALK (D5F3), ER, HER2, PD-L1, BRAF, CD20, CD30 AZF, CD30 Formalin, MLH-1, MSH-2, MSH-6 and PMS-2, have not been validated on decalcified tissues. Results should be interpreted with caution given the possibility of false negative results on decalcified specimens.          MCRS Yes (A) N/A    Performing Labs       The technical component of this testing was completed at [facility].    Stain controls for all stains resulted within this report have been reviewed and show appropriate reactivity.       Case Images     Surgical Pathology Exam    Collection Time: 12/09/24 10:00 AM   Result Value Ref Range    Case Report       Surgical Pathology Report                         Case: [id number]                                  Authorizing Provider:  Hang Harkins       Collected:           12/09/2024 09:34 AM                                 MD Josseline                                                                    Ordering Location:     [facility]      Received:            12/09/2024 11:05 AM                                 [facility]                                                         Pathologist:           Berna Guzmán MD                                                           Specimens:   A) - Lymph Node(s), Axillary, Left, left axillary lymph node biopsy                                 B) - Breast, Left, left breast mass, 6:00, 3 cm fn                                                  C) - Breast, Right, right breast mass, 1:00, 2 cm fn                                       Final Diagnosis       A) LEFT AXILLARY LYMPH NODE, ULTRASOUND-GUIDED CORE BIOPSY:    - LYMPH NODE WITH METASTATIC  ADENOCARCINOMA, CONSISTENT WITH BREAST PRIMARY SOURCE        1.  LENGTH: 9 MM        2.  EXTRANODAL SOFT TISSUE EXTENSION NOT IDENTIFIED        3.  ANCILLARY STUDIES  A.  ESTROGEN RECEPTOR: POSITIVE (81-90%)  B.  PROGESTERONE RECEPTOR: NEGATIVE (LESS THAN 1%)  C.  HER2: POSITIVE (3+)  D.  GATA3: STRONG NUCLEAR STAINING; CONSISTENT WITH BREAST PRIMARY SOURCE    B) LEFT BREAST MASS, 6:00, 3 CM FROM NIPPLE, ULTRASOUND-GUIDED CORE BIOPSIES:        - BENIGN FIBROADIPOSE BREAST TISSUE    C) RIGHT BREAST MASS, 1:00, 2 CMFN, ULTRASOUND-GUIDED CORE BIOPSIES:        - BENIGN FIBROADIPOSE BREAST TISSUE        Comment       Dr. Gaby Head concurs with the diagnosis and stain interpretation.    The presence of blue ink is confirmed on specimen A, green ink on specimen B and yellow ink on specimen C.  Method for evaluation special stains: Manual estimation      Synoptic Checklist       Breast Biomarker Reporting Template   BREAST BIOMARKER REPORTING TEMPLATE - A   Protocol posted: 2023         Test(s) Performed:            Estrogen Receptor (ER) Status:    Positive (greater than 10% of cells demonstrate nuclear positivity)           Percentage of Cells with Nuclear Positivity:    81-90%           Average Intensity of Staining:    Strong         Test Type:    Food and Drug Administration (FDA) cleared (test / vendor): [place]         Primary Antibody:    SP1         Scoring System:    No separate scoring system used       Test(s) Performed:            Progesterone Receptor (PgR) Status:    Negative (less than 1%)           :    Internal control cells absent         Test Type:    Food and Drug Administration (FDA) cleared (test / vendor): [place]         Primary Antibody:    1E2       Test(s) Performed:            HER2 by Immunohistochemistry:    Positive (Score 3+)           Percentage of Cells with Uniform Intense Complete Membrane Stainin %        Test Type:    Food and Drug Administration (FDA) cleared (test  "/ vendor): [place]         Primary Antibody:    4B5       Cold Ischemia and Fixation Times:    Meet requirements specified in latest version of the ASCO / CAP Guidelines       METHODS      Fixative:    Formalin       Image Analysis:    Not performed       Gross Description       A(1). Lymph Node(s), Axillary, Left, left axillary lymph node biopsy:  The specimen is received in formalin with proper patient identification, labeled \"left axillary lymph node biopsy\".  The specimen consists of 3 tan-white soft tissue needle core biopsies, 0.2-1.2 cm in length by 0.1 cm in diameter.  The specimen is inked blue and entirely submitted in cassette A1.  Specimen is collected and placed in fixative at 934 on 12/9/2024.    Total formalin fixation time: 6: 26 serrated dysplasia just over so yellow who is on-call Dr. Gaby Griffin   B(1). Breast, Left, left breast mass, 6:00, 3 cm fn:  The specimen is received in formalin with proper patient identification, labeled \"left breast mass, 6:00, 3 cm FN\".  The specimen consists of 2 tan-yellow soft tissue needle core biopsies, 0.3-1.2 cm in length by 0.1 cm in diameter.  The specimen is inked green, wrapped and entirely submitted in cassette B1.  The specimen is collected and placed in fixative at 940 on 12/9/2024.   C(1). Breast, Right, right breast mass, 1:00, 2 cm fn:  The specimen is received in formalin with proper patient identification, labeled \"right breast mass, 1:00, 2 cm FN\".  The specimen consists of 3 tan-yellow soft tissue needle core biopsies, 0.4-1.7 cm in length by 0.1 cm in diameter.  The specimen is inked yellow, wrapped and entirely submitted in cassette C1.  The specimen is collected and placed in fixative at 10:00 on 12/9/2024.   RED Franco        Microscopic Description       Microscopic examination  performed, substantiating the above diagnosis.      Disclaimer         The following assays; ALK (D5F3), ER, HER2, PD-L1, BRAF, CD20, CD30 AZF, CD30 Formalin, " MLH-1, MSH-2, MSH-6 and PMS-2, have not been validated on decalcified tissues. Results should be interpreted with caution given the possibility of false negative results on decalcified specimens.          MCRS Yes (A) N/A    Performing Labs       The technical component of this testing was completed at [facility].    Stain controls for all stains resulted within this report have been reviewed and show appropriate reactivity.       Case Images     Comprehensive metabolic panel    Collection Time: 12/12/24  8:21 AM   Result Value Ref Range    Sodium 138 135 - 145 mmol/L    Potassium 4.1 3.4 - 5.3 mmol/L    Carbon Dioxide (CO2) 22 22 - 29 mmol/L    Anion Gap 12 7 - 15 mmol/L    Urea Nitrogen 10.5 6.0 - 20.0 mg/dL    Creatinine 0.79 0.51 - 0.95 mg/dL    GFR Estimate 87 >60 mL/min/1.73m2    Calcium 9.4 8.8 - 10.4 mg/dL    Chloride 104 98 - 107 mmol/L    Glucose 104 (H) 70 - 99 mg/dL    Alkaline Phosphatase 82 40 - 150 U/L    AST 40 0 - 45 U/L    ALT 40 0 - 50 U/L    Protein Total 7.1 6.4 - 8.3 g/dL    Albumin 4.2 3.5 - 5.2 g/dL    Bilirubin Total 0.4 <=1.2 mg/dL   CBC with platelets and differential    Collection Time: 12/12/24  8:21 AM   Result Value Ref Range    WBC Count 9.1 4.0 - 11.0 10e3/uL    RBC Count 4.83 3.80 - 5.20 10e6/uL    Hemoglobin 13.8 11.7 - 15.7 g/dL    Hematocrit 40.7 35.0 - 47.0 %    MCV 84 78 - 100 fL    MCH 28.6 26.5 - 33.0 pg    MCHC 33.9 31.5 - 36.5 g/dL    RDW 14.6 10.0 - 15.0 %    Platelet Count 263 150 - 450 10e3/uL    % Neutrophils 62 %    % Lymphocytes 28 %    % Monocytes 7 %    % Eosinophils 2 %    % Basophils 1 %    % Immature Granulocytes 0 %    NRBCs per 100 WBC 0 <1 /100    Absolute Neutrophils 5.7 1.6 - 8.3 10e3/uL    Absolute Lymphocytes 2.5 0.8 - 5.3 10e3/uL    Absolute Monocytes 0.6 0.0 - 1.3 10e3/uL    Absolute Eosinophils 0.2 0.0 - 0.7 10e3/uL    Absolute Basophils 0.1 0.0 - 0.2 10e3/uL    Absolute Immature Granulocytes  0.0 <=0.4 10e3/uL    Absolute NRBCs 0.0 10e3/uL         Imaging    US Breast Biopsy Core Lymph Node Left    Addendum Date: 12/11/2024    Pathology shows metastatic adenocarcinoma. This is consistent with imaging findings. A verbal report was given to the patient. Surgical and oncological consultation is recommended.    Result Date: 12/11/2024  LEFT LYMPH NODE ULTRASOUND GUIDED BIOPSY, CLIP PLACEMENT: INDICATIONS: Abnormal Lymph Node PROCEDURE: Informed consent was obtained from the patient. Ultrasound was used to localize the lymph node in the left axilla. The skin was prepped and draped in a sterile fashion. Lidocaine was used for local anesthesia. Under direct sonographic guidance, a 18 gauge coaxial needle was used to obtain 2 core biopises. A clip was then placed. Digital mammograms performed in separate room, using separate equipment to document clip placement, demonstrates the clip in appropriate position. The patient tolerated this well, there are no immediate complications.     IMPRESSION: Ultrasound guided biopsy of a suspicious lymph node in the left axilla. Pathology pending.     US Breast Biopsy Core Needle Left    Addendum Date: 12/11/2024    Pathology shows benign breast tissue. This is consistent with imaging findings. A verbal report was given to the patient. Surgical consultation is recommended for the patients know left breast cancer.    Result Date: 12/11/2024  US Breast Biopsy Core Needle Left INDICATION: Left breast mass. PROCEDURE: Informed consent was obtained from the patient. Ultrasound was used to localize the mass at the 6 o'clock position of the left breast, 3 cm from the nipple.  The skin was marked, then prepped and draped in a sterile fashion. 1% lidocaine was used for local anesthesia. Under direct sonographic guidance, a  18 -gauge coaxial needle was used to obtain 2 core biopsies.  A biopsy marking clip was then placed.  Digital mammograms performed in a separate room, using  separate equipment, to document clip placement. The mammogram showed the clip to be in good position. The patient tolerated this well; there are no immediate complications.    IMPRESSION: 1. Ultrasound-guided biopsy of mass in the left breast. Pathology pending. 2. Post procedure mammogram for marker placement     MA Post Procedure Left    Addendum Date: 12/11/2024    Pathology shows benign breast tissue. This is consistent with imaging findings. A verbal report was given to the patient. Surgical consultation is recommended for the patients know left breast cancer.    Result Date: 12/11/2024  US Breast Biopsy Core Needle Left INDICATION: Left breast mass. PROCEDURE: Informed consent was obtained from the patient. Ultrasound was used to localize the mass at the 6 o'clock position of the left breast, 3 cm from the nipple.  The skin was marked, then prepped and draped in a sterile fashion. 1% lidocaine was used for local anesthesia. Under direct sonographic guidance, a  18 -gauge coaxial needle was used to obtain 2 core biopsies.  A biopsy marking clip was then placed.  Digital mammograms performed in a separate room, using separate equipment, to document clip placement. The mammogram showed the clip to be in good position. The patient tolerated this well; there are no immediate complications.    IMPRESSION: 1. Ultrasound-guided biopsy of mass in the left breast. Pathology pending. 2. Post procedure mammogram for marker placement     US Breast Biopsy Core Needle Right    Addendum Date: 12/11/2024    Pathology shows benign breast tissue. This is consistent with imaging findings. A verbal report was given to the patient. Repeat routine screening mammogram in one year is recommended.    Result Date: 12/11/2024  US Breast Biopsy Core Needle Right INDICATION: Right breast mass. PROCEDURE: Informed consent was obtained from the patient. Ultrasound was used to localize the mass at the 1 o'clock position of the right breast, 2  cm from the nipple.  The skin was marked, then prepped and draped in a sterile fashion. 1% lidocaine was used for local anesthesia. Under direct sonographic guidance, a  18 -gauge coaxial needle was used to obtain 2 core biopsies.  A biopsy marking clip was then placed.  Digital mammograms performed in a separate room, using separate equipment, to document clip placement. The mammogram showed the clip to be in good position. The patient tolerated this well; there are no immediate complications.    IMPRESSION: 1. Ultrasound-guided biopsy of mass in the right breast. Pathology pending. 2. Post procedure mammogram for marker placement     MA Post Procedure Right    Addendum Date: 12/11/2024    Pathology shows benign breast tissue. This is consistent with imaging findings. A verbal report was given to the patient. Repeat routine screening mammogram in one year is recommended.    Result Date: 12/11/2024  US Breast Biopsy Core Needle Right INDICATION: Right breast mass. PROCEDURE: Informed consent was obtained from the patient. Ultrasound was used to localize the mass at the 1 o'clock position of the right breast, 2 cm from the nipple.  The skin was marked, then prepped and draped in a sterile fashion. 1% lidocaine was used for local anesthesia. Under direct sonographic guidance, a  18 -gauge coaxial needle was used to obtain 2 core biopsies.  A biopsy marking clip was then placed.  Digital mammograms performed in a separate room, using separate equipment, to document clip placement. The mammogram showed the clip to be in good position. The patient tolerated this well; there are no immediate complications.    IMPRESSION: 1. Ultrasound-guided biopsy of mass in the right breast. Pathology pending. 2. Post procedure mammogram for marker placement     US Breast Bilateral Limited 1-3 Quadrants    Result Date: 12/9/2024  EXAM: US BREAST BILATERAL LIMITED 1-3 QUADRANTS, 12/9/2024 8:19 AM COMPARISONS: Breast MRI from 11/27/2024  and mammograms and breast ultrasounds from 11/18/2024, 11/15/2024, 11/12/2024 HISTORY: Known left breast cancer with solitary lesion seen in the right breast in the 12:00 position and 2 small lesions seen in the left breast on breast MRI. Lymphadenopathy noted on the breast MRI in the left axilla FINDINGS: Targeted bilateral breast ultrasound was performed by both the ultrasonographer and the radiologist. In the right breast in the 1:00 position 2 cm from the nipple there was a hyperechoic oval circumscribed lesion with parallel orientation measuring 7 x 5 x 7 mm correlating with the abnormality seen in this location on the patient's breast MRI. In the left breast in the 6:00 position 3 cm from the nipple there is a similar-appearing lesion measuring 5 x 4 x 4 mm. Both have an appearance most consistent with a benign hemangioma and correlate with MRI abnormality seen in these locations. The additional left breast lesion seen on MRI is not identified on ultrasound but had a similar appearance to the other 2 lesions on the MRI. The left axilla was examined as well demonstrating multiple abnormal-appearing lymph nodes with cortical thickening to 6 mm.     IMPRESSION: BI-RADS CATEGORY: 6 - Known Biopsy-Proven Malignancy. Small lesions in both breasts have an appearance most consistent with benign hemangiomas. Given the patient's history of known left breast cancer, ultrasound-guided core biopsy of both lesions will be performed. Given that they have a similar appearance on MRI to the third lesion seen in the left breast on MRI MRI guided biopsy seems unnecessary. This was discussed with Dr. Bonilla. Ultrasound-guided core biopsy of one of the abnormal lymph nodes in the left axilla will also be performed. RECOMMENDED FOLLOW-UP: Biopsy. The findings and the recommendations were discussed with the patient at the time of exam. Ultrasound guided core biopsy of the hyperechoic lesions in the left breast in the 6:00 position  3 cm from the nipple and in the right breast in the 1:00 position 2 cm from the nipple as well as one of the left axillary lymph nodes will be performed today. EDUARDO CHIU MD   SYSTEM ID:  [id number]    MR Liver wo & w Contrast    Result Date: 12/5/2024  EXAM: MR LIVER W/O and W CONTRAST LOCATION: [facility] DATE: 12/5/2024 INDICATION:  Liver lesion COMPARISON: CT dated 12/3/2024 TECHNIQUE: Routine MRI liver protocol including T1 in/out phase, diffusion, multiplane T2, and dynamic T1 with IV contrast.  CONTRAST: 10 mL Gadavist FINDINGS: LUNG BASES: Left breast mass measuring up to 2.3 cm, and adjacent adenopathy redemonstrated. LIVER: 2.4 cm left hepatic lobe cyst. Scattered additional low-attenuation lesion seen at CT are consistent with benign hemangiomas, largest centrally in the right hepatic lobe reaching 1.4 cm, image 28 series 16. GALLBLADDER/BILIARY: No biliary dilatation PANCREAS: No pancreatic ductal dilatation. SPLEEN: Unremarkable. ADRENALS: Unremarkable. KIDNEYS: Tiny left renal cyst for which no additional follow-up imaging recommended. BOWEL: Visualized bowel is nondilated. LYMPH NODES: No significant upper abdominal adenopathy. VASCULATURE: Unremarkable. MUSCULOSKELETAL: No acute bony abnormalities. OTHER: No upper abdominal ascites.     IMPRESSION: 1.  No suspicious hepatic mass lesions. 2.  Left breast mass with partially visualized left axillary adenopathy.    CT Chest/Abdomen/Pelvis w Contrast    Result Date: 12/4/2024  EXAM: CT CHEST/ABDOMEN/PELVIS W CONTRAST LOCATION: [facility] DATE: 12/3/2024 INDICATION: Left breast malignancy. COMPARISON: None available. TECHNIQUE: CT scan of the chest, abdomen, and pelvis was performed following injection of IV contrast. Multiplanar reformats were obtained. Dose reduction techniques were used. CONTRAST: 90 mL Isovue-370. FINDINGS: LUNGS AND PLEURA: Trachea and large airways are patent. No focal  airspace consolidation. Mild dependent atelectasis.  No suspicious pulmonary nodule. No pneumothorax. No pleural effusion.  MEDIASTINUM/AXILLAE: Prominent and mildly enlarged left supraclavicular lymph nodes, largest measuring up to 10 mm in short axis, series 3 image 12. No mediastinal/hilar adenopathy. Thoracic esophagus is unremarkable. Mild left axillary and subpectoral lymphadenopathy. For reference, an enlarged left axillary lymph node measures up to 11 mm in short axis, series 3 image 38. Asymmetric soft tissue density within the upper outer quadrant of the left breast, likely corresponding to known malignancy, series 3 image 43. No thoracic aortic aneurysm. Heart is normal in size. No pericardial effusion. CORONARY ARTERY CALCIFICATION: None. HEPATOBILIARY: There are a few low-attenuation hepatic lesions, which are indeterminate. Largest measures up to 14 mm in the central right hepatic lobe. Correlation with liver MR imaging is recommended, as metastases cannot be excluded. A larger low-attenuation lesion along the falciform ligament, series 3 image 124, measures up to 24 mm and favors a benign cyst. Attention on liver MR imaging follow-up. Gallbladder is normal. No intrahepatic or intrahepatic biliary ductal dilatation. PANCREAS: Enhances normally. No peripancreatic inflammatory fat stranding. SPLEEN: Enhances normally. Normal size. ADRENAL GLANDS: Normal. KIDNEYS: Both kidneys enhance symmetrically, without hydronephrosis. No nephroureterolithiasis. Urinary bladder is unremarkable. PELVIC ORGANS: No suspicious abnormality. Calcified uterine fibroid. BOWEL: No evidence of acute gastrointestinal inflammation or obstruction. No intraperitoneal free fluid or free air. LYMPH NODES: No suspicious abdominal or pelvic lymphadenopathy. VASCULATURE: No abdominal aortic aneurysm. MUSCULOSKELETAL: No suspicious abnormality. OTHER: No additionally significant abnormalities.     IMPRESSION: 1.  Asymmetric soft tissue  density within the upper outer quadrant of the left breast, likely corresponding to known malignancy. 2.  Mild left axillary and subpectoral lymphadenopathy, likely metastatic. 3.  Prominent and mildly enlarged left supraclavicular lymph nodes, also likely metastatic. 4.  Indeterminate low-attenuation hepatic lesions, measuring up to 14 mm. Correlation with liver MR imaging is recommended, as metastases cannot be excluded.     Echocardiogram Complete    Result Date: 2024  [id number]^SEAMUS^CYRUS^[place] [address]  Name: BRYAN SHI MRN: [id number] : 1965 Study Date: 2024 10:19 AM Age: 58 yrs Gender: Female Patient Location: [facility] Reason For Study: Malignant neoplasm of upper-outer quadrant of left breast in fem Ordering Physician: CYRUS WAY Referring Physician: CYRUS WAY Performed By: KS  BSA: 2.1 m2 Height: 65 in Weight: 219 lb HR: 66 BP: 133/84 mmHg ______________________________________________________________________________ Procedure Complete Echo Adult. Myocardial Strain Imaging performed. Adequate quality two-dimensional was performed and interpreted. There is no comparison study available. No hemodynamically significant valvular abnormalities on 2D or color flow imaging. ______________________________________________________________________________ Interpretation Summary  The left ventricle is normal in size. Biplane LVEF is 57%. Regional wall motion is grossly normal but endocardial border definition is limited Global peak LV longitudinal strain is averaged at -20.5%. This is within reported normal limits (normal <-18%). No hemodynamically significant valvular abnormalities on 2D or color flow imaging. There is no comparison study available. ______________________________________________________________________________ Left Ventricle The left ventricle is normal in  size. There is normal left ventricular wall thickness. Left ventricular diastolic function is normal. Diastolic Doppler findings (E/E' ratio and/or other parameters) suggest left ventricular filling pressures are normal. Biplane LVEF is 57%. Global peak LV longitudinal strain is averaged at -20.5%. This is within reported normal limits (normal <-18%). Regional wall motion is grossly normal but endocardial border definition is limited.  Right Ventricle Normal right ventricle size and systolic function.  Atria Normal left atrial size. Right atrial size is normal. There is no color Doppler evidence of a PFO.  Mitral Valve The mitral valve leaflets are mildly thickened. There is no mitral regurgitation noted. There is no mitral valve stenosis.  Tricuspid Valve The tricuspid valve is not well visualized, but is grossly normal. There is trace tricuspid regurgitation. Right ventricular systolic pressure could not be approximated due to inadequate tricuspid regurgitation. There is no tricuspid stenosis.  Aortic Valve The aortic valve is trileaflet. No aortic regurgitation is present. No aortic stenosis is present.  Pulmonic Valve The pulmonic valve is not well visualized. There is no pulmonic valvular regurgitation. There is no pulmonic valvular stenosis.  Vessels The aorta root is normal. The thoracic aorta is normal. IVC diameter <2.1 cm collapsing >50% with sniff suggests a normal RA pressure of 3 mmHg.  Pericardium There is no pericardial effusion.  Rhythm Sinus rhythm was noted. ______________________________________________________________________________ MMode/2D Measurements & Calculations IVSd: 0.88 cm  LVIDd: 4.6 cm LVIDs: 3.0 cm LVPWd: 1.0 cm FS: 35.2 % LV mass(C)d: 150.3 grams LV mass(C)dI: 73.1 grams/m2 Ao root diam: 3.3 cm asc Aorta Diam: 3.5 cm LVOT diam: 2.3 cm LVOT area: 4.1 cm2 Ao root diam index Ht(cm/m): 2.0 Ao root diam index BSA (cm/m2): 1.6 Asc Ao diam index BSA (cm/m2): 1.7 Asc Ao diam index  Ht(cm/m): 2.1 EF Biplane: 57.1 % LA Volume (BP): 44.5 ml  LA Volume Index (BP): 21.6 ml/m2 LA Volume Indexed (AL/bp): 22.4 ml/m2 RV Base: 3.9 cm RWT: 0.45 TAPSE: 2.1 cm  Time Measurements MM HR: 67.0 BPM  Doppler Measurements & Calculations MV E max lee: 92.2 cm/sec MV A max lee: 76.8 cm/sec MV E/A: 1.2 MV dec slope: 545.6 cm/sec2 MV dec time: 0.17 sec Ao V2 max: 121.0 cm/sec Ao max P.0 mmHg Ao V2 mean: 98.7 cm/sec Ao mean P.2 mmHg Ao V2 VTI: 29.5 cm YURY(I,D): 3.3 cm2 YURY(V,D): 3.6 cm2 LV V1 max P.5 mmHg LV V1 max: 105.6 cm/sec LV V1 VTI: 23.6 cm SV(LVOT): 96.0 ml SI(LVOT): 46.7 ml/m2 PA V2 max: 68.5 cm/sec PA max P.9 mmHg PA acc time: 0.12 sec AV Lee Ratio (DI): 0.87 YURY Index (cm2/m2): 1.6 E/E': 7.6 E/E' av.1 Lateral E/e': 7.6 Medial E/e': 10.5 Peak E' Lee: 12.2 cm/sec  RV S Lee: 14.8 cm/sec  Measurements from QLAB LV GLS Endo Peak A2C (AS): -21.1 % LV GLS Endo Peak A3C (AS): -20.4 % LV GLS Endo Peak A4C (AS): -19.8 % LV GLS Endo Peak Avg (AS): -20.5 % ______________________________________________________________________________ Report approved by: Solomon Laws 2024 12:44 PM       MR Breast Bilateral w/o & w Contrast    Result Date: 2024  EXAM: Bilateral Breast MRI with and without contrast, and computer aided kinetic analysis. LOCATION: [facility] DATE: 2024 HISTORY/INDICATION: 58-year-old woman with recent diagnosis left breast 2:00 invasive ductal carcinoma COMPARISON: 2024, 11/15/2024, 2022 TECHNIQUE: Multiplanar, multisequence imaging performed prior to contrast administration and at multiple time points after contrast administration. Post-processing including subtractions, MIPs and color encoding of post contrast dynamic acquisitions. IV contrast: Gadavist 10mL SEDATION: None FINDINGS: Right Breast: Breast composition: Scattered fibroglandular tissue Background parenchymal enhancement: Mild There is an oval 9 x 5 x 4 mm mildly  enhancing mass at 12:00 posterior depth axial CADstream image 150. Right Axilla: No lymphadenopathy. Right Internal Mammary Chain: No lymphadenopathy. Left Breast: Breast composition: Scattered fibroglandular tissue Background parenchymal enhancement: Mild The biopsy-proven malignancy is an irregular, avidly enhancing mass at 2:00 middle depth measuring 3.2 x 2.2 x 1.9 cm with associated biopsy clip. There are 2 additional smaller nonspecific oval enhancing masses in the retroareolar plane, middle depth measuring 6 x 4 x 3 mm, axial CADstream image 194, and posterior depth measuring 5 x 4 x 4 mm, axial CADstream image 197. Left Axilla: There is extensive axillary adenopathy, with approximately 9 abnormal level 1 lymph nodes measuring up to 3 cm, at least 5 abnormal level 2 lymph nodes, and approximately 4 asymmetric level 3 lymph nodes. Left Internal Mammary Chain: There are 2 diminutive, within normal limits medial internal mammary lymph nodes measuring 2 mm axial CADstream images 129 and 138, however asymmetric compared to the right. Evaluation of limited imaging through the chest and upper abdomen is unremarkable.     IMPRESSION: 1.  Biopsy-proven left 2:00 malignancy measuring 3.2 cm. 2.  Nonspecific small enhancing masses right 12:00 posterior depth, and left retroareolar plane middle and posterior depth, which given multiplicity and appearance are favored to be probably benign, and recommend at minimum a 6 month MRI follow-up if patient desires breast conserving/unilateral surgery. Of note, if there is patient/provider preference, MRI guided biopsies could be performed of the right side and a representative left site. 3.  Left level 1, level 2 and level 3 axillary adenopathy consistent with metastases. Targeted ultrasound with ultrasound-guided biopsy could be performed if this would affect patient management. 4.  A couple of diminutive left internal mammary lymph nodes are technically within normal limits,  however asymmetric compared to the right and recommend attention to these on any follow-up imaging. Right Breast ACR BI-RADS Category: 3 - Probably Benign Finding-Short Interval Follow-Up Suggested. Left Breast ACR BI-RADS Category: 6 - Known Biopsy-Proven Malignancy-Appropriate Action Should Be Taken. RECOMMENDATION: Surgical Consultation    US Breast Biopsy Core Needle Left    Addendum Date: 11/25/2024    BRYAN SHI [id number] FINAL DIAGNOSIS: Invasive ductal carcinoma. Please see final path report. Result is concordant.  Surgical and oncologic consultation will be arranged.  Geovanny Bhandari MD   Date of Addendum: 11/25/2024     Result Date: 11/25/2024  US Breast Biopsy Core Needle Left INDICATION: Left breast mass. PROCEDURE: Informed consent was obtained from the patient. Ultrasound was used to localize the mass at the 2 o'clock position of the left breast, 7 cm from the nipple.  The skin was marked, then prepped and draped in a sterile fashion. 1% lidocaine was used for local anesthesia. Under direct sonographic guidance, a 14-gauge coaxial needle was used to obtain 4 core biopsies.  A biopsy marking clip was then placed.  Digital mammograms performed in a separate room, using separate equipment, to document clip placement. The mammogram showed the clip to be in good position. The patient tolerated this well; there are no immediate complications.    IMPRESSION: 1. Ultrasound-guided biopsy of mass in the left breast. Pathology pending. 2. Post procedure mammogram for marker placement     MA Post Procedure Left    Addendum Date: 11/25/2024    BRYAN RAJ BLACKMONJOSEMORAIMA [id number] FINAL DIAGNOSIS: Invasive ductal carcinoma. Please see final path report. Result is concordant.  Surgical and oncologic consultation will be arranged.  Geovanny Bhandari MD   Date of Addendum: 11/25/2024     Result Date: 11/25/2024  US Breast Biopsy Core Needle Left INDICATION: Left breast mass. PROCEDURE: Informed consent was obtained from  the patient. Ultrasound was used to localize the mass at the 2 o'clock position of the left breast, 7 cm from the nipple.  The skin was marked, then prepped and draped in a sterile fashion. 1% lidocaine was used for local anesthesia. Under direct sonographic guidance, a 14-gauge coaxial needle was used to obtain 4 core biopsies.  A biopsy marking clip was then placed.  Digital mammograms performed in a separate room, using separate equipment, to document clip placement. The mammogram showed the clip to be in good position. The patient tolerated this well; there are no immediate complications.    IMPRESSION: 1. Ultrasound-guided biopsy of mass in the left breast. Pathology pending. 2. Post procedure mammogram for marker placement     US Breast Left    Result Date: 11/15/2024  EXAM: MA DIAGNOSTIC LEFT W/ ELDER, US BREAST LEFT LIMITED 1-3 QUADRANTS, 11/15/2024 10:57 AM COMPARISONS: 11/12/2024, 7/11/2022 HISTORY: Possible asymmetry in the left breast in the 2:00 position in the posterior depth BREAST DENSITY: The breasts are heterogeneously dense, which may obscure small masses. FINDINGS: There are a few scattered benign-appearing round calcifications. On the additional tomographic images persistent focal asymmetry and subtle distortion are present in the 2:00 position in the posterior depth. ULTRASOUND: Targeted left breast ultrasound was performed by both the ultrasonographer and the radiologist. In the 2:00 position 7 cm from the nipple there was focal dense glandular tissue with central hypoechoic shadowing area measuring 20 x 17 x 24 mm correlating in size, shape, and location with the mammographic abnormality.     IMPRESSION: BI-RADS CATEGORY: 4 - Suspicious.  Subtle increased density in the left breast in the 12:00 position with correlating abnormality seen on ultrasound. Ultrasound-guided core biopsy for tissue diagnosis is recommended. RECOMMENDED FOLLOW-UP: Biopsy. The findings and the recommendations were  discussed with the patient at the time of exam. We are helping her with scheduling the biopsy. EDUARDO CHIU MD   SYSTEM ID:  [id number]    MA Diagnostic Left w/Elder    Result Date: 11/15/2024  EXAM: MA DIAGNOSTIC LEFT W/ ELDER, US BREAST LEFT LIMITED 1-3 QUADRANTS, 11/15/2024 10:57 AM COMPARISONS: 11/12/2024, 7/11/2022 HISTORY: Possible asymmetry in the left breast in the 2:00 position in the posterior depth BREAST DENSITY: The breasts are heterogeneously dense, which may obscure small masses. FINDINGS: There are a few scattered benign-appearing round calcifications. On the additional tomographic images persistent focal asymmetry and subtle distortion are present in the 2:00 position in the posterior depth. ULTRASOUND: Targeted left breast ultrasound was performed by both the ultrasonographer and the radiologist. In the 2:00 position 7 cm from the nipple there was focal dense glandular tissue with central hypoechoic shadowing area measuring 20 x 17 x 24 mm correlating in size, shape, and location with the mammographic abnormality.     IMPRESSION: BI-RADS CATEGORY: 4 - Suspicious.  Subtle increased density in the left breast in the 12:00 position with correlating abnormality seen on ultrasound. Ultrasound-guided core biopsy for tissue diagnosis is recommended. RECOMMENDED FOLLOW-UP: Biopsy. The findings and the recommendations were discussed with the patient at the time of exam. We are helping her with scheduling the biopsy. EDUARDO CHIU MD   SYSTEM ID:  [id number]         Assessment and Plan    Stage IIIc breast cancer HER2 positive, ER/KS positive  Patient is here to start treatment.  Her workup is complete.  She had a CT scan done which had shown questionable lesions in the liver a follow-up MRI of the liver done which showed them to be hemangioma and benign there are no metastatic lesions in the liver all this imaging studies were personally reviewed by.  Patient is going to start neoadjuvant  treatment today with TCHP.  The side effect of treatment including but not limited to alopecia tiredness fatigue nausea vomiting diarrhea mucositis neutropenia neutropenic fever and sepsis were all discussed with the patient in detail she was given an opportunity to ask questions which were answered to her satisfaction.  She will get her first dose of chemotherapy today and will come back in 1 week for a symptom check.  Her second cycle will be given in 3 weeks.  I will plan to do imaging of her breast after 3 cycles.  Patient will be referred for surgery after 6 cycles are completed.  Post surgical treatment will depend on the degree of pathologic response.  1.  Patient to start TCHP today.  Symptom check in 1 week with our nurse practitioner second cycle in 3 weeks.  Breast imaging will be done after 3 cycles.  Patient to receive prophylactic Neulasta after every cycle  2.  All radiologic imaging including CT chest abdomen pelvis MRI of the liver and MRI of the breast were personally reviewed by me no evidence of metastatic disease  3. Still awaiting pathology report from her dermatologist of the lesion removed from her leg   4. patient will need cardiac monitoring and an echo will be done after the third cycle.  5.  Patient again had questions about a lumpectomy versus a mastectomy.  I explained to her that patient would have a choice between those surgeries at the end of neoadjuvant treatment and we will discuss this further.    Signed by: Josseline Harkins MD        CC: Kallie Ta NP

## 2024-12-16 ENCOUNTER — PATIENT OUTREACH (OUTPATIENT)
Dept: ONCOLOGY | Facility: HOSPITAL | Age: 59
End: 2024-12-16
Payer: COMMERCIAL

## 2024-12-16 DIAGNOSIS — C50.412 MALIGNANT NEOPLASM OF UPPER-OUTER QUADRANT OF LEFT BREAST IN FEMALE, ESTROGEN RECEPTOR POSITIVE (H): Primary | ICD-10-CM

## 2024-12-16 DIAGNOSIS — Z17.0 MALIGNANT NEOPLASM OF UPPER-OUTER QUADRANT OF LEFT BREAST IN FEMALE, ESTROGEN RECEPTOR POSITIVE (H): Primary | ICD-10-CM

## 2024-12-16 NOTE — PROGRESS NOTES
[facility]: Cancer Care Follow-Up Note                                    Discussion with Patient:                                                      Called Adrienne to follow up post chemo to check in to see how she is doing.  She relays that she has experienced fatigue post treatment and has been resting more than usual. She has been taking walks daily. Encouraged her to continue to alternate rest and activity.  She is eating small frequent meals and drinking several glasses of fluid daily. She does note metal taste in her mouth. Encouraged her to use plastic silverware vs metal and to experiment with different spices, and food textures. She does not have any more sores but does not mouth tenderness. Recipe for salt and soda rinse was given and instructed to rinse 5-6 times per day and mix new batch up daily.  She did get neulasta on Day 2 and took Claritin the night before her dose and took for 3 days. She noted that she has deep aches in her low back and femurs,  she relays achy pain 3, pain is 2. Instructed that she should take the Claritin for 5-7 days post her next neulasta injection, next time,  to help prevent the bone pain. Can take tylenol if needed for discomfort, only if needed.  She does report nausea when waking in the morning that has cristobal well managed buy one compazine, No emesis or dry heaves.  Does report diarrhea that started on Sunday, 12/15 with 2 liquid stools and 3 liquid stools on 12/17. She has not taken imodium. Reviewed instructions on how to take  imodium and she took 2 while on phone with writer. She was encouraged to continue to hydrate.  Discussed  supportive services and would like to talk with CHARITY about support group options. She did  complete application for [employer] today. She is receiving less pay related to time off from work and would benefit from financial resources such as [employer] to help with gas and or grocery cards  SW referral placed today.    Dates  of Treatment:                                                      Infusion given in last 28 days       Administered MAR Action Medication Dose Rate Visit    12/12/2024 10:55 New Bag pertuzumab (PERJETA) 840 mg in sodium chloride 0.9 % 303 mL infusion 840 mg 303 mL/hr Infusion Therapy Visit on 12/12/2024 in [facility]    12/12/2024 11:59 New Bag trastuzumab-qyyp (TRAZIMERA) 790 mg in sodium chloride 0.9 % 312.62 mL infusion 790 mg 208.4 mL/hr Infusion Therapy Visit on 12/12/2024 in [facility]    12/12/2024 13:40 New Bag DOCEtaxel (TAXOTERE) 160 mg in sodium chloride 0.9% in non-PVC container 283 mL infusion 160 mg 283 mL/hr Infusion Therapy Visit on 12/12/2024 in [facility]    12/12/2024 15:14 New Bag CARBOplatin 690 mg in sodium chloride 0.9 % 344 mL infusion 690 mg 688 mL/hr Infusion Therapy Visit on 12/12/2024 in [facility]            Assessment:                                                      Post chemo check in, see documentation above    Intervention/Education provided during outreach:                                                       See above    Confirmed patient has clinic and triage numbers    Signature:  Radha Stephen RN

## 2024-12-17 ENCOUNTER — VIRTUAL VISIT (OUTPATIENT)
Dept: ONCOLOGY | Facility: HOSPITAL | Age: 59
End: 2024-12-17
Attending: DIETITIAN, REGISTERED
Payer: COMMERCIAL

## 2024-12-17 VITALS — WEIGHT: 212 LBS | BODY MASS INDEX: 35.32 KG/M2 | HEIGHT: 65 IN

## 2024-12-17 DIAGNOSIS — Z17.0 MALIGNANT NEOPLASM OF UPPER-OUTER QUADRANT OF LEFT BREAST IN FEMALE, ESTROGEN RECEPTOR POSITIVE (H): Primary | ICD-10-CM

## 2024-12-17 DIAGNOSIS — C50.412 MALIGNANT NEOPLASM OF UPPER-OUTER QUADRANT OF LEFT BREAST IN FEMALE, ESTROGEN RECEPTOR POSITIVE (H): Primary | ICD-10-CM

## 2024-12-17 PROCEDURE — 97802 MEDICAL NUTRITION INDIV IN: CPT | Mod: GT,95 | Performed by: DIETITIAN, REGISTERED

## 2024-12-17 ASSESSMENT — PAIN SCALES - GENERAL: PAINLEVEL_OUTOF10: MILD PAIN (3)

## 2024-12-17 NOTE — NURSING NOTE
Patient confirms medications and allergies are accurate via patients echeck in completion, and or denies any changes since last reviewed/verified.     Current patient location: [address] [place] [address]    Is the patient currently in the state of [place]? YES    Visit mode:VIDEO    If the visit is dropped, the patient can be reconnected by:VIDEO VISIT: Text to cell phone:   Telephone Information:   Mobile [phone]       Will anyone else be joining the visit? NO  (If patient encounters technical issues they should call [phone]    Are changes needed to the allergy or medication list? No    Are refills needed on medications prescribed by this physician? NO    Rooming Documentation:  Questionnaire(s) not done per department protocol    Reason for visit: Consult    Areli BOBF

## 2024-12-17 NOTE — PROGRESS NOTES
Virtual Visit Details    Type of service:  Video Visit     Originating Location (pt. Location): Home  Distant Location (provider location):  Off-site  Platform used for Video Visit: [facility]    CLINICAL NUTRITION SERVICES - ASSESSMENT NOTE    Adrienne Ivory 58 year old referred for MNT related to breast cancer     Time Spent: 24 minutes  Visit Type: initial video visit  Pt accompanied by: self  Referring Physician: Dr. Harkins    NUTRITION HISTORY  Factors affecting nutrition intake include:diarrhea and nausea (managed with medications), taste changes  Current diet/appetite: regular diet/ fair appetite  Chemotherapy: ongoing  Radiation: n/a    Diet Recall  Adrienne is eating small, frequent meals. Commonly consumed foods include greek yogurt with maple syrup, English muffin, homemade meals (recently had some beef stew made by partner), sandwiches, and protein shakes. She is going to try oatmeal tomorrow. She can only eat small amounts in one sitting. She was consistently drink [employer] shakes in the past and is planning to start drinking these again as they worked well for her. Each shake has 240 calories and 25 g protein (and she sometimes uses these as a base for a homemade smoothie). She is drinking water and small amounts of tea and Sprite.    Treatment Plan:  Oncology History   Breast cancer (H)   11/25/2024 Initial Diagnosis    Breast cancer (H)     12/12/2024 -  Chemotherapy    OP ONC Breast Cancer Neoadjuvant - DOCEtaxel / CARBOplatin / TRASTuzumab / Pertuzumab (TCHP)  Plan Provider: Hang Harkins MD  Treatment goal: Curative  Line of treatment: First Line     12/12/2024 - 12/12/2024 Chemotherapy    OP ONC Breast Cancer - DOCEtaxel / CARBOplatin / TRASTuzumab (TCH) EVERY 3 WEEKS  Plan Provider: Hang Harkins MD  Treatment goal: Curative  Line of treatment: First Line       Treatment plan has been reviewed.    ANTHROPOMETRICS  Height:   Ht Readings from Last 1 Encounters:   12/17/24 1.651  "m (5' 5\")     Weight:   Wt Readings from Last 1 Encounters:   12/17/24 96.2 kg (212 lb)     BMI: 35.3 kg/m2  Weight Status:  Obesity Grade II BMI 35-39.9  Weight History: Weight is down 7 lbs (3%) in the past 1 month.  Wt Readings from Last 10 Encounters:   12/17/24 96.2 kg (212 lb)   12/12/24 97.1 kg (214 lb)   11/25/24 99.3 kg (219 lb)   11/25/24 99.3 kg (219 lb)   02/21/24 99.3 kg (219 lb)   11/08/22 103.9 kg (229 lb)   03/02/22 99.3 kg (219 lb)   06/28/21 96.2 kg (212 lb)   12/04/20 90.3 kg (199 lb)   09/16/19 91.8 kg (202 lb 4.8 oz)       Dosing Weight: 67 kg (adj for >120% IBW)    Medications/vitamins/minerals/herbals:   Reviewed    Labs:   Labs reviewed    ASSESSED NUTRITION NEEDS:  Estimated Energy Needs: 2195-0835 kcals (25-30 Kcal/Kg)  Justification: maintenance  Estimated Protein Needs:  grams protein (1.2-1.5 g pro/Kg)  Justification: increased needs  Estimated Fluid Needs: 0286-9110 mL   Justification: maintenance    MALNUTRITION:  % Weight Loss:  Weight loss does not meet criteria for malnutrition (3% in 1 month)  % Intake:  <75% for > 7 days (moderate malnutrition)  Subcutaneous Fat Loss:  None observed  Muscle Loss:  None observed  Fluid Retention:  None noted    Malnutrition Diagnosis: Patient does not meet two of the above criteria necessary for diagnosing malnutrition    NUTRITION DIAGNOSIS:  Inadequate oral intake related to decreased ability to consume sufficient energy due to side effects of cancer therapy as evidenced by dietary intake 50-75% estimated needs.      INTERVENTIONS  Provided written & verbal education:     - Discussed nutrition and hydration needs. Encouraged pt to aim for at least 80 g protein. Encouraged continued efforts to take in adequate fluids.  - Discussed strategies to help fortify meals and snacks. Encouraged to focus on small, frequent meals.    - Reviewed sources of protein. Encouraged to have a protein source with each meal and snack.    - Reviewed common " barriers to eating with cancer treatment.  Discussed ways to cope with taste changes and low appetite/ early satiety.   - Reviewed high calorie/high protein oral nutrition supplement options. Reviewed current preferred supplement drink.  - Encouraged utilizing these ONS in home made shakes/smoothies to prevent flavor fatigue.      Provided pt with corresponding education materials/handouts on:  Maximizing Nutrition During Cancer Treatment, Sources of Protein, Six Small Meals, Coping with Taste Changes    Pt verbalize understanding of materials provided during consult.   Patient Understanding: Excellent  Expected patient engagement: Excellent     Goals  1. Aim for 5-6 small frequent meals  2. Aim for 1700 kcal and 80 g protein, 8 cups fluids/day  3. Weight maintenance     Follow-Up Plans: Pt has RD contact information for questions.      MONITORING AND EVALUATION:  -Food/beverage intake  -Weight trends      Emiliana Caicedo, MS, RD, LD

## 2024-12-18 ENCOUNTER — TELEPHONE (OUTPATIENT)
Dept: ONCOLOGY | Facility: HOSPITAL | Age: 59
End: 2024-12-18
Payer: COMMERCIAL

## 2024-12-18 NOTE — TELEPHONE ENCOUNTER
"Dr. Harkins, please review and advise.  Patient called in to report severe lumbar back spasms,   Started last night, intense, 8 of 10 pain, \"takes my breath away\". \"Squeezing, grabbing and twisting pain.\" She was awake for four hours. Feels like she is going to have loose stools, had one episode of diarrhea, took imodium. She said it feels like she is going to throw up, gnawing in esophagus. Prilosec daily and she did use prochlorperazine, as well.  She did take Tylenol two tabs and was able to get back to sleep. She also used Claritin daily after treatment as directed.   Had first cycle TCH on 12/12/24.  Diandra Fontanez RN    "

## 2024-12-18 NOTE — TELEPHONE ENCOUNTER
Per Dr. Harkins:  should continue to use tylenol. should call back if not helping. If not taking claritin now should take for 3 days.     Called patient, she agrees with plan. She has Tylenol 500 mg tabs, will try to limit to 3 grams daily. Will also continue to take Claritin.  RN also advised she can try cold or heat, limiting to no more than 15 minutes each application.   She will call if no relief., or worsening.  Diandra Fontanez RN

## 2024-12-19 ENCOUNTER — INFUSION THERAPY VISIT (OUTPATIENT)
Dept: INFUSION THERAPY | Facility: HOSPITAL | Age: 59
End: 2024-12-19
Attending: INTERNAL MEDICINE
Payer: COMMERCIAL

## 2024-12-19 ENCOUNTER — ONCOLOGY VISIT (OUTPATIENT)
Dept: ONCOLOGY | Facility: HOSPITAL | Age: 59
End: 2024-12-19
Attending: INTERNAL MEDICINE
Payer: COMMERCIAL

## 2024-12-19 ENCOUNTER — PATIENT OUTREACH (OUTPATIENT)
Dept: CARE COORDINATION | Facility: CLINIC | Age: 59
End: 2024-12-19

## 2024-12-19 VITALS
DIASTOLIC BLOOD PRESSURE: 74 MMHG | WEIGHT: 212.5 LBS | HEIGHT: 65 IN | SYSTOLIC BLOOD PRESSURE: 128 MMHG | RESPIRATION RATE: 18 BRPM | TEMPERATURE: 98.5 F | BODY MASS INDEX: 35.4 KG/M2 | HEART RATE: 113 BPM | OXYGEN SATURATION: 95 %

## 2024-12-19 DIAGNOSIS — C50.912 INVASIVE DUCTAL CARCINOMA OF LEFT BREAST (H): Primary | ICD-10-CM

## 2024-12-19 DIAGNOSIS — Z17.0 MALIGNANT NEOPLASM OF UPPER-INNER QUADRANT OF LEFT BREAST IN FEMALE, ESTROGEN RECEPTOR POSITIVE (H): ICD-10-CM

## 2024-12-19 DIAGNOSIS — C50.919 BREAST CANCER (H): Primary | ICD-10-CM

## 2024-12-19 DIAGNOSIS — C50.212 MALIGNANT NEOPLASM OF UPPER-INNER QUADRANT OF LEFT BREAST IN FEMALE, ESTROGEN RECEPTOR POSITIVE (H): ICD-10-CM

## 2024-12-19 LAB
ALBUMIN SERPL BCG-MCNC: 3.9 G/DL (ref 3.5–5.2)
ALP SERPL-CCNC: 156 U/L (ref 40–150)
ALT SERPL W P-5'-P-CCNC: 109 U/L (ref 0–50)
ANION GAP SERPL CALCULATED.3IONS-SCNC: 9 MMOL/L (ref 7–15)
AST SERPL W P-5'-P-CCNC: 66 U/L (ref 0–45)
BASOPHILS # BLD MANUAL: 0 10E3/UL (ref 0–0.2)
BASOPHILS NFR BLD MANUAL: 0 %
BILIRUB SERPL-MCNC: 0.3 MG/DL
BUN SERPL-MCNC: 11.5 MG/DL (ref 6–20)
CALCIUM SERPL-MCNC: 9.2 MG/DL (ref 8.8–10.4)
CHLORIDE SERPL-SCNC: 101 MMOL/L (ref 98–107)
CREAT SERPL-MCNC: 0.88 MG/DL (ref 0.51–0.95)
EGFRCR SERPLBLD CKD-EPI 2021: 76 ML/MIN/1.73M2
EOSINOPHIL # BLD MANUAL: 0.4 10E3/UL (ref 0–0.7)
EOSINOPHIL NFR BLD MANUAL: 1 %
ERYTHROCYTE [DISTWIDTH] IN BLOOD BY AUTOMATED COUNT: 14.6 % (ref 10–15)
GLUCOSE SERPL-MCNC: 110 MG/DL (ref 70–99)
HCO3 SERPL-SCNC: 25 MMOL/L (ref 22–29)
HCT VFR BLD AUTO: 39.8 % (ref 35–47)
HGB BLD-MCNC: 13.4 G/DL (ref 11.7–15.7)
LDH SERPL L TO P-CCNC: 396 U/L (ref 0–250)
LYMPHOCYTES # BLD MANUAL: 4.9 10E3/UL (ref 0.8–5.3)
LYMPHOCYTES NFR BLD MANUAL: 14 %
MCH RBC QN AUTO: 28.5 PG (ref 26.5–33)
MCHC RBC AUTO-ENTMCNC: 33.7 G/DL (ref 31.5–36.5)
MCV RBC AUTO: 85 FL (ref 78–100)
METAMYELOCYTES # BLD MANUAL: 0.7 10E3/UL
METAMYELOCYTES NFR BLD MANUAL: 2 %
MONOCYTES # BLD MANUAL: 1.4 10E3/UL (ref 0–1.3)
MONOCYTES NFR BLD MANUAL: 4 %
MYELOCYTES # BLD MANUAL: 0.4 10E3/UL
MYELOCYTES NFR BLD MANUAL: 1 %
NEUTROPHILS # BLD MANUAL: 27.3 10E3/UL (ref 1.6–8.3)
NEUTROPHILS NFR BLD MANUAL: 78 %
PLAT MORPH BLD: ABNORMAL
PLATELET # BLD AUTO: 221 10E3/UL (ref 150–450)
POTASSIUM SERPL-SCNC: 4.1 MMOL/L (ref 3.4–5.3)
PROT SERPL-MCNC: 6.7 G/DL (ref 6.4–8.3)
RBC # BLD AUTO: 4.7 10E6/UL (ref 3.8–5.2)
RBC MORPH BLD: ABNORMAL
SODIUM SERPL-SCNC: 135 MMOL/L (ref 135–145)
WBC # BLD AUTO: 35 10E3/UL (ref 4–11)

## 2024-12-19 PROCEDURE — 36591 DRAW BLOOD OFF VENOUS DEVICE: CPT

## 2024-12-19 PROCEDURE — 80053 COMPREHEN METABOLIC PANEL: CPT

## 2024-12-19 PROCEDURE — 85007 BL SMEAR W/DIFF WBC COUNT: CPT

## 2024-12-19 PROCEDURE — 83615 LACTATE (LD) (LDH) ENZYME: CPT

## 2024-12-19 PROCEDURE — 99213 OFFICE O/P EST LOW 20 MIN: CPT | Performed by: NURSE PRACTITIONER

## 2024-12-19 PROCEDURE — 250N000011 HC RX IP 250 OP 636: Performed by: NURSE PRACTITIONER

## 2024-12-19 PROCEDURE — 85027 COMPLETE CBC AUTOMATED: CPT

## 2024-12-19 RX ORDER — HEPARIN SODIUM (PORCINE) LOCK FLUSH IV SOLN 100 UNIT/ML 100 UNIT/ML
5 SOLUTION INTRAVENOUS
OUTPATIENT
Start: 2024-12-19

## 2024-12-19 RX ORDER — HEPARIN SODIUM (PORCINE) LOCK FLUSH IV SOLN 100 UNIT/ML 100 UNIT/ML
5 SOLUTION INTRAVENOUS
Status: DISCONTINUED | OUTPATIENT
Start: 2024-12-19 | End: 2024-12-19 | Stop reason: HOSPADM

## 2024-12-19 RX ADMIN — Medication 5 ML: at 09:33

## 2024-12-19 ASSESSMENT — PAIN SCALES - GENERAL: PAINLEVEL_OUTOF10: NO PAIN (0)

## 2024-12-19 NOTE — LETTER
12/19/2024      Adrienne Ivory  [address]  [place] [address]      Dear Colleague,    Thank you for referring your patient, Adrienne Ivory, to the [facility] [place]. Please see a copy of my visit note below.    [facility] Hematology and Oncology Progress Note    Patient: Adrienne Ivory  MRN: [id number]  Date of Service: 12/19/2024        Reason for Visit    Chief Complaint   Patient presents with     Oncology Clinic Visit     Breast cancer (H)  Invasive ductal carcinoma of left breast (H)  Moderate dysplasia of cervix (ARNAV II)         Assessment and Plan     Cancer Staging   Invasive ductal carcinoma of left breast (H)  Staging form: Breast, AJCC 8th Edition  - Clinical: Stage IIIB (cT2, cN3, cM0, G2, ER+, CT-, HER2+) - Signed by Anastasiya Salgado APRN CNP on 12/19/2024    Breast cancer, Clinical stage III, ER+ and Her2 +: pt has started neoadjuvant treatment with TCHP. Will get 6 cycles of this and then go on to get surgery. Post treatment will be based on response found in surgery. She will return in 2 weeks for cycle 2.     Leukocytosis: likely from neulasta. No signs of infection. they will monitor temp and signs of infection and call us of they occur. Will continue to monitor labs at subsequent visits.     Mild expected side effects of chemo including fatigue, nausea, bone pain: Encouraged her to continue to take her medications.  I did tell her she can alternate Zofran and Compazine for her antiemetics.  Use what works best.  Encouraged small frequent meals and try to avoid foods that her nausea worse.  Ginger products.  Encouraged her to try to have healthy diet, stay hydrated and get daily exercise.  Use Tylenol as needed for any pain.  Pain likely was from Neulasta.  I did tell her that generally that does get better    Elevated liver function tests: likely from chemotherapy. Were normal last week when she received. These are mild. We will continue to  monitor and make adjustments as necessary.         ECOG Performance    0 - Independent    Distress Screening (within last 30 days)    1. How concerned are you about your ability to eat? : 0  2. How concerned are you about unintended weight loss or your current weight? : 0  3. How concerned are you about feeling depressed or very sad? : 0  4. How concerned are you about feeling anxious or very scared? : 5 (the unknown)  5. Do you struggle with the loss of meaning and travis in your life? : Not at all  6. How concerned are you about work and home life issues that may be affected by your cancer? : 0  7. How concerned are you about knowing what resources are available to help you? : 0  8. Do you currently have what you would describe as Religion or spiritual struggles?: Not at all       Pain  Pain Score: No Pain (0)    Problem List    Patient Active Problem List   Diagnosis     Anxiety state     Premenstrual tension syndrome     Moderate dysplasia of cervix (ARNAV II)     Herpes simplex virus (HSV) infection     Insomnia     Dysmenorrhea     ADHD (attention deficit hyperactivity disorder)     CARDIOVASCULAR SCREENING; LDL GOAL LESS THAN 160     Left ankle pain     Major depressive disorder, recurrent episode, moderate (H)     Anxiety     Class 2 obesity due to excess calories with body mass index (BMI) of 36.0 to 36.9 in adult, unspecified whether serious comorbidity present     Breast cancer (H)     Invasive ductal carcinoma of left breast (H)        ______________________________________________________________________________    History of Present Illness    Oncologist: Dr. Harkins    Diagnosis: Breast cancer, Clinical stage T2N3M0. Left side.   -11/18/24: left breast biopsy- invasive ductal cancer. Grade 2. ER + (80%) [place]- Her2/wolf 3+  -12/9/24: lymph node positive for breast cancer. ER+[place]-Her2+. Also had left breast and right breast mass biopsied   -12/3/24: CT: 1.  Asymmetric soft tissue density within the upper outer  quadrant of the left breast, likely corresponding to known malignancy. 2.  Mild left axillary and subpectoral lymphadenopathy, likely metastatic.3.  Prominent and mildly enlarged left supraclavicular lymph nodes, also likely metastatic.4.  Indeterminate low-attenuation hepatic lesions, measuring up to 14 mm. Correlation with liver MR imaging is recommended, as metastases cannot be excluded.  -12/5/24: Liver MRI: 1.  No suspicious hepatic mass lesions. 2.  Left breast mass with partially visualized left axillary adenopathy.    Treatment:   -12/12/24: Neoadjuvant TCHP.  Cycle 1, day 8    Interim History:   Pt is here today for toxicity check after starting chemo.  Overall she states she is doing okay.  She states she definitely felt exhausted, having loose stools and nausea.  She is only thrown up once.  Taking antiemetics they seem to help.  She also is taking Imodium which seems to help.  She denies any fevers or chills or other infectious complaints.  She denies any skin issues.  Denies any chest pain or palpitations.  Patient states that she cannot really feel her tumor.  She also states that she had 1 day where she had a lot of back pain and for a couple days after the chemo she had some hip and leg pain.  Tylenol seem to work well for that pain.      Review of Systems    Pertinent items are noted in HPI.    Past History    Past Medical History:   Diagnosis Date     Abnormal Pap smear, can't excl hi gd sq intraepithelial lesion (ASC-H) 03/01/2015    LSIL also     Anxiety state, unspecified      Herpes simplex without mention of complication      HSIL on Pap smear of cervix 07/01/2014    colp - WNL     Human papillomavirus in conditions classified elsewhere and of unspecified site 11/01/2010    + HPV 45 & 82 with ASCUS     Hx of colposcopy with cervical biopsy 11/08/2016    [place] ECC neg.      Obese      PONV (postoperative nausea and vomiting)      Premenstrual tension syndromes        PHYSICAL EXAM  /74    "Pulse 113   Temp 98.5  F (36.9  C)   Resp 18   Ht 1.651 m (5' 5\")   Wt 96.4 kg (212 lb 8 oz)   SpO2 95%   BMI 35.36 kg/m      GENERAL: no acute distress. Cooperative in conversation. Alone in clinic  RESP: Regular respiratory rate. No expiratory wheezes   NEURO: non focal. Alert and oriented x3.   PSYCH: within normal limits. No depression or anxiety.  SKIN: exposed skin is dry intact.     Lab Results    Recent Results (from the past week)   Comprehensive metabolic panel   Result Value Ref Range    Sodium 135 135 - 145 mmol/L    Potassium 4.1 3.4 - 5.3 mmol/L    Carbon Dioxide (CO2) 25 22 - 29 mmol/L    Anion Gap 9 7 - 15 mmol/L    Urea Nitrogen 11.5 6.0 - 20.0 mg/dL    Creatinine 0.88 0.51 - 0.95 mg/dL    GFR Estimate 76 >60 mL/min/1.73m2    Calcium 9.2 8.8 - 10.4 mg/dL    Chloride 101 98 - 107 mmol/L    Glucose 110 (H) 70 - 99 mg/dL    Alkaline Phosphatase 156 (H) 40 - 150 U/L    AST 66 (H) 0 - 45 U/L     (H) 0 - 50 U/L    Protein Total 6.7 6.4 - 8.3 g/dL    Albumin 3.9 3.5 - 5.2 g/dL    Bilirubin Total 0.3 <=1.2 mg/dL   Lactate Dehydrogenase   Result Value Ref Range    Lactate Dehydrogenase 396 (H) 0 - 250 U/L   CBC with platelets and differential   Result Value Ref Range    WBC Count 35.0 (H) 4.0 - 11.0 10e3/uL    RBC Count 4.70 3.80 - 5.20 10e6/uL    Hemoglobin 13.4 11.7 - 15.7 g/dL    Hematocrit 39.8 35.0 - 47.0 %    MCV 85 78 - 100 fL    MCH 28.5 26.5 - 33.0 pg    MCHC 33.7 31.5 - 36.5 g/dL    RDW 14.6 10.0 - 15.0 %    Platelet Count 221 150 - 450 10e3/uL   Manual Differential   Result Value Ref Range    % Neutrophils 78 %    % Lymphocytes 14 %    % Monocytes 4 %    % Eosinophils 1 %    % Basophils 0 %    % Metamyelocytes 2 %    % Myelocytes 1 %    Absolute Neutrophils 27.3 (H) 1.6 - 8.3 10e3/uL    Absolute Lymphocytes 4.9 0.8 - 5.3 10e3/uL    Absolute Monocytes 1.4 (H) 0.0 - 1.3 10e3/uL    Absolute Eosinophils 0.4 0.0 - 0.7 10e3/uL    Absolute Basophils 0.0 0.0 - 0.2 10e3/uL    Absolute " Metamyelocytes 0.7 (H) <=0.0 10e3/uL    Absolute Myelocytes 0.4 (H) <=0.0 10e3/uL    RBC Morphology Confirmed RBC Indices     Platelet Assessment  Automated Count Confirmed. Platelet morphology is normal.     Automated Count Confirmed. Platelet morphology is normal.       Imaging    US Breast Biopsy Core Lymph Node Left    Addendum Date: 12/11/2024    Pathology shows metastatic adenocarcinoma. This is consistent with imaging findings. A verbal report was given to the patient. Surgical and oncological consultation is recommended.    Result Date: 12/11/2024  LEFT LYMPH NODE ULTRASOUND GUIDED BIOPSY, CLIP PLACEMENT: INDICATIONS: Abnormal Lymph Node PROCEDURE: Informed consent was obtained from the patient. Ultrasound was used to localize the lymph node in the left axilla. The skin was prepped and draped in a sterile fashion. Lidocaine was used for local anesthesia. Under direct sonographic guidance, a 18 gauge coaxial needle was used to obtain 2 core biopises. A clip was then placed. Digital mammograms performed in separate room, using separate equipment to document clip placement, demonstrates the clip in appropriate position. The patient tolerated this well, there are no immediate complications.     IMPRESSION: Ultrasound guided biopsy of a suspicious lymph node in the left axilla. Pathology pending.     US Breast Biopsy Core Needle Left    Addendum Date: 12/11/2024    Pathology shows benign breast tissue. This is consistent with imaging findings. A verbal report was given to the patient. Surgical consultation is recommended for the patients know left breast cancer.    Result Date: 12/11/2024  US Breast Biopsy Core Needle Left INDICATION: Left breast mass. PROCEDURE: Informed consent was obtained from the patient. Ultrasound was used to localize the mass at the 6 o'clock position of the left breast, 3 cm from the nipple.  The skin was marked, then prepped and draped in a sterile fashion. 1% lidocaine was used for  local anesthesia. Under direct sonographic guidance, a  18 -gauge coaxial needle was used to obtain 2 core biopsies.  A biopsy marking clip was then placed.  Digital mammograms performed in a separate room, using separate equipment, to document clip placement. The mammogram showed the clip to be in good position. The patient tolerated this well; there are no immediate complications.    IMPRESSION: 1. Ultrasound-guided biopsy of mass in the left breast. Pathology pending. 2. Post procedure mammogram for marker placement     MA Post Procedure Left    Addendum Date: 12/11/2024    Pathology shows benign breast tissue. This is consistent with imaging findings. A verbal report was given to the patient. Surgical consultation is recommended for the patients know left breast cancer.    Result Date: 12/11/2024  US Breast Biopsy Core Needle Left INDICATION: Left breast mass. PROCEDURE: Informed consent was obtained from the patient. Ultrasound was used to localize the mass at the 6 o'clock position of the left breast, 3 cm from the nipple.  The skin was marked, then prepped and draped in a sterile fashion. 1% lidocaine was used for local anesthesia. Under direct sonographic guidance, a  18 -gauge coaxial needle was used to obtain 2 core biopsies.  A biopsy marking clip was then placed.  Digital mammograms performed in a separate room, using separate equipment, to document clip placement. The mammogram showed the clip to be in good position. The patient tolerated this well; there are no immediate complications.    IMPRESSION: 1. Ultrasound-guided biopsy of mass in the left breast. Pathology pending. 2. Post procedure mammogram for marker placement     US Breast Biopsy Core Needle Right    Addendum Date: 12/11/2024    Pathology shows benign breast tissue. This is consistent with imaging findings. A verbal report was given to the patient. Repeat routine screening mammogram in one year is recommended.    Result Date:  12/11/2024  US Breast Biopsy Core Needle Right INDICATION: Right breast mass. PROCEDURE: Informed consent was obtained from the patient. Ultrasound was used to localize the mass at the 1 o'clock position of the right breast, 2 cm from the nipple.  The skin was marked, then prepped and draped in a sterile fashion. 1% lidocaine was used for local anesthesia. Under direct sonographic guidance, a  18 -gauge coaxial needle was used to obtain 2 core biopsies.  A biopsy marking clip was then placed.  Digital mammograms performed in a separate room, using separate equipment, to document clip placement. The mammogram showed the clip to be in good position. The patient tolerated this well; there are no immediate complications.    IMPRESSION: 1. Ultrasound-guided biopsy of mass in the right breast. Pathology pending. 2. Post procedure mammogram for marker placement     MA Post Procedure Right    Addendum Date: 12/11/2024    Pathology shows benign breast tissue. This is consistent with imaging findings. A verbal report was given to the patient. Repeat routine screening mammogram in one year is recommended.    Result Date: 12/11/2024  US Breast Biopsy Core Needle Right INDICATION: Right breast mass. PROCEDURE: Informed consent was obtained from the patient. Ultrasound was used to localize the mass at the 1 o'clock position of the right breast, 2 cm from the nipple.  The skin was marked, then prepped and draped in a sterile fashion. 1% lidocaine was used for local anesthesia. Under direct sonographic guidance, a  18 -gauge coaxial needle was used to obtain 2 core biopsies.  A biopsy marking clip was then placed.  Digital mammograms performed in a separate room, using separate equipment, to document clip placement. The mammogram showed the clip to be in good position. The patient tolerated this well; there are no immediate complications.    IMPRESSION: 1. Ultrasound-guided biopsy of mass in the right breast. Pathology pending. 2.  Post procedure mammogram for marker placement     US Breast Bilateral Limited 1-3 Quadrants    Result Date: 12/9/2024  EXAM: US BREAST BILATERAL LIMITED 1-3 QUADRANTS, 12/9/2024 8:19 AM COMPARISONS: Breast MRI from 11/27/2024 and mammograms and breast ultrasounds from 11/18/2024, 11/15/2024, 11/12/2024 HISTORY: Known left breast cancer with solitary lesion seen in the right breast in the 12:00 position and 2 small lesions seen in the left breast on breast MRI. Lymphadenopathy noted on the breast MRI in the left axilla FINDINGS: Targeted bilateral breast ultrasound was performed by both the ultrasonographer and the radiologist. In the right breast in the 1:00 position 2 cm from the nipple there was a hyperechoic oval circumscribed lesion with parallel orientation measuring 7 x 5 x 7 mm correlating with the abnormality seen in this location on the patient's breast MRI. In the left breast in the 6:00 position 3 cm from the nipple there is a similar-appearing lesion measuring 5 x 4 x 4 mm. Both have an appearance most consistent with a benign hemangioma and correlate with MRI abnormality seen in these locations. The additional left breast lesion seen on MRI is not identified on ultrasound but had a similar appearance to the other 2 lesions on the MRI. The left axilla was examined as well demonstrating multiple abnormal-appearing lymph nodes with cortical thickening to 6 mm.     IMPRESSION: BI-RADS CATEGORY: 6 - Known Biopsy-Proven Malignancy. Small lesions in both breasts have an appearance most consistent with benign hemangiomas. Given the patient's history of known left breast cancer, ultrasound-guided core biopsy of both lesions will be performed. Given that they have a similar appearance on MRI to the third lesion seen in the left breast on MRI MRI guided biopsy seems unnecessary. This was discussed with Dr. Bonilla. Ultrasound-guided core biopsy of one of the abnormal lymph nodes in the left axilla will also be  performed. RECOMMENDED FOLLOW-UP: Biopsy. The findings and the recommendations were discussed with the patient at the time of exam. Ultrasound guided core biopsy of the hyperechoic lesions in the left breast in the 6:00 position 3 cm from the nipple and in the right breast in the 1:00 position 2 cm from the nipple as well as one of the left axillary lymph nodes will be performed today. EDUARDO CHIU MD   SYSTEM ID:  [id number]    MR Liver wo & w Contrast    Result Date: 12/5/2024  EXAM: MR LIVER W/O and W CONTRAST LOCATION: [facility] DATE: 12/5/2024 INDICATION:  Liver lesion COMPARISON: CT dated 12/3/2024 TECHNIQUE: Routine MRI liver protocol including T1 in/out phase, diffusion, multiplane T2, and dynamic T1 with IV contrast.  CONTRAST: 10 mL Gadavist FINDINGS: LUNG BASES: Left breast mass measuring up to 2.3 cm, and adjacent adenopathy redemonstrated. LIVER: 2.4 cm left hepatic lobe cyst. Scattered additional low-attenuation lesion seen at CT are consistent with benign hemangiomas, largest centrally in the right hepatic lobe reaching 1.4 cm, image 28 series 16. GALLBLADDER/BILIARY: No biliary dilatation PANCREAS: No pancreatic ductal dilatation. SPLEEN: Unremarkable. ADRENALS: Unremarkable. KIDNEYS: Tiny left renal cyst for which no additional follow-up imaging recommended. BOWEL: Visualized bowel is nondilated. LYMPH NODES: No significant upper abdominal adenopathy. VASCULATURE: Unremarkable. MUSCULOSKELETAL: No acute bony abnormalities. OTHER: No upper abdominal ascites.     IMPRESSION: 1.  No suspicious hepatic mass lesions. 2.  Left breast mass with partially visualized left axillary adenopathy.    CT Chest/Abdomen/Pelvis w Contrast    Result Date: 12/4/2024  EXAM: CT CHEST/ABDOMEN/PELVIS W CONTRAST LOCATION: [facility] DATE: 12/3/2024 INDICATION: Left breast malignancy. COMPARISON: None available. TECHNIQUE: CT scan of the chest, abdomen, and  pelvis was performed following injection of IV contrast. Multiplanar reformats were obtained. Dose reduction techniques were used. CONTRAST: 90 mL [id number]. FINDINGS: LUNGS AND PLEURA: Trachea and large airways are patent. No focal airspace consolidation. Mild dependent atelectasis.  No suspicious pulmonary nodule. No pneumothorax. No pleural effusion.  MEDIASTINUM/AXILLAE: Prominent and mildly enlarged left supraclavicular lymph nodes, largest measuring up to 10 mm in short axis, series 3 image 12. No mediastinal/hilar adenopathy. Thoracic esophagus is unremarkable. Mild left axillary and subpectoral lymphadenopathy. For reference, an enlarged left axillary lymph node measures up to 11 mm in short axis, series 3 image 38. Asymmetric soft tissue density within the upper outer quadrant of the left breast, likely corresponding to known malignancy, series 3 image 43. No thoracic aortic aneurysm. Heart is normal in size. No pericardial effusion. CORONARY ARTERY CALCIFICATION: None. HEPATOBILIARY: There are a few low-attenuation hepatic lesions, which are indeterminate. Largest measures up to 14 mm in the central right hepatic lobe. Correlation with liver MR imaging is recommended, as metastases cannot be excluded. A larger low-attenuation lesion along the falciform ligament, series 3 image 124, measures up to 24 mm and favors a benign cyst. Attention on liver MR imaging follow-up. Gallbladder is normal. No intrahepatic or intrahepatic biliary ductal dilatation. PANCREAS: Enhances normally. No peripancreatic inflammatory fat stranding. SPLEEN: Enhances normally. Normal size. ADRENAL GLANDS: Normal. KIDNEYS: Both kidneys enhance symmetrically, without hydronephrosis. No nephroureterolithiasis. Urinary bladder is unremarkable. PELVIC ORGANS: No suspicious abnormality. Calcified uterine fibroid. BOWEL: No evidence of acute gastrointestinal inflammation or obstruction. No intraperitoneal free fluid or free air. LYMPH  NODES: No suspicious abdominal or pelvic lymphadenopathy. VASCULATURE: No abdominal aortic aneurysm. MUSCULOSKELETAL: No suspicious abnormality. OTHER: No additionally significant abnormalities.     IMPRESSION: 1.  Asymmetric soft tissue density within the upper outer quadrant of the left breast, likely corresponding to known malignancy. 2.  Mild left axillary and subpectoral lymphadenopathy, likely metastatic. 3.  Prominent and mildly enlarged left supraclavicular lymph nodes, also likely metastatic. 4.  Indeterminate low-attenuation hepatic lesions, measuring up to 14 mm. Correlation with liver MR imaging is recommended, as metastases cannot be excluded.     Echocardiogram Complete    Result Date: 2024  [id number]^SEAMUS^CYRUS^[place] [address]  Name: BRYAN SHI MRN: [id number] : 1965 Study Date: 2024 10:19 AM Age: 58 yrs Gender: Female Patient Location: [facility] Reason For Study: Malignant neoplasm of upper-outer quadrant of left breast in [facility] Ordering Physician: CYRUS WAY Referring Physician: CYRUS WAY Performed By: KS  BSA: 2.1 m2 Height: 65 in Weight: 219 lb HR: 66 BP: 133/84 mmHg ______________________________________________________________________________ Procedure Complete Echo Adult. Myocardial Strain Imaging performed. Adequate quality two-dimensional was performed and interpreted. There is no comparison study available. No hemodynamically significant valvular abnormalities on 2D or color flow imaging. ______________________________________________________________________________ Interpretation Summary  The left ventricle is normal in size. Biplane LVEF is 57%. Regional wall motion is grossly normal but endocardial border definition is limited Global peak LV longitudinal strain is averaged at -20.5%. This is within reported normal limits (normal <-18%). No  hemodynamically significant valvular abnormalities on 2D or color flow imaging. There is no comparison study available. ______________________________________________________________________________ Left Ventricle The left ventricle is normal in size. There is normal left ventricular wall thickness. Left ventricular diastolic function is normal. Diastolic Doppler findings (E/E' ratio and/or other parameters) suggest left ventricular filling pressures are normal. Biplane LVEF is 57%. Global peak LV longitudinal strain is averaged at -20.5%. This is within reported normal limits (normal <-18%). Regional wall motion is grossly normal but endocardial border definition is limited.  Right Ventricle Normal right ventricle size and systolic function.  Atria Normal left atrial size. Right atrial size is normal. There is no color Doppler evidence of a PFO.  Mitral Valve The mitral valve leaflets are mildly thickened. There is no mitral regurgitation noted. There is no mitral valve stenosis.  Tricuspid Valve The tricuspid valve is not well visualized, but is grossly normal. There is trace tricuspid regurgitation. Right ventricular systolic pressure could not be approximated due to inadequate tricuspid regurgitation. There is no tricuspid stenosis.  Aortic Valve The aortic valve is trileaflet. No aortic regurgitation is present. No aortic stenosis is present.  Pulmonic Valve The pulmonic valve is not well visualized. There is no pulmonic valvular regurgitation. There is no pulmonic valvular stenosis.  Vessels The aorta root is normal. The thoracic aorta is normal. IVC diameter <2.1 cm collapsing >50% with sniff suggests a normal RA pressure of 3 mmHg.  Pericardium There is no pericardial effusion.  Rhythm Sinus rhythm was noted. ______________________________________________________________________________ MMode/2D Measurements & Calculations IVSd: 0.88 cm  LVIDd: 4.6 cm LVIDs: 3.0 cm LVPWd: 1.0 cm FS: 35.2 % LV mass(C)d: 150.3  grams LV mass(C)dI: 73.1 grams/m2 Ao root diam: 3.3 cm asc Aorta Diam: 3.5 cm LVOT diam: 2.3 cm LVOT area: 4.1 cm2 Ao root diam index Ht(cm/m): 2.0 Ao root diam index BSA (cm/m2): 1.6 Asc Ao diam index BSA (cm/m2): 1.7 Asc Ao diam index Ht(cm/m): 2.1 EF Biplane: 57.1 % LA Volume (BP): 44.5 ml  LA Volume Index (BP): 21.6 ml/m2 LA Volume Indexed (AL/bp): 22.4 ml/m2 RV Base: 3.9 cm RWT: 0.45 TAPSE: 2.1 cm  Time Measurements MM HR: 67.0 BPM  Doppler Measurements & Calculations MV E max ele: 92.2 cm/sec MV A max lee: 76.8 cm/sec MV E/A: 1.2 MV dec slope: 545.6 cm/sec2 MV dec time: 0.17 sec Ao V2 max: 121.0 cm/sec Ao max P.0 mmHg Ao V2 mean: 98.7 cm/sec Ao mean P.2 mmHg Ao V2 VTI: 29.5 cm YURY(I,D): 3.3 cm2 YURY(V,D): 3.6 cm2 LV V1 max P.5 mmHg LV V1 max: 105.6 cm/sec LV V1 VTI: 23.6 cm SV(LVOT): 96.0 ml SI(LVOT): 46.7 ml/m2 PA V2 max: 68.5 cm/sec PA max P.9 mmHg PA acc time: 0.12 sec AV Lee Ratio (DI): 0.87 YURY Index (cm2/m2): 1.6 E/E': 7.6 E/E' av.1 Lateral E/e': 7.6 Medial E/e': 10.5 Peak E' Lee: 12.2 cm/sec  RV S Lee: 14.8 cm/sec  Measurements from QLAB LV GLS Endo Peak A2C (AS): -21.1 % LV GLS Endo Peak A3C (AS): -20.4 % LV GLS Endo Peak A4C (AS): -19.8 % LV GLS Endo Peak Avg (AS): -20.5 % ______________________________________________________________________________ Report approved by: Solomon Laws 2024 12:44 PM       MR Breast Bilateral w/o & w Contrast    Result Date: 2024  EXAM: Bilateral Breast MRI with and without contrast, and computer aided kinetic analysis. LOCATION: [facility] DATE: 2024 HISTORY/INDICATION: 58-year-old woman with recent diagnosis left breast 2:00 invasive ductal carcinoma COMPARISON: 2024, 11/15/2024, 2022 TECHNIQUE: Multiplanar, multisequence imaging performed prior to contrast administration and at multiple time points after contrast administration. Post-processing including subtractions, MIPs and color  encoding of post contrast dynamic acquisitions. IV contrast: Gadavist 10mL SEDATION: None FINDINGS: Right Breast: Breast composition: Scattered fibroglandular tissue Background parenchymal enhancement: Mild There is an oval 9 x 5 x 4 mm mildly enhancing mass at 12:00 posterior depth axial CADstream image 150. Right Axilla: No lymphadenopathy. Right Internal Mammary Chain: No lymphadenopathy. Left Breast: Breast composition: Scattered fibroglandular tissue Background parenchymal enhancement: Mild The biopsy-proven malignancy is an irregular, avidly enhancing mass at 2:00 middle depth measuring 3.2 x 2.2 x 1.9 cm with associated biopsy clip. There are 2 additional smaller nonspecific oval enhancing masses in the retroareolar plane, middle depth measuring 6 x 4 x 3 mm, axial CADstream image 194, and posterior depth measuring 5 x 4 x 4 mm, axial CADstream image 197. Left Axilla: There is extensive axillary adenopathy, with approximately 9 abnormal level 1 lymph nodes measuring up to 3 cm, at least 5 abnormal level 2 lymph nodes, and approximately 4 asymmetric level 3 lymph nodes. Left Internal Mammary Chain: There are 2 diminutive, within normal limits medial internal mammary lymph nodes measuring 2 mm axial CADstream images 129 and 138, however asymmetric compared to the right. Evaluation of limited imaging through the chest and upper abdomen is unremarkable.     IMPRESSION: 1.  Biopsy-proven left 2:00 malignancy measuring 3.2 cm. 2.  Nonspecific small enhancing masses right 12:00 posterior depth, and left retroareolar plane middle and posterior depth, which given multiplicity and appearance are favored to be probably benign, and recommend at minimum a 6 month MRI follow-up if patient desires breast conserving/unilateral surgery. Of note, if there is patient/provider preference, MRI guided biopsies could be performed of the right side and a representative left site. 3.  Left level 1, level 2 and level 3 axillary  "adenopathy consistent with metastases. Targeted ultrasound with ultrasound-guided biopsy could be performed if this would affect patient management. 4.  A couple of diminutive left internal mammary lymph nodes are technically within normal limits, however asymmetric compared to the right and recommend attention to these on any follow-up imaging. Right Breast ACR BI-RADS Category: 3 - Probably Benign Finding-Short Interval Follow-Up Suggested. Left Breast ACR BI-RADS Category: 6 - Known Biopsy-Proven Malignancy-Appropriate Action Should Be Taken. RECOMMENDATION: Surgical Consultation     Total time spent with patient in face to face time, chart review and documentation was 30 minutes.      The longitudinal plan of care for the diagnosis(es)/condition(s) as documented were addressed during this visit. Due to the added complexity in care, I will continue to support Adrienne in the subsequent management and with ongoing continuity of care.      Signed by: AMARILIS Rao CNP    Oncology Rooming Note    December 19, 2024 9:07 AM   Adrienne Ivory is a 59 year old female who presents for:    Chief Complaint   Patient presents with     Oncology Clinic Visit     Breast cancer (H)  Invasive ductal carcinoma of left breast (H)  Moderate dysplasia of cervix (ARNAV II)       Initial Vitals: /74   Pulse 113   Temp 98.5  F (36.9  C)   Resp 18   Ht 1.651 m (5' 5\")   Wt 96.4 kg (212 lb 8 oz)   SpO2 95%   BMI 35.36 kg/m   Estimated body mass index is 35.36 kg/m  as calculated from the following:    Height as of this encounter: 1.651 m (5' 5\").    Weight as of this encounter: 96.4 kg (212 lb 8 oz). Body surface area is 2.1 meters squared.  No Pain (0) Comment: Data Unavailable   No LMP recorded. Patient has had an ablation.  Allergies reviewed: Yes  Medications reviewed: Yes    Medications: Medication refills not needed today.  Pharmacy name entered into [facility]:    [facility] - [place], [place] - " [id number] [id number] [place] [address] [address]    Frailty Screening:   Is the patient here for a new oncology consult visit in cancer care? 2. No      Clinical concerns: Nausea       Tricia Loza LPN                 Again, thank you for allowing me to participate in the care of your patient.        Sincerely,        AMARILIS Rao CNP

## 2024-12-19 NOTE — PROGRESS NOTES
Social Work - Intervention  [facility]  Data/Intervention:    Patient Name: Adrienne Ivory Goes By: Adrienne    /Age: 1965 (59 year old)     Visit Type: telephone  Referral Source: SAEED Olvera  Reason for Referral: Financial resources/support groups     Psychosocial Information/Concerns:  Adrienne is a 59 year old with new dx of stage IIIc breast cancer. She follows with Dr. Harkins at [facility].     Intervention/Education/Resources Provided:  Called Adrienne to introduce self and assess resource needs. She is concerned about needing to be off work and not receiving her full pay. She is on short term disability through her work. Discussed breast cancer grants and sent my chart message with information. Let her know organizations aren't accepting applications until January. CHARITY offered to assist with medical forms for the grants after she applies. Also gave information about [facility] [employer] [facility]'s virtual breast cancer support group. She also applied for support through [employer]. Answered all questions and provided emotional support. Adrienne felt conversation was helpful/informative.     Assessment/Plan:  Adrienne will call  if she has questions about grants or needs additional support. Provided patient/family with contact information and availability.    SURY Ríos, [facility]  Adult Oncology Clinics  [place] (M,W), [place] (T) & [place] ()  *I am off Friday  Office: [phone]

## 2024-12-19 NOTE — PROGRESS NOTES
[facility] Progress Note    Patient: Adrienne Ivory  MRN: [id number]  Date of Service: 12/19/2024        Reason for Visit    Chief Complaint   Patient presents with    Oncology Clinic Visit     Breast cancer (H)  Invasive ductal carcinoma of left breast (H)  Moderate dysplasia of cervix (ARNAV II)         Assessment and Plan     Cancer Staging   Invasive ductal carcinoma of left breast (H)  Staging form: Breast, AJCC 8th Edition  - Clinical: Stage IIIB (cT2, cN3, cM0, G2, ER+, NM-, HER2+) - Signed by Anastasiya Salgado APRN CNP on 12/19/2024    Breast cancer, Clinical stage III, ER+ and Her2 +: pt has started neoadjuvant treatment with TCHP. Will get 6 cycles of this and then go on to get surgery. Post treatment will be based on response found in surgery. She will return in 2 weeks for cycle 2.     Leukocytosis: likely from neulasta. No signs of infection. they will monitor temp and signs of infection and call us of they occur. Will continue to monitor labs at subsequent visits.     Mild expected side effects of chemo including fatigue, nausea, bone pain: Encouraged her to continue to take her medications.  I did tell her she can alternate Zofran and Compazine for her antiemetics.  Use what works best.  Encouraged small frequent meals and try to avoid foods that her nausea worse.  Ginger products.  Encouraged her to try to have healthy diet, stay hydrated and get daily exercise.  Use Tylenol as needed for any pain.  Pain likely was from Neulasta.  I did tell her that generally that does get better    Elevated liver function tests: likely from chemotherapy. Were normal last week when she received. These are mild. We will continue to monitor and make adjustments as necessary.         ECOG Performance    0 - Independent    Distress Screening (within last 30 days)    1. How concerned are you about your ability to eat? : 0  2. How concerned are you about unintended weight loss or your  current weight? : 0  3. How concerned are you about feeling depressed or very sad? : 0  4. How concerned are you about feeling anxious or very scared? : 5 (the unknown)  5. Do you struggle with the loss of meaning and travis in your life? : Not at all  6. How concerned are you about work and home life issues that may be affected by your cancer? : 0  7. How concerned are you about knowing what resources are available to help you? : 0  8. Do you currently have what you would describe as Samaritan or spiritual struggles?: Not at all       Pain  Pain Score: No Pain (0)    Problem List    Patient Active Problem List   Diagnosis    Anxiety state    Premenstrual tension syndrome    Moderate dysplasia of cervix (ARNAV II)    Herpes simplex virus (HSV) infection    Insomnia    Dysmenorrhea    ADHD (attention deficit hyperactivity disorder)    CARDIOVASCULAR SCREENING; LDL GOAL LESS THAN 160    Left ankle pain    Major depressive disorder, recurrent episode, moderate (H)    Anxiety    Class 2 obesity due to excess calories with body mass index (BMI) of 36.0 to 36.9 in adult, unspecified whether serious comorbidity present    Breast cancer (H)    Invasive ductal carcinoma of left breast (H)        ______________________________________________________________________________    History of Present Illness    Oncologist: Dr. Harkins    Diagnosis: Breast cancer, Clinical stage T2N3M0. Left side.   -11/18/24: left breast biopsy- invasive ductal cancer. Grade 2. ER + (80%) ME- Her2/wolf 3+  -12/9/24: lymph node positive for breast cancer. ER+ME-Her2+. Also had left breast and right breast mass biopsied   -12/3/24: CT: 1.  Asymmetric soft tissue density within the upper outer quadrant of the left breast, likely corresponding to known malignancy. 2.  Mild left axillary and subpectoral lymphadenopathy, likely metastatic.3.  Prominent and mildly enlarged left supraclavicular lymph nodes, also likely metastatic.4.  Indeterminate low-attenuation  "hepatic lesions, measuring up to 14 mm. Correlation with liver MR imaging is recommended, as metastases cannot be excluded.  -12/5/24: Liver MRI: 1.  No suspicious hepatic mass lesions. 2.  Left breast mass with partially visualized left axillary adenopathy.    Treatment:   -12/12/24: Neoadjuvant TCHP.  Cycle 1, day 8    Interim History:   Pt is here today for toxicity check after starting chemo.  Overall she states she is doing okay.  She states she definitely felt exhausted, having loose stools and nausea.  She is only thrown up once.  Taking antiemetics they seem to help.  She also is taking Imodium which seems to help.  She denies any fevers or chills or other infectious complaints.  She denies any skin issues.  Denies any chest pain or palpitations.  Patient states that she cannot really feel her tumor.  She also states that she had 1 day where she had a lot of back pain and for a couple days after the chemo she had some hip and leg pain.  Tylenol seem to work well for that pain.      Review of Systems    Pertinent items are noted in HPI.    Past History    Past Medical History:   Diagnosis Date    Abnormal Pap smear, can't excl hi gd sq intraepithelial lesion (ASC-H) 03/01/2015    LSIL also    Anxiety state, unspecified     Herpes simplex without mention of complication     HSIL on Pap smear of cervix 07/01/2014    colp - WNL    Human papillomavirus in conditions classified elsewhere and of unspecified site 11/01/2010    + HPV 45 & 82 with ASCUS    Hx of colposcopy with cervical biopsy 11/08/2016    [facility]anks ECC neg.     Obese     PONV (postoperative nausea and vomiting)     Premenstrual tension syndromes        PHYSICAL EXAM  /74   Pulse 113   Temp 98.5  F (36.9  C)   Resp 18   Ht 1.651 m (5' 5\")   Wt 96.4 kg (212 lb 8 oz)   SpO2 95%   BMI 35.36 kg/m      GENERAL: no acute distress. Cooperative in conversation. Alone in clinic  RESP: Regular respiratory rate. No expiratory wheezes   NEURO: non focal. " Alert and oriented x3.   PSYCH: within normal limits. No depression or anxiety.  SKIN: exposed skin is dry intact.     Lab Results    Recent Results (from the past week)   Comprehensive metabolic panel   Result Value Ref Range    Sodium 135 135 - 145 mmol/L    Potassium 4.1 3.4 - 5.3 mmol/L    Carbon Dioxide (CO2) 25 22 - 29 mmol/L    Anion Gap 9 7 - 15 mmol/L    Urea Nitrogen 11.5 6.0 - 20.0 mg/dL    Creatinine 0.88 0.51 - 0.95 mg/dL    GFR Estimate 76 >60 mL/min/1.73m2    Calcium 9.2 8.8 - 10.4 mg/dL    Chloride 101 98 - 107 mmol/L    Glucose 110 (H) 70 - 99 mg/dL    Alkaline Phosphatase 156 (H) 40 - 150 U/L    AST 66 (H) 0 - 45 U/L     (H) 0 - 50 U/L    Protein Total 6.7 6.4 - 8.3 g/dL    Albumin 3.9 3.5 - 5.2 g/dL    Bilirubin Total 0.3 <=1.2 mg/dL   Lactate Dehydrogenase   Result Value Ref Range    Lactate Dehydrogenase 396 (H) 0 - 250 U/L   CBC with platelets and differential   Result Value Ref Range    WBC Count 35.0 (H) 4.0 - 11.0 10e3/uL    RBC Count 4.70 3.80 - 5.20 10e6/uL    Hemoglobin 13.4 11.7 - 15.7 g/dL    Hematocrit 39.8 35.0 - 47.0 %    MCV 85 78 - 100 fL    MCH 28.5 26.5 - 33.0 pg    MCHC 33.7 31.5 - 36.5 g/dL    RDW 14.6 10.0 - 15.0 %    Platelet Count 221 150 - 450 10e3/uL   Manual Differential   Result Value Ref Range    % Neutrophils 78 %    % Lymphocytes 14 %    % Monocytes 4 %    % Eosinophils 1 %    % Basophils 0 %    % Metamyelocytes 2 %    % Myelocytes 1 %    Absolute Neutrophils 27.3 (H) 1.6 - 8.3 10e3/uL    Absolute Lymphocytes 4.9 0.8 - 5.3 10e3/uL    Absolute Monocytes 1.4 (H) 0.0 - 1.3 10e3/uL    Absolute Eosinophils 0.4 0.0 - 0.7 10e3/uL    Absolute Basophils 0.0 0.0 - 0.2 10e3/uL    Absolute Metamyelocytes 0.7 (H) <=0.0 10e3/uL    Absolute Myelocytes 0.4 (H) <=0.0 10e3/uL    RBC Morphology Confirmed RBC Indices     Platelet Assessment  Automated Count Confirmed. Platelet morphology is normal.     Automated Count Confirmed. Platelet morphology is normal.       Imaging    US  Breast Biopsy Core Lymph Node Left    Addendum Date: 12/11/2024    Pathology shows metastatic adenocarcinoma. This is consistent with imaging findings. A verbal report was given to the patient. Surgical and oncological consultation is recommended.    Result Date: 12/11/2024  LEFT LYMPH NODE ULTRASOUND GUIDED BIOPSY, CLIP PLACEMENT: INDICATIONS: Abnormal Lymph Node PROCEDURE: Informed consent was obtained from the patient. Ultrasound was used to localize the lymph node in the left axilla. The skin was prepped and draped in a sterile fashion. Lidocaine was used for local anesthesia. Under direct sonographic guidance, a 18 gauge coaxial needle was used to obtain 2 core biopises. A clip was then placed. Digital mammograms performed in separate room, using separate equipment to document clip placement, demonstrates the clip in appropriate position. The patient tolerated this well, there are no immediate complications.     IMPRESSION: Ultrasound guided biopsy of a suspicious lymph node in the left axilla. Pathology pending.     US Breast Biopsy Core Needle Left    Addendum Date: 12/11/2024    Pathology shows benign breast tissue. This is consistent with imaging findings. A verbal report was given to the patient. Surgical consultation is recommended for the patients know left breast cancer.    Result Date: 12/11/2024  US Breast Biopsy Core Needle Left INDICATION: Left breast mass. PROCEDURE: Informed consent was obtained from the patient. Ultrasound was used to localize the mass at the 6 o'clock position of the left breast, 3 cm from the nipple.  The skin was marked, then prepped and draped in a sterile fashion. 1% lidocaine was used for local anesthesia. Under direct sonographic guidance, a  18 -gauge coaxial needle was used to obtain 2 core biopsies.  A biopsy marking clip was then placed.  Digital mammograms performed in a separate room, using separate equipment, to document clip placement. The mammogram showed the clip  to be in good position. The patient tolerated this well; there are no immediate complications.    IMPRESSION: 1. Ultrasound-guided biopsy of mass in the left breast. Pathology pending. 2. Post procedure mammogram for marker placement     MA Post Procedure Left    Addendum Date: 12/11/2024    Pathology shows benign breast tissue. This is consistent with imaging findings. A verbal report was given to the patient. Surgical consultation is recommended for the patients know left breast cancer.    Result Date: 12/11/2024  US Breast Biopsy Core Needle Left INDICATION: Left breast mass. PROCEDURE: Informed consent was obtained from the patient. Ultrasound was used to localize the mass at the 6 o'clock position of the left breast, 3 cm from the nipple.  The skin was marked, then prepped and draped in a sterile fashion. 1% lidocaine was used for local anesthesia. Under direct sonographic guidance, a  18 -gauge coaxial needle was used to obtain 2 core biopsies.  A biopsy marking clip was then placed.  Digital mammograms performed in a separate room, using separate equipment, to document clip placement. The mammogram showed the clip to be in good position. The patient tolerated this well; there are no immediate complications.    IMPRESSION: 1. Ultrasound-guided biopsy of mass in the left breast. Pathology pending. 2. Post procedure mammogram for marker placement     US Breast Biopsy Core Needle Right    Addendum Date: 12/11/2024    Pathology shows benign breast tissue. This is consistent with imaging findings. A verbal report was given to the patient. Repeat routine screening mammogram in one year is recommended.    Result Date: 12/11/2024  US Breast Biopsy Core Needle Right INDICATION: Right breast mass. PROCEDURE: Informed consent was obtained from the patient. Ultrasound was used to localize the mass at the 1 o'clock position of the right breast, 2 cm from the nipple.  The skin was marked, then prepped and draped in a  sterile fashion. 1% lidocaine was used for local anesthesia. Under direct sonographic guidance, a  18 -gauge coaxial needle was used to obtain 2 core biopsies.  A biopsy marking clip was then placed.  Digital mammograms performed in a separate room, using separate equipment, to document clip placement. The mammogram showed the clip to be in good position. The patient tolerated this well; there are no immediate complications.    IMPRESSION: 1. Ultrasound-guided biopsy of mass in the right breast. Pathology pending. 2. Post procedure mammogram for marker placement     MA Post Procedure Right    Addendum Date: 12/11/2024    Pathology shows benign breast tissue. This is consistent with imaging findings. A verbal report was given to the patient. Repeat routine screening mammogram in one year is recommended.    Result Date: 12/11/2024  US Breast Biopsy Core Needle Right INDICATION: Right breast mass. PROCEDURE: Informed consent was obtained from the patient. Ultrasound was used to localize the mass at the 1 o'clock position of the right breast, 2 cm from the nipple.  The skin was marked, then prepped and draped in a sterile fashion. 1% lidocaine was used for local anesthesia. Under direct sonographic guidance, a  18 -gauge coaxial needle was used to obtain 2 core biopsies.  A biopsy marking clip was then placed.  Digital mammograms performed in a separate room, using separate equipment, to document clip placement. The mammogram showed the clip to be in good position. The patient tolerated this well; there are no immediate complications.    IMPRESSION: 1. Ultrasound-guided biopsy of mass in the right breast. Pathology pending. 2. Post procedure mammogram for marker placement     US Breast Bilateral Limited 1-3 Quadrants    Result Date: 12/9/2024  EXAM: US BREAST BILATERAL LIMITED 1-3 QUADRANTS, 12/9/2024 8:19 AM COMPARISONS: Breast MRI from 11/27/2024 and mammograms and breast ultrasounds from 11/18/2024, 11/15/2024,  11/12/2024 HISTORY: Known left breast cancer with solitary lesion seen in the right breast in the 12:00 position and 2 small lesions seen in the left breast on breast MRI. Lymphadenopathy noted on the breast MRI in the left axilla FINDINGS: Targeted bilateral breast ultrasound was performed by both the ultrasonographer and the radiologist. In the right breast in the 1:00 position 2 cm from the nipple there was a hyperechoic oval circumscribed lesion with parallel orientation measuring 7 x 5 x 7 mm correlating with the abnormality seen in this location on the patient's breast MRI. In the left breast in the 6:00 position 3 cm from the nipple there is a similar-appearing lesion measuring 5 x 4 x 4 mm. Both have an appearance most consistent with a benign hemangioma and correlate with MRI abnormality seen in these locations. The additional left breast lesion seen on MRI is not identified on ultrasound but had a similar appearance to the other 2 lesions on the MRI. The left axilla was examined as well demonstrating multiple abnormal-appearing lymph nodes with cortical thickening to 6 mm.     IMPRESSION: BI-RADS CATEGORY: 6 - Known Biopsy-Proven Malignancy. Small lesions in both breasts have an appearance most consistent with benign hemangiomas. Given the patient's history of known left breast cancer, ultrasound-guided core biopsy of both lesions will be performed. Given that they have a similar appearance on MRI to the third lesion seen in the left breast on MRI MRI guided biopsy seems unnecessary. This was discussed with Dr. Bonilla. Ultrasound-guided core biopsy of one of the abnormal lymph nodes in the left axilla will also be performed. RECOMMENDED FOLLOW-UP: Biopsy. The findings and the recommendations were discussed with the patient at the time of exam. Ultrasound guided core biopsy of the hyperechoic lesions in the left breast in the 6:00 position 3 cm from the nipple and in the right breast in the 1:00 position 2  cm from the nipple as well as one of the left axillary lymph nodes will be performed today. EDUARDO CHIU MD   SYSTEM ID:  [id number]    MR Liver wo & w Contrast    Result Date: 12/5/2024  EXAM: MR LIVER W/O and W CONTRAST LOCATION: [facility] DATE: 12/5/2024 INDICATION:  Liver lesion COMPARISON: CT dated 12/3/2024 TECHNIQUE: Routine MRI liver protocol including T1 in/out phase, diffusion, multiplane T2, and dynamic T1 with IV contrast.  CONTRAST: 10 mL Gadavist FINDINGS: LUNG BASES: Left breast mass measuring up to 2.3 cm, and adjacent adenopathy redemonstrated. LIVER: 2.4 cm left hepatic lobe cyst. Scattered additional low-attenuation lesion seen at CT are consistent with benign hemangiomas, largest centrally in the right hepatic lobe reaching 1.4 cm, image 28 series 16. GALLBLADDER/BILIARY: No biliary dilatation PANCREAS: No pancreatic ductal dilatation. SPLEEN: Unremarkable. ADRENALS: Unremarkable. KIDNEYS: Tiny left renal cyst for which no additional follow-up imaging recommended. BOWEL: Visualized bowel is nondilated. LYMPH NODES: No significant upper abdominal adenopathy. VASCULATURE: Unremarkable. MUSCULOSKELETAL: No acute bony abnormalities. OTHER: No upper abdominal ascites.     IMPRESSION: 1.  No suspicious hepatic mass lesions. 2.  Left breast mass with partially visualized left axillary adenopathy.    CT Chest/Abdomen/Pelvis w Contrast    Result Date: 12/4/2024  EXAM: CT CHEST/ABDOMEN/PELVIS W CONTRAST LOCATION: [facility] DATE: 12/3/2024 INDICATION: Left breast malignancy. COMPARISON: None available. TECHNIQUE: CT scan of the chest, abdomen, and pelvis was performed following injection of IV contrast. Multiplanar reformats were obtained. Dose reduction techniques were used. CONTRAST: 90 mL [id number]. FINDINGS: LUNGS AND PLEURA: Trachea and large airways are patent. No focal airspace consolidation. Mild dependent atelectasis.  No suspicious  pulmonary nodule. No pneumothorax. No pleural effusion.  MEDIASTINUM/AXILLAE: Prominent and mildly enlarged left supraclavicular lymph nodes, largest measuring up to 10 mm in short axis, series 3 image 12. No mediastinal/hilar adenopathy. Thoracic esophagus is unremarkable. Mild left axillary and subpectoral lymphadenopathy. For reference, an enlarged left axillary lymph node measures up to 11 mm in short axis, series 3 image 38. Asymmetric soft tissue density within the upper outer quadrant of the left breast, likely corresponding to known malignancy, series 3 image 43. No thoracic aortic aneurysm. Heart is normal in size. No pericardial effusion. CORONARY ARTERY CALCIFICATION: None. HEPATOBILIARY: There are a few low-attenuation hepatic lesions, which are indeterminate. Largest measures up to 14 mm in the central right hepatic lobe. Correlation with liver MR imaging is recommended, as metastases cannot be excluded. A larger low-attenuation lesion along the falciform ligament, series 3 image 124, measures up to 24 mm and favors a benign cyst. Attention on liver MR imaging follow-up. Gallbladder is normal. No intrahepatic or intrahepatic biliary ductal dilatation. PANCREAS: Enhances normally. No peripancreatic inflammatory fat stranding. SPLEEN: Enhances normally. Normal size. ADRENAL GLANDS: Normal. KIDNEYS: Both kidneys enhance symmetrically, without hydronephrosis. No nephroureterolithiasis. Urinary bladder is unremarkable. PELVIC ORGANS: No suspicious abnormality. Calcified uterine fibroid. BOWEL: No evidence of acute gastrointestinal inflammation or obstruction. No intraperitoneal free fluid or free air. LYMPH NODES: No suspicious abdominal or pelvic lymphadenopathy. VASCULATURE: No abdominal aortic aneurysm. MUSCULOSKELETAL: No suspicious abnormality. OTHER: No additionally significant abnormalities.     IMPRESSION: 1.  Asymmetric soft tissue density within the upper outer quadrant of the left breast, likely  corresponding to known malignancy. 2.  Mild left axillary and subpectoral lymphadenopathy, likely metastatic. 3.  Prominent and mildly enlarged left supraclavicular lymph nodes, also likely metastatic. 4.  Indeterminate low-attenuation hepatic lesions, measuring up to 14 mm. Correlation with liver MR imaging is recommended, as metastases cannot be excluded.     Echocardiogram Complete    Result Date: 2024  [id number]^SEAMUS^CYRUS^[place] [address]  Name: BRYAN SHI MRN: [id number] : 1965 Study Date: 2024 10:19 AM Age: 58 yrs Gender: Female Patient Location: [facility] Reason For Study: Malignant neoplasm of upper-outer quadrant of left breast in fem Ordering Physician: CYRUS WAY Referring Physician: CYRUS WAY Performed By: KS  BSA: 2.1 m2 Height: 65 in Weight: 219 lb HR: 66 BP: 133/84 mmHg ______________________________________________________________________________ Procedure Complete Echo Adult. Myocardial Strain Imaging performed. Adequate quality two-dimensional was performed and interpreted. There is no comparison study available. No hemodynamically significant valvular abnormalities on 2D or color flow imaging. ______________________________________________________________________________ Interpretation Summary  The left ventricle is normal in size. Biplane LVEF is 57%. Regional wall motion is grossly normal but endocardial border definition is limited Global peak LV longitudinal strain is averaged at -20.5%. This is within reported normal limits (normal <-18%). No hemodynamically significant valvular abnormalities on 2D or color flow imaging. There is no comparison study available. ______________________________________________________________________________ Left Ventricle The left ventricle is normal in size. There is normal left ventricular wall thickness. Left ventricular  diastolic function is normal. Diastolic Doppler findings (E/E' ratio and/or other parameters) suggest left ventricular filling pressures are normal. Biplane LVEF is 57%. Global peak LV longitudinal strain is averaged at -20.5%. This is within reported normal limits (normal <-18%). Regional wall motion is grossly normal but endocardial border definition is limited.  Right Ventricle Normal right ventricle size and systolic function.  Atria Normal left atrial size. Right atrial size is normal. There is no color Doppler evidence of a PFO.  Mitral Valve The mitral valve leaflets are mildly thickened. There is no mitral regurgitation noted. There is no mitral valve stenosis.  Tricuspid Valve The tricuspid valve is not well visualized, but is grossly normal. There is trace tricuspid regurgitation. Right ventricular systolic pressure could not be approximated due to inadequate tricuspid regurgitation. There is no tricuspid stenosis.  Aortic Valve The aortic valve is trileaflet. No aortic regurgitation is present. No aortic stenosis is present.  Pulmonic Valve The pulmonic valve is not well visualized. There is no pulmonic valvular regurgitation. There is no pulmonic valvular stenosis.  Vessels The aorta root is normal. The thoracic aorta is normal. IVC diameter <2.1 cm collapsing >50% with sniff suggests a normal RA pressure of 3 mmHg.  Pericardium There is no pericardial effusion.  Rhythm Sinus rhythm was noted. ______________________________________________________________________________ MMode/2D Measurements & Calculations IVSd: 0.88 cm  LVIDd: 4.6 cm LVIDs: 3.0 cm LVPWd: 1.0 cm FS: 35.2 % LV mass(C)d: 150.3 grams LV mass(C)dI: 73.1 grams/m2 Ao root diam: 3.3 cm asc Aorta Diam: 3.5 cm LVOT diam: 2.3 cm LVOT area: 4.1 cm2 Ao root diam index Ht(cm/m): 2.0 Ao root diam index BSA (cm/m2): 1.6 Asc Ao diam index BSA (cm/m2): 1.7 Asc Ao diam index Ht(cm/m): 2.1 EF Biplane: 57.1 % LA Volume (BP): 44.5 ml  LA Volume Index (BP):  21.6 ml/m2 LA Volume Indexed (AL/bp): 22.4 ml/m2 RV Base: 3.9 cm RWT: 0.45 TAPSE: 2.1 cm  Time Measurements MM HR: 67.0 BPM  Doppler Measurements & Calculations MV E max lee: 92.2 cm/sec MV A max lee: 76.8 cm/sec MV E/A: 1.2 MV dec slope: 545.6 cm/sec2 MV dec time: 0.17 sec Ao V2 max: 121.0 cm/sec Ao max P.0 mmHg Ao V2 mean: 98.7 cm/sec Ao mean P.2 mmHg Ao V2 VTI: 29.5 cm YURY(I,D): 3.3 cm2 YURY(V,D): 3.6 cm2 LV V1 max P.5 mmHg LV V1 max: 105.6 cm/sec LV V1 VTI: 23.6 cm SV(LVOT): 96.0 ml SI(LVOT): 46.7 ml/m2 PA V2 max: 68.5 cm/sec PA max P.9 mmHg PA acc time: 0.12 sec AV Lee Ratio (DI): 0.87 YURY Index (cm2/m2): 1.6 E/E': 7.6 E/E' av.1 Lateral E/e': 7.6 Medial E/e': 10.5 Peak E' Lee: 12.2 cm/sec  RV S Lee: 14.8 cm/sec  Measurements from QLAB LV GLS Endo Peak A2C (AS): -21.1 % LV GLS Endo Peak A3C (AS): -20.4 % LV GLS Endo Peak A4C (AS): -19.8 % LV GLS Endo Peak Avg (AS): -20.5 % ______________________________________________________________________________ Report approved by: Solomon Laws 2024 12:44 PM       MR Breast Bilateral w/o & w Contrast    Result Date: 2024  EXAM: Bilateral Breast MRI with and without contrast, and computer aided kinetic analysis. 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 Post-processing including subtractions, MIPs and color encoding of post contrast dynamic acquisitions. IV contrast: Gadavist 10mL SEDATION: None FINDINGS: Right Breast: Breast composition: Scattered fibroglandular tissue Background parenchymal enhancement: Mild There is an oval 9 x 5 x 4 mm mildly enhancing mass at 12:00 posterior depth axial CADstream image 150. Right Axilla:  No lymphadenopathy. Right Internal Mammary Chain: No lymphadenopathy. Left Breast: Breast composition: Scattered fibroglandular tissue Background parenchymal enhancement: Mild The biopsy-proven malignancy is an irregular, avidly enhancing mass at 2:00 middle depth measuring 3.2 x 2.2 x 1.9 cm with associated biopsy clip. There are 2 additional smaller nonspecific oval enhancing masses in the retroareolar plane, middle depth measuring 6 x 4 x 3 mm, axial CADstream image 194, and posterior depth measuring 5 x 4 x 4 mm, axial CADstream image 197. Left Axilla: There is extensive axillary adenopathy, with approximately 9 abnormal level 1 lymph nodes measuring up to 3 cm, at least 5 abnormal level 2 lymph nodes, and approximately 4 asymmetric level 3 lymph nodes. Left Internal Mammary Chain: There are 2 diminutive, within normal limits medial internal mammary lymph nodes measuring 2 mm axial CADstream images 129 and 138, however asymmetric compared to the right. Evaluation of limited imaging through the chest and upper abdomen is unremarkable.     IMPRESSION: 1.  Biopsy-proven left 2:00 malignancy measuring 3.2 cm. 2.  Nonspecific small enhancing masses right 12:00 posterior depth, and left retroareolar plane middle and posterior depth, which given multiplicity and appearance are favored to be probably benign, and recommend at minimum a 6 month MRI follow-up if patient desires breast conserving/unilateral surgery. Of note, if there is patient/provider preference, MRI guided biopsies could be performed of the right side and a representative left site. 3.  Left level 1, level 2 and level 3 axillary adenopathy consistent with metastases. Targeted ultrasound with ultrasound-guided biopsy could be performed if this would affect patient management. 4.  A couple of diminutive left internal mammary lymph nodes are technically within normal limits, however asymmetric compared to the right and recommend attention to these on any  follow-up imaging. Right Breast ACR BI-RADS Category: 3 - Probably Benign Finding-Short Interval Follow-Up Suggested. Left Breast ACR BI-RADS Category: 6 - Known Biopsy-Proven Malignancy-Appropriate Action Should Be Taken. RECOMMENDATION: Surgical Consultation     Total time spent with patient in face to face time, chart review and documentation was 30 minutes.      The longitudinal plan of care for the diagnosis(es)/condition(s) as documented were addressed during this visit. Due to the added complexity in care, I will continue to support Adrienne in the subsequent management and with ongoing continuity of care.      Signed by: AMARILIS Roa CNP

## 2024-12-19 NOTE — PROGRESS NOTES
"Oncology Rooming Note    December 19, 2024 9:07 AM   Adrienne Ivory is a 59 year old female who presents for:    Chief Complaint   Patient presents with    Oncology Clinic Visit     Breast cancer (H)  Invasive ductal carcinoma of left breast (H)  Moderate dysplasia of cervix (ARNAV II)       Initial Vitals: /74   Pulse 113   Temp 98.5  F (36.9  C)   Resp 18   Ht 1.651 m (5' 5\")   Wt 96.4 kg (212 lb 8 oz)   SpO2 95%   BMI 35.36 kg/m   Estimated body mass index is 35.36 kg/m  as calculated from the following:    Height as of this encounter: 1.651 m (5' 5\").    Weight as of this encounter: 96.4 kg (212 lb 8 oz). Body surface area is 2.1 meters squared.  No Pain (0) Comment: Data Unavailable   No LMP recorded. Patient has had an ablation.  Allergies reviewed: Yes  Medications reviewed: Yes    Medications: Medication refills not needed today.  Pharmacy name entered into [facility]:    [facility] - [place], [place] - [address] [id number] [address] AT [address]    Frailty Screening:   Is the patient here for a new oncology consult visit in cancer care? 2. No      Clinical concerns: Nausea       Tricia Loza LPN             "

## 2024-12-19 NOTE — PROGRESS NOTES
Patient here for midcycle check. Port accessed by Cheryl CORTEZ and labs drawn. Per Anastasiya, labs look good. Port flushed and de-accessed per protocol.

## 2024-12-23 ENCOUNTER — DOCUMENTATION ONLY (OUTPATIENT)
Dept: ONCOLOGY | Facility: HOSPITAL | Age: 59
End: 2024-12-23
Payer: COMMERCIAL

## 2024-12-23 ENCOUNTER — TELEPHONE (OUTPATIENT)
Dept: ONCOLOGY | Facility: HOSPITAL | Age: 59
End: 2024-12-23
Payer: COMMERCIAL

## 2024-12-23 NOTE — TELEPHONE ENCOUNTER
Called pt to discuss return to work today with or without restrictions. Pt stated she would like to return to work with the restrictions of working 30-40 hours per week as tolerated, due to still feeling fatigued from chemotherapy. Pt stated she spoke with STD through employer and she is still covered to be out during her next treatment on 01/02/25 until 01/08/25. I informed pt I would write restrictions of working 30-40 hours per week as tolerated, with return to work without restrictions on 01/08/25. Pt was in agreement with this plan. I informed pt I would get this filled out and faxed today so pt can return to work as soon as possible. Pt was instructed to call us with any questions or concerns in the interim. Pt verbalized understanding.

## 2024-12-23 NOTE — PROGRESS NOTES
Form Type: Fitness for Duty Certification/Return to Work     Care Team Physician: Anastasiya Salgado CNP & Dr. Hang Manjarrez Torin    Date Faxed: 12/23/24    Recipient: Nic Chin at [phone]     Copy of Forms Sent to MR: YES

## 2025-01-02 ENCOUNTER — PATIENT OUTREACH (OUTPATIENT)
Dept: ONCOLOGY | Facility: HOSPITAL | Age: 60
End: 2025-01-02

## 2025-01-02 ENCOUNTER — INFUSION THERAPY VISIT (OUTPATIENT)
Dept: INFUSION THERAPY | Facility: HOSPITAL | Age: 60
End: 2025-01-02
Attending: INTERNAL MEDICINE
Payer: COMMERCIAL

## 2025-01-02 ENCOUNTER — ONCOLOGY VISIT (OUTPATIENT)
Dept: ONCOLOGY | Facility: HOSPITAL | Age: 60
End: 2025-01-02
Attending: INTERNAL MEDICINE
Payer: COMMERCIAL

## 2025-01-02 VITALS
RESPIRATION RATE: 16 BRPM | OXYGEN SATURATION: 96 % | HEIGHT: 65 IN | HEART RATE: 106 BPM | DIASTOLIC BLOOD PRESSURE: 71 MMHG | TEMPERATURE: 98.7 F | WEIGHT: 215 LBS | BODY MASS INDEX: 35.82 KG/M2 | SYSTOLIC BLOOD PRESSURE: 141 MMHG

## 2025-01-02 DIAGNOSIS — Z17.0 MALIGNANT NEOPLASM OF UPPER-INNER QUADRANT OF LEFT BREAST IN FEMALE, ESTROGEN RECEPTOR POSITIVE (H): Primary | ICD-10-CM

## 2025-01-02 DIAGNOSIS — C50.212 MALIGNANT NEOPLASM OF UPPER-INNER QUADRANT OF LEFT BREAST IN FEMALE, ESTROGEN RECEPTOR POSITIVE (H): Primary | ICD-10-CM

## 2025-01-02 LAB
ALBUMIN SERPL BCG-MCNC: 4 G/DL (ref 3.5–5.2)
ALP SERPL-CCNC: 89 U/L (ref 40–150)
ALT SERPL W P-5'-P-CCNC: 49 U/L (ref 0–50)
ANION GAP SERPL CALCULATED.3IONS-SCNC: 13 MMOL/L (ref 7–15)
AST SERPL W P-5'-P-CCNC: 31 U/L (ref 0–45)
BASOPHILS # BLD AUTO: 0.1 10E3/UL (ref 0–0.2)
BASOPHILS NFR BLD AUTO: 1 %
BILIRUB SERPL-MCNC: 0.4 MG/DL
BUN SERPL-MCNC: 9.2 MG/DL (ref 6–20)
CALCIUM SERPL-MCNC: 9.3 MG/DL (ref 8.8–10.4)
CHLORIDE SERPL-SCNC: 104 MMOL/L (ref 98–107)
CREAT SERPL-MCNC: 0.79 MG/DL (ref 0.51–0.95)
EGFRCR SERPLBLD CKD-EPI 2021: 86 ML/MIN/1.73M2
EOSINOPHIL # BLD AUTO: 0.1 10E3/UL (ref 0–0.7)
EOSINOPHIL NFR BLD AUTO: 0 %
ERYTHROCYTE [DISTWIDTH] IN BLOOD BY AUTOMATED COUNT: 15.3 % (ref 10–15)
GLUCOSE SERPL-MCNC: 132 MG/DL (ref 70–99)
HCO3 SERPL-SCNC: 22 MMOL/L (ref 22–29)
HCT VFR BLD AUTO: 36.5 % (ref 35–47)
HGB BLD-MCNC: 12.3 G/DL (ref 11.7–15.7)
IMM GRANULOCYTES # BLD: 0.1 10E3/UL
IMM GRANULOCYTES NFR BLD: 1 %
LYMPHOCYTES # BLD AUTO: 2.5 10E3/UL (ref 0.8–5.3)
LYMPHOCYTES NFR BLD AUTO: 19 %
MCH RBC QN AUTO: 28.5 PG (ref 26.5–33)
MCHC RBC AUTO-ENTMCNC: 33.7 G/DL (ref 31.5–36.5)
MCV RBC AUTO: 85 FL (ref 78–100)
MONOCYTES # BLD AUTO: 1.2 10E3/UL (ref 0–1.3)
MONOCYTES NFR BLD AUTO: 10 %
NEUTROPHILS # BLD AUTO: 9 10E3/UL (ref 1.6–8.3)
NEUTROPHILS NFR BLD AUTO: 70 %
NRBC # BLD AUTO: 0 10E3/UL
NRBC BLD AUTO-RTO: 0 /100
PLATELET # BLD AUTO: 456 10E3/UL (ref 150–450)
POTASSIUM SERPL-SCNC: 3.9 MMOL/L (ref 3.4–5.3)
PROT SERPL-MCNC: 7.1 G/DL (ref 6.4–8.3)
RBC # BLD AUTO: 4.32 10E6/UL (ref 3.8–5.2)
SODIUM SERPL-SCNC: 139 MMOL/L (ref 135–145)
WBC # BLD AUTO: 13 10E3/UL (ref 4–11)

## 2025-01-02 PROCEDURE — 250N000011 HC RX IP 250 OP 636

## 2025-01-02 PROCEDURE — 258N000003 HC RX IP 258 OP 636: Performed by: INTERNAL MEDICINE

## 2025-01-02 PROCEDURE — 250N000011 HC RX IP 250 OP 636: Performed by: INTERNAL MEDICINE

## 2025-01-02 PROCEDURE — 99213 OFFICE O/P EST LOW 20 MIN: CPT

## 2025-01-02 PROCEDURE — 82374 ASSAY BLOOD CARBON DIOXIDE: CPT | Performed by: NURSE PRACTITIONER

## 2025-01-02 PROCEDURE — 85048 AUTOMATED LEUKOCYTE COUNT: CPT | Performed by: NURSE PRACTITIONER

## 2025-01-02 PROCEDURE — 258N000003 HC RX IP 258 OP 636

## 2025-01-02 PROCEDURE — 36591 DRAW BLOOD OFF VENOUS DEVICE: CPT | Performed by: NURSE PRACTITIONER

## 2025-01-02 PROCEDURE — 82310 ASSAY OF CALCIUM: CPT | Performed by: NURSE PRACTITIONER

## 2025-01-02 PROCEDURE — 85004 AUTOMATED DIFF WBC COUNT: CPT | Performed by: NURSE PRACTITIONER

## 2025-01-02 RX ORDER — LORAZEPAM 2 MG/ML
0.5 INJECTION INTRAMUSCULAR EVERY 4 HOURS PRN
Status: CANCELLED | OUTPATIENT
Start: 2025-01-02

## 2025-01-02 RX ORDER — DIPHENHYDRAMINE HYDROCHLORIDE 50 MG/ML
25 INJECTION INTRAMUSCULAR; INTRAVENOUS EVERY 6 HOURS PRN
Status: DISCONTINUED | OUTPATIENT
Start: 2025-01-02 | End: 2025-01-02 | Stop reason: HOSPADM

## 2025-01-02 RX ORDER — ALBUTEROL SULFATE 90 UG/1
1-2 INHALANT RESPIRATORY (INHALATION)
Status: DISCONTINUED | OUTPATIENT
Start: 2025-01-02 | End: 2025-01-02 | Stop reason: HOSPADM

## 2025-01-02 RX ORDER — MEPERIDINE HYDROCHLORIDE 50 MG/ML
25 INJECTION INTRAMUSCULAR; INTRAVENOUS; SUBCUTANEOUS
Status: CANCELLED | OUTPATIENT
Start: 2025-01-02

## 2025-01-02 RX ORDER — ALBUTEROL SULFATE 0.83 MG/ML
2.5 SOLUTION RESPIRATORY (INHALATION)
Status: DISCONTINUED | OUTPATIENT
Start: 2025-01-02 | End: 2025-01-02 | Stop reason: HOSPADM

## 2025-01-02 RX ORDER — DIPHENHYDRAMINE HYDROCHLORIDE 50 MG/ML
50 INJECTION INTRAMUSCULAR; INTRAVENOUS
Status: DISCONTINUED | OUTPATIENT
Start: 2025-01-02 | End: 2025-01-02 | Stop reason: HOSPADM

## 2025-01-02 RX ORDER — DIPHENHYDRAMINE HYDROCHLORIDE 50 MG/ML
25 INJECTION INTRAMUSCULAR; INTRAVENOUS
Status: COMPLETED | OUTPATIENT
Start: 2025-01-02 | End: 2025-01-02

## 2025-01-02 RX ORDER — METHYLPREDNISOLONE SODIUM SUCCINATE 40 MG/ML
40 INJECTION INTRAMUSCULAR; INTRAVENOUS
Status: DISCONTINUED | OUTPATIENT
Start: 2025-01-02 | End: 2025-01-02 | Stop reason: HOSPADM

## 2025-01-02 RX ORDER — HEPARIN SODIUM (PORCINE) LOCK FLUSH IV SOLN 100 UNIT/ML 100 UNIT/ML
5 SOLUTION INTRAVENOUS
Status: DISCONTINUED | OUTPATIENT
Start: 2025-01-02 | End: 2025-01-02 | Stop reason: HOSPADM

## 2025-01-02 RX ORDER — ACETAMINOPHEN 325 MG/1
650 TABLET ORAL
Status: CANCELLED
Start: 2025-01-02

## 2025-01-02 RX ORDER — EPINEPHRINE 1 MG/ML
0.3 INJECTION, SOLUTION INTRAMUSCULAR; SUBCUTANEOUS EVERY 5 MIN PRN
Status: DISCONTINUED | OUTPATIENT
Start: 2025-01-02 | End: 2025-01-02 | Stop reason: HOSPADM

## 2025-01-02 RX ORDER — PALONOSETRON 0.05 MG/ML
0.25 INJECTION, SOLUTION INTRAVENOUS ONCE
Status: COMPLETED | OUTPATIENT
Start: 2025-01-02 | End: 2025-01-02

## 2025-01-02 RX ORDER — DIPHENHYDRAMINE HYDROCHLORIDE 50 MG/ML
25 INJECTION INTRAMUSCULAR; INTRAVENOUS EVERY 6 HOURS PRN
Status: CANCELLED
Start: 2025-01-02

## 2025-01-02 RX ORDER — DIPHENHYDRAMINE HCL 50 MG
50 CAPSULE ORAL
Status: CANCELLED | OUTPATIENT
Start: 2025-01-02

## 2025-01-02 RX ORDER — HEPARIN SODIUM,PORCINE 10 UNIT/ML
5-20 VIAL (ML) INTRAVENOUS DAILY PRN
Status: CANCELLED | OUTPATIENT
Start: 2025-01-02

## 2025-01-02 RX ADMIN — CARBOPLATIN 690 MG: 10 INJECTION, SOLUTION INTRAVENOUS at 13:31

## 2025-01-02 RX ADMIN — DOCETAXEL 160 MG: 160 INJECTION INTRAVENOUS at 12:18

## 2025-01-02 RX ADMIN — SODIUM CHLORIDE 250 ML: 9 INJECTION, SOLUTION INTRAVENOUS at 09:57

## 2025-01-02 RX ADMIN — SODIUM CHLORIDE 600 MG: 9 INJECTION, SOLUTION INTRAVENOUS at 11:42

## 2025-01-02 RX ADMIN — DIPHENHYDRAMINE HYDROCHLORIDE 25 MG: 50 INJECTION INTRAMUSCULAR; INTRAVENOUS at 11:00

## 2025-01-02 RX ADMIN — DEXAMETHASONE SODIUM PHOSPHATE: 10 INJECTION, SOLUTION INTRAMUSCULAR; INTRAVENOUS at 10:22

## 2025-01-02 RX ADMIN — PERTUZUMAB 420 MG: 30 INJECTION, SOLUTION, CONCENTRATE INTRAVENOUS at 11:03

## 2025-01-02 RX ADMIN — HEPARIN 5 ML: 100 SYRINGE at 14:04

## 2025-01-02 RX ADMIN — PALONOSETRON HYDROCHLORIDE 0.25 MG: 0.25 INJECTION, SOLUTION INTRAVENOUS at 09:59

## 2025-01-02 ASSESSMENT — PAIN SCALES - GENERAL: PAINLEVEL_OUTOF10: NO PAIN (0)

## 2025-01-02 NOTE — PROGRESS NOTES
"Oncology Rooming Note    January 2, 2025 8:43 AM   Adrienne Ivory is a 59 year old female who presents for:    Chief Complaint   Patient presents with    Oncology Clinic Visit     Malignant neoplasm of upper-inner quadrant of left breast in female, estrogen receptor positive     Initial Vitals: BP (!) 141/71 (Patient Position: Sitting)   Pulse 106   Temp 98.7  F (37.1  C)   Resp 16   Ht 1.651 m (5' 5\")   Wt 97.5 kg (215 lb)   SpO2 96%   BMI 35.78 kg/m   Estimated body mass index is 35.78 kg/m  as calculated from the following:    Height as of this encounter: 1.651 m (5' 5\").    Weight as of this encounter: 97.5 kg (215 lb). Body surface area is 2.11 meters squared.  No Pain (0) Comment: Data Unavailable   No LMP recorded. Patient has had an ablation.  Allergies reviewed: Yes  Medications reviewed: Yes    Medications: Medication refills not needed today.  Pharmacy name entered into [facility]:    [facility] - [place], [place] - [address] [id number] [place] [address] AT [address]ARM[address]    Frailty Screening:   Is the patient here for a new oncology consult visit in cancer care? 2. No  No concerns today.        Bre Short LPN            "

## 2025-01-02 NOTE — PROGRESS NOTES
[facility]: Cancer Care Follow-Up Note                                    Discussion with Patient:                                                    Met with Adrienne when she came to clinic for D1C2 TCHP. She started to lose her hair after C1 and she elected to have her hair buzz  cut to alleviate the stress of managing further loss of hair. She has  decided that she will wear hats and has declined need for prescription for cranial prosthesis. She continues to have good support from her family and her partner, Matthew.  She did have nausea with her last cycle and is interested in medical cannabis to help better manage her symptoms. Writer spoke with Shelley and she will make referral for Adrienne. Did confirm Romeo's email address that is listed in Epic and shared that she should watch for email for further details with enrollment. She stated understanding.  She is wanting to have the latitude to work less hours,  25-40, when needed. In the past she had negotiated 30-40. Her Short term disability carrier if Ashley. DONTA Hedrick did review the paperwork and the paperwork that was submitted on 12/4 give latitude for variance of hours and does not specifically indicate a minimum amount. Floridalma did review her STD paperwork with her today in clinic and shared the verbiage that was included.No further letter has been sent from our office. Adrienne will follow up with Hugh to clarify and will relay the above.  Adrienne has our contact information and will let writer know if further needs with Hugh arrive.   She understands to take Claritin prior to neulasta infusion and to continue for 5 days to help mitigate side effects of the injection.  She has nurse triage line for questions or concerns that arise.        Dates of Treatment:                                                      Infusion given in last 28 days       Administered MAR Action Medication Dose Rate Visit    12/12/2024 10:55 New Bag pertuzumab (PERJETA) 840 mg  in sodium chloride 0.9 % 303 mL infusion 840 mg 303 mL/hr Infusion Therapy Visit on 12/12/2024 in [facility]    12/12/2024 11:59 New Bag trastuzumab-qyyp (TRAZIMERA) 790 mg in sodium chloride 0.9 % 312.62 mL infusion 790 mg 208.4 mL/hr Infusion Therapy Visit on 12/12/2024 in [facility]    12/12/2024 13:40 New Bag DOCEtaxel (TAXOTERE) 160 mg in sodium chloride 0.9% in non-PVC container 283 mL infusion 160 mg 283 mL/hr Infusion Therapy Visit on 12/12/2024 in [facility]    12/12/2024 15:14 New Bag CARBOplatin 690 mg in sodium chloride 0.9 % 344 mL infusion 690 mg 688 mL/hr Infusion Therapy Visit on 12/12/2024 in [facility]    01/02/2025 11:03 New Bag pertuzumab (PERJETA) 420 mg in sodium chloride 0.9 % 289 mL infusion 420 mg 578 mL/hr Infusion Therapy Visit on 01/02/2025 in [facility]            Assessment:                                                      D1C2 TCHP, see above        Intervention/Education provided during outreach:                                                       See above    Confirmed patient has clinic and triage numbers    Signature:  Radha Stephen RN

## 2025-01-02 NOTE — PROGRESS NOTES
[facility] Hematology and Oncology Outpatient Progress Note    Patient: Adrienne Ivory  MRN: [id number]  Date of Service: Jan 2, 2025          Reason for Visit    Chief Complaint   Patient presents with    Oncology Clinic Visit     Malignant neoplasm of upper-inner quadrant of left breast in female, estrogen receptor positive        Cancer Staging   Invasive ductal carcinoma of left breast (H)  Staging form: Breast, AJCC 8th Edition  - Clinical: Stage IIIB (cT2, cN3, cM0, G2, ER+, FL-, HER2+) - Signed by Anastasiya Salgado APRN CNP on 12/19/2024      Primary Hematologist/Oncologist: Dr. Josseline Harkins        Assessment/Plan  #.  Breast cancer, Clinical stage III, ER+ and Her2 +  Clinically stable. Tolerating first cycle of treatment well with manageable side effects of fatigue, nausea and muscle and bone pain. The infusion was stopped for increased nausea and anxiety, but she was able to finish the full dose. Will add in Benadryl to premeds. We reviewed labs noting an unremarkable CMP. CBC shows wbc of 13.0 with ANC of 9.0, likely related to neulasta. Hgb and plts are adequate.   Recommend to proceed with next dose of chemotherapy.   Plan is to give 6 cycles of this and then go on to get surgery. Post treatment will be based on response found in surgery.   She will return in 2 weeks with Dr. Harkins + labs for cycle 3.   Patient was encouraged to contact this clinic with any questions or concerns in the interim.     #.  Leukocytosis:   Likely from neulasta. No signs of infection. they will monitor temp and signs of infection and call us of they occur. Will continue to monitor labs at subsequent visits.     #.  Mild expected side effects of chemo including fatigue, nausea, bone pain: Managing well at this time.   Continue Zofran and Compazine for antiemetics.    Encouraged small frequent meals.  Use Tylenol and advil as needed for any pain.   Start Claritin    #.  Elevated liver function tests: likely from  chemotherapy, though these have now returned to normal.   Will continue to monitor.     ______________________________________________________________________________    History of Present Illness/ Interval History    Ms. Adrienne Ivory  is a 59 year old patient who is seen today for follow up. She has had 1 cycle of TCHP with good toleration. She had expected fatigue and some nausea. She is getting over a cold that started a few days ago. She has had no fevers. She did have muscle cramps and bone pain after Neulasta injection. Started Advil and they have since resolved. She was not taking Claritin, but has plans to start now. The first cycle of treatment was given in its entirety but was stopped during docetaxel due to nausea and anxiety.       ECOG performance status   0- Fully active, without restriction      Distress Screening (within last 30 days)    1. How concerned are you about your ability to eat? : 0  2. How concerned are you about unintended weight loss or your current weight? : 0  3. How concerned are you about feeling depressed or very sad? : 0  4. How concerned are you about feeling anxious or very scared? : 5 (the unknown)  5. Do you struggle with the loss of meaning and travis in your life? : Not at all  6. How concerned are you about work and home life issues that may be affected by your cancer? : 0  7. How concerned are you about knowing what resources are available to help you? : 0  8. Do you currently have what you would describe as Nondenominational or spiritual struggles?: Not at all      Pain  Pain Score: No Pain (0)    ROS  A 14 point review of systems was obtained.  Positive findings noted in the history.  The remainder of the review of system is otherwise negative.      Oncology History/Treatment    Diagnosis: Breast cancer, Clinical stage T2N3M0. Left side.   -11/18/24: left breast biopsy- invasive ductal cancer. Grade 2. ER + (80%) [place]- Her2/wolf 3+  -12/9/24: lymph node positive for breast cancer.  "ER+HI-Her2+. Also had left breast and right breast mass biopsied   -12/3/24: CT: 1.  Asymmetric soft tissue density within the upper outer quadrant of the left breast, likely corresponding to known malignancy. 2.  Mild left axillary and subpectoral lymphadenopathy, likely metastatic.3.  Prominent and mildly enlarged left supraclavicular lymph nodes, also likely metastatic.4.  Indeterminate low-attenuation hepatic lesions, measuring up to 14 mm. Correlation with liver MR imaging is recommended, as metastases cannot be excluded.  -12/5/24: Liver MRI: 1.  No suspicious hepatic mass lesions. 2.  Left breast mass with partially visualized left axillary adenopathy.    Treatment:   -12/12/24: Neoadjuvant TCHP.        Physical Exam    BP (!) 141/71 (Patient Position: Sitting)   Pulse 106   Temp 98.7  F (37.1  C)   Resp 16   Ht 1.651 m (5' 5\")   Wt 97.5 kg (215 lb)   SpO2 96%   BMI 35.78 kg/m      General: Well-appearing female in no acute distress. Cooperative in conversation.  Eyes: EOMI, PERRL. No scleral icterus.  ENT: Oral mucosa is moist without lesions or thrush. No ulcerations or mucositis noted.  Lymphatic: Neck is supple without cervical, axillary, or supraclavicular lymphadenopathy.   Cardiovascular: RRR No murmurs, gallops, or rubs. No peripheral edema.  Respiratory: CTA bilaterally. No wheezes or crackles.  Gastrointestinal: BS +. Abdomen soft, non-tender. No palpable hepatosplenomegaly or masses.   Neurologic: Alert and oriented x3. Cranial nerves II through XII are grossly intact.  Skin: No rashes, petechiae, or bruising noted. Warm, dry, intact.      Lab Results    Recent Results (from the past week)   Comprehensive metabolic panel   Result Value Ref Range    Sodium 139 135 - 145 mmol/L    Potassium 3.9 3.4 - 5.3 mmol/L    Carbon Dioxide (CO2) 22 22 - 29 mmol/L    Anion Gap 13 7 - 15 mmol/L    Urea Nitrogen 9.2 6.0 - 20.0 mg/dL    Creatinine 0.79 0.51 - 0.95 mg/dL    GFR Estimate 86 >60 mL/min/1.73m2 "    Calcium 9.3 8.8 - 10.4 mg/dL    Chloride 104 98 - 107 mmol/L    Glucose 132 (H) 70 - 99 mg/dL    Alkaline Phosphatase 89 40 - 150 U/L    AST 31 0 - 45 U/L    ALT 49 0 - 50 U/L    Protein Total 7.1 6.4 - 8.3 g/dL    Albumin 4.0 3.5 - 5.2 g/dL    Bilirubin Total 0.4 <=1.2 mg/dL   CBC with platelets and differential   Result Value Ref Range    WBC Count 13.0 (H) 4.0 - 11.0 10e3/uL    RBC Count 4.32 3.80 - 5.20 10e6/uL    Hemoglobin 12.3 11.7 - 15.7 g/dL    Hematocrit 36.5 35.0 - 47.0 %    MCV 85 78 - 100 fL    MCH 28.5 26.5 - 33.0 pg    MCHC 33.7 31.5 - 36.5 g/dL    RDW 15.3 (H) 10.0 - 15.0 %    Platelet Count 456 (H) 150 - 450 10e3/uL    % Neutrophils 70 %    % Lymphocytes 19 %    % Monocytes 10 %    % Eosinophils 0 %    % Basophils 1 %    % Immature Granulocytes 1 %    NRBCs per 100 WBC 0 <1 /100    Absolute Neutrophils 9.0 (H) 1.6 - 8.3 10e3/uL    Absolute Lymphocytes 2.5 0.8 - 5.3 10e3/uL    Absolute Monocytes 1.2 0.0 - 1.3 10e3/uL    Absolute Eosinophils 0.1 0.0 - 0.7 10e3/uL    Absolute Basophils 0.1 0.0 - 0.2 10e3/uL    Absolute Immature Granulocytes 0.1 <=0.4 10e3/uL    Absolute NRBCs 0.0 10e3/uL     I reviewed the above labs today.  Imaging    US Breast Biopsy Core Lymph Node Left    Addendum Date: 12/11/2024    Pathology shows metastatic adenocarcinoma. This is consistent with imaging findings. A verbal report was given to the patient. Surgical and oncological consultation is recommended.    Result Date: 12/11/2024  LEFT LYMPH NODE ULTRASOUND GUIDED BIOPSY, CLIP PLACEMENT: INDICATIONS: Abnormal Lymph Node PROCEDURE: Informed consent was obtained from the patient. Ultrasound was used to localize the lymph node in the left axilla. The skin was prepped and draped in a sterile fashion. Lidocaine was used for local anesthesia. Under direct sonographic guidance, a 18 gauge coaxial needle was used to obtain 2 core biopises. A clip was then placed. Digital mammograms performed in separate room, using separate  equipment to document clip placement, demonstrates the clip in appropriate position. The patient tolerated this well, there are no immediate complications.     IMPRESSION: Ultrasound guided biopsy of a suspicious lymph node in the left axilla. Pathology pending.     US Breast Biopsy Core Needle Left    Addendum Date: 12/11/2024    Pathology shows benign breast tissue. This is consistent with imaging findings. A verbal report was given to the patient. Surgical consultation is recommended for the patients know left breast cancer.    Result Date: 12/11/2024  US Breast Biopsy Core Needle Left INDICATION: Left breast mass. PROCEDURE: Informed consent was obtained from the patient. Ultrasound was used to localize the mass at the 6 o'clock position of the left breast, 3 cm from the nipple.  The skin was marked, then prepped and draped in a sterile fashion. 1% lidocaine was used for local anesthesia. Under direct sonographic guidance, a  18 -gauge coaxial needle was used to obtain 2 core biopsies.  A biopsy marking clip was then placed.  Digital mammograms performed in a separate room, using separate equipment, to document clip placement. The mammogram showed the clip to be in good position. The patient tolerated this well; there are no immediate complications.    IMPRESSION: 1. Ultrasound-guided biopsy of mass in the left breast. Pathology pending. 2. Post procedure mammogram for marker placement     MA Post Procedure Left    Addendum Date: 12/11/2024    Pathology shows benign breast tissue. This is consistent with imaging findings. A verbal report was given to the patient. Surgical consultation is recommended for the patients know left breast cancer.    Result Date: 12/11/2024  US Breast Biopsy Core Needle Left INDICATION: Left breast mass. PROCEDURE: Informed consent was obtained from the patient. Ultrasound was used to localize the mass at the 6 o'clock position of the left breast, 3 cm from the nipple.  The skin was  marked, then prepped and draped in a sterile fashion. 1% lidocaine was used for local anesthesia. Under direct sonographic guidance, a  18 -gauge coaxial needle was used to obtain 2 core biopsies.  A biopsy marking clip was then placed.  Digital mammograms performed in a separate room, using separate equipment, to document clip placement. The mammogram showed the clip to be in good position. The patient tolerated this well; there are no immediate complications.    IMPRESSION: 1. Ultrasound-guided biopsy of mass in the left breast. Pathology pending. 2. Post procedure mammogram for marker placement     US Breast Biopsy Core Needle Right    Addendum Date: 12/11/2024    Pathology shows benign breast tissue. This is consistent with imaging findings. A verbal report was given to the patient. Repeat routine screening mammogram in one year is recommended.    Result Date: 12/11/2024  US Breast Biopsy Core Needle Right INDICATION: Right breast mass. PROCEDURE: Informed consent was obtained from the patient. Ultrasound was used to localize the mass at the 1 o'clock position of the right breast, 2 cm from the nipple.  The skin was marked, then prepped and draped in a sterile fashion. 1% lidocaine was used for local anesthesia. Under direct sonographic guidance, a  18 -gauge coaxial needle was used to obtain 2 core biopsies.  A biopsy marking clip was then placed.  Digital mammograms performed in a separate room, using separate equipment, to document clip placement. The mammogram showed the clip to be in good position. The patient tolerated this well; there are no immediate complications.    IMPRESSION: 1. Ultrasound-guided biopsy of mass in the right breast. Pathology pending. 2. Post procedure mammogram for marker placement     MA Post Procedure Right    Addendum Date: 12/11/2024    Pathology shows benign breast tissue. This is consistent with imaging findings. A verbal report was given to the patient. Repeat routine  screening mammogram in one year is recommended.    Result Date: 12/11/2024  US Breast Biopsy Core Needle Right INDICATION: Right breast mass. PROCEDURE: Informed consent was obtained from the patient. Ultrasound was used to localize the mass at the 1 o'clock position of the right breast, 2 cm from the nipple.  The skin was marked, then prepped and draped in a sterile fashion. 1% lidocaine was used for local anesthesia. Under direct sonographic guidance, a  18 -gauge coaxial needle was used to obtain 2 core biopsies.  A biopsy marking clip was then placed.  Digital mammograms performed in a separate room, using separate equipment, to document clip placement. The mammogram showed the clip to be in good position. The patient tolerated this well; there are no immediate complications.    IMPRESSION: 1. Ultrasound-guided biopsy of mass in the right breast. Pathology pending. 2. Post procedure mammogram for marker placement     US Breast Bilateral Limited 1-3 Quadrants    Result Date: 12/9/2024  EXAM: US BREAST BILATERAL LIMITED 1-3 QUADRANTS, 12/9/2024 8:19 AM COMPARISONS: Breast MRI from 11/27/2024 and mammograms and breast ultrasounds from 11/18/2024, 11/15/2024, 11/12/2024 HISTORY: Known left breast cancer with solitary lesion seen in the right breast in the 12:00 position and 2 small lesions seen in the left breast on breast MRI. Lymphadenopathy noted on the breast MRI in the left axilla FINDINGS: Targeted bilateral breast ultrasound was performed by both the ultrasonographer and the radiologist. In the right breast in the 1:00 position 2 cm from the nipple there was a hyperechoic oval circumscribed lesion with parallel orientation measuring 7 x 5 x 7 mm correlating with the abnormality seen in this location on the patient's breast MRI. In the left breast in the 6:00 position 3 cm from the nipple there is a similar-appearing lesion measuring 5 x 4 x 4 mm. Both have an appearance most consistent with a benign  hemangioma and correlate with MRI abnormality seen in these locations. The additional left breast lesion seen on MRI is not identified on ultrasound but had a similar appearance to the other 2 lesions on the MRI. The left axilla was examined as well demonstrating multiple abnormal-appearing lymph nodes with cortical thickening to 6 mm.     IMPRESSION: BI-RADS CATEGORY: 6 - Known Biopsy-Proven Malignancy. Small lesions in both breasts have an appearance most consistent with benign hemangiomas. Given the patient's history of known left breast cancer, ultrasound-guided core biopsy of both lesions will be performed. Given that they have a similar appearance on MRI to the third lesion seen in the left breast on MRI MRI guided biopsy seems unnecessary. This was discussed with Dr. Bonilla. Ultrasound-guided core biopsy of one of the abnormal lymph nodes in the left axilla will also be performed. RECOMMENDED FOLLOW-UP: Biopsy. The findings and the recommendations were discussed with the patient at the time of exam. Ultrasound guided core biopsy of the hyperechoic lesions in the left breast in the 6:00 position 3 cm from the nipple and in the right breast in the 1:00 position 2 cm from the nipple as well as one of the left axillary lymph nodes will be performed today. EDUARDO CHIU MD   SYSTEM ID:  [id number]    MR Liver wo & w Contrast    Result Date: 12/5/2024  EXAM: MR LIVER W/O and W CONTRAST LOCATION: [facility] DATE: 12/5/2024 INDICATION:  Liver lesion COMPARISON: CT dated 12/3/2024 TECHNIQUE: Routine MRI liver protocol including T1 in/out phase, diffusion, multiplane T2, and dynamic T1 with IV contrast.  CONTRAST: 10 mL Gadavist FINDINGS: LUNG BASES: Left breast mass measuring up to 2.3 cm, and adjacent adenopathy redemonstrated. LIVER: 2.4 cm left hepatic lobe cyst. Scattered additional low-attenuation lesion seen at CT are consistent with benign hemangiomas, largest centrally in  the right hepatic lobe reaching 1.4 cm, image 28 series 16. GALLBLADDER/BILIARY: No biliary dilatation PANCREAS: No pancreatic ductal dilatation. SPLEEN: Unremarkable. ADRENALS: Unremarkable. KIDNEYS: Tiny left renal cyst for which no additional follow-up imaging recommended. BOWEL: Visualized bowel is nondilated. LYMPH NODES: No significant upper abdominal adenopathy. VASCULATURE: Unremarkable. MUSCULOSKELETAL: No acute bony abnormalities. OTHER: No upper abdominal ascites.     IMPRESSION: 1.  No suspicious hepatic mass lesions. 2.  Left breast mass with partially visualized left axillary adenopathy.    CT Chest/Abdomen/Pelvis w Contrast    Result Date: 12/4/2024  EXAM: CT CHEST/ABDOMEN/PELVIS W CONTRAST LOCATION: [facility] DATE: 12/3/2024 INDICATION: Left breast malignancy. COMPARISON: None available. TECHNIQUE: CT scan of the chest, abdomen, and pelvis was performed following injection of IV contrast. Multiplanar reformats were obtained. Dose reduction techniques were used. CONTRAST: 90 mL [id number]. FINDINGS: LUNGS AND PLEURA: Trachea and large airways are patent. No focal airspace consolidation. Mild dependent atelectasis.  No suspicious pulmonary nodule. No pneumothorax. No pleural effusion.  MEDIASTINUM/AXILLAE: Prominent and mildly enlarged left supraclavicular lymph nodes, largest measuring up to 10 mm in short axis, series 3 image 12. No mediastinal/hilar adenopathy. Thoracic esophagus is unremarkable. Mild left axillary and subpectoral lymphadenopathy. For reference, an enlarged left axillary lymph node measures up to 11 mm in short axis, series 3 image 38. Asymmetric soft tissue density within the upper outer quadrant of the left breast, likely corresponding to known malignancy, series 3 image 43. No thoracic aortic aneurysm. Heart is normal in size. No pericardial effusion. CORONARY ARTERY CALCIFICATION: None. HEPATOBILIARY: There are a few low-attenuation hepatic lesions,  which are indeterminate. Largest measures up to 14 mm in the central right hepatic lobe. Correlation with liver MR imaging is recommended, as metastases cannot be excluded. A larger low-attenuation lesion along the falciform ligament, series 3 image 124, measures up to 24 mm and favors a benign cyst. Attention on liver MR imaging follow-up. Gallbladder is normal. No intrahepatic or intrahepatic biliary ductal dilatation. PANCREAS: Enhances normally. No peripancreatic inflammatory fat stranding. SPLEEN: Enhances normally. Normal size. ADRENAL GLANDS: Normal. KIDNEYS: Both kidneys enhance symmetrically, without hydronephrosis. No nephroureterolithiasis. Urinary bladder is unremarkable. PELVIC ORGANS: No suspicious abnormality. Calcified uterine fibroid. BOWEL: No evidence of acute gastrointestinal inflammation or obstruction. No intraperitoneal free fluid or free air. LYMPH NODES: No suspicious abdominal or pelvic lymphadenopathy. VASCULATURE: No abdominal aortic aneurysm. MUSCULOSKELETAL: No suspicious abnormality. OTHER: No additionally significant abnormalities.     IMPRESSION: 1.  Asymmetric soft tissue density within the upper outer quadrant of the left breast, likely corresponding to known malignancy. 2.  Mild left axillary and subpectoral lymphadenopathy, likely metastatic. 3.  Prominent and mildly enlarged left supraclavicular lymph nodes, also likely metastatic. 4.  Indeterminate low-attenuation hepatic lesions, measuring up to 14 mm. Correlation with liver MR imaging is recommended, as metastases cannot be excluded.        Billing  Total time 35 minutes, to include face to face visit, review of EMR, ordering, documentation and coordination of care on date of service. The longitudinal plan of care for the diagnosis(es)/condition(s) as documented were addressed during this visit. Due to the added complexity in care, I will continue to support Adrienne in the subsequent management and with ongoing continuity of  care.    Signed by: AMARILIS Soria CNP        Chart documentation with Dragon Voice recognition Software. Although reviewed after completion, some words and grammatical errors may remain.

## 2025-01-02 NOTE — PROGRESS NOTES
Infusion Nursing Note:  Adrienne Ivory presents today for C2D1 Perjeta, Trazimera, Docetaxol, and carboplatin.    Patient seen by provider today: Yes: RYAN Osorio CNP   present during visit today: Not Applicable.    Note: Reviewed treatment plan with patient and patients friend, Desmond. Premedicated with Benadryl 25mg IVP, Emend/dex, and Aloxi.Pt. appears anxious with treatment due to her first cycle she became really groggy with her Benadryl 50 mg, nauseated, and c/o mild chest discomfort. Pt. Denies any side effects today that she experienced.      Intravenous Access:  Implanted Port.    Treatment Conditions:  Results reviewed, labs MET treatment parameters, ok to proceed with treatment.      Post Infusion Assessment:  Patient tolerated infusion without incident.  Site patent and intact, free from redness, edema or discomfort.  No evidence of extravasations.  Access discontinued per protocol.       Discharge Plan:   Patient and/or family verbalized understanding of discharge instructions and all questions answered.      Estefani Bruno RN

## 2025-01-02 NOTE — LETTER
1/2/2025      Adrienne Ivory  [address]  [place] [address]      Dear Colleague,    Thank you for referring your patient, Adrienne Ivory, to the [facility] [place]. Please see a copy of my visit note below.    [facility] Hematology and Oncology Outpatient Progress Note    Patient: Adrienne Ivory  MRN: [id number]  Date of Service: Jan 2, 2025          Reason for Visit    Chief Complaint   Patient presents with     Oncology Clinic Visit     Malignant neoplasm of upper-inner quadrant of left breast in female, estrogen receptor positive        Cancer Staging   Invasive ductal carcinoma of left breast (H)  Staging form: Breast, AJCC 8th Edition  - Clinical: Stage IIIB (cT2, cN3, cM0, G2, ER+, GA-, HER2+) - Signed by Anastasiya Salgado APRN CNP on 12/19/2024      Primary Hematologist/Oncologist: Dr. Josseline Harkins        Assessment/Plan  #.  Breast cancer, Clinical stage III, ER+ and Her2 +  Clinically stable. Tolerating first cycle of treatment well with manageable side effects of fatigue, nausea and muscle and bone pain. The infusion was stopped for increased nausea and anxiety, but she was able to finish the full dose. Will add in Benadryl to premeds. We reviewed labs noting an unremarkable CMP. CBC shows wbc of 13.0 with ANC of 9.0, likely related to neulasta. Hgb and plts are adequate.   Recommend to proceed with next dose of chemotherapy.   Plan is to give 6 cycles of this and then go on to get surgery. Post treatment will be based on response found in surgery.   She will return in 2 weeks with Dr. Harkins + labs for cycle 3.   Patient was encouraged to contact this clinic with any questions or concerns in the interim.     #.  Leukocytosis:   Likely from neulasta. No signs of infection. they will monitor temp and signs of infection and call us of they occur. Will continue to monitor labs at subsequent visits.     #.  Mild expected side effects of chemo including fatigue,  nausea, bone pain: Managing well at this time.   Continue Zofran and Compazine for antiemetics.    Encouraged small frequent meals.  Use Tylenol and advil as needed for any pain.   Start Claritin    #.  Elevated liver function tests: likely from chemotherapy, though these have now returned to normal.   Will continue to monitor.     ______________________________________________________________________________    History of Present Illness/ Interval History    Ms. Adrienne Ivory  is a 59 year old patient who is seen today for follow up. She has had 1 cycle of TCHP with good toleration. She had expected fatigue and some nausea. She is getting over a cold that started a few days ago. She has had no fevers. She did have muscle cramps and bone pain after Neulasta injection. Started Advil and they have since resolved. She was not taking Claritin, but has plans to start now. The first cycle of treatment was given in its entirety but was stopped during docetaxel due to nausea and anxiety.       ECOG performance status   0- Fully active, without restriction      Distress Screening (within last 30 days)    1. How concerned are you about your ability to eat? : 0  2. How concerned are you about unintended weight loss or your current weight? : 0  3. How concerned are you about feeling depressed or very sad? : 0  4. How concerned are you about feeling anxious or very scared? : 5 (the unknown)  5. Do you struggle with the loss of meaning and travis in your life? : Not at all  6. How concerned are you about work and home life issues that may be affected by your cancer? : 0  7. How concerned are you about knowing what resources are available to help you? : 0  8. Do you currently have what you would describe as Latter-day or spiritual struggles?: Not at all      Pain  Pain Score: No Pain (0)    ROS  A 14 point review of systems was obtained.  Positive findings noted in the history.  The remainder of the review of system is otherwise  "negative.      Oncology History/Treatment    Diagnosis: Breast cancer, Clinical stage T2N3M0. Left side.   -11/18/24: left breast biopsy- invasive ductal cancer. Grade 2. ER + (80%) [place]- Her2/wolf 3+  -12/9/24: lymph node positive for breast cancer. ER+[place]-Her2+. Also had left breast and right breast mass biopsied   -12/3/24: CT: 1.  Asymmetric soft tissue density within the upper outer quadrant of the left breast, likely corresponding to known malignancy. 2.  Mild left axillary and subpectoral lymphadenopathy, likely metastatic.3.  Prominent and mildly enlarged left supraclavicular lymph nodes, also likely metastatic.4.  Indeterminate low-attenuation hepatic lesions, measuring up to 14 mm. Correlation with liver MR imaging is recommended, as metastases cannot be excluded.  -12/5/24: Liver MRI: 1.  No suspicious hepatic mass lesions. 2.  Left breast mass with partially visualized left axillary adenopathy.    Treatment:   -12/12/24: Neoadjuvant TCHP.        Physical Exam    BP (!) 141/71 (Patient Position: Sitting)   Pulse 106   Temp 98.7  F (37.1  C)   Resp 16   Ht 1.651 m (5' 5\")   Wt 97.5 kg (215 lb)   SpO2 96%   BMI 35.78 kg/m      General: Well-appearing female in no acute distress. Cooperative in conversation.  Eyes: EOMI, PERRL. No scleral icterus.  ENT: Oral mucosa is moist without lesions or thrush. No ulcerations or mucositis noted.  Lymphatic: Neck is supple without cervical, axillary, or supraclavicular lymphadenopathy.   Cardiovascular: RRR No murmurs, gallops, or rubs. No peripheral edema.  Respiratory: CTA bilaterally. No wheezes or crackles.  Gastrointestinal: BS +. Abdomen soft, non-tender. No palpable hepatosplenomegaly or masses.   Neurologic: Alert and oriented x3. Cranial nerves II through XII are grossly intact.  Skin: No rashes, petechiae, or bruising noted. Warm, dry, intact.      Lab Results    Recent Results (from the past week)   Comprehensive metabolic panel   Result Value Ref Range    " Sodium 139 135 - 145 mmol/L    Potassium 3.9 3.4 - 5.3 mmol/L    Carbon Dioxide (CO2) 22 22 - 29 mmol/L    Anion Gap 13 7 - 15 mmol/L    Urea Nitrogen 9.2 6.0 - 20.0 mg/dL    Creatinine 0.79 0.51 - 0.95 mg/dL    GFR Estimate 86 >60 mL/min/1.73m2    Calcium 9.3 8.8 - 10.4 mg/dL    Chloride 104 98 - 107 mmol/L    Glucose 132 (H) 70 - 99 mg/dL    Alkaline Phosphatase 89 40 - 150 U/L    AST 31 0 - 45 U/L    ALT 49 0 - 50 U/L    Protein Total 7.1 6.4 - 8.3 g/dL    Albumin 4.0 3.5 - 5.2 g/dL    Bilirubin Total 0.4 <=1.2 mg/dL   CBC with platelets and differential   Result Value Ref Range    WBC Count 13.0 (H) 4.0 - 11.0 10e3/uL    RBC Count 4.32 3.80 - 5.20 10e6/uL    Hemoglobin 12.3 11.7 - 15.7 g/dL    Hematocrit 36.5 35.0 - 47.0 %    MCV 85 78 - 100 fL    MCH 28.5 26.5 - 33.0 pg    MCHC 33.7 31.5 - 36.5 g/dL    RDW 15.3 (H) 10.0 - 15.0 %    Platelet Count 456 (H) 150 - 450 10e3/uL    % Neutrophils 70 %    % Lymphocytes 19 %    % Monocytes 10 %    % Eosinophils 0 %    % Basophils 1 %    % Immature Granulocytes 1 %    NRBCs per 100 WBC 0 <1 /100    Absolute Neutrophils 9.0 (H) 1.6 - 8.3 10e3/uL    Absolute Lymphocytes 2.5 0.8 - 5.3 10e3/uL    Absolute Monocytes 1.2 0.0 - 1.3 10e3/uL    Absolute Eosinophils 0.1 0.0 - 0.7 10e3/uL    Absolute Basophils 0.1 0.0 - 0.2 10e3/uL    Absolute Immature Granulocytes 0.1 <=0.4 10e3/uL    Absolute NRBCs 0.0 10e3/uL     I reviewed the above labs today.  Imaging    US Breast Biopsy Core Lymph Node Left    Addendum Date: 12/11/2024    Pathology shows metastatic adenocarcinoma. This is consistent with imaging findings. A verbal report was given to the patient. Surgical and oncological consultation is recommended.    Result Date: 12/11/2024  LEFT LYMPH NODE ULTRASOUND GUIDED BIOPSY, CLIP PLACEMENT: INDICATIONS: Abnormal Lymph Node PROCEDURE: Informed consent was obtained from the patient. Ultrasound was used to localize the lymph node in the left axilla. The skin was prepped and draped in a  sterile fashion. Lidocaine was used for local anesthesia. Under direct sonographic guidance, a 18 gauge coaxial needle was used to obtain 2 core biopises. A clip was then placed. Digital mammograms performed in separate room, using separate equipment to document clip placement, demonstrates the clip in appropriate position. The patient tolerated this well, there are no immediate complications.     IMPRESSION: Ultrasound guided biopsy of a suspicious lymph node in the left axilla. Pathology pending.     US Breast Biopsy Core Needle Left    Addendum Date: 12/11/2024    Pathology shows benign breast tissue. This is consistent with imaging findings. A verbal report was given to the patient. Surgical consultation is recommended for the patients know left breast cancer.    Result Date: 12/11/2024  US Breast Biopsy Core Needle Left INDICATION: Left breast mass. PROCEDURE: Informed consent was obtained from the patient. Ultrasound was used to localize the mass at the 6 o'clock position of the left breast, 3 cm from the nipple.  The skin was marked, then prepped and draped in a sterile fashion. 1% lidocaine was used for local anesthesia. Under direct sonographic guidance, a  18 -gauge coaxial needle was used to obtain 2 core biopsies.  A biopsy marking clip was then placed.  Digital mammograms performed in a separate room, using separate equipment, to document clip placement. The mammogram showed the clip to be in good position. The patient tolerated this well; there are no immediate complications.    IMPRESSION: 1. Ultrasound-guided biopsy of mass in the left breast. Pathology pending. 2. Post procedure mammogram for marker placement     MA Post Procedure Left    Addendum Date: 12/11/2024    Pathology shows benign breast tissue. This is consistent with imaging findings. A verbal report was given to the patient. Surgical consultation is recommended for the patients know left breast cancer.    Result Date: 12/11/2024    Breast Biopsy Core Needle Left INDICATION: Left breast mass. PROCEDURE: Informed consent was obtained from the patient. Ultrasound was used to localize the mass at the 6 o'clock position of the left breast, 3 cm from the nipple.  The skin was marked, then prepped and draped in a sterile fashion. 1% lidocaine was used for local anesthesia. Under direct sonographic guidance, a  18 -gauge coaxial needle was used to obtain 2 core biopsies.  A biopsy marking clip was then placed.  Digital mammograms performed in a separate room, using separate equipment, to document clip placement. The mammogram showed the clip to be in good position. The patient tolerated this well; there are no immediate complications.    IMPRESSION: 1. Ultrasound-guided biopsy of mass in the left breast. Pathology pending. 2. Post procedure mammogram for marker placement     US Breast Biopsy Core Needle Right    Addendum Date: 12/11/2024    Pathology shows benign breast tissue. This is consistent with imaging findings. A verbal report was given to the patient. Repeat routine screening mammogram in one year is recommended.    Result Date: 12/11/2024  US Breast Biopsy Core Needle Right INDICATION: Right breast mass. PROCEDURE: Informed consent was obtained from the patient. Ultrasound was used to localize the mass at the 1 o'clock position of the right breast, 2 cm from the nipple.  The skin was marked, then prepped and draped in a sterile fashion. 1% lidocaine was used for local anesthesia. Under direct sonographic guidance, a  18 -gauge coaxial needle was used to obtain 2 core biopsies.  A biopsy marking clip was then placed.  Digital mammograms performed in a separate room, using separate equipment, to document clip placement. The mammogram showed the clip to be in good position. The patient tolerated this well; there are no immediate complications.    IMPRESSION: 1. Ultrasound-guided biopsy of mass in the right breast. Pathology pending. 2. Post  procedure mammogram for marker placement     MA Post Procedure Right    Addendum Date: 12/11/2024    Pathology shows benign breast tissue. This is consistent with imaging findings. A verbal report was given to the patient. Repeat routine screening mammogram in one year is recommended.    Result Date: 12/11/2024  US Breast Biopsy Core Needle Right INDICATION: Right breast mass. PROCEDURE: Informed consent was obtained from the patient. Ultrasound was used to localize the mass at the 1 o'clock position of the right breast, 2 cm from the nipple.  The skin was marked, then prepped and draped in a sterile fashion. 1% lidocaine was used for local anesthesia. Under direct sonographic guidance, a  18 -gauge coaxial needle was used to obtain 2 core biopsies.  A biopsy marking clip was then placed.  Digital mammograms performed in a separate room, using separate equipment, to document clip placement. The mammogram showed the clip to be in good position. The patient tolerated this well; there are no immediate complications.    IMPRESSION: 1. Ultrasound-guided biopsy of mass in the right breast. Pathology pending. 2. Post procedure mammogram for marker placement     US Breast Bilateral Limited 1-3 Quadrants    Result Date: 12/9/2024  EXAM: US BREAST BILATERAL LIMITED 1-3 QUADRANTS, 12/9/2024 8:19 AM COMPARISONS: Breast MRI from 11/27/2024 and mammograms and breast ultrasounds from 11/18/2024, 11/15/2024, 11/12/2024 HISTORY: Known left breast cancer with solitary lesion seen in the right breast in the 12:00 position and 2 small lesions seen in the left breast on breast MRI. Lymphadenopathy noted on the breast MRI in the left axilla FINDINGS: Targeted bilateral breast ultrasound was performed by both the ultrasonographer and the radiologist. In the right breast in the 1:00 position 2 cm from the nipple there was a hyperechoic oval circumscribed lesion with parallel orientation measuring 7 x 5 x 7 mm correlating with the  abnormality seen in this location on the patient's breast MRI. In the left breast in the 6:00 position 3 cm from the nipple there is a similar-appearing lesion measuring 5 x 4 x 4 mm. Both have an appearance most consistent with a benign hemangioma and correlate with MRI abnormality seen in these locations. The additional left breast lesion seen on MRI is not identified on ultrasound but had a similar appearance to the other 2 lesions on the MRI. The left axilla was examined as well demonstrating multiple abnormal-appearing lymph nodes with cortical thickening to 6 mm.     IMPRESSION: BI-RADS CATEGORY: 6 - Known Biopsy-Proven Malignancy. Small lesions in both breasts have an appearance most consistent with benign hemangiomas. Given the patient's history of known left breast cancer, ultrasound-guided core biopsy of both lesions will be performed. Given that they have a similar appearance on MRI to the third lesion seen in the left breast on MRI MRI guided biopsy seems unnecessary. This was discussed with Dr. Bonilla. Ultrasound-guided core biopsy of one of the abnormal lymph nodes in the left axilla will also be performed. RECOMMENDED FOLLOW-UP: Biopsy. The findings and the recommendations were discussed with the patient at the time of exam. Ultrasound guided core biopsy of the hyperechoic lesions in the left breast in the 6:00 position 3 cm from the nipple and in the right breast in the 1:00 position 2 cm from the nipple as well as one of the left axillary lymph nodes will be performed today. EDUARDO CHIU MD   SYSTEM ID:  [id number]    MR Liver wo & w Contrast    Result Date: 12/5/2024  EXAM: MR LIVER W/O and W CONTRAST LOCATION: [facility] DATE: 12/5/2024 INDICATION:  Liver lesion COMPARISON: CT dated 12/3/2024 TECHNIQUE: Routine MRI liver protocol including T1 in/out phase, diffusion, multiplane T2, and dynamic T1 with IV contrast.  CONTRAST: 10 mL Gadavist FINDINGS: LUNG BASES:  Left breast mass measuring up to 2.3 cm, and adjacent adenopathy redemonstrated. LIVER: 2.4 cm left hepatic lobe cyst. Scattered additional low-attenuation lesion seen at CT are consistent with benign hemangiomas, largest centrally in the right hepatic lobe reaching 1.4 cm, image 28 series 16. GALLBLADDER/BILIARY: No biliary dilatation PANCREAS: No pancreatic ductal dilatation. SPLEEN: Unremarkable. ADRENALS: Unremarkable. KIDNEYS: Tiny left renal cyst for which no additional follow-up imaging recommended. BOWEL: Visualized bowel is nondilated. LYMPH NODES: No significant upper abdominal adenopathy. VASCULATURE: Unremarkable. MUSCULOSKELETAL: No acute bony abnormalities. OTHER: No upper abdominal ascites.     IMPRESSION: 1.  No suspicious hepatic mass lesions. 2.  Left breast mass with partially visualized left axillary adenopathy.    CT Chest/Abdomen/Pelvis w Contrast    Result Date: 12/4/2024  EXAM: CT CHEST/ABDOMEN/PELVIS W CONTRAST LOCATION: [facility] DATE: 12/3/2024 INDICATION: Left breast malignancy. COMPARISON: None available. TECHNIQUE: CT scan of the chest, abdomen, and pelvis was performed following injection of IV contrast. Multiplanar reformats were obtained. Dose reduction techniques were used. CONTRAST: 90 mL [id number]. FINDINGS: LUNGS AND PLEURA: Trachea and large airways are patent. No focal airspace consolidation. Mild dependent atelectasis.  No suspicious pulmonary nodule. No pneumothorax. No pleural effusion.  MEDIASTINUM/AXILLAE: Prominent and mildly enlarged left supraclavicular lymph nodes, largest measuring up to 10 mm in short axis, series 3 image 12. No mediastinal/hilar adenopathy. Thoracic esophagus is unremarkable. Mild left axillary and subpectoral lymphadenopathy. For reference, an enlarged left axillary lymph node measures up to 11 mm in short axis, series 3 image 38. Asymmetric soft tissue density within the upper outer quadrant of the left breast, likely  corresponding to known malignancy, series 3 image 43. No thoracic aortic aneurysm. Heart is normal in size. No pericardial effusion. CORONARY ARTERY CALCIFICATION: None. HEPATOBILIARY: There are a few low-attenuation hepatic lesions, which are indeterminate. Largest measures up to 14 mm in the central right hepatic lobe. Correlation with liver MR imaging is recommended, as metastases cannot be excluded. A larger low-attenuation lesion along the falciform ligament, series 3 image 124, measures up to 24 mm and favors a benign cyst. Attention on liver MR imaging follow-up. Gallbladder is normal. No intrahepatic or intrahepatic biliary ductal dilatation. PANCREAS: Enhances normally. No peripancreatic inflammatory fat stranding. SPLEEN: Enhances normally. Normal size. ADRENAL GLANDS: Normal. KIDNEYS: Both kidneys enhance symmetrically, without hydronephrosis. No nephroureterolithiasis. Urinary bladder is unremarkable. PELVIC ORGANS: No suspicious abnormality. Calcified uterine fibroid. BOWEL: No evidence of acute gastrointestinal inflammation or obstruction. No intraperitoneal free fluid or free air. LYMPH NODES: No suspicious abdominal or pelvic lymphadenopathy. VASCULATURE: No abdominal aortic aneurysm. MUSCULOSKELETAL: No suspicious abnormality. OTHER: No additionally significant abnormalities.     IMPRESSION: 1.  Asymmetric soft tissue density within the upper outer quadrant of the left breast, likely corresponding to known malignancy. 2.  Mild left axillary and subpectoral lymphadenopathy, likely metastatic. 3.  Prominent and mildly enlarged left supraclavicular lymph nodes, also likely metastatic. 4.  Indeterminate low-attenuation hepatic lesions, measuring up to 14 mm. Correlation with liver MR imaging is recommended, as metastases cannot be excluded.        Billing  Total time 35 minutes, to include face to face visit, review of EMR, ordering, documentation and coordination of care on date of service. The  "longitudinal plan of care for the diagnosis(es)/condition(s) as documented were addressed during this visit. Due to the added complexity in care, I will continue to support Adrienne in the subsequent management and with ongoing continuity of care.    Signed by: AMARILIS oSria CNP        Chart documentation with Dragon Voice recognition Software. Although reviewed after completion, some words and grammatical errors may remain.    Oncology Rooming Note    January 2, 2025 8:43 AM   Adrienne Ivory is a 59 year old female who presents for:    Chief Complaint   Patient presents with     Oncology Clinic Visit     Malignant neoplasm of upper-inner quadrant of left breast in female, estrogen receptor positive     Initial Vitals: BP (!) 141/71 (Patient Position: Sitting)   Pulse 106   Temp 98.7  F (37.1  C)   Resp 16   Ht 1.651 m (5' 5\")   Wt 97.5 kg (215 lb)   SpO2 96%   BMI 35.78 kg/m   Estimated body mass index is 35.78 kg/m  as calculated from the following:    Height as of this encounter: 1.651 m (5' 5\").    Weight as of this encounter: 97.5 kg (215 lb). Body surface area is 2.11 meters squared.  No Pain (0) Comment: Data Unavailable   No LMP recorded. Patient has had an ablation.  Allergies reviewed: Yes  Medications reviewed: Yes    Medications: Medication refills not needed today.  Pharmacy name entered into [facility]:    [facility] - [place], [place] - [address] [facility] #[id number] [place] [address] AT [address] [facility]    Frailty Screening:   Is the patient here for a new oncology consult visit in cancer care? 2. No  No concerns today.        Bre Short LPN              Again, thank you for allowing me to participate in the care of your patient.        Sincerely,        AMARILIS Soria CNP    Electronically signed"

## 2025-01-08 ENCOUNTER — ALLIED HEALTH/NURSE VISIT (OUTPATIENT)
Dept: FAMILY MEDICINE | Facility: CLINIC | Age: 60
End: 2025-01-08
Payer: COMMERCIAL

## 2025-01-08 VITALS
OXYGEN SATURATION: 95 % | HEIGHT: 65 IN | TEMPERATURE: 98.3 F | BODY MASS INDEX: 35.78 KG/M2 | SYSTOLIC BLOOD PRESSURE: 118 MMHG | HEART RATE: 113 BPM | RESPIRATION RATE: 14 BRPM | DIASTOLIC BLOOD PRESSURE: 84 MMHG

## 2025-01-08 DIAGNOSIS — J02.9 SORE THROAT: ICD-10-CM

## 2025-01-08 DIAGNOSIS — R12 HEARTBURN: ICD-10-CM

## 2025-01-08 DIAGNOSIS — J04.0 LARYNGITIS: Primary | ICD-10-CM

## 2025-01-08 LAB
DEPRECATED S PYO AG THROAT QL EIA: NEGATIVE
S PYO DNA THROAT QL NAA+PROBE: NOT DETECTED
SARS-COV-2 RNA RESP QL NAA+PROBE: NEGATIVE

## 2025-01-08 PROCEDURE — 87635 SARS-COV-2 COVID-19 AMP PRB: CPT | Performed by: NURSE PRACTITIONER

## 2025-01-08 PROCEDURE — 87651 STREP A DNA AMP PROBE: CPT | Performed by: NURSE PRACTITIONER

## 2025-01-08 PROCEDURE — 99213 OFFICE O/P EST LOW 20 MIN: CPT | Performed by: NURSE PRACTITIONER

## 2025-01-08 ASSESSMENT — PATIENT HEALTH QUESTIONNAIRE - PHQ9: SUM OF ALL RESPONSES TO PHQ QUESTIONS 1-9: 8

## 2025-01-08 ASSESSMENT — ENCOUNTER SYMPTOMS: SORE THROAT: 1

## 2025-01-08 NOTE — PROGRESS NOTES
"Assessment & Plan     Sore throat  - Streptococcus A Rapid Screen w/Reflex to PCR - Clinic Collect  - COVID-19 Virus (Coronavirus) by PCR Nose  - Group A Streptococcus PCR Throat Swab    Laryngitis  - Adult ENT  Referral; Future Patient to see ENT if laryngitis persists greater than 1-2 weeks.     Rapid strep negative, patient notified, throat culture and COVID pending. Follow-up if symptoms are worsening or not improving over the next three to five days.    Patient Instructions   Flonase and mucinex.    Will send in penicillin if strep is positive.    Will send augmentin for sinus infection to take in 2-3 days if sinus symptoms are not improving or worsening.    I have seen and evaluated patient with the RN. This document represents decision making and discussion with the patient.     Sandy Estevez NP    BMI  Estimated body mass index is 35.78 kg/m  as calculated from the following:    Height as of this encounter: 1.651 m (5' 5\").    Weight as of 1/2/25: 97.5 kg (215 lb).       Wolfgang Deleon is a 59 year old, presenting for the following health issues:  Pharyngitis (Mild cough and sore throat x4 days.)      1/8/2025    10:17 AM   Additional Questions   Roomed by DIEGO Peralta         1/8/2025   Declines Weight   Did patient decline having their weight taken? Yes     Pharyngitis     Temp Been running around 97.1 but I have no voice and a sore throat that won't go away. Coughing a lot but nothing but regular green stuff.       Sinus pressure-not a significant amount of pain.    Patient is currently receiving chemo.    Objective    /84 (BP Location: Right arm, Patient Position: Sitting, Cuff Size: Adult Regular)   Pulse 113   Temp 98.3  F (36.8  C) (Oral)   Resp 14   Ht 1.651 m (5' 5\")   SpO2 95%   BMI 35.78 kg/m    Body mass index is 35.78 kg/m .  Physical Exam  HENT:      Head: Normocephalic.      Nose: Rhinorrhea present.      Right Sinus: Maxillary sinus tenderness present. No frontal sinus " tenderness.      Left Sinus: Maxillary sinus tenderness present. No frontal sinus tenderness.      Mouth/Throat:      Mouth: Mucous membranes are moist.   Cardiovascular:      Rate and Rhythm: Normal rate and regular rhythm.      Heart sounds: Normal heart sounds.   Pulmonary:      Effort: Pulmonary effort is normal.      Breath sounds: Normal breath sounds.   Lymphadenopathy:      Cervical: No cervical adenopathy.      Right cervical: No superficial, deep or posterior cervical adenopathy.     Left cervical: No superficial, deep or posterior cervical adenopathy.   Skin:     General: Skin is warm and dry.   Neurological:      General: No focal deficit present.      Mental Status: She is alert.   Psychiatric:         Mood and Affect: Mood normal.         Behavior: Behavior normal.              Signed Electronically by: AMARILIS LANGSTON CNP

## 2025-01-08 NOTE — PATIENT INSTRUCTIONS
Flonase and mucinex.    Will send in penicillin if strep is positive.    Will send augmentin for sinus infection to take in 2-3 days if sinus symptoms are not improving or worsening.

## 2025-01-09 RX ORDER — OMEPRAZOLE 40 MG/1
40 CAPSULE, DELAYED RELEASE ORAL DAILY
Qty: 30 CAPSULE | Refills: 1 | Status: SHIPPED | OUTPATIENT
Start: 2025-01-09

## 2025-01-19 ENCOUNTER — MYC REFILL (OUTPATIENT)
Dept: ONCOLOGY | Facility: HOSPITAL | Age: 60
End: 2025-01-19
Payer: COMMERCIAL

## 2025-01-19 DIAGNOSIS — C50.212 MALIGNANT NEOPLASM OF UPPER-INNER QUADRANT OF LEFT BREAST IN FEMALE, ESTROGEN RECEPTOR POSITIVE (H): ICD-10-CM

## 2025-01-19 DIAGNOSIS — Z17.0 MALIGNANT NEOPLASM OF UPPER-INNER QUADRANT OF LEFT BREAST IN FEMALE, ESTROGEN RECEPTOR POSITIVE (H): ICD-10-CM

## 2025-01-20 NOTE — TELEPHONE ENCOUNTER
Refusing refill request, should already have refills on file.    Last Written Prescription Date:  12/5/24  Last Fill Quantity: 6,  # refills: 5   Last office visit provider:  1/2/25 with Shelley Osorio CNP     Requested Prescriptions   Pending Prescriptions Disp Refills    dexAMETHasone (DECADRON) 4 MG tablet 6 tablet 5     Sig: Take 2 tablets (8 mg) by mouth 2 times daily (with meals). Start evening of Docetaxel infusion and continue for a total of 3 doses.       There is no refill protocol information for this order          Nissa Valdes RN 01/20/25 8:02 AM

## 2025-01-21 RX ORDER — DEXAMETHASONE 4 MG/1
8 TABLET ORAL 2 TIMES DAILY WITH MEALS
Qty: 6 TABLET | Refills: 5 | OUTPATIENT
Start: 2025-01-21

## 2025-01-23 ENCOUNTER — INFUSION THERAPY VISIT (OUTPATIENT)
Dept: INFUSION THERAPY | Facility: HOSPITAL | Age: 60
End: 2025-01-23
Attending: INTERNAL MEDICINE
Payer: COMMERCIAL

## 2025-01-23 ENCOUNTER — ONCOLOGY VISIT (OUTPATIENT)
Dept: ONCOLOGY | Facility: HOSPITAL | Age: 60
End: 2025-01-23
Attending: INTERNAL MEDICINE
Payer: COMMERCIAL

## 2025-01-23 ENCOUNTER — PATIENT OUTREACH (OUTPATIENT)
Dept: ONCOLOGY | Facility: HOSPITAL | Age: 60
End: 2025-01-23

## 2025-01-23 VITALS
WEIGHT: 207 LBS | DIASTOLIC BLOOD PRESSURE: 85 MMHG | BODY MASS INDEX: 34.49 KG/M2 | TEMPERATURE: 98.3 F | SYSTOLIC BLOOD PRESSURE: 145 MMHG | OXYGEN SATURATION: 97 % | RESPIRATION RATE: 20 BRPM | HEIGHT: 65 IN | HEART RATE: 105 BPM

## 2025-01-23 DIAGNOSIS — Z17.0 MALIGNANT NEOPLASM OF UPPER-INNER QUADRANT OF LEFT BREAST IN FEMALE, ESTROGEN RECEPTOR POSITIVE (H): Primary | ICD-10-CM

## 2025-01-23 DIAGNOSIS — C50.212 MALIGNANT NEOPLASM OF UPPER-INNER QUADRANT OF LEFT BREAST IN FEMALE, ESTROGEN RECEPTOR POSITIVE (H): ICD-10-CM

## 2025-01-23 DIAGNOSIS — C50.212 MALIGNANT NEOPLASM OF UPPER-INNER QUADRANT OF LEFT BREAST IN FEMALE, ESTROGEN RECEPTOR POSITIVE (H): Primary | ICD-10-CM

## 2025-01-23 DIAGNOSIS — Z17.0 MALIGNANT NEOPLASM OF UPPER-INNER QUADRANT OF LEFT BREAST IN FEMALE, ESTROGEN RECEPTOR POSITIVE (H): ICD-10-CM

## 2025-01-23 LAB
ALBUMIN SERPL BCG-MCNC: 4.2 G/DL (ref 3.5–5.2)
ALP SERPL-CCNC: 80 U/L (ref 40–150)
ALT SERPL W P-5'-P-CCNC: 55 U/L (ref 0–50)
ANION GAP SERPL CALCULATED.3IONS-SCNC: 12 MMOL/L (ref 7–15)
AST SERPL W P-5'-P-CCNC: 40 U/L (ref 0–45)
BASOPHILS # BLD AUTO: 0.1 10E3/UL (ref 0–0.2)
BASOPHILS NFR BLD AUTO: 1 %
BILIRUB SERPL-MCNC: 0.4 MG/DL
BUN SERPL-MCNC: 7.4 MG/DL (ref 6–20)
CALCIUM SERPL-MCNC: 9.2 MG/DL (ref 8.8–10.4)
CHLORIDE SERPL-SCNC: 105 MMOL/L (ref 98–107)
CREAT SERPL-MCNC: 0.81 MG/DL (ref 0.51–0.95)
EGFRCR SERPLBLD CKD-EPI 2021: 84 ML/MIN/1.73M2
EOSINOPHIL # BLD AUTO: 0 10E3/UL (ref 0–0.7)
EOSINOPHIL NFR BLD AUTO: 0 %
ERYTHROCYTE [DISTWIDTH] IN BLOOD BY AUTOMATED COUNT: 16.6 % (ref 10–15)
GLUCOSE SERPL-MCNC: 92 MG/DL (ref 70–99)
HCO3 SERPL-SCNC: 23 MMOL/L (ref 22–29)
HCT VFR BLD AUTO: 38.6 % (ref 35–47)
HGB BLD-MCNC: 12.9 G/DL (ref 11.7–15.7)
IMM GRANULOCYTES # BLD: 0.1 10E3/UL
IMM GRANULOCYTES NFR BLD: 1 %
LYMPHOCYTES # BLD AUTO: 2.2 10E3/UL (ref 0.8–5.3)
LYMPHOCYTES NFR BLD AUTO: 21 %
MCH RBC QN AUTO: 29 PG (ref 26.5–33)
MCHC RBC AUTO-ENTMCNC: 33.4 G/DL (ref 31.5–36.5)
MCV RBC AUTO: 87 FL (ref 78–100)
MONOCYTES # BLD AUTO: 0.9 10E3/UL (ref 0–1.3)
MONOCYTES NFR BLD AUTO: 8 %
NEUTROPHILS # BLD AUTO: 7.3 10E3/UL (ref 1.6–8.3)
NEUTROPHILS NFR BLD AUTO: 69 %
NRBC # BLD AUTO: 0 10E3/UL
NRBC BLD AUTO-RTO: 0 /100
PLATELET # BLD AUTO: 307 10E3/UL (ref 150–450)
POTASSIUM SERPL-SCNC: 3.7 MMOL/L (ref 3.4–5.3)
PROT SERPL-MCNC: 7 G/DL (ref 6.4–8.3)
RBC # BLD AUTO: 4.45 10E6/UL (ref 3.8–5.2)
SODIUM SERPL-SCNC: 140 MMOL/L (ref 135–145)
WBC # BLD AUTO: 10.6 10E3/UL (ref 4–11)

## 2025-01-23 PROCEDURE — 250N000011 HC RX IP 250 OP 636: Performed by: INTERNAL MEDICINE

## 2025-01-23 PROCEDURE — 85041 AUTOMATED RBC COUNT: CPT

## 2025-01-23 PROCEDURE — 36591 DRAW BLOOD OFF VENOUS DEVICE: CPT

## 2025-01-23 PROCEDURE — 99214 OFFICE O/P EST MOD 30 MIN: CPT | Performed by: INTERNAL MEDICINE

## 2025-01-23 PROCEDURE — 258N000003 HC RX IP 258 OP 636: Performed by: INTERNAL MEDICINE

## 2025-01-23 PROCEDURE — 85004 AUTOMATED DIFF WBC COUNT: CPT

## 2025-01-23 PROCEDURE — 82040 ASSAY OF SERUM ALBUMIN: CPT

## 2025-01-23 RX ORDER — ALBUTEROL SULFATE 0.83 MG/ML
2.5 SOLUTION RESPIRATORY (INHALATION)
Status: DISCONTINUED | OUTPATIENT
Start: 2025-01-23 | End: 2025-01-23 | Stop reason: HOSPADM

## 2025-01-23 RX ORDER — PALONOSETRON 0.05 MG/ML
0.25 INJECTION, SOLUTION INTRAVENOUS ONCE
Status: CANCELLED | OUTPATIENT
Start: 2025-01-23

## 2025-01-23 RX ORDER — DIPHENHYDRAMINE HYDROCHLORIDE 50 MG/ML
25 INJECTION INTRAMUSCULAR; INTRAVENOUS EVERY 6 HOURS PRN
Status: DISCONTINUED | OUTPATIENT
Start: 2025-01-23 | End: 2025-01-23 | Stop reason: HOSPADM

## 2025-01-23 RX ORDER — DIPHENHYDRAMINE HCL 50 MG
50 CAPSULE ORAL
Status: CANCELLED | OUTPATIENT
Start: 2025-01-23

## 2025-01-23 RX ORDER — ALBUTEROL SULFATE 90 UG/1
1-2 INHALANT RESPIRATORY (INHALATION)
Status: CANCELLED
Start: 2025-01-23

## 2025-01-23 RX ORDER — HEPARIN SODIUM,PORCINE 10 UNIT/ML
5-20 VIAL (ML) INTRAVENOUS DAILY PRN
Status: CANCELLED | OUTPATIENT
Start: 2025-01-23

## 2025-01-23 RX ORDER — DIPHENHYDRAMINE HYDROCHLORIDE 50 MG/ML
25 INJECTION INTRAMUSCULAR; INTRAVENOUS
Status: CANCELLED
Start: 2025-01-23

## 2025-01-23 RX ORDER — EPINEPHRINE 1 MG/ML
0.3 INJECTION, SOLUTION INTRAMUSCULAR; SUBCUTANEOUS EVERY 5 MIN PRN
Status: CANCELLED | OUTPATIENT
Start: 2025-01-23

## 2025-01-23 RX ORDER — PALONOSETRON 0.05 MG/ML
0.25 INJECTION, SOLUTION INTRAVENOUS ONCE
Status: COMPLETED | OUTPATIENT
Start: 2025-01-23 | End: 2025-01-23

## 2025-01-23 RX ORDER — DIPHENHYDRAMINE HYDROCHLORIDE 50 MG/ML
50 INJECTION INTRAMUSCULAR; INTRAVENOUS
Status: DISCONTINUED | OUTPATIENT
Start: 2025-01-23 | End: 2025-01-23 | Stop reason: HOSPADM

## 2025-01-23 RX ORDER — ALBUTEROL SULFATE 90 UG/1
1-2 INHALANT RESPIRATORY (INHALATION)
Status: DISCONTINUED | OUTPATIENT
Start: 2025-01-23 | End: 2025-01-23 | Stop reason: HOSPADM

## 2025-01-23 RX ORDER — EPINEPHRINE 1 MG/ML
0.3 INJECTION, SOLUTION INTRAMUSCULAR; SUBCUTANEOUS EVERY 5 MIN PRN
Status: DISCONTINUED | OUTPATIENT
Start: 2025-01-23 | End: 2025-01-23 | Stop reason: HOSPADM

## 2025-01-23 RX ORDER — MEPERIDINE HYDROCHLORIDE 25 MG/ML
25 INJECTION INTRAMUSCULAR; INTRAVENOUS; SUBCUTANEOUS
Status: DISCONTINUED | OUTPATIENT
Start: 2025-01-23 | End: 2025-01-23 | Stop reason: HOSPADM

## 2025-01-23 RX ORDER — METHYLPREDNISOLONE SODIUM SUCCINATE 40 MG/ML
40 INJECTION INTRAMUSCULAR; INTRAVENOUS
Status: CANCELLED
Start: 2025-01-23

## 2025-01-23 RX ORDER — LORAZEPAM 2 MG/ML
0.5 INJECTION INTRAMUSCULAR EVERY 4 HOURS PRN
Status: CANCELLED | OUTPATIENT
Start: 2025-01-23

## 2025-01-23 RX ORDER — ALBUTEROL SULFATE 0.83 MG/ML
2.5 SOLUTION RESPIRATORY (INHALATION)
Status: CANCELLED | OUTPATIENT
Start: 2025-01-23

## 2025-01-23 RX ORDER — HEPARIN SODIUM (PORCINE) LOCK FLUSH IV SOLN 100 UNIT/ML 100 UNIT/ML
5 SOLUTION INTRAVENOUS
Status: CANCELLED | OUTPATIENT
Start: 2025-01-23

## 2025-01-23 RX ORDER — ACETAMINOPHEN 325 MG/1
650 TABLET ORAL
Status: CANCELLED
Start: 2025-01-23

## 2025-01-23 RX ORDER — HEPARIN SODIUM (PORCINE) LOCK FLUSH IV SOLN 100 UNIT/ML 100 UNIT/ML
5 SOLUTION INTRAVENOUS
Status: DISCONTINUED | OUTPATIENT
Start: 2025-01-23 | End: 2025-01-23 | Stop reason: HOSPADM

## 2025-01-23 RX ORDER — DIPHENHYDRAMINE HYDROCHLORIDE 50 MG/ML
25 INJECTION INTRAMUSCULAR; INTRAVENOUS
Status: COMPLETED | OUTPATIENT
Start: 2025-01-23 | End: 2025-01-23

## 2025-01-23 RX ORDER — DIPHENHYDRAMINE HYDROCHLORIDE 50 MG/ML
25 INJECTION INTRAMUSCULAR; INTRAVENOUS EVERY 6 HOURS PRN
Status: CANCELLED
Start: 2025-01-23

## 2025-01-23 RX ORDER — METHYLPREDNISOLONE SODIUM SUCCINATE 40 MG/ML
40 INJECTION INTRAMUSCULAR; INTRAVENOUS
Status: DISCONTINUED | OUTPATIENT
Start: 2025-01-23 | End: 2025-01-23 | Stop reason: HOSPADM

## 2025-01-23 RX ORDER — DIPHENHYDRAMINE HYDROCHLORIDE 50 MG/ML
50 INJECTION INTRAMUSCULAR; INTRAVENOUS
Status: CANCELLED
Start: 2025-01-23

## 2025-01-23 RX ORDER — MEPERIDINE HYDROCHLORIDE 25 MG/ML
25 INJECTION INTRAMUSCULAR; INTRAVENOUS; SUBCUTANEOUS
Status: CANCELLED | OUTPATIENT
Start: 2025-01-23

## 2025-01-23 RX ADMIN — DIPHENHYDRAMINE HYDROCHLORIDE 25 MG: 50 INJECTION INTRAMUSCULAR; INTRAVENOUS at 10:11

## 2025-01-23 RX ADMIN — CARBOPLATIN 665 MG: 10 INJECTION, SOLUTION INTRAVENOUS at 13:05

## 2025-01-23 RX ADMIN — DEXAMETHASONE SODIUM PHOSPHATE: 10 INJECTION, SOLUTION INTRAMUSCULAR; INTRAVENOUS at 10:18

## 2025-01-23 RX ADMIN — SODIUM CHLORIDE 420 MG: 9 INJECTION, SOLUTION INTRAVENOUS at 10:46

## 2025-01-23 RX ADMIN — SODIUM CHLORIDE 600 MG: 9 INJECTION, SOLUTION INTRAVENOUS at 11:20

## 2025-01-23 RX ADMIN — HEPARIN 5 ML: 100 SYRINGE at 13:42

## 2025-01-23 RX ADMIN — DOCETAXEL 160 MG: 160 INJECTION INTRAVENOUS at 11:59

## 2025-01-23 RX ADMIN — SODIUM CHLORIDE 250 ML: 9 INJECTION, SOLUTION INTRAVENOUS at 09:58

## 2025-01-23 RX ADMIN — PALONOSETRON 0.25 MG: 0.25 INJECTION, SOLUTION INTRAVENOUS at 09:59

## 2025-01-23 ASSESSMENT — PAIN SCALES - GENERAL: PAINLEVEL_OUTOF10: NO PAIN (0)

## 2025-01-23 NOTE — PROGRESS NOTES
"Oncology Rooming Note    January 23, 2025 9:00 AM   Adrienne Ivory is a 59 year old female who presents for:    Chief Complaint   Patient presents with    Oncology Clinic Visit     Invasive ductal carcinoma of left breast     Initial Vitals: BP (!) 145/85 (BP Location: Right arm, Patient Position: Sitting, Cuff Size: Adult Regular)   Pulse 105   Temp 98.3  F (36.8  C) (Tympanic)   Resp 20   Ht 1.651 m (5' 5\")   Wt 93.9 kg (207 lb)   SpO2 97%   BMI 34.45 kg/m   Estimated body mass index is 34.45 kg/m  as calculated from the following:    Height as of this encounter: 1.651 m (5' 5\").    Weight as of this encounter: 93.9 kg (207 lb). Body surface area is 2.08 meters squared.  No Pain (0) Comment: Data Unavailable   No LMP recorded. Patient has had an ablation.  Allergies reviewed: Yes  Medications reviewed: Yes    Medications: Medication refills not needed today.  Pharmacy name entered into [facility]:    [facility] - [place], [place] - [address] [id number] [place] [address] AT [address]    Frailty Screening:   Is the patient here for a new oncology consult visit in cancer care? 2. No      Clinical concerns: review labs / infusion       Shelley Dey MA            "

## 2025-01-23 NOTE — PROGRESS NOTES
Infusion Nursing Note:  Adrienne Ivory presents today for C3D1 Docetaxel, Perjeta, Trazimera, and Carboplatin.    Patient seen by provider today: Yes: Dr. Harkins   present during visit today: Not Applicable.    Note: Patient was premedicated with Benadryl 25mg IVP for anxiety and this drug did help her relax. Also given was Aloxi and Emend/Dexamethasone. Patients nausea subsided and she tolerated her treatment very well today.      Intravenous Access:  Implanted Port.    Treatment Conditions:  Results reviewed, labs MET treatment parameters, ok to proceed with treatment.      Post Infusion Assessment:  Patient tolerated infusion without incident.  Site patent and intact, free from redness, edema or discomfort.  No evidence of extravasations.  Access discontinued per protocol.       Discharge Plan:   Patient and/or family verbalized understanding of discharge instructions and all questions answered.      Estefani Bruno RN

## 2025-01-23 NOTE — PROGRESS NOTES
[facility]: Cancer Care Follow-Up Note                                    Discussion with Patient:                                                    Met with Adrienne in the infusion bay when she came to clinic for treatment. She has had nausea prior to coming to her infusion and Dr. Harkins had instructed for her to take ativan prior to her apt. She does not have a prescription for ativan. Lorazepam only alprazolam Clarified with Dr. Harkins he he would like her to take alprazolam  as it is the same class of drug as ativan. She stated understanding.  She did get enrolled in medical cannabis program and is using the chocolate cannabis candy bites at bedtime and is sleeping well. She is not using at any other time during the day.  She feels that she is tolerating treatment well, overall, and does not feel well the first 7-10 days of treatment but is able to manage her side effects. The week prior to her chemo, symptoms and energy improve  Breast MRI  scheduled on 2/10 and she will follow up on 2/13 with CNP to review imaging /infusion.  No other needs at this time. She is aware that writer will be out of office 1/30-2/7 and other nursing staff are available during this time if any needs arise.        Dates of Treatment:                                                      Infusion given in last 28 days       Administered MAR Action Medication Dose Rate Visit    01/02/2025 11:03 New Bag pertuzumab (PERJETA) 420 mg in sodium chloride 0.9 % 289 mL infusion 420 mg 578 mL/hr Infusion Therapy Visit on 01/02/2025 in [facility]    01/02/2025 11:42 New Bag trastuzumab-qyyp (TRAZIMERA) 600 mg in sodium chloride 0.9 % 303.57 mL infusion 600 mg 607.1 mL/hr Infusion Therapy Visit on 01/02/2025 in [facility]    01/02/2025 12:18 New Bag DOCEtaxel (TAXOTERE) 160 mg in sodium chloride 0.9% in non-PVC container 283 mL infusion 160 mg 283 mL/hr Infusion Therapy Visit on  01/02/2025 in [facility]    01/02/2025 13:31 New Bag CARBOplatin 690 mg in sodium chloride 0.9 % 344 mL infusion 690 mg 688 mL/hr Infusion Therapy Visit on 01/02/2025 in [facility]    01/23/2025 10:46 New Bag pertuzumab (PERJETA) 420 mg in sodium chloride 0.9 % 289 mL infusion 420 mg 578 mL/hr Infusion Therapy Visit on 01/23/2025 in [facility]    01/23/2025 11:20 New Bag trastuzumab-qyyp (TRAZIMERA) 600 mg in sodium chloride 0.9 % 303.57 mL infusion 600 mg 607.1 mL/hr Infusion Therapy Visit on 01/23/2025 in [facility]    01/23/2025 11:59 New Bag DOCEtaxel (TAXOTERE) 160 mg in sodium chloride 0.9% in non-PVC container 283 mL infusion 160 mg 283 mL/hr Infusion Therapy Visit on 01/23/2025 in [facility]    01/23/2025 13:05 New Bag CARBOplatin 665 mg in sodium chloride 0.9 % 341.5 mL infusion 665 mg 683 mL/hr Infusion Therapy Visit on 01/23/2025 in [facility]            Assessment:                                                      C1C3 TCHP-see above        Intervention/Education provided during outreach:                                                       See above documentation    Confirmed patient has clinic and triage numbers    Signature:  Radha Stephen, RN

## 2025-01-23 NOTE — PROGRESS NOTES
[facility] Hematology and Oncology Progress Note    Patient: Adrienne Ivory  MRN: [id number]  Date of Service: Jan 23, 2025             ECOG Performance    0 - Independent          ______________________________________________________________________________  Oncologic history  T2 N3 M0 stage IIIc invasive ductal carcinoma of the left breast ER positive RI positive HER2/wolf 3+.  November 2024  Patient had multiple level 1 level 2 and level 3 axillary lymph nodes involved    Treatment  Patient started on neoadjuvant TCHP on 12/12/2024    History of Present Illness    Ms. Adrienne Ivory is here for follow-up.  She is due for cycle #3 of her concurrent chemotherapy.  She is tolerating the chemotherapy well without any significant side effects.  She continues to be anxious especially on days of chemotherapy and that does cause some nausea.  She denies any vomiting episodes or any significant diarrhea but there are days that she has 2-3 loose stools.  She also denies any numbness and tingling.  She denies any shortness of breath or dyspnea on exertion.  She does complain of a metallic taste in her mouth for the first week to 10 days.  Review of systems  A comprehensive 12 point review of system was done that was negative except what is mentioned in the history of present illness    Past History    Past Medical History:   Diagnosis Date    Abnormal Pap smear, can't excl hi gd sq intraepithelial lesion (ASC-H) 03/01/2015    LSIL also    Anxiety state, unspecified     Herpes simplex without mention of complication     HSIL on Pap smear of cervix 07/01/2014    colp - WNL    Human papillomavirus in conditions classified elsewhere and of unspecified site 11/01/2010    + HPV 45 & 82 with ASCUS    Hx of colposcopy with cervical biopsy 11/08/2016    [place] ECC neg.     Obese     PONV (postoperative nausea and vomiting)     Premenstrual tension syndromes        Past Surgical History:   Procedure Laterality Date    C  "IUD,MIRENA  2/08-3/11    removed for cramping    COLONOSCOPY N/A 12/4/2020    Procedure: COLONOSCOPY, WITH POLYPECTOMY;  Surgeon: Moises Pride MD;  Location: [facility] OR    DILATION AND CURETTAGE, ABLATE ENDOMETRIUM NOVASURE, COMBINED N/A 12/31/2015    Procedure: COMBINED DILATION AND CURETTAGE, ABLATE ENDOMETRIUM NOVASURE;  Surgeon: Shakira Penaloza MD;  Location[facility]    HYSTEROSCOPY DIAGNOSTIC N/A 12/31/2015    Procedure: HYSTEROSCOPY DIAGNOSTIC;  Surgeon: Shakira Penaloza MD;  Location: [facility] OR    LEEP TX, CERVICAL  4/3/15    ARNAV 2-3, negative margins    NO HISTORY OF SURGERY         Physical Exam    BP (!) 145/85 (BP Location: Right arm, Patient Position: Sitting, Cuff Size: Adult Regular)   Pulse 105   Temp 98.3  F (36.8  C) (Tympanic)   Resp 20   Ht 1.651 m (5' 5\")   Wt 93.9 kg (207 lb)   SpO2 97%   BMI 34.45 kg/m      General: alert, awake, not in acute distress  HEENT: Head: Normal, normocephalic, atraumatic.  Eye: Normal external eye, conjunctiva, lids cornea, SITA.  Nose: Normal external nose, mucus membranes and septum.  Pharynx: Normal buccal mucosa. Normal pharynx.  Neck / Thyroid: Supple, no masses, nodes, nodules or enlargement.  Lymphatics: No abnormally enlarged lymph nodes.  Chest: Normal chest wall and respirations. Clear to auscultation.  No crackles or rhonchi's  Heart: S1 S2 RRR, no murmur.   Abdomen: abdomen is soft without significant tenderness, masses, organomegaly or guarding  Extremities: normal strength, tone, and muscle mass  Skin: normal. no rash or abnormalities  CNS: non focal.    Breast exam on the left side did show shrinkage in the mass I could still feel a lymph node in the axilla      Lab Results    Recent Results (from the past 240 hours)   Comprehensive metabolic panel    Collection Time: 01/23/25  8:37 AM   Result Value Ref Range    Sodium 140 135 - 145 mmol/L    Potassium 3.7 3.4 - 5.3 mmol/L    Carbon Dioxide (CO2) 23 22 - 29 mmol/L    Anion Gap 12 7 - 15 " mmol/L    Urea Nitrogen 7.4 6.0 - 20.0 mg/dL    Creatinine 0.81 0.51 - 0.95 mg/dL    GFR Estimate 84 >60 mL/min/1.73m2    Calcium 9.2 8.8 - 10.4 mg/dL    Chloride 105 98 - 107 mmol/L    Glucose 92 70 - 99 mg/dL    Alkaline Phosphatase 80 40 - 150 U/L    AST 40 0 - 45 U/L    ALT 55 (H) 0 - 50 U/L    Protein Total 7.0 6.4 - 8.3 g/dL    Albumin 4.2 3.5 - 5.2 g/dL    Bilirubin Total 0.4 <=1.2 mg/dL   CBC with platelets and differential    Collection Time: 01/23/25  8:37 AM   Result Value Ref Range    WBC Count 10.6 4.0 - 11.0 10e3/uL    RBC Count 4.45 3.80 - 5.20 10e6/uL    Hemoglobin 12.9 11.7 - 15.7 g/dL    Hematocrit 38.6 35.0 - 47.0 %    MCV 87 78 - 100 fL    MCH 29.0 26.5 - 33.0 pg    MCHC 33.4 31.5 - 36.5 g/dL    RDW 16.6 (H) 10.0 - 15.0 %    Platelet Count 307 150 - 450 10e3/uL    % Neutrophils 69 %    % Lymphocytes 21 %    % Monocytes 8 %    % Eosinophils 0 %    % Basophils 1 %    % Immature Granulocytes 1 %    NRBCs per 100 WBC 0 <1 /100    Absolute Neutrophils 7.3 1.6 - 8.3 10e3/uL    Absolute Lymphocytes 2.2 0.8 - 5.3 10e3/uL    Absolute Monocytes 0.9 0.0 - 1.3 10e3/uL    Absolute Eosinophils 0.0 0.0 - 0.7 10e3/uL    Absolute Basophils 0.1 0.0 - 0.2 10e3/uL    Absolute Immature Granulocytes 0.1 <=0.4 10e3/uL    Absolute NRBCs 0.0 10e3/uL         Imaging    No results found.        Assessment and Plan    Stage IIIc breast cancer HER2 positive, ER/GA positive  Patient is here in follow-up.  She is due for cycle #3 of TCHP.  She is tolerating it with some expected side effects but overall is doing well.  Her labs are in a reasonable range.  I will proceed with the third cycle today without any dose changes.  On exam it does look like her tumor is shrinking but we would like objective evidence of that.  Will get an MRI of the breast done prior to her next cycle.  If the tumor is shrinking we will proceed with cycle #4 3 weeks.  The plan is to complete a total of 6 cycles before referring her to surgery.  Patient  continues to hope to get a lumpectomy at this point in time.    Cardiac monitoring  Patient does needs continued cardiac monitoring because of the use of HER2 directed agents.  I would like the patient to get a cardiac echo done after her fourth cycle which can be scheduled on her next visit.    Nausea  patient has some nausea from the chemotherapy her as needed nausea medications are working.  She does  Complains of nausea on the day she comes for chemo.  I told the patient  to take Ativan at the morning of her chemotherapy       Signed by: Josseline Harkins MD        CC: Kallie Ta NP

## 2025-01-23 NOTE — LETTER
"1/23/2025      Adrienne Ivory  [address] [place] [address]      Dear Colleague,    Thank you for referring your patient, Adrienne Ivory, to the [facility] CANCER [facility]. Please see a copy of my visit note below.    Oncology Rooming Note    January 23, 2025 9:00 AM   Adrienne Ivory is a 59 year old female who presents for:    Chief Complaint   Patient presents with     Oncology Clinic Visit     Invasive ductal carcinoma of left breast     Initial Vitals: BP (!) 145/85 (BP Location: Right arm, Patient Position: Sitting, Cuff Size: Adult Regular)   Pulse 105   Temp 98.3  F (36.8  C) (Tympanic)   Resp 20   Ht 1.651 m (5' 5\")   Wt 93.9 kg (207 lb)   SpO2 97%   BMI 34.45 kg/m   Estimated body mass index is 34.45 kg/m  as calculated from the following:    Height as of this encounter: 1.651 m (5' 5\").    Weight as of this encounter: 93.9 kg (207 lb). Body surface area is 2.08 meters squared.  No Pain (0) Comment: Data Unavailable   No LMP recorded. Patient has had an ablation.  Allergies reviewed: Yes  Medications reviewed: Yes    Medications: Medication refills not needed today.  Pharmacy name entered into [facility]:    [facility] - [place], [place] - [address] [id number] AT [address]    Frailty Screening:   Is the patient here for a new oncology consult visit in cancer care? 2. No      Clinical concerns: review labs / infusion       Shelley Dey MA              [facility] Hematology and Oncology Progress Note    Patient: Adrienne Ivory  MRN: [id number]  Date of Service: Jan 23, 2025             ECOG Performance    0 - Independent          ______________________________________________________________________________  Oncologic history  T2 N3 M0 stage IIIc invasive ductal carcinoma of the left breast ER " positive MA positive HER2/wolf 3+.  November 2024  Patient had multiple level 1 level 2 and level 3 axillary lymph nodes involved    Treatment  Patient started on neoadjuvant TCHP on 12/12/2024    History of Present Illness    Ms. Adrienne Ivory is here for follow-up.  She is due for cycle #3 of her concurrent chemotherapy.  She is tolerating the chemotherapy well without any significant side effects.  She continues to be anxious especially on days of chemotherapy and that does cause some nausea.  She denies any vomiting episodes or any significant diarrhea but there are days that she has 2-3 loose stools.  She also denies any numbness and tingling.  She denies any shortness of breath or dyspnea on exertion.  She does complain of a metallic taste in her mouth for the first week to 10 days.  Review of systems  A comprehensive 12 point review of system was done that was negative except what is mentioned in the history of present illness    Past History    Past Medical History:   Diagnosis Date     Abnormal Pap smear, can't excl hi gd sq intraepithelial lesion (ASC-H) 03/01/2015    LSIL also     Anxiety state, unspecified      Herpes simplex without mention of complication      HSIL on Pap smear of cervix 07/01/2014    colp - WNL     Human papillomavirus in conditions classified elsewhere and of unspecified site 11/01/2010    + HPV 45 & 82 with ASCUS     Hx of colposcopy with cervical biopsy 11/08/2016    [place] ECC neg.      Obese      PONV (postoperative nausea and vomiting)      Premenstrual tension syndromes        Past Surgical History:   Procedure Laterality Date     C IUD,MIRENA  2/08-3/11    removed for cramping     COLONOSCOPY N/A 12/4/2020    Procedure: COLONOSCOPY, WITH POLYPECTOMY;  Surgeon: Moises Pride MD;  Location: [facility] OR     DILATION AND CURETTAGE, ABLATE ENDOMETRIUM NOVASURE, COMBINED N/A 12/31/2015    Procedure: COMBINED DILATION AND CURETTAGE, ABLATE ENDOMETRIUM NOVASURE;  Surgeon: Tremaine  "Shakira COTTO MD;  [facility]     HYSTEROSCOPY DIAGNOSTIC N/A 12/31/2015    Procedure: HYSTEROSCOPY DIAGNOSTIC;  Surgeon: Shakira Penaloza MD;  [facility]     LEEP TX, CERVICAL  4/3/15    ARNAV 2-3, negative margins     NO HISTORY OF SURGERY         Physical Exam    BP (!) 145/85 (BP Location: Right arm, Patient Position: Sitting, Cuff Size: Adult Regular)   Pulse 105   Temp 98.3  F (36.8  C) (Tympanic)   Resp 20   Ht 1.651 m (5' 5\")   Wt 93.9 kg (207 lb)   SpO2 97%   BMI 34.45 kg/m      General: alert, awake, not in acute distress  HEENT: Head: Normal, normocephalic, atraumatic.  Eye: Normal external eye, conjunctiva, lids cornea, SITA.  Nose: Normal external nose, mucus membranes and septum.  Pharynx: Normal buccal mucosa. Normal pharynx.  Neck / Thyroid: Supple, no masses, nodes, nodules or enlargement.  Lymphatics: No abnormally enlarged lymph nodes.  Chest: Normal chest wall and respirations. Clear to auscultation.  No crackles or rhonchi's  Heart: S1 S2 RRR, no murmur.   Abdomen: abdomen is soft without significant tenderness, masses, organomegaly or guarding  Extremities: normal strength, tone, and muscle mass  Skin: normal. no rash or abnormalities  CNS: non focal.    Breast exam on the left side did show shrinkage in the mass I could still feel a lymph node in the axilla      Lab Results    Recent Results (from the past 240 hours)   Comprehensive metabolic panel    Collection Time: 01/23/25  8:37 AM   Result Value Ref Range    Sodium 140 135 - 145 mmol/L    Potassium 3.7 3.4 - 5.3 mmol/L    Carbon Dioxide (CO2) 23 22 - 29 mmol/L    Anion Gap 12 7 - 15 mmol/L    Urea Nitrogen 7.4 6.0 - 20.0 mg/dL    Creatinine 0.81 0.51 - 0.95 mg/dL    GFR Estimate 84 >60 mL/min/1.73m2    Calcium 9.2 8.8 - 10.4 mg/dL    Chloride 105 98 - 107 mmol/L    Glucose 92 70 - 99 mg/dL    Alkaline Phosphatase 80 40 - 150 U/L    AST 40 0 - 45 U/L    ALT 55 (H) 0 - 50 U/L    Protein Total 7.0 6.4 - 8.3 g/dL    " Albumin 4.2 3.5 - 5.2 g/dL    Bilirubin Total 0.4 <=1.2 mg/dL   CBC with platelets and differential    Collection Time: 01/23/25  8:37 AM   Result Value Ref Range    WBC Count 10.6 4.0 - 11.0 10e3/uL    RBC Count 4.45 3.80 - 5.20 10e6/uL    Hemoglobin 12.9 11.7 - 15.7 g/dL    Hematocrit 38.6 35.0 - 47.0 %    MCV 87 78 - 100 fL    MCH 29.0 26.5 - 33.0 pg    MCHC 33.4 31.5 - 36.5 g/dL    RDW 16.6 (H) 10.0 - 15.0 %    Platelet Count 307 150 - 450 10e3/uL    % Neutrophils 69 %    % Lymphocytes 21 %    % Monocytes 8 %    % Eosinophils 0 %    % Basophils 1 %    % Immature Granulocytes 1 %    NRBCs per 100 WBC 0 <1 /100    Absolute Neutrophils 7.3 1.6 - 8.3 10e3/uL    Absolute Lymphocytes 2.2 0.8 - 5.3 10e3/uL    Absolute Monocytes 0.9 0.0 - 1.3 10e3/uL    Absolute Eosinophils 0.0 0.0 - 0.7 10e3/uL    Absolute Basophils 0.1 0.0 - 0.2 10e3/uL    Absolute Immature Granulocytes 0.1 <=0.4 10e3/uL    Absolute NRBCs 0.0 10e3/uL         Imaging    No results found.        Assessment and Plan    Stage IIIc breast cancer HER2 positive, ER/ID positive  Patient is here in follow-up.  She is due for cycle #3 of TCHP.  She is tolerating it with some expected side effects but overall is doing well.  Her labs are in a reasonable range.  I will proceed with the third cycle today without any dose changes.  On exam it does look like her tumor is shrinking but we would like objective evidence of that.  Will get an MRI of the breast done prior to her next cycle.  If the tumor is shrinking we will proceed with cycle #4 3 weeks.  The plan is to complete a total of 6 cycles before referring her to surgery.  Patient continues to hope to get a lumpectomy at this point in time.    Cardiac monitoring  Patient does needs continued cardiac monitoring because of the use of HER2 directed agents.  I would like the patient to get a cardiac echo done after her fourth cycle which can be scheduled on her next visit.    Nausea  patient has some nausea from  the chemotherapy her as needed nausea medications are working.  She does  Complains of nausea on the day she comes for chemo.  I told the patient  to take Ativan at the morning of her chemotherapy       Signed by: Josseline Harkins MD        CC: Kallie Ta NP       Again, thank you for allowing me to participate in the care of your patient.        Sincerely,        Josseline Harkins MD    Electronically signed

## 2025-01-29 ENCOUNTER — INFUSION THERAPY VISIT (OUTPATIENT)
Dept: INFUSION THERAPY | Facility: HOSPITAL | Age: 60
End: 2025-01-29
Attending: INTERNAL MEDICINE
Payer: COMMERCIAL

## 2025-01-29 ENCOUNTER — TELEPHONE (OUTPATIENT)
Dept: ONCOLOGY | Facility: HOSPITAL | Age: 60
End: 2025-01-29
Payer: COMMERCIAL

## 2025-01-29 VITALS
TEMPERATURE: 98.4 F | SYSTOLIC BLOOD PRESSURE: 136 MMHG | OXYGEN SATURATION: 95 % | HEART RATE: 108 BPM | DIASTOLIC BLOOD PRESSURE: 90 MMHG | RESPIRATION RATE: 18 BRPM

## 2025-01-29 DIAGNOSIS — R11.2 NAUSEA AND VOMITING, UNSPECIFIED VOMITING TYPE: Primary | ICD-10-CM

## 2025-01-29 DIAGNOSIS — R11.2 NAUSEA AND VOMITING, UNSPECIFIED VOMITING TYPE: ICD-10-CM

## 2025-01-29 DIAGNOSIS — C50.919 BREAST CANCER (H): ICD-10-CM

## 2025-01-29 LAB
ALBUMIN SERPL BCG-MCNC: 4.2 G/DL (ref 3.5–5.2)
ALP SERPL-CCNC: 119 U/L (ref 40–150)
ALT SERPL W P-5'-P-CCNC: 113 U/L (ref 0–50)
ANION GAP SERPL CALCULATED.3IONS-SCNC: 12 MMOL/L (ref 7–15)
AST SERPL W P-5'-P-CCNC: 61 U/L (ref 0–45)
BILIRUB SERPL-MCNC: 0.7 MG/DL
BUN SERPL-MCNC: 12.1 MG/DL (ref 6–20)
CALCIUM SERPL-MCNC: 9.1 MG/DL (ref 8.8–10.4)
CHLORIDE SERPL-SCNC: 98 MMOL/L (ref 98–107)
CREAT SERPL-MCNC: 0.77 MG/DL (ref 0.51–0.95)
EGFRCR SERPLBLD CKD-EPI 2021: 89 ML/MIN/1.73M2
GLUCOSE SERPL-MCNC: 105 MG/DL (ref 70–99)
HCO3 SERPL-SCNC: 27 MMOL/L (ref 22–29)
MAGNESIUM SERPL-MCNC: 1.6 MG/DL (ref 1.7–2.3)
POTASSIUM SERPL-SCNC: 3.4 MMOL/L (ref 3.4–5.3)
PROT SERPL-MCNC: 6.6 G/DL (ref 6.4–8.3)
SODIUM SERPL-SCNC: 137 MMOL/L (ref 135–145)

## 2025-01-29 PROCEDURE — 82947 ASSAY GLUCOSE BLOOD QUANT: CPT

## 2025-01-29 PROCEDURE — 36591 DRAW BLOOD OFF VENOUS DEVICE: CPT

## 2025-01-29 PROCEDURE — 250N000011 HC RX IP 250 OP 636: Performed by: INTERNAL MEDICINE

## 2025-01-29 PROCEDURE — 82040 ASSAY OF SERUM ALBUMIN: CPT

## 2025-01-29 PROCEDURE — 96365 THER/PROPH/DIAG IV INF INIT: CPT

## 2025-01-29 PROCEDURE — 96375 TX/PRO/DX INJ NEW DRUG ADDON: CPT

## 2025-01-29 PROCEDURE — 83735 ASSAY OF MAGNESIUM: CPT

## 2025-01-29 RX ORDER — DEXTROSE, SODIUM CHLORIDE, SODIUM LACTATE, POTASSIUM CHLORIDE, AND CALCIUM CHLORIDE 5; .6; .31; .03; .02 G/100ML; G/100ML; G/100ML; G/100ML; G/100ML
1000 INJECTION, SOLUTION INTRAVENOUS ONCE
Status: COMPLETED | OUTPATIENT
Start: 2025-01-29 | End: 2025-01-29

## 2025-01-29 RX ORDER — ONDANSETRON 2 MG/ML
4 INJECTION INTRAMUSCULAR; INTRAVENOUS ONCE
Status: CANCELLED | OUTPATIENT
Start: 2025-01-29 | End: 2025-01-29

## 2025-01-29 RX ORDER — MAGNESIUM SULFATE HEPTAHYDRATE 40 MG/ML
2 INJECTION, SOLUTION INTRAVENOUS ONCE
Status: COMPLETED | OUTPATIENT
Start: 2025-01-29 | End: 2025-01-29

## 2025-01-29 RX ORDER — ONDANSETRON 2 MG/ML
4 INJECTION INTRAMUSCULAR; INTRAVENOUS ONCE
Status: COMPLETED | OUTPATIENT
Start: 2025-01-29 | End: 2025-01-29

## 2025-01-29 RX ORDER — HEPARIN SODIUM (PORCINE) LOCK FLUSH IV SOLN 100 UNIT/ML 100 UNIT/ML
5 SOLUTION INTRAVENOUS
OUTPATIENT
Start: 2025-01-29

## 2025-01-29 RX ORDER — HEPARIN SODIUM (PORCINE) LOCK FLUSH IV SOLN 100 UNIT/ML 100 UNIT/ML
5 SOLUTION INTRAVENOUS
Status: DISCONTINUED | OUTPATIENT
Start: 2025-01-29 | End: 2025-01-29 | Stop reason: HOSPADM

## 2025-01-29 RX ORDER — DIPHENOXYLATE HYDROCHLORIDE AND ATROPINE SULFATE 2.5; .025 MG/1; MG/1
1 TABLET ORAL 4 TIMES DAILY PRN
Qty: 50 TABLET | Refills: 0 | Status: SHIPPED | OUTPATIENT
Start: 2025-01-29

## 2025-01-29 RX ORDER — HEPARIN SODIUM (PORCINE) LOCK FLUSH IV SOLN 100 UNIT/ML 100 UNIT/ML
5 SOLUTION INTRAVENOUS
Status: CANCELLED | OUTPATIENT
Start: 2025-01-29

## 2025-01-29 RX ORDER — DEXTROSE, SODIUM CHLORIDE, SODIUM LACTATE, POTASSIUM CHLORIDE, AND CALCIUM CHLORIDE 5; .6; .31; .03; .02 G/100ML; G/100ML; G/100ML; G/100ML; G/100ML
1000 INJECTION, SOLUTION INTRAVENOUS ONCE
Status: CANCELLED | OUTPATIENT
Start: 2025-01-29 | End: 2025-01-29

## 2025-01-29 RX ORDER — HEPARIN SODIUM,PORCINE 10 UNIT/ML
5-20 VIAL (ML) INTRAVENOUS DAILY PRN
Status: DISCONTINUED | OUTPATIENT
Start: 2025-01-29 | End: 2025-01-29 | Stop reason: HOSPADM

## 2025-01-29 RX ORDER — HEPARIN SODIUM,PORCINE 10 UNIT/ML
5-20 VIAL (ML) INTRAVENOUS DAILY PRN
Status: CANCELLED | OUTPATIENT
Start: 2025-01-29

## 2025-01-29 RX ORDER — MAGNESIUM SULFATE HEPTAHYDRATE 40 MG/ML
2 INJECTION, SOLUTION INTRAVENOUS ONCE
Status: DISCONTINUED | OUTPATIENT
Start: 2025-01-29 | End: 2025-01-29

## 2025-01-29 RX ADMIN — SODIUM CHLORIDE, SODIUM LACTATE, POTASSIUM CHLORIDE, CALCIUM CHLORIDE AND DEXTROSE MONOHYDRATE 1000 ML: 5; 600; 310; 30; 20 INJECTION, SOLUTION INTRAVENOUS at 12:23

## 2025-01-29 RX ADMIN — HEPARIN 5 ML: 100 SYRINGE at 13:43

## 2025-01-29 RX ADMIN — MAGNESIUM SULFATE HEPTAHYDRATE 2 G: 40 INJECTION, SOLUTION INTRAVENOUS at 12:53

## 2025-01-29 RX ADMIN — ONDANSETRON 4 MG: 2 INJECTION, SOLUTION INTRAMUSCULAR; INTRAVENOUS at 12:22

## 2025-01-29 NOTE — TELEPHONE ENCOUNTER
"Patient calls in with concerns of ongoing diarrhea, nausea and emesis. Treated 1/24 with TCHP.   Having 3 to 4 liquid stools daily, taking imodium as directed.   Nausea and \"grabbing gut\" discomfort. Using prochlorperazine as directed, has used ondansetron occasionally.  Had emesis yesterday x one and today x one.  Metallic taste in mouth. Trying a variety of foods, protein, etc and \"I'm hungry.\"  Does have some lightheadedness, is fatigued and some weakness. Denies dizziness, no tachycardia.   She was ill on the day she came to treatment with stomach flu; SO was ill with same.   Denies fever.   Has not had to receive IV hydration yet.   RN will discuss with Dr. Harkins and infusion.    Also provided education on things to try in regards to metallic taste:  Ways to Combat Taste Changes    There are some recommended ways that chemotherapy patients overcome their taste changes. The following is a list of tips that might help mask the different taste sensations you may be feeling:    Bitter Taste    Try smoothies: Make an ice-cold fruit smoothie and add vegetables and protein, too. The sweetness of the fruit will offset the bitterness of the green vegetables and boost your nutrient intake.   Choose your protein:   If red meat such as beef tastes metallic, bitter, or rotten, try to eat chicken, beans, nuts, eggs, or cheese instead.  Add sweeteners:   Additional sweetness may help to mask a bitter flavor. Try adding sweeteners like sugar, honey, or juices to see if it helps improve the taste.  Metallic Overtones  Consume tart drinks: Beverages like lemonade or limeade may help to mask a metallic taste. Avoid these drinks if you have dry mouth or mouth sores.  Use plastic utensils: Some people on chemo prefer to use plastic utensils instead of metal ones to cut down on the metallic taste of some foods.  Chew gum or suck on hard candy: Mint, lemon, orange, or similar flavors may help to mask metallic, bitter, or other " unpleasant tastes in your mouth.  Too Sweet  Add lemon juice and salt: Acid and salt may help balance a flavor that tastes too sweet.  Try plain versions of food:  Try plain oatmeal or yogurt instead of varieties that are sweetened or have fruits added.  Loss of Flavors  Add extra flavors to your food:   Marinate your food in sauces like teriyaki, barbecue, or ketchup. Herbs and spices may help too.  Try new foods or flavors: [place] with new foods, flavors, and dishes  Minimize liquids during meals: Drinking too much can fill you up before you eat the necessary nutrition. Small amounts of water or other drinks are OK    General Tips  Delay eating until two to three hours after chemotherapy treatment: This helps to avoid chemo-induced nausea and vomiting    Eat cold or room temperature foods: Foods at these temperatures generally have less taste and smell, making them easier to eat.    Try ice chips: Sucking on ice chips before eating may help to numb your mouth and make unpleasant tastes more tolerable.    Keep your mouth clean: Brush your teeth and rinse your mouth regularly. This helps to decrease unpleasant tastes    [employer]. Taste and smell changes.    Also education provided on anti-emetics and timing of dosing, reasons to use.   Diandra Fontanez RN

## 2025-01-29 NOTE — PROGRESS NOTES
Infusion Nursing Note:  Adrienne Ivory presents today for IVF and zofran.    Patient seen by provider today: No   present during visit today: Not Applicable.    Note: /90   Pulse 108   Temp 98.4  F (36.9  C)   Resp 18   SpO2 95%   Added on today after speaking with triage. Today she got 1L of IVF and zofran. CMP checked. Her mag was low at 1.6 and she was given 2g mag also.      Intravenous Access:  Implanted Port.    Treatment Conditions:  Lab Results   Component Value Date     01/29/2025    POTASSIUM 3.4 01/29/2025    MAG 1.6 (L) 01/29/2025    CR 0.77 01/29/2025    CONNIE 9.1 01/29/2025    BILITOTAL 0.7 01/29/2025    ALBUMIN 4.2 01/29/2025     (H) 01/29/2025    AST 61 (H) 01/29/2025       Results reviewed, labs MET treatment parameters, ok to proceed with treatment.      Post Infusion Assessment:  Patient tolerated infusion without incident.  Blood return noted pre and post infusion.  Site patent and intact, free from redness, edema or discomfort.  No evidence of extravasations.  Access discontinued per protocol.       Discharge Plan:   Patient discharged in stable condition accompanied by: self and .  Departure Mode: Ambulatory.      Keyla Hamilton RN

## 2025-01-29 NOTE — TELEPHONE ENCOUNTER
Discussed with Dr. Harkins, he will order Lomotil and he will sign therapy plan for IV hydration. Keyla ORO RN infusion charge oks fluids today. CMP also.   Also, ordering Lomotil to change from imodium.  The patient was contacted and is aware of plan and verbalizes agreement.  Patient to call if symptoms ongoing after fluids.  Diandra Fontanez RN

## 2025-02-06 DIAGNOSIS — C50.212 MALIGNANT NEOPLASM OF UPPER-INNER QUADRANT OF LEFT BREAST IN FEMALE, ESTROGEN RECEPTOR POSITIVE (H): ICD-10-CM

## 2025-02-06 DIAGNOSIS — Z17.0 MALIGNANT NEOPLASM OF UPPER-INNER QUADRANT OF LEFT BREAST IN FEMALE, ESTROGEN RECEPTOR POSITIVE (H): ICD-10-CM

## 2025-02-06 RX ORDER — PROCHLORPERAZINE MALEATE 10 MG
TABLET ORAL
Qty: 30 TABLET | Refills: 2 | Status: SHIPPED | OUTPATIENT
Start: 2025-02-06

## 2025-02-08 ENCOUNTER — MYC MEDICAL ADVICE (OUTPATIENT)
Dept: ONCOLOGY | Facility: HOSPITAL | Age: 60
End: 2025-02-08
Payer: COMMERCIAL

## 2025-02-10 ENCOUNTER — HOSPITAL ENCOUNTER (OUTPATIENT)
Dept: MRI IMAGING | Facility: HOSPITAL | Age: 60
Discharge: HOME | End: 2025-02-10
Attending: INTERNAL MEDICINE | Admitting: INTERNAL MEDICINE
Payer: COMMERCIAL

## 2025-02-10 DIAGNOSIS — Z17.0 MALIGNANT NEOPLASM OF UPPER-INNER QUADRANT OF LEFT BREAST IN FEMALE, ESTROGEN RECEPTOR POSITIVE (H): ICD-10-CM

## 2025-02-10 DIAGNOSIS — C50.212 MALIGNANT NEOPLASM OF UPPER-INNER QUADRANT OF LEFT BREAST IN FEMALE, ESTROGEN RECEPTOR POSITIVE (H): ICD-10-CM

## 2025-02-10 PROCEDURE — 77049 MRI BREAST C-+ W/CAD BI: CPT

## 2025-02-10 PROCEDURE — 255N000002 HC RX 255 OP 636: Performed by: INTERNAL MEDICINE

## 2025-02-10 PROCEDURE — A9585 GADOBUTROL INJECTION: HCPCS | Performed by: INTERNAL MEDICINE

## 2025-02-10 RX ORDER — GADOBUTROL 604.72 MG/ML
10 INJECTION INTRAVENOUS ONCE
Status: COMPLETED | OUTPATIENT
Start: 2025-02-10 | End: 2025-02-10

## 2025-02-10 RX ADMIN — GADOBUTROL 10 ML: 604.72 INJECTION INTRAVENOUS at 13:19

## 2025-02-10 NOTE — TELEPHONE ENCOUNTER
Forms are complete from [employer] and have had dates added, will fax when I have a fax number.  Floridalma COTTO CMA    2/11/2025 forms faxed to [phone] with Right Fax confirmation. Patient informed thru MY Chart message.  Floridalma COTTO CMA

## 2025-02-12 NOTE — PROGRESS NOTES
[facility] Hematology and Oncology Outpatient Progress Note    Patient: Adrienne Ivory  MRN: [id number]  Date of Service: Feb 13, 2025          Reason for Visit    Chief Complaint   Patient presents with    Oncology Clinic Visit       Invasive ductal carcinoma of left breast           Cancer Staging   Invasive ductal carcinoma of left breast (H)  Staging form: Breast, AJCC 8th Edition  - Clinical: Stage IIIB (cT2, cN3, cM0, G2, ER+, VA-, HER2+) - Signed by Anastasiya Salgado APRN CNP on 12/19/2024      Primary Hematologist/Oncologist: Dr. Josseline Harkins        Assessment/Plan  #.  Stage IIIc breast cancer HER2 positive, ER/VA positive  Adrienne is clinically stable. She is here for 4th cycle of TCHP. She is tolerating it with manageable side effects of nausea, food taste alterations and some diarrhea this last cycle. We reviewed her labs today including a CMP which shows stable and normal renal and liver function. She has some hypokalemia today, and I recommend to replace that today per protocol. We will also get her started on a supplement. CBC is stable. She has seen a slight drop in hgb to 11.9. We reviewed the MRI of the breast results that was completed on 2/10. It showed no abnormal enhancement in left breast, resolution of all visualized lymph nodes, and right breast masses have resolved/decreased in size. With positive response to treatment and good toleration, I recommend to proceed with next cycle of chemotherapy. The plan is to complete a total of 6 cycles before referring her to surgery.  Patient continues to hope to get a lumpectomy at this point in time.  Proceed with C#4 today of TCHP.   Replace potassium today per protocol  Start Potassium 40 mEq daily. Rx sent to preferred pharmacy.   ECHO prior to next visit  Follow-up with Dr. Harkins in 3 weeks    #.  Cardiac monitoring  Patient needs continued cardiac monitoring because of the use of HER2 directed agents.  Recommend echo to be done done after  her fourth cycle. Order placed.      #.  Nausea  This has been fairly stable with current antiemetic regimen.   ______________________________________________________________________________    History of Present Illness/ Interval History    Ms. Adrienne Ivory  is a 59 year old patient who is seen today in follow up for breast cancer. She is due for the fourth cycle of TCHP. She is doing ok, but did have some trouble with diarrhea and nausea. She feels she had noro-virus, and has been improving. She has had no fevers. She states food is tasting metallic. Her eyes feel dry, and her tongue feels weird. She completed a breast MRI on 2/10.     ECOG performance status   0- Fully active, without restriction      Distress Screening (within last 30 days)    1. How concerned are you about your ability to eat? : 0  2. How concerned are you about unintended weight loss or your current weight? : 0  3. How concerned are you about feeling depressed or very sad? : 0  4. How concerned are you about feeling anxious or very scared? : 0  5. Do you struggle with the loss of meaning and travis in your life? : Not at all  6. How concerned are you about work and home life issues that may be affected by your cancer? : 0  7. How concerned are you about knowing what resources are available to help you? : 0  8. Do you currently have what you would describe as Sabianist or spiritual struggles?: Not at all      Pain  Pain Score: No Pain (0)    ROS  A 14 point review of systems was obtained.  Positive findings noted in the history.  The remainder of the review of system is otherwise negative.      Oncology History/Treatment  Oncologic history  T2 N3 M0 stage IIIc invasive ductal carcinoma of the left breast ER positive MS positive HER2/wolf 3+.  November 2024  Patient had multiple level 1 level 2 and level 3 axillary lymph nodes involved    Treatment  Patient started on neoadjuvant TCHP on 12/12/2024      Physical Exam    /80 (BP Location:  "Left arm, Patient Position: Sitting, Cuff Size: Adult Regular)   Pulse 85   Temp 97.6  F (36.4  C) (Tympanic)   Resp 16   Ht 1.651 m (5' 5\")   Wt 92.9 kg (204 lb 12.8 oz)   SpO2 98%   BMI 34.08 kg/m      GENERAL: no acute distress. Cooperative in conversation.   HEENT: pupils are equal, round and reactive. Oral mucosa is moist and intact.  RESP:Chest symmetric. Regular respiratory rate. No stridor.  ABD: Nondistended, soft.  EXTREMITIES: No lower extremity edema.   NEURO: non focal. Alert and oriented x3.   PSYCH: within normal limits. No depression or anxiety.  SKIN: warm dry intact      Lab Results    Recent Results (from the past week)   Comprehensive metabolic panel   Result Value Ref Range    Sodium 143 135 - 145 mmol/L    Potassium 3.0 (L) 3.4 - 5.3 mmol/L    Carbon Dioxide (CO2) 28 22 - 29 mmol/L    Anion Gap 10 7 - 15 mmol/L    Urea Nitrogen 5.7 (L) 6.0 - 20.0 mg/dL    Creatinine 0.72 0.51 - 0.95 mg/dL    GFR Estimate >90 >60 mL/min/1.73m2    Calcium 8.6 (L) 8.8 - 10.4 mg/dL    Chloride 105 98 - 107 mmol/L    Glucose 94 70 - 99 mg/dL    Alkaline Phosphatase 75 40 - 150 U/L    AST 38 0 - 45 U/L    ALT 43 0 - 50 U/L    Protein Total 6.0 (L) 6.4 - 8.3 g/dL    Albumin 3.7 3.5 - 5.2 g/dL    Bilirubin Total 0.3 <=1.2 mg/dL   CBC with platelets and differential   Result Value Ref Range    WBC Count 7.9 4.0 - 11.0 10e3/uL    RBC Count 3.82 3.80 - 5.20 10e6/uL    Hemoglobin 11.2 (L) 11.7 - 15.7 g/dL    Hematocrit 33.7 (L) 35.0 - 47.0 %    MCV 88 78 - 100 fL    MCH 29.3 26.5 - 33.0 pg    MCHC 33.2 31.5 - 36.5 g/dL    RDW 16.9 (H) 10.0 - 15.0 %    Platelet Count 263 150 - 450 10e3/uL    % Neutrophils 58 %    % Lymphocytes 31 %    % Monocytes 10 %    % Eosinophils 0 %    % Basophils 1 %    % Immature Granulocytes 0 %    NRBCs per 100 WBC 0 <1 /100    Absolute Neutrophils 4.6 1.6 - 8.3 10e3/uL    Absolute Lymphocytes 2.4 0.8 - 5.3 10e3/uL    Absolute Monocytes 0.8 0.0 - 1.3 10e3/uL    Absolute Eosinophils 0.0 0.0 " - 0.7 10e3/uL    Absolute Basophils 0.1 0.0 - 0.2 10e3/uL    Absolute Immature Granulocytes 0.0 <=0.4 10e3/uL    Absolute NRBCs 0.0 10e3/uL     I reviewed the above labs today.  Imaging    MR Breast Bilateral w/o & w Contrast    Result Date: 2/11/2025  EXAM: Bilateral Breast MRI with and without contrast, and computer aided kinetic analysis. LOCATION: [facility] DATE: 2/10/2025 HISTORY/INDICATION: 59-year-old female with known, biopsy-proven left breast IDC with left axillary lymph node metastases. Current neoadjuvant chemotherapy. COMPARISON: Prior breast MRI dated 11/27/2024. Additional prior examinations dated 12/9/2024, 11/18/2024, 11/15/2024, 11/12/2024, 7/11/2022. TECHNIQUE: Multiplanar, multisequence imaging performed prior to contrast administration and at multiple time points after contrast administration. Post-processing including subtractions, MIPs and color encoding of post contrast dynamic acquisitions. IV contrast: 10ml Gadavist SEDATION: None FINDINGS: Right Breast: Breast composition: Scattered fibroglandular tissue Background parenchymal enhancement: Minimal The previously seen oval, enhancing mass within the right breast at the 6:00 position, posterior depth has decreased in size and is less conspicuous. Otherwise, there are no new or suspicious masses or areas of nonmass enhancement identified within the right breast. Right Axilla: No lymphadenopathy. Right Internal Mammary Chain: No lymphadenopathy. Left Breast: Breast composition: Scattered fibroglandular tissue Background parenchymal enhancement: Minimal There is no residual abnormal enhancement at the site of the previously seen irregular, enhancing mass within the left breast at the 2:00 position, posterior depth, consistent with the known, biopsy-proven malignancy. Additionally, the previously seen oval, enhancing mass within the left breast in the retroareolar plane at middle depth has also resolved and the second  oval, enhancing mass within the retroareolar plane at posterior depth has decreased in size and is less conspicuous. Left Axilla: Left level I-III lymphadenopathy has also resolved with all visualized lymph nodes within normal limits. The previously biopsied left axillary lymph node consistent with metastasis is demonstrated on axial postcontrast image 59/118). Left Internal Mammary Chain: The previously described two internal mammary chain lymph nodes measuring up to 0.2 cm are grossly unchanged (axial postcontrast image 62/118 and 59/118).     IMPRESSION: 1.  No residual abnormal enhancement at the site of known, biopsy-proven malignancy within the left breast at the 2:00 position, posterior depth consistent with treatment response. 2.  Level I-III left axillary lymphadenopathy has resolved and all visualized lymph nodes are within normal limits, also consistent with treatment response. The two previously seen 0.2 cm left internal mammary chain lymph nodes are unchanged. 3.  The previously seen oval, enhancing masses within the right breast at the 6:00 position, posterior depth and within the left breast in the retroareolar region at middle and posterior depth are resolved or have decreased in size and are less conspicuous. Right Breast ACR BI-RADS Category: 2 - Benign Finding(s). Left Breast ACR BI-RADS Category: 6 - Known Biopsy-Proven Malignancy-Appropriate Action Should Be Taken. RECOMMENDATION: Oncological Follow-up       Billing  Total time 47 minutes, to include face to face visit, review of EMR, ordering, documentation and coordination of care on date of service. The longitudinal plan of care for the diagnosis(es)/condition(s) as documented were addressed during this visit. Due to the added complexity in care, I will continue to support Adrienne in the subsequent management and with ongoing continuity of care.    Signed by: AMARILIS Soria CNP        Chart documentation with Dragon Voice recognition  Software. Although reviewed after completion, some words and grammatical errors may remain.

## 2025-02-13 ENCOUNTER — INFUSION THERAPY VISIT (OUTPATIENT)
Dept: INFUSION THERAPY | Facility: HOSPITAL | Age: 60
End: 2025-02-13
Attending: INTERNAL MEDICINE
Payer: COMMERCIAL

## 2025-02-13 ENCOUNTER — MYC MEDICAL ADVICE (OUTPATIENT)
Dept: ONCOLOGY | Facility: HOSPITAL | Age: 60
End: 2025-02-13

## 2025-02-13 ENCOUNTER — ONCOLOGY VISIT (OUTPATIENT)
Dept: ONCOLOGY | Facility: HOSPITAL | Age: 60
End: 2025-02-13
Attending: INTERNAL MEDICINE
Payer: COMMERCIAL

## 2025-02-13 VITALS
SYSTOLIC BLOOD PRESSURE: 121 MMHG | BODY MASS INDEX: 34.12 KG/M2 | HEART RATE: 85 BPM | OXYGEN SATURATION: 98 % | WEIGHT: 204.8 LBS | DIASTOLIC BLOOD PRESSURE: 80 MMHG | TEMPERATURE: 97.6 F | HEIGHT: 65 IN | RESPIRATION RATE: 16 BRPM

## 2025-02-13 DIAGNOSIS — C50.212 MALIGNANT NEOPLASM OF UPPER-INNER QUADRANT OF LEFT BREAST IN FEMALE, ESTROGEN RECEPTOR POSITIVE (H): Primary | ICD-10-CM

## 2025-02-13 DIAGNOSIS — Z17.0 MALIGNANT NEOPLASM OF UPPER-INNER QUADRANT OF LEFT BREAST IN FEMALE, ESTROGEN RECEPTOR POSITIVE (H): Primary | ICD-10-CM

## 2025-02-13 DIAGNOSIS — R19.7 DIARRHEA, UNSPECIFIED TYPE: ICD-10-CM

## 2025-02-13 DIAGNOSIS — F41.9 ANXIETY: ICD-10-CM

## 2025-02-13 DIAGNOSIS — R11.2 NAUSEA AND VOMITING, UNSPECIFIED VOMITING TYPE: ICD-10-CM

## 2025-02-13 LAB
ALBUMIN SERPL BCG-MCNC: 3.7 G/DL (ref 3.5–5.2)
ALP SERPL-CCNC: 75 U/L (ref 40–150)
ALT SERPL W P-5'-P-CCNC: 43 U/L (ref 0–50)
ANION GAP SERPL CALCULATED.3IONS-SCNC: 10 MMOL/L (ref 7–15)
AST SERPL W P-5'-P-CCNC: 38 U/L (ref 0–45)
BASOPHILS # BLD AUTO: 0.1 10E3/UL (ref 0–0.2)
BASOPHILS NFR BLD AUTO: 1 %
BILIRUB SERPL-MCNC: 0.3 MG/DL
BUN SERPL-MCNC: 5.7 MG/DL (ref 6–20)
CALCIUM SERPL-MCNC: 8.6 MG/DL (ref 8.8–10.4)
CHLORIDE SERPL-SCNC: 105 MMOL/L (ref 98–107)
CREAT SERPL-MCNC: 0.72 MG/DL (ref 0.51–0.95)
EGFRCR SERPLBLD CKD-EPI 2021: >90 ML/MIN/1.73M2
EOSINOPHIL # BLD AUTO: 0 10E3/UL (ref 0–0.7)
EOSINOPHIL NFR BLD AUTO: 0 %
ERYTHROCYTE [DISTWIDTH] IN BLOOD BY AUTOMATED COUNT: 16.9 % (ref 10–15)
GLUCOSE SERPL-MCNC: 94 MG/DL (ref 70–99)
HCO3 SERPL-SCNC: 28 MMOL/L (ref 22–29)
HCT VFR BLD AUTO: 33.7 % (ref 35–47)
HGB BLD-MCNC: 11.2 G/DL (ref 11.7–15.7)
IMM GRANULOCYTES # BLD: 0 10E3/UL
IMM GRANULOCYTES NFR BLD: 0 %
LYMPHOCYTES # BLD AUTO: 2.4 10E3/UL (ref 0.8–5.3)
LYMPHOCYTES NFR BLD AUTO: 31 %
MCH RBC QN AUTO: 29.3 PG (ref 26.5–33)
MCHC RBC AUTO-ENTMCNC: 33.2 G/DL (ref 31.5–36.5)
MCV RBC AUTO: 88 FL (ref 78–100)
MONOCYTES # BLD AUTO: 0.8 10E3/UL (ref 0–1.3)
MONOCYTES NFR BLD AUTO: 10 %
NEUTROPHILS # BLD AUTO: 4.6 10E3/UL (ref 1.6–8.3)
NEUTROPHILS NFR BLD AUTO: 58 %
NRBC # BLD AUTO: 0 10E3/UL
NRBC BLD AUTO-RTO: 0 /100
PLATELET # BLD AUTO: 263 10E3/UL (ref 150–450)
POTASSIUM SERPL-SCNC: 3 MMOL/L (ref 3.4–5.3)
PROT SERPL-MCNC: 6 G/DL (ref 6.4–8.3)
RBC # BLD AUTO: 3.82 10E6/UL (ref 3.8–5.2)
SODIUM SERPL-SCNC: 143 MMOL/L (ref 135–145)
WBC # BLD AUTO: 7.9 10E3/UL (ref 4–11)

## 2025-02-13 PROCEDURE — 36591 DRAW BLOOD OFF VENOUS DEVICE: CPT

## 2025-02-13 PROCEDURE — 82310 ASSAY OF CALCIUM: CPT

## 2025-02-13 PROCEDURE — 80053 COMPREHEN METABOLIC PANEL: CPT

## 2025-02-13 PROCEDURE — 85041 AUTOMATED RBC COUNT: CPT

## 2025-02-13 PROCEDURE — 250N000011 HC RX IP 250 OP 636

## 2025-02-13 PROCEDURE — 258N000003 HC RX IP 258 OP 636

## 2025-02-13 PROCEDURE — 85004 AUTOMATED DIFF WBC COUNT: CPT

## 2025-02-13 PROCEDURE — 82040 ASSAY OF SERUM ALBUMIN: CPT

## 2025-02-13 PROCEDURE — 250N000013 HC RX MED GY IP 250 OP 250 PS 637

## 2025-02-13 PROCEDURE — 99213 OFFICE O/P EST LOW 20 MIN: CPT

## 2025-02-13 RX ORDER — PALONOSETRON 0.05 MG/ML
0.25 INJECTION, SOLUTION INTRAVENOUS ONCE
Status: CANCELLED | OUTPATIENT
Start: 2025-02-13

## 2025-02-13 RX ORDER — DIPHENHYDRAMINE HYDROCHLORIDE 50 MG/ML
25 INJECTION INTRAMUSCULAR; INTRAVENOUS
Status: COMPLETED | OUTPATIENT
Start: 2025-02-13 | End: 2025-02-13

## 2025-02-13 RX ORDER — HEPARIN SODIUM (PORCINE) LOCK FLUSH IV SOLN 100 UNIT/ML 100 UNIT/ML
5 SOLUTION INTRAVENOUS
Status: CANCELLED | OUTPATIENT
Start: 2025-02-13

## 2025-02-13 RX ORDER — POTASSIUM CHLORIDE 1500 MG/1
40 TABLET, EXTENDED RELEASE ORAL ONCE
Status: COMPLETED | OUTPATIENT
Start: 2025-02-13 | End: 2025-02-13

## 2025-02-13 RX ORDER — ALBUTEROL SULFATE 90 UG/1
1-2 INHALANT RESPIRATORY (INHALATION)
Status: DISCONTINUED | OUTPATIENT
Start: 2025-02-13 | End: 2025-02-13 | Stop reason: HOSPADM

## 2025-02-13 RX ORDER — ALBUTEROL SULFATE 90 UG/1
1-2 INHALANT RESPIRATORY (INHALATION)
Status: CANCELLED
Start: 2025-02-13

## 2025-02-13 RX ORDER — LORAZEPAM 2 MG/ML
0.5 INJECTION INTRAMUSCULAR EVERY 4 HOURS PRN
Status: CANCELLED | OUTPATIENT
Start: 2025-02-13

## 2025-02-13 RX ORDER — ACETAMINOPHEN 325 MG/1
650 TABLET ORAL
Status: CANCELLED
Start: 2025-02-13

## 2025-02-13 RX ORDER — MEPERIDINE HYDROCHLORIDE 25 MG/ML
25 INJECTION INTRAMUSCULAR; INTRAVENOUS; SUBCUTANEOUS
Status: CANCELLED | OUTPATIENT
Start: 2025-02-13

## 2025-02-13 RX ORDER — METHYLPREDNISOLONE SODIUM SUCCINATE 40 MG/ML
40 INJECTION INTRAMUSCULAR; INTRAVENOUS
Status: DISCONTINUED | OUTPATIENT
Start: 2025-02-13 | End: 2025-02-13 | Stop reason: HOSPADM

## 2025-02-13 RX ORDER — METHYLPREDNISOLONE SODIUM SUCCINATE 40 MG/ML
40 INJECTION INTRAMUSCULAR; INTRAVENOUS
Status: CANCELLED
Start: 2025-02-13

## 2025-02-13 RX ORDER — HEPARIN SODIUM (PORCINE) LOCK FLUSH IV SOLN 100 UNIT/ML 100 UNIT/ML
5 SOLUTION INTRAVENOUS
Status: DISCONTINUED | OUTPATIENT
Start: 2025-02-13 | End: 2025-02-13 | Stop reason: HOSPADM

## 2025-02-13 RX ORDER — HEPARIN SODIUM,PORCINE 10 UNIT/ML
5-20 VIAL (ML) INTRAVENOUS DAILY PRN
Status: CANCELLED | OUTPATIENT
Start: 2025-02-13

## 2025-02-13 RX ORDER — DIPHENHYDRAMINE HYDROCHLORIDE 50 MG/ML
25 INJECTION INTRAMUSCULAR; INTRAVENOUS EVERY 6 HOURS PRN
Status: DISCONTINUED | OUTPATIENT
Start: 2025-02-13 | End: 2025-02-13 | Stop reason: HOSPADM

## 2025-02-13 RX ORDER — DIPHENHYDRAMINE HCL 50 MG
50 CAPSULE ORAL
Status: CANCELLED | OUTPATIENT
Start: 2025-02-13

## 2025-02-13 RX ORDER — PALONOSETRON 0.05 MG/ML
0.25 INJECTION, SOLUTION INTRAVENOUS ONCE
Status: COMPLETED | OUTPATIENT
Start: 2025-02-13 | End: 2025-02-13

## 2025-02-13 RX ORDER — ALBUTEROL SULFATE 0.83 MG/ML
2.5 SOLUTION RESPIRATORY (INHALATION)
Status: DISCONTINUED | OUTPATIENT
Start: 2025-02-13 | End: 2025-02-13 | Stop reason: HOSPADM

## 2025-02-13 RX ORDER — DIPHENHYDRAMINE HYDROCHLORIDE 50 MG/ML
50 INJECTION INTRAMUSCULAR; INTRAVENOUS
Status: CANCELLED
Start: 2025-02-13

## 2025-02-13 RX ORDER — EPINEPHRINE 1 MG/ML
0.3 INJECTION, SOLUTION INTRAMUSCULAR; SUBCUTANEOUS EVERY 5 MIN PRN
Status: CANCELLED | OUTPATIENT
Start: 2025-02-13

## 2025-02-13 RX ORDER — HEPARIN SODIUM,PORCINE 10 UNIT/ML
5-20 VIAL (ML) INTRAVENOUS DAILY PRN
Status: DISCONTINUED | OUTPATIENT
Start: 2025-02-13 | End: 2025-02-13 | Stop reason: HOSPADM

## 2025-02-13 RX ORDER — DIPHENHYDRAMINE HYDROCHLORIDE 50 MG/ML
50 INJECTION INTRAMUSCULAR; INTRAVENOUS
Status: DISCONTINUED | OUTPATIENT
Start: 2025-02-13 | End: 2025-02-13 | Stop reason: HOSPADM

## 2025-02-13 RX ORDER — DIPHENHYDRAMINE HYDROCHLORIDE 50 MG/ML
25 INJECTION INTRAMUSCULAR; INTRAVENOUS
Status: CANCELLED
Start: 2025-02-13

## 2025-02-13 RX ORDER — POTASSIUM CHLORIDE 1500 MG/1
20 TABLET, EXTENDED RELEASE ORAL 2 TIMES DAILY
Qty: 30 TABLET | Refills: 1 | Status: SHIPPED | OUTPATIENT
Start: 2025-02-13

## 2025-02-13 RX ORDER — DIPHENHYDRAMINE HYDROCHLORIDE 50 MG/ML
25 INJECTION INTRAMUSCULAR; INTRAVENOUS EVERY 6 HOURS PRN
Status: CANCELLED
Start: 2025-02-13

## 2025-02-13 RX ORDER — ALBUTEROL SULFATE 0.83 MG/ML
2.5 SOLUTION RESPIRATORY (INHALATION)
Status: CANCELLED | OUTPATIENT
Start: 2025-02-13

## 2025-02-13 RX ORDER — MEPERIDINE HYDROCHLORIDE 25 MG/ML
25 INJECTION INTRAMUSCULAR; INTRAVENOUS; SUBCUTANEOUS
Status: DISCONTINUED | OUTPATIENT
Start: 2025-02-13 | End: 2025-02-13 | Stop reason: HOSPADM

## 2025-02-13 RX ORDER — EPINEPHRINE 1 MG/ML
0.3 INJECTION, SOLUTION INTRAMUSCULAR; SUBCUTANEOUS EVERY 5 MIN PRN
Status: DISCONTINUED | OUTPATIENT
Start: 2025-02-13 | End: 2025-02-13 | Stop reason: HOSPADM

## 2025-02-13 RX ADMIN — DIPHENHYDRAMINE HYDROCHLORIDE 25 MG: 50 INJECTION, SOLUTION INTRAMUSCULAR; INTRAVENOUS at 09:43

## 2025-02-13 RX ADMIN — SODIUM CHLORIDE 420 MG: 9 INJECTION, SOLUTION INTRAVENOUS at 10:21

## 2025-02-13 RX ADMIN — PALONOSETRON 0.25 MG: 0.25 INJECTION, SOLUTION INTRAVENOUS at 09:36

## 2025-02-13 RX ADMIN — DEXAMETHASONE SODIUM PHOSPHATE: 10 INJECTION, SOLUTION INTRAMUSCULAR; INTRAVENOUS at 09:47

## 2025-02-13 RX ADMIN — CARBOPLATIN 725 MG: 10 INJECTION, SOLUTION INTRAVENOUS at 12:59

## 2025-02-13 RX ADMIN — HEPARIN 5 ML: 100 SYRINGE at 13:45

## 2025-02-13 RX ADMIN — DOCETAXEL 160 MG: 160 INJECTION INTRAVENOUS at 11:42

## 2025-02-13 RX ADMIN — SODIUM CHLORIDE 600 MG: 9 INJECTION, SOLUTION INTRAVENOUS at 11:01

## 2025-02-13 RX ADMIN — POTASSIUM CHLORIDE 40 MEQ: 1500 TABLET, EXTENDED RELEASE ORAL at 09:34

## 2025-02-13 RX ADMIN — SODIUM CHLORIDE 250 ML: 9 INJECTION, SOLUTION INTRAVENOUS at 09:35

## 2025-02-13 ASSESSMENT — PAIN SCALES - GENERAL: PAINLEVEL_OUTOF10: NO PAIN (0)

## 2025-02-13 NOTE — PATIENT INSTRUCTIONS
Soda and Salt Mouth Rinse    1/4 teaspoon baking soda  1/8 teaspoon salt  1 cup of warm water    Mix well until the salt dissolves. Rinse the mouth gently, being careful not to swallow the mixture. Follow this with a plain water rinse to clean out any remaining salt or soda.      3/4 tsp salt  1 tsp baking soda   4 cups water

## 2025-02-13 NOTE — PROGRESS NOTES
"Oncology Rooming Note    February 13, 2025 8:31 AM   Adrienne Ivory is a 59 year old female who presents for:    Chief Complaint   Patient presents with    Oncology Clinic Visit       Invasive ductal carcinoma of left breast        Initial Vitals: /80 (BP Location: Left arm, Patient Position: Sitting, Cuff Size: Adult Regular)   Pulse 85   Temp 97.6  F (36.4  C) (Tympanic)   Resp 16   Ht 1.651 m (5' 5\")   Wt 92.9 kg (204 lb 12.8 oz)   SpO2 98%   BMI 34.08 kg/m   Estimated body mass index is 34.08 kg/m  as calculated from the following:    Height as of this encounter: 1.651 m (5' 5\").    Weight as of this encounter: 92.9 kg (204 lb 12.8 oz). Body surface area is 2.06 meters squared.  No Pain (0) Comment: Data Unavailable   No LMP recorded. Patient has had an ablation.  Allergies reviewed: Yes  Medications reviewed: Yes    Medications: MEDICATION REFILLS NEEDED TODAY. Provider was notified.  Pharmacy name entered into [facility]:    [facility] - [place], [place] - [address] [place] [address] DR[facility] ST[facility] #[id number] [place], [place] - [facility] [address] - [address] [place] [address] HOME DELIVERY - [place], [place] - [address] [facility]    Frailty Screening:   Is the patient here for a new oncology consult visit in cancer care? 2. No    PHQ9:  Did this patient require a PHQ9?: No      Clinical concerns:  2 week labs and treatment       Floridalma Navarrete              "

## 2025-02-13 NOTE — PROGRESS NOTES
Infusion Nursing Note:  Adrienne Ivory presents today for C4D1 Perjeta, Trazimera, Taxotere, and Carboplatin.    Patient seen by provider today: Yes: RYAN Osorio CNP   present during visit today: Not Applicable.    Note: Patient arrives with , Desmond after receiving good news from RYAN Osorio CNP this am regarding her breast cancer. Premedicated with Benadryl IVP, Emend/Dexamethasone, and aloxi. Her potassium level this am 3.0. New order for oral potassium 40 meq given today. Pt. Denied any nausea today. Appears calm and talkative.      Intravenous Access:  Implanted Port.    Treatment Conditions:  Results reviewed, labs MET treatment parameters, ok to proceed with treatment.      Post Infusion Assessment:  Patient tolerated infusion without incident.  Site patent and intact, free from redness, edema or discomfort.  No evidence of extravasations.  Access discontinued per protocol.       Discharge Plan:   Patient and/or family verbalized understanding of discharge instructions and all questions answered.      Estefani Bruno RN

## 2025-02-13 NOTE — LETTER
2/13/2025      Adrienne Ivory  [address]  [place] [address]      Dear Colleague,    Thank you for referring your patient, Adrienne Ivory, to the [facility] [place]. Please see a copy of my visit note below.    [facility] Hematology and Oncology Outpatient Progress Note    Patient: Adrienne Ivory  MRN: [id number]  Date of Service: Feb 13, 2025          Reason for Visit    Chief Complaint   Patient presents with     Oncology Clinic Visit       Invasive ductal carcinoma of left breast           Cancer Staging   Invasive ductal carcinoma of left breast (H)  Staging form: Breast, AJCC 8th Edition  - Clinical: Stage IIIB (cT2, cN3, cM0, G2, ER+, NV-, HER2+) - Signed by Anastasiya Salgado APRN CNP on 12/19/2024      Primary Hematologist/Oncologist: Dr. Josseline Harkins        Assessment/Plan  #.  Stage IIIc breast cancer HER2 positive, ER/NV positive  Adrienne is clinically stable. She is here for 4th cycle of TCHP. She is tolerating it with manageable side effects of nausea, food taste alterations and some diarrhea this last cycle. We reviewed her labs today including a CMP which shows stable and normal renal and liver function. She has some hypokalemia today, and I recommend to replace that today per protocol. We will also get her started on a supplement. CBC is stable. She has seen a slight drop in hgb to 11.9. We reviewed the MRI of the breast results that was completed on 2/10. It showed no abnormal enhancement in left breast, resolution of all visualized lymph nodes, and right breast masses have resolved/decreased in size. With positive response to treatment and good toleration, I recommend to proceed with next cycle of chemotherapy. The plan is to complete a total of 6 cycles before referring her to surgery.  Patient continues to hope to get a lumpectomy at this point in time.  Proceed with C#4 today of TCHP.   Replace potassium today per protocol  Start Potassium 40 mEq  daily. Rx sent to preferred pharmacy.   ECHO prior to next visit  Follow-up with Dr. Harkins in 3 weeks    #.  Cardiac monitoring  Patient needs continued cardiac monitoring because of the use of HER2 directed agents.  Recommend echo to be done done after her fourth cycle. Order placed.      #.  Nausea  This has been fairly stable with current antiemetic regimen.   ______________________________________________________________________________    History of Present Illness/ Interval History    Ms. Adrienne Ivory  is a 59 year old patient who is seen today in follow up for breast cancer. She is due for the fourth cycle of TCHP. She is doing ok, but did have some trouble with diarrhea and nausea. She feels she had noro-virus, and has been improving. She has had no fevers. She states food is tasting metallic. Her eyes feel dry, and her tongue feels weird. She completed a breast MRI on 2/10.     ECOG performance status   0- Fully active, without restriction      Distress Screening (within last 30 days)    1. How concerned are you about your ability to eat? : 0  2. How concerned are you about unintended weight loss or your current weight? : 0  3. How concerned are you about feeling depressed or very sad? : 0  4. How concerned are you about feeling anxious or very scared? : 0  5. Do you struggle with the loss of meaning and travis in your life? : Not at all  6. How concerned are you about work and home life issues that may be affected by your cancer? : 0  7. How concerned are you about knowing what resources are available to help you? : 0  8. Do you currently have what you would describe as Presybeterian or spiritual struggles?: Not at all      Pain  Pain Score: No Pain (0)    ROS  A 14 point review of systems was obtained.  Positive findings noted in the history.  The remainder of the review of system is otherwise negative.      Oncology History/Treatment  Oncologic history  T2 N3 M0 stage IIIc invasive ductal carcinoma of the  "left breast ER positive [place] positive HER2/wolf 3+.  November 2024  Patient had multiple level 1 level 2 and level 3 axillary lymph nodes involved    Treatment  Patient started on neoadjuvant TCHP on 12/12/2024      Physical Exam    /80 (BP Location: Left arm, Patient Position: Sitting, Cuff Size: Adult Regular)   Pulse 85   Temp 97.6  F (36.4  C) (Tympanic)   Resp 16   Ht 1.651 m (5' 5\")   Wt 92.9 kg (204 lb 12.8 oz)   SpO2 98%   BMI 34.08 kg/m      GENERAL: no acute distress. Cooperative in conversation.   HEENT: pupils are equal, round and reactive. Oral mucosa is moist and intact.  RESP:Chest symmetric. Regular respiratory rate. No stridor.  ABD: Nondistended, soft.  EXTREMITIES: No lower extremity edema.   NEURO: non focal. Alert and oriented x3.   PSYCH: within normal limits. No depression or anxiety.  SKIN: warm dry intact      Lab Results    Recent Results (from the past week)   Comprehensive metabolic panel   Result Value Ref Range    Sodium 143 135 - 145 mmol/L    Potassium 3.0 (L) 3.4 - 5.3 mmol/L    Carbon Dioxide (CO2) 28 22 - 29 mmol/L    Anion Gap 10 7 - 15 mmol/L    Urea Nitrogen 5.7 (L) 6.0 - 20.0 mg/dL    Creatinine 0.72 0.51 - 0.95 mg/dL    GFR Estimate >90 >60 mL/min/1.73m2    Calcium 8.6 (L) 8.8 - 10.4 mg/dL    Chloride 105 98 - 107 mmol/L    Glucose 94 70 - 99 mg/dL    Alkaline Phosphatase 75 40 - 150 U/L    AST 38 0 - 45 U/L    ALT 43 0 - 50 U/L    Protein Total 6.0 (L) 6.4 - 8.3 g/dL    Albumin 3.7 3.5 - 5.2 g/dL    Bilirubin Total 0.3 <=1.2 mg/dL   CBC with platelets and differential   Result Value Ref Range    WBC Count 7.9 4.0 - 11.0 10e3/uL    RBC Count 3.82 3.80 - 5.20 10e6/uL    Hemoglobin 11.2 (L) 11.7 - 15.7 g/dL    Hematocrit 33.7 (L) 35.0 - 47.0 %    MCV 88 78 - 100 fL    MCH 29.3 26.5 - 33.0 pg    MCHC 33.2 31.5 - 36.5 g/dL    RDW 16.9 (H) 10.0 - 15.0 %    Platelet Count 263 150 - 450 10e3/uL    % Neutrophils 58 %    % Lymphocytes 31 %    % Monocytes 10 %    % Eosinophils " 0 %    % Basophils 1 %    % Immature Granulocytes 0 %    NRBCs per 100 WBC 0 <1 /100    Absolute Neutrophils 4.6 1.6 - 8.3 10e3/uL    Absolute Lymphocytes 2.4 0.8 - 5.3 10e3/uL    Absolute Monocytes 0.8 0.0 - 1.3 10e3/uL    Absolute Eosinophils 0.0 0.0 - 0.7 10e3/uL    Absolute Basophils 0.1 0.0 - 0.2 10e3/uL    Absolute Immature Granulocytes 0.0 <=0.4 10e3/uL    Absolute NRBCs 0.0 10e3/uL     I reviewed the above labs today.  Imaging    MR Breast Bilateral w/o & w Contrast    Result Date: 2/11/2025  EXAM: Bilateral Breast MRI with and without contrast, and computer aided kinetic analysis. LOCATION: [facility] DATE: 2/10/2025 HISTORY/INDICATION: 59-year-old female with known, biopsy-proven left breast IDC with left axillary lymph node metastases. Current neoadjuvant chemotherapy. COMPARISON: Prior breast MRI dated 11/27/2024. Additional prior examinations dated 12/9/2024, 11/18/2024, 11/15/2024, 11/12/2024, 7/11/2022. TECHNIQUE: Multiplanar, multisequence imaging performed prior to contrast administration and at multiple time points after contrast administration. Post-processing including subtractions, MIPs and color encoding of post contrast dynamic acquisitions. IV contrast: 10ml Gadavist SEDATION: None FINDINGS: Right Breast: Breast composition: Scattered fibroglandular tissue Background parenchymal enhancement: Minimal The previously seen oval, enhancing mass within the right breast at the 6:00 position, posterior depth has decreased in size and is less conspicuous. Otherwise, there are no new or suspicious masses or areas of nonmass enhancement identified within the right breast. Right Axilla: No lymphadenopathy. Right Internal Mammary Chain: No lymphadenopathy. Left Breast: Breast composition: Scattered fibroglandular tissue Background parenchymal enhancement: Minimal There is no residual abnormal enhancement at the site of the previously seen irregular, enhancing mass within the left  breast at the 2:00 position, posterior depth, consistent with the known, biopsy-proven malignancy. Additionally, the previously seen oval, enhancing mass within the left breast in the retroareolar plane at middle depth has also resolved and the second oval, enhancing mass within the retroareolar plane at posterior depth has decreased in size and is less conspicuous. Left Axilla: Left level I-III lymphadenopathy has also resolved with all visualized lymph nodes within normal limits. The previously biopsied left axillary lymph node consistent with metastasis is demonstrated on axial postcontrast image 59/118). Left Internal Mammary Chain: The previously described two internal mammary chain lymph nodes measuring up to 0.2 cm are grossly unchanged (axial postcontrast image 62/118 and 59/118).     IMPRESSION: 1.  No residual abnormal enhancement at the site of known, biopsy-proven malignancy within the left breast at the 2:00 position, posterior depth consistent with treatment response. 2.  Level I-III left axillary lymphadenopathy has resolved and all visualized lymph nodes are within normal limits, also consistent with treatment response. The two previously seen 0.2 cm left internal mammary chain lymph nodes are unchanged. 3.  The previously seen oval, enhancing masses within the right breast at the 6:00 position, posterior depth and within the left breast in the retroareolar region at middle and posterior depth are resolved or have decreased in size and are less conspicuous. Right Breast ACR BI-RADS Category: 2 - Benign Finding(s). Left Breast ACR BI-RADS Category: 6 - Known Biopsy-Proven Malignancy-Appropriate Action Should Be Taken. RECOMMENDATION: Oncological Follow-up       Billing  Total time 47 minutes, to include face to face visit, review of EMR, ordering, documentation and coordination of care on date of service. The longitudinal plan of care for the diagnosis(es)/condition(s) as documented were addressed  "during this visit. Due to the added complexity in care, I will continue to support Adrienne in the subsequent management and with ongoing continuity of care.    Signed by: AMARILIS Soria CNP        Chart documentation with Dragon Voice recognition Software. Although reviewed after completion, some words and grammatical errors may remain.    Oncology Rooming Note    February 13, 2025 8:31 AM   Adrienne Ivory is a 59 year old female who presents for:    Chief Complaint   Patient presents with     Oncology Clinic Visit       Invasive ductal carcinoma of left breast        Initial Vitals: /80 (BP Location: Left arm, Patient Position: Sitting, Cuff Size: Adult Regular)   Pulse 85   Temp 97.6  F (36.4  C) (Tympanic)   Resp 16   Ht 1.651 m (5' 5\")   Wt 92.9 kg (204 lb 12.8 oz)   SpO2 98%   BMI 34.08 kg/m   Estimated body mass index is 34.08 kg/m  as calculated from the following:    Height as of this encounter: 1.651 m (5' 5\").    Weight as of this encounter: 92.9 kg (204 lb 12.8 oz). Body surface area is 2.06 meters squared.  No Pain (0) Comment: Data Unavailable   No LMP recorded. Patient has had an ablation.  Allergies reviewed: Yes  Medications reviewed: Yes    Medications: MEDICATION REFILLS NEEDED TODAY. Provider was notified.  Pharmacy name entered into [employer]:    [facility] - [place], [place] - [address] [id number] [place], [place] - [facility] [address] AT [address] PHARMACY [place], [place] - [address] [place] [address] HOME DELIVERY [place], [place] - [facility]    Frailty Screening:   Is the patient here for a new oncology consult visit in cancer care? 2. No    PHQ9:  Did this patient require a PHQ9?: No      Clinical concerns:  2 week labs and treatment       Floridalma Navarrete                Again, thank you for allowing me to participate in the care of your patient.        Sincerely,        Shelley" TRUONG Osorio, APRN CNP    Electronically signed

## 2025-02-17 NOTE — TELEPHONE ENCOUNTER
[employer] disability forms are complete and faxed to [phone] with Right Fax confirmation. Patient can be off of work whenever necessary depending on how she feels.   Pt informed by My Chart message.    Floridalma COTTO CMA

## 2025-02-20 DIAGNOSIS — C50.212 MALIGNANT NEOPLASM OF UPPER-INNER QUADRANT OF LEFT BREAST IN FEMALE, ESTROGEN RECEPTOR POSITIVE (H): ICD-10-CM

## 2025-02-20 DIAGNOSIS — Z17.0 MALIGNANT NEOPLASM OF UPPER-INNER QUADRANT OF LEFT BREAST IN FEMALE, ESTROGEN RECEPTOR POSITIVE (H): ICD-10-CM

## 2025-02-20 RX ORDER — PROCHLORPERAZINE MALEATE 10 MG
TABLET ORAL
Qty: 30 TABLET | Refills: 2 | OUTPATIENT
Start: 2025-02-20

## 2025-02-20 NOTE — TELEPHONE ENCOUNTER
Refusing refill request, too early, should have refills on file.    Last Written Prescription Date:  2/6/25  Last Fill Quantity: 30,  # refills: 2  Last office visit provider:  2/13/25 with Shelley Osorio CNP     Requested Prescriptions   Pending Prescriptions Disp Refills    prochlorperazine (COMPAZINE) 10 MG tablet [Pharmacy Med Name: PROCHLORPERAZINE 10MG TABLETS] 30 tablet 2     Sig: TAKE 1 TABLET(10 MG) BY MOUTH EVERY 6 HOURS AS NEEDED FOR NAUSEA OR VOMITING        Antivertigo/Antiemetic Agents Failed - 2/20/2025  8:32 AM        Failed - Recent (12 mo) or future (90 days) visit with authorizing provider's specialty     The patient must have completed an in-person or virtual visit within the past 12 months or has a future visit scheduled within the next 90 days with the authorizing provider s specialty.  Urgent care and e-visits do not qualify as an office visit for this protocol.          Passed - Medication is active on med list and the sig matches. RN to manually verify dose and sig if red X/fail.     If the protocol passes (green check), you do not need to verify med dose and sig.    A prescription matches if they are the same clinical intention.    For Example: once daily and every morning are the same.    For all fails (red x), verify dose and sig.    If the refill does match what is on file, the RN can still proceed to approve the refill request.     If they do not match, route to the appropriate provider.             Passed - Medication indicated for associated diagnosis     The medication is prescribed for one or more of the following conditions:     Motion sickness   Nausea   Vomiting   Vertigo   Chemotherapy-induced nausea and vomiting   Radiation-induced nausea and vomiting,    Nausea and vomiting prophylaxis, migraine          Passed - Patient is 18 years of age or older             Nissa Valdes RN 02/20/25 8:32 AM

## 2025-02-24 DIAGNOSIS — Z17.0 MALIGNANT NEOPLASM OF UPPER-INNER QUADRANT OF LEFT BREAST IN FEMALE, ESTROGEN RECEPTOR POSITIVE (H): ICD-10-CM

## 2025-02-24 DIAGNOSIS — C50.212 MALIGNANT NEOPLASM OF UPPER-INNER QUADRANT OF LEFT BREAST IN FEMALE, ESTROGEN RECEPTOR POSITIVE (H): Primary | ICD-10-CM

## 2025-02-24 DIAGNOSIS — R21 RASH: ICD-10-CM

## 2025-02-24 DIAGNOSIS — Z17.0 MALIGNANT NEOPLASM OF UPPER-INNER QUADRANT OF LEFT BREAST IN FEMALE, ESTROGEN RECEPTOR POSITIVE (H): Primary | ICD-10-CM

## 2025-02-24 DIAGNOSIS — C50.212 MALIGNANT NEOPLASM OF UPPER-INNER QUADRANT OF LEFT BREAST IN FEMALE, ESTROGEN RECEPTOR POSITIVE (H): ICD-10-CM

## 2025-02-24 RX ORDER — CLOBETASOL PROPIONATE 0.5 MG/G
CREAM TOPICAL 2 TIMES DAILY
Qty: 45 G | Refills: 0 | Status: SHIPPED | OUTPATIENT
Start: 2025-02-24

## 2025-02-24 RX ORDER — ONDANSETRON 8 MG/1
8 TABLET, FILM COATED ORAL EVERY 8 HOURS PRN
Qty: 30 TABLET | Refills: 2 | Status: SHIPPED | OUTPATIENT
Start: 2025-02-24

## 2025-02-24 NOTE — TELEPHONE ENCOUNTER
Last Written Prescription Date:  12/5/24  Last Fill Quantity: 30,  # refills: 2   Last office visit provider:  2/13/25 with Shelley Osorio CNP     Requested Prescriptions   Pending Prescriptions Disp Refills    ondansetron (ZOFRAN) 8 MG tablet 30 tablet 2     Sig: Take 1 tablet (8 mg) by mouth every 8 hours as needed for nausea (vomiting).       There is no refill protocol information for this order          Nissa Valdes RN 02/24/25 8:44 AM

## 2025-03-04 ENCOUNTER — HOSPITAL ENCOUNTER (OUTPATIENT)
Dept: CARDIOLOGY | Facility: CLINIC | Age: 60
Discharge: HOME | End: 2025-03-04
Payer: COMMERCIAL

## 2025-03-04 DIAGNOSIS — R12 HEARTBURN: ICD-10-CM

## 2025-03-04 DIAGNOSIS — C50.212 MALIGNANT NEOPLASM OF UPPER-INNER QUADRANT OF LEFT BREAST IN FEMALE, ESTROGEN RECEPTOR POSITIVE (H): ICD-10-CM

## 2025-03-04 DIAGNOSIS — Z17.0 MALIGNANT NEOPLASM OF UPPER-INNER QUADRANT OF LEFT BREAST IN FEMALE, ESTROGEN RECEPTOR POSITIVE (H): ICD-10-CM

## 2025-03-04 LAB — LVEF ECHO: NORMAL

## 2025-03-04 PROCEDURE — 93306 TTE W/DOPPLER COMPLETE: CPT | Mod: 26 | Performed by: INTERNAL MEDICINE

## 2025-03-04 PROCEDURE — 93356 MYOCRD STRAIN IMG SPCKL TRCK: CPT

## 2025-03-04 PROCEDURE — 93356 MYOCRD STRAIN IMG SPCKL TRCK: CPT | Performed by: INTERNAL MEDICINE

## 2025-03-04 NOTE — TELEPHONE ENCOUNTER
Last Written Prescription Date:  1/9/25  Last Fill Quantity: 30,  # refills: 1   Last office visit provider:  2/13/25 Shelley Osorio CNP     Requested Prescriptions   Pending Prescriptions Disp Refills    omeprazole (PRILOSEC) 40 MG DR capsule [Pharmacy Med Name: OMEPRAZOLE 40MG CAPSULES] 90 capsule      Sig: TAKE 1 CAPSULE(40 MG) BY MOUTH DAILY       PPI Protocol Failed - 3/4/2025  2:22 PM        Failed - Recent (12 mo) or future (90 days) visit within the authorizing provider's specialty     The patient must have completed an in-person or virtual visit within the past 12 months or has a future visit scheduled within the next 90 days with the authorizing provider s specialty.  Urgent care and e-visits do not qualify as an office visit for this protocol.          Passed - Medication is active on med list and the sig matches. RN to manually verify dose and sig if red X/fail.     If the protocol passes (green check), you do not need to verify med dose and sig.    A prescription matches if they are the same clinical intention.    For Example: once daily and every morning are the same.    For all fails (red x), verify dose and sig.    If the refill does match what is on file, the RN can still proceed to approve the refill request.     If they do not match, route to the appropriate provider.             Passed - Medication indicated for associated diagnosis     The medication is prescribed for one or more of the following conditions:     Erosive esophagitis    Gastritis   Gastric hypersecretion   Gastric ulcer   Gastroesophageal reflux disease   Helicobacter pylori gastrointestinal tract infection   Ulcer of duodenum   Drug-induced peptic ulcer   Esophageal stricture   Gastrointestinal hemorrhage   Indigestion   Stress ulcer   Zollinger-Hyde syndrome   Patino s esophagus   Laryngopharyngeal reflux   Epigastric Pain   Morbid Obesity   Cough   History of Peptic Ulcer   Esophageal Atresia, Stenosis and Fistula   Cystic  Fibrosis  Bronchiectasis          Passed - Patient is age 18 or older        Passed - No active pregnacy on record        Passed - No positive pregnancy test in past 12 months             Nissa Valdes RN 03/04/25 2:22 PM

## 2025-03-05 RX ORDER — OMEPRAZOLE 40 MG/1
40 CAPSULE, DELAYED RELEASE ORAL DAILY
Qty: 90 CAPSULE | Refills: 0 | Status: SHIPPED | OUTPATIENT
Start: 2025-03-05

## 2025-03-06 ENCOUNTER — INFUSION THERAPY VISIT (OUTPATIENT)
Dept: INFUSION THERAPY | Facility: HOSPITAL | Age: 60
End: 2025-03-06
Attending: INTERNAL MEDICINE
Payer: COMMERCIAL

## 2025-03-06 ENCOUNTER — ONCOLOGY VISIT (OUTPATIENT)
Dept: ONCOLOGY | Facility: HOSPITAL | Age: 60
End: 2025-03-06
Attending: INTERNAL MEDICINE
Payer: COMMERCIAL

## 2025-03-06 ENCOUNTER — PATIENT OUTREACH (OUTPATIENT)
Dept: ONCOLOGY | Facility: HOSPITAL | Age: 60
End: 2025-03-06

## 2025-03-06 VITALS
RESPIRATION RATE: 16 BRPM | HEART RATE: 82 BPM | SYSTOLIC BLOOD PRESSURE: 147 MMHG | TEMPERATURE: 97.4 F | WEIGHT: 200 LBS | OXYGEN SATURATION: 99 % | HEIGHT: 65 IN | DIASTOLIC BLOOD PRESSURE: 94 MMHG | BODY MASS INDEX: 33.32 KG/M2

## 2025-03-06 DIAGNOSIS — C50.212 MALIGNANT NEOPLASM OF UPPER-INNER QUADRANT OF LEFT BREAST IN FEMALE, ESTROGEN RECEPTOR POSITIVE (H): Primary | ICD-10-CM

## 2025-03-06 DIAGNOSIS — Z17.0 MALIGNANT NEOPLASM OF UPPER-INNER QUADRANT OF LEFT BREAST IN FEMALE, ESTROGEN RECEPTOR POSITIVE (H): Primary | ICD-10-CM

## 2025-03-06 DIAGNOSIS — R11.2 NAUSEA AND VOMITING, UNSPECIFIED VOMITING TYPE: ICD-10-CM

## 2025-03-06 LAB
ALBUMIN SERPL BCG-MCNC: 3.6 G/DL (ref 3.5–5.2)
ALP SERPL-CCNC: 67 U/L (ref 40–150)
ALT SERPL W P-5'-P-CCNC: 23 U/L (ref 0–50)
ANION GAP SERPL CALCULATED.3IONS-SCNC: 10 MMOL/L (ref 7–15)
AST SERPL W P-5'-P-CCNC: 28 U/L (ref 0–45)
BASOPHILS # BLD AUTO: 0 10E3/UL (ref 0–0.2)
BASOPHILS NFR BLD AUTO: 0 %
BILIRUB SERPL-MCNC: 0.3 MG/DL
BUN SERPL-MCNC: 9.8 MG/DL (ref 6–20)
CALCIUM SERPL-MCNC: 8.5 MG/DL (ref 8.8–10.4)
CHLORIDE SERPL-SCNC: 108 MMOL/L (ref 98–107)
CREAT SERPL-MCNC: 0.79 MG/DL (ref 0.51–0.95)
EGFRCR SERPLBLD CKD-EPI 2021: 86 ML/MIN/1.73M2
EOSINOPHIL # BLD AUTO: 0 10E3/UL (ref 0–0.7)
EOSINOPHIL NFR BLD AUTO: 0 %
ERYTHROCYTE [DISTWIDTH] IN BLOOD BY AUTOMATED COUNT: 17.8 % (ref 10–15)
GLUCOSE SERPL-MCNC: 95 MG/DL (ref 70–99)
HCO3 SERPL-SCNC: 25 MMOL/L (ref 22–29)
HCT VFR BLD AUTO: 31.8 % (ref 35–47)
HGB BLD-MCNC: 10.7 G/DL (ref 11.7–15.7)
IMM GRANULOCYTES # BLD: 0 10E3/UL
IMM GRANULOCYTES NFR BLD: 0 %
LYMPHOCYTES # BLD AUTO: 2.1 10E3/UL (ref 0.8–5.3)
LYMPHOCYTES NFR BLD AUTO: 23 %
MCH RBC QN AUTO: 30.6 PG (ref 26.5–33)
MCHC RBC AUTO-ENTMCNC: 33.6 G/DL (ref 31.5–36.5)
MCV RBC AUTO: 91 FL (ref 78–100)
MONOCYTES # BLD AUTO: 0.8 10E3/UL (ref 0–1.3)
MONOCYTES NFR BLD AUTO: 8 %
NEUTROPHILS # BLD AUTO: 6.4 10E3/UL (ref 1.6–8.3)
NEUTROPHILS NFR BLD AUTO: 68 %
NRBC # BLD AUTO: 0 10E3/UL
NRBC BLD AUTO-RTO: 0 /100
PLATELET # BLD AUTO: 228 10E3/UL (ref 150–450)
POTASSIUM SERPL-SCNC: 3.7 MMOL/L (ref 3.4–5.3)
PROT SERPL-MCNC: 5.9 G/DL (ref 6.4–8.3)
RBC # BLD AUTO: 3.5 10E6/UL (ref 3.8–5.2)
SODIUM SERPL-SCNC: 143 MMOL/L (ref 135–145)
WBC # BLD AUTO: 9.4 10E3/UL (ref 4–11)

## 2025-03-06 PROCEDURE — 36591 DRAW BLOOD OFF VENOUS DEVICE: CPT

## 2025-03-06 PROCEDURE — 85004 AUTOMATED DIFF WBC COUNT: CPT

## 2025-03-06 PROCEDURE — 258N000003 HC RX IP 258 OP 636: Performed by: INTERNAL MEDICINE

## 2025-03-06 PROCEDURE — 250N000011 HC RX IP 250 OP 636: Performed by: INTERNAL MEDICINE

## 2025-03-06 PROCEDURE — 99213 OFFICE O/P EST LOW 20 MIN: CPT | Performed by: INTERNAL MEDICINE

## 2025-03-06 PROCEDURE — 80053 COMPREHEN METABOLIC PANEL: CPT

## 2025-03-06 RX ORDER — DIPHENHYDRAMINE HYDROCHLORIDE 50 MG/ML
25 INJECTION, SOLUTION INTRAMUSCULAR; INTRAVENOUS
Status: DISCONTINUED | OUTPATIENT
Start: 2025-03-06 | End: 2025-03-06 | Stop reason: HOSPADM

## 2025-03-06 RX ORDER — ACETAMINOPHEN 325 MG/1
650 TABLET ORAL
Status: CANCELLED
Start: 2025-03-06

## 2025-03-06 RX ORDER — DIPHENHYDRAMINE HYDROCHLORIDE 50 MG/ML
25 INJECTION, SOLUTION INTRAMUSCULAR; INTRAVENOUS EVERY 6 HOURS PRN
Status: CANCELLED
Start: 2025-03-06

## 2025-03-06 RX ORDER — MEPERIDINE HYDROCHLORIDE 25 MG/ML
25 INJECTION INTRAMUSCULAR; INTRAVENOUS; SUBCUTANEOUS
Status: CANCELLED | OUTPATIENT
Start: 2025-03-06

## 2025-03-06 RX ORDER — ALBUTEROL SULFATE 0.83 MG/ML
2.5 SOLUTION RESPIRATORY (INHALATION)
Status: CANCELLED | OUTPATIENT
Start: 2025-03-06

## 2025-03-06 RX ORDER — LORAZEPAM 2 MG/ML
0.5 INJECTION INTRAMUSCULAR EVERY 4 HOURS PRN
Status: CANCELLED | OUTPATIENT
Start: 2025-03-06

## 2025-03-06 RX ORDER — PALONOSETRON 0.05 MG/ML
0.25 INJECTION, SOLUTION INTRAVENOUS ONCE
Status: CANCELLED | OUTPATIENT
Start: 2025-03-06

## 2025-03-06 RX ORDER — ALBUTEROL SULFATE 90 UG/1
1-2 INHALANT RESPIRATORY (INHALATION)
Status: CANCELLED
Start: 2025-03-06

## 2025-03-06 RX ORDER — ALBUTEROL SULFATE 90 UG/1
1-2 INHALANT RESPIRATORY (INHALATION)
Status: DISCONTINUED | OUTPATIENT
Start: 2025-03-06 | End: 2025-03-06 | Stop reason: HOSPADM

## 2025-03-06 RX ORDER — PALONOSETRON 0.05 MG/ML
0.25 INJECTION, SOLUTION INTRAVENOUS ONCE
Status: COMPLETED | OUTPATIENT
Start: 2025-03-06 | End: 2025-03-06

## 2025-03-06 RX ORDER — EPINEPHRINE 1 MG/ML
0.3 INJECTION, SOLUTION INTRAMUSCULAR; SUBCUTANEOUS EVERY 5 MIN PRN
Status: DISCONTINUED | OUTPATIENT
Start: 2025-03-06 | End: 2025-03-06 | Stop reason: HOSPADM

## 2025-03-06 RX ORDER — METHYLPREDNISOLONE SODIUM SUCCINATE 40 MG/ML
40 INJECTION INTRAMUSCULAR; INTRAVENOUS
Status: DISCONTINUED | OUTPATIENT
Start: 2025-03-06 | End: 2025-03-06 | Stop reason: HOSPADM

## 2025-03-06 RX ORDER — HEPARIN SODIUM,PORCINE 10 UNIT/ML
5-20 VIAL (ML) INTRAVENOUS DAILY PRN
Status: CANCELLED | OUTPATIENT
Start: 2025-03-06

## 2025-03-06 RX ORDER — DIPHENHYDRAMINE HYDROCHLORIDE 50 MG/ML
25 INJECTION, SOLUTION INTRAMUSCULAR; INTRAVENOUS EVERY 6 HOURS PRN
Status: DISCONTINUED | OUTPATIENT
Start: 2025-03-06 | End: 2025-03-06 | Stop reason: HOSPADM

## 2025-03-06 RX ORDER — METHYLPREDNISOLONE SODIUM SUCCINATE 40 MG/ML
40 INJECTION INTRAMUSCULAR; INTRAVENOUS
Status: CANCELLED
Start: 2025-03-06

## 2025-03-06 RX ORDER — DIPHENHYDRAMINE HYDROCHLORIDE 50 MG/ML
50 INJECTION, SOLUTION INTRAMUSCULAR; INTRAVENOUS
Status: CANCELLED
Start: 2025-03-06

## 2025-03-06 RX ORDER — EPINEPHRINE 1 MG/ML
0.3 INJECTION, SOLUTION INTRAMUSCULAR; SUBCUTANEOUS EVERY 5 MIN PRN
Status: CANCELLED | OUTPATIENT
Start: 2025-03-06

## 2025-03-06 RX ORDER — HEPARIN SODIUM (PORCINE) LOCK FLUSH IV SOLN 100 UNIT/ML 100 UNIT/ML
5 SOLUTION INTRAVENOUS
Status: CANCELLED | OUTPATIENT
Start: 2025-03-06

## 2025-03-06 RX ORDER — ALBUTEROL SULFATE 0.83 MG/ML
2.5 SOLUTION RESPIRATORY (INHALATION)
Status: DISCONTINUED | OUTPATIENT
Start: 2025-03-06 | End: 2025-03-06 | Stop reason: HOSPADM

## 2025-03-06 RX ORDER — MEPERIDINE HYDROCHLORIDE 25 MG/ML
25 INJECTION INTRAMUSCULAR; INTRAVENOUS; SUBCUTANEOUS
Status: DISCONTINUED | OUTPATIENT
Start: 2025-03-06 | End: 2025-03-06 | Stop reason: HOSPADM

## 2025-03-06 RX ORDER — ONDANSETRON 8 MG/1
8 TABLET, FILM COATED ORAL EVERY 12 HOURS PRN
Qty: 60 TABLET | Refills: 0 | Status: SHIPPED | OUTPATIENT
Start: 2025-03-06 | End: 2025-04-05

## 2025-03-06 RX ORDER — DIPHENHYDRAMINE HYDROCHLORIDE 50 MG/ML
25 INJECTION, SOLUTION INTRAMUSCULAR; INTRAVENOUS
Status: CANCELLED
Start: 2025-03-06

## 2025-03-06 RX ORDER — HEPARIN SODIUM (PORCINE) LOCK FLUSH IV SOLN 100 UNIT/ML 100 UNIT/ML
5 SOLUTION INTRAVENOUS
Status: DISCONTINUED | OUTPATIENT
Start: 2025-03-06 | End: 2025-03-06 | Stop reason: HOSPADM

## 2025-03-06 RX ORDER — DIPHENHYDRAMINE HCL 50 MG
50 CAPSULE ORAL
Status: CANCELLED | OUTPATIENT
Start: 2025-03-06

## 2025-03-06 RX ORDER — DIPHENHYDRAMINE HYDROCHLORIDE 50 MG/ML
50 INJECTION, SOLUTION INTRAMUSCULAR; INTRAVENOUS
Status: DISCONTINUED | OUTPATIENT
Start: 2025-03-06 | End: 2025-03-06 | Stop reason: HOSPADM

## 2025-03-06 RX ADMIN — DIPHENHYDRAMINE HYDROCHLORIDE 25 MG: 50 INJECTION, SOLUTION INTRAMUSCULAR; INTRAVENOUS at 10:13

## 2025-03-06 RX ADMIN — PALONOSETRON 0.25 MG: 0.05 INJECTION, SOLUTION INTRAVENOUS at 09:42

## 2025-03-06 RX ADMIN — DEXAMETHASONE SODIUM PHOSPHATE: 10 INJECTION, SOLUTION INTRAMUSCULAR; INTRAVENOUS at 09:48

## 2025-03-06 RX ADMIN — HEPARIN 5 ML: 100 SYRINGE at 13:21

## 2025-03-06 RX ADMIN — SODIUM CHLORIDE 600 MG: 0.9 INJECTION, SOLUTION INTRAVENOUS at 10:57

## 2025-03-06 RX ADMIN — CARBOPLATIN 670 MG: 10 INJECTION, SOLUTION INTRAVENOUS at 12:51

## 2025-03-06 RX ADMIN — DOCETAXEL 160 MG: 160 INJECTION INTRAVENOUS at 11:45

## 2025-03-06 RX ADMIN — PERTUZUMAB 420 MG: 30 INJECTION, SOLUTION, CONCENTRATE INTRAVENOUS at 10:21

## 2025-03-06 RX ADMIN — SODIUM CHLORIDE 250 ML: 0.9 INJECTION, SOLUTION INTRAVENOUS at 09:42

## 2025-03-06 ASSESSMENT — PAIN SCALES - GENERAL: PAINLEVEL_OUTOF10: NO PAIN (0)

## 2025-03-06 NOTE — PROGRESS NOTES
[facility]: Cancer Care Follow-Up Note                                    Discussion with Patient:                                                      Met with Adrienen in the infusion bay when she was at clinic for follow up and D1C5 TCHP. She relayed that her nausea has been persistent for a longer period of time with her recent cycles and zofran has been the most helpful with managing her side effects. She did get a refill today and with a larger quantity so that she can better control her symptoms at home. Did encourage her to call our office if nausea is not controlled and needs additional assistance. She is using medical cannabis at night and is able to sleep. She is continuing to work but does take 4 days off post chemo and then works 5-6 hours for the following week and then full time for the subsequent week. She is considering working less due to her symptoms. She is now planning on bilateral mastectomies with no reconstruction as she is uncertain of having implants placed and the recovery time/trauma. She will meet with surgeon after C6 and she will discuss surgical options further.  She would like to talk with the SW again and message was sent to [facility] to reach out to her.  She has our contact information and will reach out if any questions or concerns arise.       Dates of Treatment:                                                      Infusion given in last 28 days       Administered MAR Action Medication Dose Rate Visit    02/13/2025 10:21 New Bag pertuzumab (PERJETA) 420 mg in sodium chloride 0.9 % 289 mL infusion 420 mg 578 mL/hr Infusion Therapy Visit on 02/13/2025 in [facility]    02/13/2025 11:01 New Bag trastuzumab-qyyp (TRAZIMERA) 600 mg in sodium chloride 0.9 % 303.57 mL infusion 600 mg 607.1 mL/hr Infusion Therapy Visit on 02/13/2025 in [facility]    02/13/2025 11:42 New Bag DOCEtaxel (TAXOTERE) 160 mg in sodium chloride 0.9% in  non-PVC container 283 mL infusion 160 mg 283 mL/hr Infusion Therapy Visit on 02/13/2025 in [facility]    02/13/2025 12:59 New Bag CARBOplatin 725 mg in sodium chloride 0.9 % 622.5 mL infusion 725 mg 1,245 mL/hr Infusion Therapy Visit on 02/13/2025 in [facility]    03/06/2025 10:21 New Bag pertuzumab (PERJETA) 420 mg in sodium chloride 0.9 % 289 mL infusion 420 mg 578 mL/hr Infusion Therapy Visit on 03/06/2025 in [facility]    03/06/2025 10:57 New Bag trastuzumab-qyyp (TRAZIMERA) 600 mg in sodium chloride 0.9 % 303.57 mL infusion 600 mg 607.1 mL/hr Infusion Therapy Visit on 03/06/2025 in [facility]    03/06/2025 11:45 New Bag DOCEtaxel (TAXOTERE) 160 mg in sodium chloride 0.9% in non-PVC container 283 mL infusion 160 mg 283 mL/hr Infusion Therapy Visit on 03/06/2025 in [facility]    03/06/2025 12:51 New Bag CARBOplatin 670 mg in sodium chloride 0.9 % 342 mL infusion 670 mg 684 mL/hr Infusion Therapy Visit on 03/06/2025 in [facility]            Assessment:                                                      D1C5 TCHP-see above         Intervention/Education provided during outreach:                                                       See above    Confirmed patient has clinic and triage numbers    Signature:  Radha Stephen RN

## 2025-03-06 NOTE — PROGRESS NOTES
"Oncology Rooming Note    March 6, 2025 8:58 AM   Adrienne Ivory is a 59 year old female who presents for:    Chief Complaint   Patient presents with    Oncology Clinic Visit     Follow up -  Malignant neoplasm of upper-inner quadrant of left breast in female, estrogen receptor positive     Initial Vitals: BP (!) 147/94 (BP Location: Right arm, Patient Position: Sitting, Cuff Size: Adult Regular)   Pulse 82   Temp 97.4  F (36.3  C) (Tympanic)   Resp 16   Ht 1.651 m (5' 5\")   Wt 90.7 kg (200 lb)   SpO2 99%   BMI 33.28 kg/m   Estimated body mass index is 33.28 kg/m  as calculated from the following:    Height as of this encounter: 1.651 m (5' 5\").    Weight as of this encounter: 90.7 kg (200 lb). Body surface area is 2.04 meters squared.  No Pain (0) Comment: Data Unavailable   No LMP recorded. Patient has had an ablation.  Allergies reviewed: Yes  Medications reviewed: Yes    Medications: Medication refills not needed today.  Pharmacy name entered into [facility]:    [facility] - [place], [place] - [address] [place] [address] DRUG STORE #[id number] [place], [place] - [facility] [address] AT [address] - [address] [place] [address] HOME DELIVERY - [place], [place] - [address] [facility]    Frailty Screening:   Is the patient here for a new oncology consult visit in cancer care? 2. No    PHQ9:  Did this patient require a PHQ9?: No      Clinical concerns:   Follow up. Multiple questions.       Shari Panchal MA            "

## 2025-03-06 NOTE — PROGRESS NOTES
Infusion Nursing Note:  Adrienne Ivory presents today for C5 D1 Perjeta, Trazimera, Docetaxel, Carboplatin.    Patient seen by provider today: Yes:     Note: Pt arrives ambulatory, with , to [facility] after visit with . Relayed to pt that zofran script was sent to [facility]. Discussed process of today's visit. Radha CORTEZ at chairside to check-in with pt.    Premedications: aloxi, emend/dex, IV benadryl 25mg (for anxiety).    Intravenous Access:  Implanted Port.    Treatment Conditions:  Lab Results   Component Value Date    HGB 10.7 (L) 03/06/2025    WBC 9.4 03/06/2025    ANEU 27.3 (H) 12/19/2024    ANEUTAUTO 6.4 03/06/2025     03/06/2025        Lab Results   Component Value Date     03/06/2025    POTASSIUM 3.7 03/06/2025    MAG 1.6 (L) 01/29/2025    CR 0.79 03/06/2025    CONNIE 8.5 (L) 03/06/2025    BILITOTAL 0.3 03/06/2025    ALBUMIN 3.6 03/06/2025    ALT 23 03/06/2025    AST 28 03/06/2025     Echo 3/4/25    Results reviewed, labs MET treatment parameters, ok to proceed with treatment.    Post Infusion Assessment:  Patient tolerated infusion without incident.    Site patent and intact, free from redness, edema or discomfort.  No evidence of extravasations.  Access discontinued per protocol.     Discharge Plan:   Pt to return tomorrow for Fulphila inj.  Patient discharged in stable condition accompanied by: .  Departure Mode: Ambulatory.      Winifred Paulino RN

## 2025-03-06 NOTE — LETTER
"3/6/2025      Adrienne Ivory  [address] [place] [address]      Dear Colleague,    Thank you for referring your patient, Adrienne Ivory, to the [facility] [place]. Please see a copy of my visit note below.    Oncology Rooming Note    March 6, 2025 8:58 AM   Adrienne Ivory is a 59 year old female who presents for:    Chief Complaint   Patient presents with     Oncology Clinic Visit     Follow up -  Malignant neoplasm of upper-inner quadrant of left breast in female, estrogen receptor positive     Initial Vitals: BP (!) 147/94 (BP Location: Right arm, Patient Position: Sitting, Cuff Size: Adult Regular)   Pulse 82   Temp 97.4  F (36.3  C) (Tympanic)   Resp 16   Ht 1.651 m (5' 5\")   Wt 90.7 kg (200 lb)   SpO2 99%   BMI 33.28 kg/m   Estimated body mass index is 33.28 kg/m  as calculated from the following:    Height as of this encounter: 1.651 m (5' 5\").    Weight as of this encounter: 90.7 kg (200 lb). Body surface area is 2.04 meters squared.  No Pain (0) Comment: Data Unavailable   No LMP recorded. Patient has had an ablation.  Allergies reviewed: Yes  Medications reviewed: Yes    Medications: Medication refills not needed today.  Pharmacy name entered into [facility]:    [facility] - [place], [place] - [address] [id number] [place], [place] - [address] AT [address] PHARMACY [place], [place] - [address] [place] [address] HOME DELIVERY [place], [place] - [facility]    Frailty Screening:   Is the patient here for a new oncology consult visit in cancer care? 2. No    PHQ9:  Did this patient require a PHQ9?: No      Clinical concerns:   Follow up. Multiple questions.       Shari Panchal MA              [facility]ology [facility] Oncology Progress Note    Patient: Adrienne Ivory  MRN: [id number]  Date of Service: Mar 6, 2025             ECOG Performance    0 - " Independent          ______________________________________________________________________________  Oncologic history  T2 N3 M0 stage IIIc invasive ductal carcinoma of the left breast ER positive VT positive HER2/wolf 3+.  November 2024  Patient had multiple level 1 level 2 and level 3 axillary lymph nodes involved    Treatment  Patient started on neoadjuvant TCHP on 12/12/2024  Near complete radiologic remission after 3 cycles of TCHP    History of Present Illness    Ms. Adrienne Ivory is here for follow-up.  She is due for her fifth cycle of chemotherapy today she is having side effects her biggest complaint is a metallic taste in her mouth that does not go away that has affected her appetite.  She has lost about 15 pounds so far.  She is decreasing her caloric intake and the weight loss has slowed down.  She also complains of persistent nausea that affected in the afternoon.  Ondansetron is only medicine that helps with that but she has not had enough in the past.  Patient denies any fever and chills.  She also had some evidence of hand-foot syndrome in the last cycle with some redness and tenderness in her hands and feet.    Review of systems  A comprehensive 12 point review of system was done that was negative except what is mentioned in the history of present illness    Past History    Past Medical History:   Diagnosis Date     Abnormal Pap smear, can't excl hi gd sq intraepithelial lesion (ASC-H) 03/01/2015    LSIL also     Anxiety state, unspecified      Herpes simplex without mention of complication      HSIL on Pap smear of cervix 07/01/2014    colp - WNL     Human papillomavirus in conditions classified elsewhere and of unspecified site 11/01/2010    + HPV 45 & 82 with ASCUS     Hx of colposcopy with cervical biopsy 11/08/2016    Zanoni ECC neg.      Obese      PONV (postoperative nausea and vomiting)      Premenstrual tension syndromes        Past Surgical History:   Procedure Laterality Date     C  "IUD,MIRENA  2/08-3/11    removed for cramping     COLONOSCOPY N/A 12/4/2020    Procedure: COLONOSCOPY, WITH POLYPECTOMY;  Surgeon: Moises Pride MD;  Location: [facility] OR     DILATION AND CURETTAGE, ABLATE ENDOMETRIUM NOVASURE, COMBINED N/A 12/31/2015    Procedure: COMBINED DILATION AND CURETTAGE, ABLATE ENDOMETRIUM NOVASURE;  Surgeon: Shakira Penaloza MD;  Location: [facility] OR     HYSTEROSCOPY DIAGNOSTIC N/A 12/31/2015    Procedure: HYSTEROSCOPY DIAGNOSTIC;  Surgeon: Shakira Penaloza MD;  Location: [facility] OR     LEEP TX, CERVICAL  4/3/15    ARNAV 2-3, negative margins     NO HISTORY OF SURGERY         Physical Exam    BP (!) 147/94 (BP Location: Right arm, Patient Position: Sitting, Cuff Size: Adult Regular)   Pulse 82   Temp 97.4  F (36.3  C) (Tympanic)   Resp 16   Ht 1.651 m (5' 5\")   Wt 90.7 kg (200 lb)   SpO2 99%   BMI 33.28 kg/m      General: alert, awake, not in acute distress  HEENT: Head: Normal, normocephalic, atraumatic.  Eye: Normal external eye, conjunctiva, lids cornea, SITA.  Nose: Normal external nose, mucus membranes and septum.  Pharynx: Normal buccal mucosa. Normal pharynx.  Neck / Thyroid: Supple, no masses, nodes, nodules or enlargement.  Lymphatics: No abnormally enlarged lymph nodes.  Chest: Normal chest wall and respirations. Clear to auscultation.  No crackles or rhonchi's  Heart: S1 S2 RRR, no murmur.   Abdomen: abdomen is soft without significant tenderness, masses, organomegaly or guarding  Extremities: normal strength, tone, and muscle mass  Skin: normal. no rash or abnormalities  CNS: non focal.    Breast exam on the left side did show shrinkage in the mass I could still feel a lymph node in the axilla      Lab Results    Recent Results (from the past 240 hours)   Echocardiogram Complete    Collection Time: 03/04/25  9:41 AM   Result Value Ref Range    LVEF  55-60%    CBC with platelets and differential    Collection Time: 03/06/25  8:49 AM   Result Value Ref Range    WBC Count " 9.4 4.0 - 11.0 10e3/uL    RBC Count 3.50 (L) 3.80 - 5.20 10e6/uL    Hemoglobin 10.7 (L) 11.7 - 15.7 g/dL    Hematocrit 31.8 (L) 35.0 - 47.0 %    MCV 91 78 - 100 fL    MCH 30.6 26.5 - 33.0 pg    MCHC 33.6 31.5 - 36.5 g/dL    RDW 17.8 (H) 10.0 - 15.0 %    Platelet Count 228 150 - 450 10e3/uL    % Neutrophils 68 %    % Lymphocytes 23 %    % Monocytes 8 %    % Eosinophils 0 %    % Basophils 0 %    % Immature Granulocytes 0 %    NRBCs per 100 WBC 0 <1 /100    Absolute Neutrophils 6.4 1.6 - 8.3 10e3/uL    Absolute Lymphocytes 2.1 0.8 - 5.3 10e3/uL    Absolute Monocytes 0.8 0.0 - 1.3 10e3/uL    Absolute Eosinophils 0.0 0.0 - 0.7 10e3/uL    Absolute Basophils 0.0 0.0 - 0.2 10e3/uL    Absolute Immature Granulocytes 0.0 <=0.4 10e3/uL    Absolute NRBCs 0.0 10e3/uL         Imaging    Echocardiogram Complete    Result Date: 3/4/2025  [id number]^GINA^[place] [address]  Name: BRYAN SHI MRN: [id number] : 1965 Study Date: 2025 08:41 AM Age: 59 yrs Gender: Female Patient Location: [facility] Reason For Study: Malignant neoplasm of upper-inner quadrant of left breast in [facility] Ordering Physician: TAYLOR FUENTES Referring Physician: TAYLOR FUENTES Performed By: CONSTANTINE  BSA: 2.0 m2 Height: 65 in Weight: 204 lb HR: 86 BP: 110/63 mmHg ______________________________________________________________________________ Procedure Echocardiogram with two-dimensional, color and spectral Doppler. No hemodynamically significant valvular abnormalities on 2D or color flow imaging. Compared to the prior study dated 24, there have been no changes. ______________________________________________________________________________ Interpretation Summary  The left ventricle is normal in size. There is normal left ventricular wall thickness. The visual ejection fraction is 55-60%. Left ventricular diastolic function is normal. Normal right ventricle size and systolic  function. Global peak LV longitudinal strain is averaged at -19%. This is within reported normal limits (normal <-18%). Compared to the prior study dated 12/2/24, there have been no changes. ______________________________________________________________________________ Left Ventricle The left ventricle is normal in size. There is normal left ventricular wall thickness. Global peak LV longitudinal strain is averaged at -19%. This is within reported normal limits (normal <-18%). Left ventricular diastolic function is normal. The visual ejection fraction is 55-60%. No regional wall motion abnormalities noted.  Right Ventricle Normal right ventricle size and systolic function.  Atria Normal left atrial size. Right atrial size is normal. There is no color Doppler evidence of an atrial shunt.  Mitral Valve Mitral valve leaflets appear normal. There is no evidence of mitral stenosis or clinically significant mitral regurgitation.  Tricuspid Valve There is trace tricuspid regurgitation. Right ventricular systolic pressure could not be approximated due to inadequate tricuspid regurgitation.  Aortic Valve Aortic valve leaflets appear normal. There is no evidence of aortic stenosis or clinically significant aortic regurgitation.  Pulmonic Valve The pulmonic valve is not well seen, but is grossly normal. This degree of valvular regurgitation is within normal limits.  Vessels The aorta root is normal. Normal size ascending aorta. IVC diameter <2.1 cm collapsing >50% with sniff suggests a normal RA pressure of 3 mmHg.  Pericardium There is no pericardial effusion.  ______________________________________________________________________________ MMode/2D Measurements & Calculations IVSd: 0.92 cm LVIDd: 5.0 cm LVIDs: 2.8 cm LVPWd: 0.94 cm FS: 43.4 % LV mass(C)d: 165.6 grams LV mass(C)dI: 83.0 grams/m2  Ao root diam: 3.3 cm asc Aorta Diam: 3.5 cm LVOT diam: 2.1 cm LVOT area: 3.5 cm2 Ao root diam index Ht(cm/m): 2.0 Ao root diam index  BSA (cm/m2): 1.6 Asc Ao diam index BSA (cm/m2): 1.8 Asc Ao diam index Ht(cm/m): 2.1 EF Biplane: 61.4 % LA Volume (BP): 51.4 ml LA Volume Index (BP): 25.8 ml/m2  LA Volume Indexed (AL/bp): 27.0 ml/m2 RV Base: 3.6 cm RWT: 0.37 TAPSE: 2.6 cm  Doppler Measurements & Calculations MV E max lee: 106.0 cm/sec MV A max lee: 88.6 cm/sec MV E/A: 1.2 MV dec slope: 527.0 cm/sec2 MV dec time: 0.20 sec Ao V2 max: 151.0 cm/sec Ao max P.0 mmHg Ao V2 mean: 110.0 cm/sec Ao mean P.0 mmHg Ao V2 VTI: 32.2 cm YURY(I,D): 2.5 cm2 YURY(V,D): 2.7 cm2  LV V1 max P.5 mmHg LV V1 max: 117.0 cm/sec LV V1 VTI: 23.4 cm SV(LVOT): 81.0 ml SI(LVOT): 40.6 ml/m2 PA V2 max: 96.2 cm/sec PA max PG: 3.7 mmHg PA acc time: 0.09 sec AV Lee Ratio (DI): 0.77 YURY Index (cm2/m2): 1.3 E/E' av.6 Lateral E/e': 8.7 Medial E/e': 10.6 RV S Lee: 14.4 cm/sec  ______________________________________________________________________________ Report approved by: Óscar Pan on 2025 11:14 AM       MR Breast Bilateral w/o & w Contrast    Result Date: 2025  EXAM: Bilateral Breast MRI with and without contrast, and computer aided kinetic analysis. LOCATION: [facility] DATE: 2/10/2025 HISTORY/INDICATION: 59-year-old female with known, biopsy-proven left breast IDC with left axillary lymph node metastases. Current neoadjuvant chemotherapy. COMPARISON: Prior breast MRI dated 2024. Additional prior examinations dated 2024, 2024, 11/15/2024, 2024, 2022. TECHNIQUE: Multiplanar, multisequence imaging performed prior to contrast administration and at multiple time points after contrast administration. Post-processing including subtractions, MIPs and color encoding of post contrast dynamic acquisitions. IV contrast: 10ml Gadavist SEDATION: None FINDINGS: Right Breast: Breast composition: Scattered fibroglandular tissue Background parenchymal enhancement: Minimal The previously seen oval, enhancing mass within the  right breast at the 6:00 position, posterior depth has decreased in size and is less conspicuous. Otherwise, there are no new or suspicious masses or areas of nonmass enhancement identified within the right breast. Right Axilla: No lymphadenopathy. Right Internal Mammary Chain: No lymphadenopathy. Left Breast: Breast composition: Scattered fibroglandular tissue Background parenchymal enhancement: Minimal There is no residual abnormal enhancement at the site of the previously seen irregular, enhancing mass within the left breast at the 2:00 position, posterior depth, consistent with the known, biopsy-proven malignancy. Additionally, the previously seen oval, enhancing mass within the left breast in the retroareolar plane at middle depth has also resolved and the second oval, enhancing mass within the retroareolar plane at posterior depth has decreased in size and is less conspicuous. Left Axilla: Left level I-III lymphadenopathy has also resolved with all visualized lymph nodes within normal limits. The previously biopsied left axillary lymph node consistent with metastasis is demonstrated on axial postcontrast image 59/118). Left Internal Mammary Chain: The previously described two internal mammary chain lymph nodes measuring up to 0.2 cm are grossly unchanged (axial postcontrast image 62/118 and 59/118).     IMPRESSION: 1.  No residual abnormal enhancement at the site of known, biopsy-proven malignancy within the left breast at the 2:00 position, posterior depth consistent with treatment response. 2.  Level I-III left axillary lymphadenopathy has resolved and all visualized lymph nodes are within normal limits, also consistent with treatment response. The two previously seen 0.2 cm left internal mammary chain lymph nodes are unchanged. 3.  The previously seen oval, enhancing masses within the right breast at the 6:00 position, posterior depth and within the left breast in the retroareolar region at middle and  posterior depth are resolved or have decreased in size and are less conspicuous. Right Breast ACR BI-RADS Category: 2 - Benign Finding(s). Left Breast ACR BI-RADS Category: 6 - Known Biopsy-Proven Malignancy-Appropriate Action Should Be Taken. RECOMMENDATION: Oncological Follow-up         Assessment and Plan    Stage IIIc breast cancer HER2 positive, ER/TX positive  Patient is here in follow-up.  She is due for her fifth cycle of chemotherapy.  She had an MRI done after the third cycle which showed a near complete remission.  She is having some side effects but I do not see any dose-limiting side effects that need dose reduction.  I will proceed with the fifth cycle today and patient will come back in 3 weeks for the 6 cycle.  We will plan to refer her for surgery after that particular cycle    Cardiac monitoring  Patient does needs continued cardiac monitoring because of the use of HER2 directed agents.  Patient had a cardiac echo done and she has preserved cardiac function we will now do an echo after the sixth cycle   Nausea  patient has some nausea from the chemotherapy her as needed nausea medications are working.  patient has persistent nausea in the second and third week.  Ondansetron is only medicine that helps she has not had enough of it in the past I will give her a prescription that she has enough ondansetron that she can take it twice a day throughout the 3 weeks.  I will have her call us if that is not enough to control her nausea we can also consider getting her back in a week times to get IV nausea medications  Hand-foot syndrome  Patient did have symptoms of mild to moderate hand-foot syndrome that has completely resolved we will continue to keep an eye on it as she may have it again after this cycle    Signed by: Josseline Harkins MD        CC: Kallie Ta NP     Again, thank you for allowing me to participate in the care of your patient.        Sincerely,        Josseline Harkins,  MD    Electronically signed

## 2025-03-06 NOTE — PROGRESS NOTES
[facility] Progress Note    Patient: Adrienne Ivory  MRN: [id number]  Date of Service: Mar 6, 2025             ECOG Performance    0 - Independent          ______________________________________________________________________________  Oncologic history  T2 N3 M0 stage IIIc invasive ductal carcinoma of the left breast ER positive ND positive HER2/wolf 3+.  November 2024  Patient had multiple level 1 level 2 and level 3 axillary lymph nodes involved    Treatment  Patient started on neoadjuvant TCHP on 12/12/2024  Near complete radiologic remission after 3 cycles of TCHP    History of Present Illness    Ms. Adrienne Ivory is here for follow-up.  She is due for her fifth cycle of chemotherapy today she is having side effects her biggest complaint is a metallic taste in her mouth that does not go away that has affected her appetite.  She has lost about 15 pounds so far.  She is decreasing her caloric intake and the weight loss has slowed down.  She also complains of persistent nausea that affected in the afternoon.  Ondansetron is only medicine that helps with that but she has not had enough in the past.  Patient denies any fever and chills.  She also had some evidence of hand-foot syndrome in the last cycle with some redness and tenderness in her hands and feet.    Review of systems  A comprehensive 12 point review of system was done that was negative except what is mentioned in the history of present illness    Past History    Past Medical History:   Diagnosis Date    Abnormal Pap smear, can't excl hi gd sq intraepithelial lesion (ASC-H) 03/01/2015    LSIL also    Anxiety state, unspecified     Herpes simplex without mention of complication     HSIL on Pap smear of cervix 07/01/2014    colp - WNL    Human papillomavirus in conditions classified elsewhere and of unspecified site 11/01/2010    + HPV 45 & 82 with ASCUS    Hx of colposcopy with cervical biopsy 11/08/2016    [facility] ECC neg.   "   Obese     PONV (postoperative nausea and vomiting)     Premenstrual tension syndromes        Past Surgical History:   Procedure Laterality Date    C IUD,MIRENA  2/08-3/11    removed for cramping    COLONOSCOPY N/A 12/4/2020    Procedure: COLONOSCOPY, WITH POLYPECTOMY;  Surgeon: Moises Pride MD;  Location: [facility] OR    DILATION AND CURETTAGE, ABLATE ENDOMETRIUM NOVASURE, COMBINED N/A 12/31/2015    Procedure: COMBINED DILATION AND CURETTAGE, ABLATE ENDOMETRIUM NOVASURE;  Surgeon: Shakira Penaloza MD;  Location: [facility] OR    HYSTEROSCOPY DIAGNOSTIC N/A 12/31/2015    Procedure: HYSTEROSCOPY DIAGNOSTIC;  Surgeon: Shakira Penaloza MD;  Location: [facility] OR    LEEP TX, CERVICAL  4/3/15    ARNAV 2-3, negative margins    NO HISTORY OF SURGERY         Physical Exam    BP (!) 147/94 (BP Location: Right arm, Patient Position: Sitting, Cuff Size: Adult Regular)   Pulse 82   Temp 97.4  F (36.3  C) (Tympanic)   Resp 16   Ht 1.651 m (5' 5\")   Wt 90.7 kg (200 lb)   SpO2 99%   BMI 33.28 kg/m      General: alert, awake, not in acute distress  HEENT: Head: Normal, normocephalic, atraumatic.  Eye: Normal external eye, conjunctiva, lids cornea, SITA.  Nose: Normal external nose, mucus membranes and septum.  Pharynx: Normal buccal mucosa. Normal pharynx.  Neck / Thyroid: Supple, no masses, nodes, nodules or enlargement.  Lymphatics: No abnormally enlarged lymph nodes.  Chest: Normal chest wall and respirations. Clear to auscultation.  No crackles or rhonchi's  Heart: S1 S2 RRR, no murmur.   Abdomen: abdomen is soft without significant tenderness, masses, organomegaly or guarding  Extremities: normal strength, tone, and muscle mass  Skin: normal. no rash or abnormalities  CNS: non focal.    Breast exam on the left side did show shrinkage in the mass I could still feel a lymph node in the axilla      Lab Results    Recent Results (from the past 240 hours)   Echocardiogram Complete    Collection Time: 03/04/25  9:41 AM   Result " Value Ref Range    LVEF  55-60%    CBC with platelets and differential    Collection Time: 25  8:49 AM   Result Value Ref Range    WBC Count 9.4 4.0 - 11.0 10e3/uL    RBC Count 3.50 (L) 3.80 - 5.20 10e6/uL    Hemoglobin 10.7 (L) 11.7 - 15.7 g/dL    Hematocrit 31.8 (L) 35.0 - 47.0 %    MCV 91 78 - 100 fL    MCH 30.6 26.5 - 33.0 pg    MCHC 33.6 31.5 - 36.5 g/dL    RDW 17.8 (H) 10.0 - 15.0 %    Platelet Count 228 150 - 450 10e3/uL    % Neutrophils 68 %    % Lymphocytes 23 %    % Monocytes 8 %    % Eosinophils 0 %    % Basophils 0 %    % Immature Granulocytes 0 %    NRBCs per 100 WBC 0 <1 /100    Absolute Neutrophils 6.4 1.6 - 8.3 10e3/uL    Absolute Lymphocytes 2.1 0.8 - 5.3 10e3/uL    Absolute Monocytes 0.8 0.0 - 1.3 10e3/uL    Absolute Eosinophils 0.0 0.0 - 0.7 10e3/uL    Absolute Basophils 0.0 0.0 - 0.2 10e3/uL    Absolute Immature Granulocytes 0.0 <=0.4 10e3/uL    Absolute NRBCs 0.0 10e3/uL         Imaging    Echocardiogram Complete    Result Date: 3/4/2025  [id number]^GINA^[place] [id number]  Name: BRYAN SHI MRN: [id number] : 1965 Study Date: 2025 08:41 AM Age: 59 yrs Gender: Female Patient Location: [facility] Reason For Study: Malignant neoplasm of upper-inner quadrant of left breast in [facility] Ordering Physician: TAYLOR FUENTES Referring Physician: TAYLOR FUENTES Performed By: CONSTANTINE  BSA: 2.0 m2 Height: 65 in Weight: 204 lb HR: 86 BP: 110/63 mmHg ______________________________________________________________________________ Procedure Echocardiogram with two-dimensional, color and spectral Doppler. No hemodynamically significant valvular abnormalities on 2D or color flow imaging. Compared to the prior study dated 24, there have been no changes. ______________________________________________________________________________ Interpretation Summary  The left ventricle is normal in size. There is normal left ventricular wall  thickness. The visual ejection fraction is 55-60%. Left ventricular diastolic function is normal. Normal right ventricle size and systolic function. Global peak LV longitudinal strain is averaged at -19%. This is within reported normal limits (normal <-18%). Compared to the prior study dated 12/2/24, there have been no changes. ______________________________________________________________________________ Left Ventricle The left ventricle is normal in size. There is normal left ventricular wall thickness. Global peak LV longitudinal strain is averaged at -19%. This is within reported normal limits (normal <-18%). Left ventricular diastolic function is normal. The visual ejection fraction is 55-60%. No regional wall motion abnormalities noted.  Right Ventricle Normal right ventricle size and systolic function.  Atria Normal left atrial size. Right atrial size is normal. There is no color Doppler evidence of an atrial shunt.  Mitral Valve Mitral valve leaflets appear normal. There is no evidence of mitral stenosis or clinically significant mitral regurgitation.  Tricuspid Valve There is trace tricuspid regurgitation. Right ventricular systolic pressure could not be approximated due to inadequate tricuspid regurgitation.  Aortic Valve Aortic valve leaflets appear normal. There is no evidence of aortic stenosis or clinically significant aortic regurgitation.  Pulmonic Valve The pulmonic valve is not well seen, but is grossly normal. This degree of valvular regurgitation is within normal limits.  Vessels The aorta root is normal. Normal size ascending aorta. IVC diameter <2.1 cm collapsing >50% with sniff suggests a normal RA pressure of 3 mmHg.  Pericardium There is no pericardial effusion.  ______________________________________________________________________________ MMode/2D Measurements & Calculations IVSd: 0.92 cm LVIDd: 5.0 cm LVIDs: 2.8 cm LVPWd: 0.94 cm FS: 43.4 % LV mass(C)d: 165.6 grams LV mass(C)dI: 83.0  grams/m2  Ao root diam: 3.3 cm asc Aorta Diam: 3.5 cm LVOT diam: 2.1 cm LVOT area: 3.5 cm2 Ao root diam index Ht(cm/m): 2.0 Ao root diam index BSA (cm/m2): 1.6 Asc Ao diam index BSA (cm/m2): 1.8 Asc Ao diam index Ht(cm/m): 2.1 EF Biplane: 61.4 % LA Volume (BP): 51.4 ml LA Volume Index (BP): 25.8 ml/m2  LA Volume Indexed (AL/bp): 27.0 ml/m2 RV Base: 3.6 cm RWT: 0.37 TAPSE: 2.6 cm  Doppler Measurements & Calculations MV E max lee: 106.0 cm/sec MV A max lee: 88.6 cm/sec MV E/A: 1.2 MV dec slope: 527.0 cm/sec2 MV dec time: 0.20 sec Ao V2 max: 151.0 cm/sec Ao max P.0 mmHg Ao V2 mean: 110.0 cm/sec Ao mean P.0 mmHg Ao V2 VTI: 32.2 cm YURY(I,D): 2.5 cm2 YURY(V,D): 2.7 cm2  LV V1 max P.5 mmHg LV V1 max: 117.0 cm/sec LV V1 VTI: 23.4 cm SV(LVOT): 81.0 ml SI(LVOT): 40.6 ml/m2 PA V2 max: 96.2 cm/sec PA max PG: 3.7 mmHg PA acc time: 0.09 sec AV Lee Ratio (DI): 0.77 YURY Index (cm2/m2): 1.3 E/E' av.6 Lateral E/e': 8.7 Medial E/e': 10.6 RV S Lee: 14.4 cm/sec  ______________________________________________________________________________ Report approved by: Óscar Pan on 2025 11:14 AM       MR Breast Bilateral w/o & w Contrast    Result Date: 2025  EXAM: Bilateral Breast MRI with and without contrast, and computer aided kinetic analysis. LOCATION: [facility] DATE: 2/10/2025 HISTORY/INDICATION: 59-year-old female with known, biopsy-proven left breast IDC with left axillary lymph node metastases. Current neoadjuvant chemotherapy. COMPARISON: Prior breast MRI dated 2024. Additional prior examinations dated 2024, 2024, 11/15/2024, 2024, 2022. TECHNIQUE: Multiplanar, multisequence imaging performed prior to contrast administration and at multiple time points after contrast administration. Post-processing including subtractions, MIPs and color encoding of post contrast dynamic acquisitions. IV contrast: 10ml Gadavist SEDATION: None FINDINGS: Right Breast:  Breast composition: Scattered fibroglandular tissue Background parenchymal enhancement: Minimal The previously seen oval, enhancing mass within the right breast at the 6:00 position, posterior depth has decreased in size and is less conspicuous. Otherwise, there are no new or suspicious masses or areas of nonmass enhancement identified within the right breast. Right Axilla: No lymphadenopathy. Right Internal Mammary Chain: No lymphadenopathy. Left Breast: Breast composition: Scattered fibroglandular tissue Background parenchymal enhancement: Minimal There is no residual abnormal enhancement at the site of the previously seen irregular, enhancing mass within the left breast at the 2:00 position, posterior depth, consistent with the known, biopsy-proven malignancy. Additionally, the previously seen oval, enhancing mass within the left breast in the retroareolar plane at middle depth has also resolved and the second oval, enhancing mass within the retroareolar plane at posterior depth has decreased in size and is less conspicuous. Left Axilla: Left level I-III lymphadenopathy has also resolved with all visualized lymph nodes within normal limits. The previously biopsied left axillary lymph node consistent with metastasis is demonstrated on axial postcontrast image 59/118). Left Internal Mammary Chain: The previously described two internal mammary chain lymph nodes measuring up to 0.2 cm are grossly unchanged (axial postcontrast image 62/118 and 59/118).     IMPRESSION: 1.  No residual abnormal enhancement at the site of known, biopsy-proven malignancy within the left breast at the 2:00 position, posterior depth consistent with treatment response. 2.  Level I-III left axillary lymphadenopathy has resolved and all visualized lymph nodes are within normal limits, also consistent with treatment response. The two previously seen 0.2 cm left internal mammary chain lymph nodes are unchanged. 3.  The previously seen oval,  enhancing masses within the right breast at the 6:00 position, posterior depth and within the left breast in the retroareolar region at middle and posterior depth are resolved or have decreased in size and are less conspicuous. Right Breast ACR BI-RADS Category: 2 - Benign Finding(s). Left Breast ACR BI-RADS Category: 6 - Known Biopsy-Proven Malignancy-Appropriate Action Should Be Taken. RECOMMENDATION: Oncological Follow-up         Assessment and Plan    Stage IIIc breast cancer HER2 positive, ER/WV positive  Patient is here in follow-up.  She is due for her fifth cycle of chemotherapy.  She had an MRI done after the third cycle which showed a near complete remission.  She is having some side effects but I do not see any dose-limiting side effects that need dose reduction.  I will proceed with the fifth cycle today and patient will come back in 3 weeks for the 6 cycle.  We will plan to refer her for surgery after that particular cycle    Cardiac monitoring  Patient does needs continued cardiac monitoring because of the use of HER2 directed agents.  Patient had a cardiac echo done and she has preserved cardiac function we will now do an echo after the sixth cycle   Nausea  patient has some nausea from the chemotherapy her as needed nausea medications are working.  patient has persistent nausea in the second and third week.  Ondansetron is only medicine that helps she has not had enough of it in the past I will give her a prescription that she has enough ondansetron that she can take it twice a day throughout the 3 weeks.  I will have her call us if that is not enough to control her nausea we can also consider getting her back in a week times to get IV nausea medications  Hand-foot syndrome  Patient did have symptoms of mild to moderate hand-foot syndrome that has completely resolved we will continue to keep an eye on it as she may have it again after this cycle    Signed by: Josseline Harkins MD        CC: Kallie GUERIN  [place], NP

## 2025-03-11 ENCOUNTER — PATIENT OUTREACH (OUTPATIENT)
Dept: CARE COORDINATION | Facility: CLINIC | Age: 60
End: 2025-03-11
Payer: COMMERCIAL

## 2025-03-11 DIAGNOSIS — Z71.89 COUNSELING AND COORDINATION OF CARE: Primary | ICD-10-CM

## 2025-03-11 NOTE — PROGRESS NOTES
Social Work - Follow-Up  [facility]    Data/Intervention:    Patient Name: Adrienne Ivory Goes By: Adrienne    /Age: 1965 (59 year old)    Reason for Follow-Up:  Pt requested call     Collaborated With:    -pt  -Radha Stephen, RNCC    Intervention/Education/Resources Provided:  CHARITY called Adrienne to follow up on resource needs and offer support. She is interested in getting more information about some of the breast cancer grants. CHARITY will sent info for [employer] and [employer] It Forward. She is also hoping to participate in a breast cancer support group. CHARITY will resend information. She connected with a person from [employer], but it wasn't a good match for her. She shared that she is scared about her mastectomy and shared feelings around the challenges of her cancer journey. Even in the face of her struggles she tries to remain positive. SW offered to meet with Adrienne in person in the future if helpful. She liked the idea and will reach out to schedule. SW provided active listening, validation of feelings and emotional support.     Assessment/Plan:  Previously provided patient/family with writer's contact information and availability.      SURY Ríos, [facility]  Adult Oncology Clinics  [place] (M,W), [place] (T) & [place] ()  *I am off Friday  Office: [phone]

## 2025-03-17 ENCOUNTER — INFUSION THERAPY VISIT (OUTPATIENT)
Dept: INFUSION THERAPY | Facility: HOSPITAL | Age: 60
End: 2025-03-17
Payer: COMMERCIAL

## 2025-03-17 VITALS
SYSTOLIC BLOOD PRESSURE: 130 MMHG | HEART RATE: 109 BPM | OXYGEN SATURATION: 95 % | TEMPERATURE: 98.2 F | RESPIRATION RATE: 18 BRPM | DIASTOLIC BLOOD PRESSURE: 74 MMHG

## 2025-03-17 DIAGNOSIS — R11.2 NAUSEA AND VOMITING, UNSPECIFIED VOMITING TYPE: Primary | ICD-10-CM

## 2025-03-17 PROCEDURE — 96361 HYDRATE IV INFUSION ADD-ON: CPT

## 2025-03-17 PROCEDURE — 96375 TX/PRO/DX INJ NEW DRUG ADDON: CPT

## 2025-03-17 PROCEDURE — 96374 THER/PROPH/DIAG INJ IV PUSH: CPT

## 2025-03-17 PROCEDURE — 250N000011 HC RX IP 250 OP 636: Performed by: INTERNAL MEDICINE

## 2025-03-17 PROCEDURE — 258N000003 HC RX IP 258 OP 636: Performed by: INTERNAL MEDICINE

## 2025-03-17 RX ORDER — PALONOSETRON 0.05 MG/ML
0.25 INJECTION, SOLUTION INTRAVENOUS ONCE
Status: COMPLETED | OUTPATIENT
Start: 2025-03-17 | End: 2025-03-17

## 2025-03-17 RX ORDER — SODIUM CHLORIDE 9 MG/ML
INJECTION, SOLUTION INTRAVENOUS ONCE
Start: 2025-03-17 | End: 2025-03-17

## 2025-03-17 RX ORDER — DEXAMETHASONE SODIUM PHOSPHATE 10 MG/ML
8 INJECTION, SOLUTION INTRAMUSCULAR; INTRAVENOUS ONCE
Status: CANCELLED
Start: 2025-03-24 | End: 2025-03-24

## 2025-03-17 RX ORDER — PALONOSETRON 0.05 MG/ML
0.25 INJECTION, SOLUTION INTRAVENOUS ONCE
Status: CANCELLED
Start: 2025-03-24

## 2025-03-17 RX ORDER — HEPARIN SODIUM (PORCINE) LOCK FLUSH IV SOLN 100 UNIT/ML 100 UNIT/ML
5 SOLUTION INTRAVENOUS
Status: CANCELLED | OUTPATIENT
Start: 2025-03-17

## 2025-03-17 RX ORDER — HEPARIN SODIUM (PORCINE) LOCK FLUSH IV SOLN 100 UNIT/ML 100 UNIT/ML
5 SOLUTION INTRAVENOUS
Status: DISCONTINUED | OUTPATIENT
Start: 2025-03-17 | End: 2025-03-17 | Stop reason: HOSPADM

## 2025-03-17 RX ORDER — PALONOSETRON 0.05 MG/ML
0.25 INJECTION, SOLUTION INTRAVENOUS ONCE
Start: 2025-03-17 | End: 2025-03-17

## 2025-03-17 RX ORDER — HEPARIN SODIUM (PORCINE) LOCK FLUSH IV SOLN 100 UNIT/ML 100 UNIT/ML
5 SOLUTION INTRAVENOUS
Status: CANCELLED | OUTPATIENT
Start: 2025-03-24

## 2025-03-17 RX ORDER — SODIUM CHLORIDE 9 MG/ML
INJECTION, SOLUTION INTRAVENOUS ONCE
Status: COMPLETED | OUTPATIENT
Start: 2025-03-17 | End: 2025-03-17

## 2025-03-17 RX ORDER — SODIUM CHLORIDE 9 MG/ML
INJECTION, SOLUTION INTRAVENOUS ONCE
Status: CANCELLED
Start: 2025-03-24 | End: 2025-03-24

## 2025-03-17 RX ORDER — DEXAMETHASONE SODIUM PHOSPHATE 10 MG/ML
8 INJECTION, SOLUTION INTRAMUSCULAR; INTRAVENOUS ONCE
Start: 2025-03-17 | End: 2025-03-17

## 2025-03-17 RX ORDER — DEXAMETHASONE SODIUM PHOSPHATE 10 MG/ML
8 INJECTION, SOLUTION INTRAMUSCULAR; INTRAVENOUS ONCE
Status: COMPLETED | OUTPATIENT
Start: 2025-03-17 | End: 2025-03-17

## 2025-03-17 RX ORDER — HEPARIN SODIUM,PORCINE 10 UNIT/ML
5-20 VIAL (ML) INTRAVENOUS DAILY PRN
Status: CANCELLED | OUTPATIENT
Start: 2025-03-24

## 2025-03-17 RX ORDER — HEPARIN SODIUM,PORCINE 10 UNIT/ML
5-20 VIAL (ML) INTRAVENOUS DAILY PRN
OUTPATIENT
Start: 2025-03-17

## 2025-03-17 RX ADMIN — DEXAMETHASONE SODIUM PHOSPHATE 8 MG: 10 INJECTION, SOLUTION INTRAMUSCULAR; INTRAVENOUS at 12:53

## 2025-03-17 RX ADMIN — SODIUM CHLORIDE: 0.9 INJECTION, SOLUTION INTRAVENOUS at 12:41

## 2025-03-17 RX ADMIN — PALONOSETRON 0.25 MG: 0.05 INJECTION, SOLUTION INTRAVENOUS at 12:47

## 2025-03-17 RX ADMIN — HEPARIN 5 ML: 100 SYRINGE at 14:05

## 2025-03-17 NOTE — PROGRESS NOTES
Infusion Nursing Note:  Adrienne Ivory presents today for IV fluids and nausea medication.    Patient seen by provider today: No   present during visit today: Not Applicable.    Note: Adrienne comes in today after talking with Dr. Harkins's and them wanting her to get some IV fluids and nausea medication. I went over the plan of care with Mana and she verbalized understanding. Port was accessed per protocol and then the 1000 ml of normal saline was started. She also received Aloxi and Dexamethasone.  Adrienne felt much better after the fluids and the nausea medications. Port was flushed and hepinized and the de-accessed. Adrienne left in stable condition.      Intravenous Access:  Implanted Port.    Treatment Conditions:  Not Applicable.      Post Infusion Assessment:  Patient tolerated infusion without incident.  Blood return noted pre and post infusion.  Site patent and intact, free from redness, edema or discomfort.  No evidence of extravasations.  Access discontinued per protocol.       Discharge Plan:   AVS to patient via MYCHART.    Patient discharged in stable condition accompanied by: family.  Departure Mode: Ambulatory.      CAROLYN ROYAL RN

## 2025-03-18 DIAGNOSIS — Z17.0 MALIGNANT NEOPLASM OF UPPER-INNER QUADRANT OF LEFT BREAST IN FEMALE, ESTROGEN RECEPTOR POSITIVE (H): ICD-10-CM

## 2025-03-18 DIAGNOSIS — R19.7 DIARRHEA, UNSPECIFIED TYPE: ICD-10-CM

## 2025-03-18 DIAGNOSIS — C50.212 MALIGNANT NEOPLASM OF UPPER-INNER QUADRANT OF LEFT BREAST IN FEMALE, ESTROGEN RECEPTOR POSITIVE (H): ICD-10-CM

## 2025-03-20 ENCOUNTER — TRANSFERRED RECORDS (OUTPATIENT)
Dept: HEALTH INFORMATION MANAGEMENT | Facility: CLINIC | Age: 60
End: 2025-03-20
Payer: COMMERCIAL

## 2025-03-25 RX ORDER — POTASSIUM CHLORIDE 1500 MG/1
20 TABLET, EXTENDED RELEASE ORAL 2 TIMES DAILY
Qty: 30 TABLET | Refills: 1 | Status: SHIPPED | OUTPATIENT
Start: 2025-03-25

## 2025-03-27 ENCOUNTER — INFUSION THERAPY VISIT (OUTPATIENT)
Dept: INFUSION THERAPY | Facility: HOSPITAL | Age: 60
End: 2025-03-27
Attending: INTERNAL MEDICINE
Payer: COMMERCIAL

## 2025-03-27 ENCOUNTER — ONCOLOGY VISIT (OUTPATIENT)
Dept: ONCOLOGY | Facility: HOSPITAL | Age: 60
End: 2025-03-27
Attending: INTERNAL MEDICINE
Payer: COMMERCIAL

## 2025-03-27 VITALS
WEIGHT: 198 LBS | RESPIRATION RATE: 17 BRPM | HEART RATE: 94 BPM | BODY MASS INDEX: 32.99 KG/M2 | OXYGEN SATURATION: 98 % | SYSTOLIC BLOOD PRESSURE: 147 MMHG | HEIGHT: 65 IN | DIASTOLIC BLOOD PRESSURE: 87 MMHG | TEMPERATURE: 97.4 F

## 2025-03-27 DIAGNOSIS — F41.9 ANXIETY: ICD-10-CM

## 2025-03-27 DIAGNOSIS — C50.212 MALIGNANT NEOPLASM OF UPPER-INNER QUADRANT OF LEFT BREAST IN FEMALE, ESTROGEN RECEPTOR POSITIVE (H): Primary | ICD-10-CM

## 2025-03-27 DIAGNOSIS — Z17.0 MALIGNANT NEOPLASM OF UPPER-INNER QUADRANT OF LEFT BREAST IN FEMALE, ESTROGEN RECEPTOR POSITIVE (H): Primary | ICD-10-CM

## 2025-03-27 DIAGNOSIS — Z51.11 ENCOUNTER FOR ANTINEOPLASTIC CHEMOTHERAPY: ICD-10-CM

## 2025-03-27 LAB
ALBUMIN SERPL BCG-MCNC: 3.5 G/DL (ref 3.5–5.2)
ALP SERPL-CCNC: 63 U/L (ref 40–150)
ALT SERPL W P-5'-P-CCNC: 12 U/L (ref 0–50)
ANION GAP SERPL CALCULATED.3IONS-SCNC: 10 MMOL/L (ref 7–15)
AST SERPL W P-5'-P-CCNC: 21 U/L (ref 0–45)
BASOPHILS # BLD AUTO: 0.1 10E3/UL (ref 0–0.2)
BASOPHILS NFR BLD AUTO: 0 %
BILIRUB SERPL-MCNC: 0.3 MG/DL
BUN SERPL-MCNC: 9.6 MG/DL (ref 6–20)
CALCIUM SERPL-MCNC: 8.7 MG/DL (ref 8.8–10.4)
CHLORIDE SERPL-SCNC: 104 MMOL/L (ref 98–107)
CREAT SERPL-MCNC: 0.74 MG/DL (ref 0.51–0.95)
EGFRCR SERPLBLD CKD-EPI 2021: >90 ML/MIN/1.73M2
EOSINOPHIL # BLD AUTO: 0 10E3/UL (ref 0–0.7)
EOSINOPHIL NFR BLD AUTO: 0 %
ERYTHROCYTE [DISTWIDTH] IN BLOOD BY AUTOMATED COUNT: 17.2 % (ref 10–15)
GLUCOSE SERPL-MCNC: 106 MG/DL (ref 70–99)
HCO3 SERPL-SCNC: 26 MMOL/L (ref 22–29)
HCT VFR BLD AUTO: 30.4 % (ref 35–47)
HGB BLD-MCNC: 10.2 G/DL (ref 11.7–15.7)
IMM GRANULOCYTES # BLD: 0.1 10E3/UL
IMM GRANULOCYTES NFR BLD: 0 %
LYMPHOCYTES # BLD AUTO: 2.2 10E3/UL (ref 0.8–5.3)
LYMPHOCYTES NFR BLD AUTO: 19 %
MCH RBC QN AUTO: 31.1 PG (ref 26.5–33)
MCHC RBC AUTO-ENTMCNC: 33.6 G/DL (ref 31.5–36.5)
MCV RBC AUTO: 93 FL (ref 78–100)
MONOCYTES # BLD AUTO: 1 10E3/UL (ref 0–1.3)
MONOCYTES NFR BLD AUTO: 9 %
NEUTROPHILS # BLD AUTO: 8.4 10E3/UL (ref 1.6–8.3)
NEUTROPHILS NFR BLD AUTO: 72 %
NRBC # BLD AUTO: 0 10E3/UL
NRBC BLD AUTO-RTO: 0 /100
PLATELET # BLD AUTO: 225 10E3/UL (ref 150–450)
POTASSIUM SERPL-SCNC: 3.3 MMOL/L (ref 3.4–5.3)
PROT SERPL-MCNC: 5.9 G/DL (ref 6.4–8.3)
RBC # BLD AUTO: 3.28 10E6/UL (ref 3.8–5.2)
SODIUM SERPL-SCNC: 140 MMOL/L (ref 135–145)
WBC # BLD AUTO: 11.7 10E3/UL (ref 4–11)

## 2025-03-27 PROCEDURE — 36591 DRAW BLOOD OFF VENOUS DEVICE: CPT | Performed by: INTERNAL MEDICINE

## 2025-03-27 PROCEDURE — 250N000011 HC RX IP 250 OP 636: Performed by: INTERNAL MEDICINE

## 2025-03-27 PROCEDURE — 85025 COMPLETE CBC W/AUTO DIFF WBC: CPT | Performed by: INTERNAL MEDICINE

## 2025-03-27 PROCEDURE — 80053 COMPREHEN METABOLIC PANEL: CPT | Performed by: INTERNAL MEDICINE

## 2025-03-27 PROCEDURE — 99213 OFFICE O/P EST LOW 20 MIN: CPT | Performed by: INTERNAL MEDICINE

## 2025-03-27 PROCEDURE — 258N000003 HC RX IP 258 OP 636: Performed by: INTERNAL MEDICINE

## 2025-03-27 RX ORDER — HEPARIN SODIUM (PORCINE) LOCK FLUSH IV SOLN 100 UNIT/ML 100 UNIT/ML
5 SOLUTION INTRAVENOUS
Status: CANCELLED | OUTPATIENT
Start: 2025-03-27

## 2025-03-27 RX ORDER — DIPHENHYDRAMINE HYDROCHLORIDE 50 MG/ML
25 INJECTION, SOLUTION INTRAMUSCULAR; INTRAVENOUS
Status: CANCELLED
Start: 2025-03-27

## 2025-03-27 RX ORDER — MEPERIDINE HYDROCHLORIDE 25 MG/ML
25 INJECTION INTRAMUSCULAR; INTRAVENOUS; SUBCUTANEOUS
Status: DISCONTINUED | OUTPATIENT
Start: 2025-03-27 | End: 2025-03-27 | Stop reason: HOSPADM

## 2025-03-27 RX ORDER — DIPHENHYDRAMINE HYDROCHLORIDE 50 MG/ML
25 INJECTION, SOLUTION INTRAMUSCULAR; INTRAVENOUS
Status: DISCONTINUED | OUTPATIENT
Start: 2025-03-27 | End: 2025-03-27 | Stop reason: HOSPADM

## 2025-03-27 RX ORDER — DIPHENHYDRAMINE HCL 50 MG
50 CAPSULE ORAL
Status: CANCELLED | OUTPATIENT
Start: 2025-03-27

## 2025-03-27 RX ORDER — MEPERIDINE HYDROCHLORIDE 25 MG/ML
25 INJECTION INTRAMUSCULAR; INTRAVENOUS; SUBCUTANEOUS
Status: CANCELLED | OUTPATIENT
Start: 2025-03-27

## 2025-03-27 RX ORDER — HEPARIN SODIUM,PORCINE 10 UNIT/ML
5-20 VIAL (ML) INTRAVENOUS DAILY PRN
Status: CANCELLED | OUTPATIENT
Start: 2025-03-27

## 2025-03-27 RX ORDER — ALBUTEROL SULFATE 90 UG/1
1-2 INHALANT RESPIRATORY (INHALATION)
Status: DISCONTINUED | OUTPATIENT
Start: 2025-03-27 | End: 2025-03-27 | Stop reason: HOSPADM

## 2025-03-27 RX ORDER — DIPHENHYDRAMINE HYDROCHLORIDE 50 MG/ML
50 INJECTION, SOLUTION INTRAMUSCULAR; INTRAVENOUS
Status: CANCELLED
Start: 2025-03-27

## 2025-03-27 RX ORDER — METHYLPREDNISOLONE SODIUM SUCCINATE 40 MG/ML
40 INJECTION INTRAMUSCULAR; INTRAVENOUS
Status: CANCELLED
Start: 2025-03-27

## 2025-03-27 RX ORDER — METHYLPREDNISOLONE SODIUM SUCCINATE 40 MG/ML
40 INJECTION INTRAMUSCULAR; INTRAVENOUS
Status: DISCONTINUED | OUTPATIENT
Start: 2025-03-27 | End: 2025-03-27 | Stop reason: HOSPADM

## 2025-03-27 RX ORDER — ALBUTEROL SULFATE 0.83 MG/ML
2.5 SOLUTION RESPIRATORY (INHALATION)
Status: DISCONTINUED | OUTPATIENT
Start: 2025-03-27 | End: 2025-03-27 | Stop reason: HOSPADM

## 2025-03-27 RX ORDER — PALONOSETRON 0.05 MG/ML
0.25 INJECTION, SOLUTION INTRAVENOUS ONCE
Status: COMPLETED | OUTPATIENT
Start: 2025-03-27 | End: 2025-03-27

## 2025-03-27 RX ORDER — ALBUTEROL SULFATE 0.83 MG/ML
2.5 SOLUTION RESPIRATORY (INHALATION)
Status: CANCELLED | OUTPATIENT
Start: 2025-03-27

## 2025-03-27 RX ORDER — DIPHENHYDRAMINE HYDROCHLORIDE 50 MG/ML
50 INJECTION, SOLUTION INTRAMUSCULAR; INTRAVENOUS
Status: DISCONTINUED | OUTPATIENT
Start: 2025-03-27 | End: 2025-03-27 | Stop reason: HOSPADM

## 2025-03-27 RX ORDER — ACETAMINOPHEN 325 MG/1
650 TABLET ORAL
Status: CANCELLED
Start: 2025-03-27

## 2025-03-27 RX ORDER — DIPHENHYDRAMINE HYDROCHLORIDE 50 MG/ML
25 INJECTION, SOLUTION INTRAMUSCULAR; INTRAVENOUS EVERY 6 HOURS PRN
Status: CANCELLED
Start: 2025-03-27

## 2025-03-27 RX ORDER — EPINEPHRINE 1 MG/ML
0.3 INJECTION, SOLUTION INTRAMUSCULAR; SUBCUTANEOUS EVERY 5 MIN PRN
Status: CANCELLED | OUTPATIENT
Start: 2025-03-27

## 2025-03-27 RX ORDER — ALBUTEROL SULFATE 90 UG/1
1-2 INHALANT RESPIRATORY (INHALATION)
Status: CANCELLED
Start: 2025-03-27

## 2025-03-27 RX ORDER — LORAZEPAM 2 MG/ML
0.5 INJECTION INTRAMUSCULAR EVERY 4 HOURS PRN
Status: CANCELLED | OUTPATIENT
Start: 2025-03-27

## 2025-03-27 RX ORDER — PALONOSETRON 0.05 MG/ML
0.25 INJECTION, SOLUTION INTRAVENOUS ONCE
Status: CANCELLED | OUTPATIENT
Start: 2025-03-27

## 2025-03-27 RX ORDER — DIPHENHYDRAMINE HYDROCHLORIDE 50 MG/ML
25 INJECTION, SOLUTION INTRAMUSCULAR; INTRAVENOUS EVERY 6 HOURS PRN
Status: DISCONTINUED | OUTPATIENT
Start: 2025-03-27 | End: 2025-03-27 | Stop reason: HOSPADM

## 2025-03-27 RX ORDER — EPINEPHRINE 1 MG/ML
0.3 INJECTION, SOLUTION INTRAMUSCULAR; SUBCUTANEOUS EVERY 5 MIN PRN
Status: DISCONTINUED | OUTPATIENT
Start: 2025-03-27 | End: 2025-03-27 | Stop reason: HOSPADM

## 2025-03-27 RX ORDER — HEPARIN SODIUM (PORCINE) LOCK FLUSH IV SOLN 100 UNIT/ML 100 UNIT/ML
5 SOLUTION INTRAVENOUS
Status: DISCONTINUED | OUTPATIENT
Start: 2025-03-27 | End: 2025-03-27 | Stop reason: HOSPADM

## 2025-03-27 RX ADMIN — DEXAMETHASONE SODIUM PHOSPHATE: 10 INJECTION, SOLUTION INTRAMUSCULAR; INTRAVENOUS at 10:25

## 2025-03-27 RX ADMIN — DOCETAXEL 160 MG: 160 INJECTION INTRAVENOUS at 12:00

## 2025-03-27 RX ADMIN — DIPHENHYDRAMINE HYDROCHLORIDE 25 MG: 50 INJECTION, SOLUTION INTRAMUSCULAR; INTRAVENOUS at 10:08

## 2025-03-27 RX ADMIN — SODIUM CHLORIDE 600 MG: 0.9 INJECTION, SOLUTION INTRAVENOUS at 11:25

## 2025-03-27 RX ADMIN — HEPARIN 5 ML: 100 SYRINGE at 13:06

## 2025-03-27 RX ADMIN — SODIUM CHLORIDE 420 MG: 0.9 INJECTION, SOLUTION INTRAVENOUS at 10:52

## 2025-03-27 RX ADMIN — SODIUM CHLORIDE 250 ML: 0.9 INJECTION, SOLUTION INTRAVENOUS at 10:01

## 2025-03-27 RX ADMIN — PALONOSETRON 0.25 MG: 0.05 INJECTION, SOLUTION INTRAVENOUS at 10:01

## 2025-03-27 RX ADMIN — CARBOPLATIN 700 MG: 10 INJECTION, SOLUTION INTRAVENOUS at 13:04

## 2025-03-27 ASSESSMENT — PAIN SCALES - GENERAL: PAINLEVEL_OUTOF10: NO PAIN (0)

## 2025-03-27 NOTE — PROGRESS NOTES
Infusion Nursing Note:  Adrienne Ivory presents today for D1C6 perjeta, trazimera, taxotere, and carboplatin.    Patient seen by provider today: Yes: Dr Harkins   present during visit today: Not Applicable.    Note: Pt given iv benadryl 25 mg prior to tx per her request for anxiety. This is pt last chemo before going on to surgery and then radiation.    Premeds Given: aloxi, benadryl IV, dexamethasone IV, and emend    Intravenous Access:  Implanted Port.    Treatment Conditions:  Lab Results   Component Value Date    HGB 10.2 (L) 03/27/2025    WBC 11.7 (H) 03/27/2025    ANEU 27.3 (H) 12/19/2024    ANEUTAUTO 8.4 (H) 03/27/2025     03/27/2025        Lab Results   Component Value Date     03/27/2025    POTASSIUM 3.3 (L) 03/27/2025    MAG 1.6 (L) 01/29/2025    CR 0.74 03/27/2025    CONNIE 8.7 (L) 03/27/2025    BILITOTAL 0.3 03/27/2025    ALBUMIN 3.5 03/27/2025    ALT 12 03/27/2025    AST 21 03/27/2025       Results reviewed, labs MET treatment parameters, ok to proceed with treatment.      Post Infusion Assessment:  Patient tolerated infusion without incident.  Blood return noted pre and post infusion.  Site patent and intact, free from redness, edema or discomfort.  Access discontinued per protocol.       Discharge Plan:   Patient and/or family verbalized understanding of discharge instructions and all questions answered.  Pt will return next week for fluids and nausea meds    Polly Chen, RN

## 2025-03-27 NOTE — PROGRESS NOTES
[facility] Hematology and Oncology Progress Note    Patient: Adrienne Ivory  MRN: [id number]  Date of Service: Mar 27, 2025             ECOG Performance    0 - Independent          ______________________________________________________________________________  Oncologic history  T2 N3 M0 stage IIIc invasive ductal carcinoma of the left breast ER positive ME positive HER2/wolf 3+.  November 2024  Patient had multiple level 1 level 2 and level 3 axillary lymph nodes involved    Treatment  Patient started on neoadjuvant TCHP on 12/12/2024  Near complete radiologic remission after 3 cycles of TCHP  Completed 6 cycle of TCHP on 3/27/2025    History of Present Illness    Ms. Adrienne Ivory is here for follow-up.  She is due for her sixth cycle today.  She feels fine.  She has continued to have severe nausea after each cycle.  Will give her IV fluids and IV nausea medication in the last cycle which really helped with the symptoms as oral medications have not helped.  She also complains of tiredness which lasted longer in the last cycle.  She has numbness at the tips of her fingers which is persisting throughout the 3 weeks.    Review of systems  A comprehensive 12 point review of system was done that was negative except what is mentioned in the history of present illness    Past History    Past Medical History:   Diagnosis Date    Abnormal Pap smear, can't excl hi gd sq intraepithelial lesion (ASC-H) 03/01/2015    LSIL also    Anxiety state, unspecified     Herpes simplex without mention of complication     HSIL on Pap smear of cervix 07/01/2014    colp - WNL    Human papillomavirus in conditions classified elsewhere and of unspecified site 11/01/2010    + HPV 45 & 82 with ASCUS    Hx of colposcopy with cervical biopsy 11/08/2016    [place] ECC neg.     Obese     PONV (postoperative nausea and vomiting)     Premenstrual tension syndromes        Past Surgical History:   Procedure Laterality Date    C IUD,MIRENA   "2/08-3/11    removed for cramping    COLONOSCOPY N/A 12/4/2020    Procedure: COLONOSCOPY, WITH POLYPECTOMY;  Surgeon: Moises Pride MD;  Location: [facility] OR    DILATION AND CURETTAGE, ABLATE ENDOMETRIUM NOVASURE, COMBINED N/A 12/31/2015    Procedure: COMBINED DILATION AND CURETTAGE, ABLATE ENDOMETRIUM NOVASURE;  Surgeon: Shakira Penaloza MD;  Location: [facility] OR    HYSTEROSCOPY DIAGNOSTIC N/A 12/31/2015    Procedure: HYSTEROSCOPY DIAGNOSTIC;  Surgeon: Shakira Penaloza MD;  Location: [facility] OR    LEEP TX, CERVICAL  4/3/15    ARNAV 2-3, negative margins    NO HISTORY OF SURGERY         Physical Exam    BP (!) 147/87 (BP Location: Left arm, Patient Position: Sitting, Cuff Size: Adult Regular)   Pulse 94   Temp 97.4  F (36.3  C) (Tympanic)   Resp 17   Ht 1.651 m (5' 5\")   Wt 89.8 kg (198 lb)   SpO2 98%   BMI 32.95 kg/m      General: alert, awake, not in acute distress  HEENT: Head: Normal, normocephalic, atraumatic.  Eye: Normal external eye, conjunctiva, lids cornea, SITA.  Nose: Normal external nose, mucus membranes and septum.  Pharynx: Normal buccal mucosa. Normal pharynx.  Neck / Thyroid: Supple, no masses, nodes, nodules or enlargement.  Lymphatics: No abnormally enlarged lymph nodes.  Chest: Normal chest wall and respirations. Clear to auscultation.  No crackles or rhonchi's  Heart: S1 S2 RRR, no murmur.   Abdomen: abdomen is soft without significant tenderness, masses, organomegaly or guarding  Extremities: normal strength, tone, and muscle mass  Skin: normal. no rash or abnormalities  CNS: non focal.    Breast exam on the left side did show shrinkage in the mass I could still feel a lymph node in the axilla      Lab Results    Recent Results (from the past 240 hours)   UA with Microscopic reflex to Culture - Clinic Collect    Collection Time: 03/21/25 10:50 AM    Specimen: Urine, Clean Catch   Result Value Ref Range    Color Urine Colorless Colorless, Straw, Light Yellow, Yellow    Appearance Urine " Clear Clear    Glucose Urine Negative Negative mg/dL    Bilirubin Urine Negative Negative    Ketones Urine Negative Negative mg/dL    Specific Gravity Urine 1.001 1.001 - 1.030    Blood Urine Negative Negative    pH Urine 6.5 5.0 - 7.0    Protein Albumin Urine Negative Negative mg/dL    Urobilinogen Urine <2.0 <2.0 mg/dL    Nitrite Urine Negative Negative    Leukocyte Esterase Urine 25 Claudia/uL (A) Negative    RBC Urine 0 <=2 /HPF    WBC Urine 2 <=5 /HPF   Comprehensive metabolic panel    Collection Time: 03/27/25  8:47 AM   Result Value Ref Range    Sodium 140 135 - 145 mmol/L    Potassium 3.3 (L) 3.4 - 5.3 mmol/L    Carbon Dioxide (CO2) 26 22 - 29 mmol/L    Anion Gap 10 7 - 15 mmol/L    Urea Nitrogen 9.6 6.0 - 20.0 mg/dL    Creatinine 0.74 0.51 - 0.95 mg/dL    GFR Estimate >90 >60 mL/min/1.73m2    Calcium 8.7 (L) 8.8 - 10.4 mg/dL    Chloride 104 98 - 107 mmol/L    Glucose 106 (H) 70 - 99 mg/dL    Alkaline Phosphatase 63 40 - 150 U/L    AST 21 0 - 45 U/L    ALT 12 0 - 50 U/L    Protein Total 5.9 (L) 6.4 - 8.3 g/dL    Albumin 3.5 3.5 - 5.2 g/dL    Bilirubin Total 0.3 <=1.2 mg/dL   CBC with platelets and differential    Collection Time: 03/27/25  8:47 AM   Result Value Ref Range    WBC Count 11.7 (H) 4.0 - 11.0 10e3/uL    RBC Count 3.28 (L) 3.80 - 5.20 10e6/uL    Hemoglobin 10.2 (L) 11.7 - 15.7 g/dL    Hematocrit 30.4 (L) 35.0 - 47.0 %    MCV 93 78 - 100 fL    MCH 31.1 26.5 - 33.0 pg    MCHC 33.6 31.5 - 36.5 g/dL    RDW 17.2 (H) 10.0 - 15.0 %    Platelet Count 225 150 - 450 10e3/uL    % Neutrophils 72 %    % Lymphocytes 19 %    % Monocytes 9 %    % Eosinophils 0 %    % Basophils 0 %    % Immature Granulocytes 0 %    NRBCs per 100 WBC 0 <1 /100    Absolute Neutrophils 8.4 (H) 1.6 - 8.3 10e3/uL    Absolute Lymphocytes 2.2 0.8 - 5.3 10e3/uL    Absolute Monocytes 1.0 0.0 - 1.3 10e3/uL    Absolute Eosinophils 0.0 0.0 - 0.7 10e3/uL    Absolute Basophils 0.1 0.0 - 0.2 10e3/uL    Absolute Immature Granulocytes 0.1 <=0.4  10e3/uL    Absolute NRBCs 0.0 10e3/uL         Imaging    Echocardiogram Complete    Result Date: 3/4/2025  [id number]^GINA^[place] [address]  Name: BRYAN SHI MRN: [id number] : 1965 Study Date: 2025 08:41 AM Age: 59 yrs Gender: Female Patient Location: [facility] Reason For Study: Malignant neoplasm of upper-inner quadrant of left breast in [facility] Ordering Physician: TAYLOR FUENTES Referring Physician: TAYLOR FUENTES Performed By:   BSA: 2.0 m2 Height: 65 in Weight: 204 lb HR: 86 BP: 110/63 mmHg ______________________________________________________________________________ Procedure Echocardiogram with two-dimensional, color and spectral Doppler. No hemodynamically significant valvular abnormalities on 2D or color flow imaging. Compared to the prior study dated 24, there have been no changes. ______________________________________________________________________________ Interpretation Summary  The left ventricle is normal in size. There is normal left ventricular wall thickness. The visual ejection fraction is 55-60%. Left ventricular diastolic function is normal. Normal right ventricle size and systolic function. Global peak LV longitudinal strain is averaged at -19%. This is within reported normal limits (normal <-18%). Compared to the prior study dated 24, there have been no changes. ______________________________________________________________________________ Left Ventricle The left ventricle is normal in size. There is normal left ventricular wall thickness. Global peak LV longitudinal strain is averaged at -19%. This is within reported normal limits (normal <-18%). Left ventricular diastolic function is normal. The visual ejection fraction is 55-60%. No regional wall motion abnormalities noted.  Right Ventricle Normal right ventricle size and systolic function.  Atria Normal left atrial size. Right atrial size  is normal. There is no color Doppler evidence of an atrial shunt.  Mitral Valve Mitral valve leaflets appear normal. There is no evidence of mitral stenosis or clinically significant mitral regurgitation.  Tricuspid Valve There is trace tricuspid regurgitation. Right ventricular systolic pressure could not be approximated due to inadequate tricuspid regurgitation.  Aortic Valve Aortic valve leaflets appear normal. There is no evidence of aortic stenosis or clinically significant aortic regurgitation.  Pulmonic Valve The pulmonic valve is not well seen, but is grossly normal. This degree of valvular regurgitation is within normal limits.  Vessels The aorta root is normal. Normal size ascending aorta. IVC diameter <2.1 cm collapsing >50% with sniff suggests a normal RA pressure of 3 mmHg.  Pericardium There is no pericardial effusion.  ______________________________________________________________________________ MMode/2D Measurements & Calculations IVSd: 0.92 cm LVIDd: 5.0 cm LVIDs: 2.8 cm LVPWd: 0.94 cm FS: 43.4 % LV mass(C)d: 165.6 grams LV mass(C)dI: 83.0 grams/m2  Ao root diam: 3.3 cm asc Aorta Diam: 3.5 cm LVOT diam: 2.1 cm LVOT area: 3.5 cm2 Ao root diam index Ht(cm/m): 2.0 Ao root diam index BSA (cm/m2): 1.6 Asc Ao diam index BSA (cm/m2): 1.8 Asc Ao diam index Ht(cm/m): 2.1 EF Biplane: 61.4 % LA Volume (BP): 51.4 ml LA Volume Index (BP): 25.8 ml/m2  LA Volume Indexed (AL/bp): 27.0 ml/m2 RV Base: 3.6 cm RWT: 0.37 TAPSE: 2.6 cm  Doppler Measurements & Calculations MV E max lee: 106.0 cm/sec MV A max lee: 88.6 cm/sec MV E/A: 1.2 MV dec slope: 527.0 cm/sec2 MV dec time: 0.20 sec Ao V2 max: 151.0 cm/sec Ao max P.0 mmHg Ao V2 mean: 110.0 cm/sec Ao mean P.0 mmHg Ao V2 VTI: 32.2 cm YURY(I,D): 2.5 cm2 YURY(V,D): 2.7 cm2  LV V1 max P.5 mmHg LV V1 max: 117.0 cm/sec LV V1 VTI: 23.4 cm SV(LVOT): 81.0 ml SI(LVOT): 40.6 ml/m2 PA V2 max: 96.2 cm/sec PA max PG: 3.7 mmHg PA acc time: 0.09 sec AV Lee Ratio (DI): 0.77  YURY Index (cm2/m2): 1.3 E/E' av.6 Lateral E/e': 8.7 Medial E/e': 10.6 RV S Lee: 14.4 cm/sec  ______________________________________________________________________________ Report approved by: Óscar Pan on 2025 11:14 AM            Assessment and Plan    Stage IIIc breast cancer HER2 positive, ER/UT positive  Patient is here in follow-up.  She is due for her sixth cycle of TCHP today.  She we will proceed with that without any dose changes I will see her in 3 weeks and we will switch her to trastuzumab until we have a final pathology report from her surgery.  A decision to switch her to a different HER2 directed agent will be made based on her pathology report.  She also needs to see our surgery colleagues so a date of surgery can be decided.  She already has an appointment with Dr. Matthews next Monday    Cardiac monitoring  Patient does needs continued cardiac monitoring because of the use of HER2 directed agents.  Her last echo was in March and this is her second dose after that echo.  We will plan to give her 2 more doses before repeating the echo    Nausea  patient has had severe nausea after each cycle.  Oral medications have not helped.  We brought her in for IV fluids and IV nausea medication and her last cycle which really helped.  I will plan to give her IV nausea medication and IV fluid once a week for the next 2 weeks    Patient will be referred for an opinion to our radiation oncology colleagues after surgical management is complete hormonal therapy will be started after radiation therapy has been completed  Signed by: Josseline Harkins MD        CC: Kallie Ta NP

## 2025-03-27 NOTE — PROGRESS NOTES
"Oncology Rooming Note    March 27, 2025 8:58 AM   Adrienne Ivory is a 59 year old female who presents for:    Chief Complaint   Patient presents with    Oncology Clinic Visit     Follow up -   Malignant neoplasm of upper-inner quadrant of left breast in female, estrogen receptor positive     Initial Vitals: BP (!) 147/87 (BP Location: Left arm, Patient Position: Sitting, Cuff Size: Adult Regular)   Pulse 94   Temp 97.4  F (36.3  C) (Tympanic)   Resp 17   Ht 1.651 m (5' 5\")   Wt 89.8 kg (198 lb)   SpO2 98%   BMI 32.95 kg/m   Estimated body mass index is 32.95 kg/m  as calculated from the following:    Height as of this encounter: 1.651 m (5' 5\").    Weight as of this encounter: 89.8 kg (198 lb). Body surface area is 2.03 meters squared.  No Pain (0) Comment: Data Unavailable   No LMP recorded. Patient has had an ablation.  Allergies reviewed: Yes  Medications reviewed: Yes    Medications: Medication refills not needed today.  Pharmacy name entered into [facility]:    [facility] - [place], [place] - [address] [id number] [place] [address] [address]    Frailty Screening:   Is the patient here for a new oncology consult visit in cancer care? 2. No    PHQ9:  Did this patient require a PHQ9?: No      Clinical concerns:   Follow up.   Multiple concerns/questions.       Shari Panchal MA            "

## 2025-03-27 NOTE — LETTER
"3/27/2025      Adrienne Ivory  [address] [place] [address]      Dear Colleague,    Thank you for referring your patient, Adrienne Ivory, to the [facility]. Please see a copy of my visit note below.    Oncology Rooming Note    March 27, 2025 8:58 AM   Adrienne Ivory is a 59 year old female who presents for:    Chief Complaint   Patient presents with     Oncology Clinic Visit     Follow up -   Malignant neoplasm of upper-inner quadrant of left breast in female, estrogen receptor positive     Initial Vitals: BP (!) 147/87 (BP Location: Left arm, Patient Position: Sitting, Cuff Size: Adult Regular)   Pulse 94   Temp 97.4  F (36.3  C) (Tympanic)   Resp 17   Ht 1.651 m (5' 5\")   Wt 89.8 kg (198 lb)   SpO2 98%   BMI 32.95 kg/m   Estimated body mass index is 32.95 kg/m  as calculated from the following:    Height as of this encounter: 1.651 m (5' 5\").    Weight as of this encounter: 89.8 kg (198 lb). Body surface area is 2.03 meters squared.  No Pain (0) Comment: Data Unavailable   No LMP recorded. Patient has had an ablation.  Allergies reviewed: Yes  Medications reviewed: Yes    Medications: Medication refills not needed today.  Pharmacy name entered into [facility]:    [facility] - [place], [place] - [address] [id number] [place] [address] [facility] [address]    Frailty Screening:   Is the patient here for a new oncology consult visit in cancer care? 2. No    PHQ9:  Did this patient require a PHQ9?: No      Clinical concerns:   Follow up.   Multiple concerns/questions.       Shari Panchal MA              [facility] Hematology and Oncology Progress Note    Patient: Adrienne Ivory  MRN: [id number]  Date of Service: Mar 27, 2025             ECOG Performance    0 - " Independent          ______________________________________________________________________________  Oncologic history  T2 N3 M0 stage IIIc invasive ductal carcinoma of the left breast ER positive DE positive HER2/wolf 3+.  November 2024  Patient had multiple level 1 level 2 and level 3 axillary lymph nodes involved    Treatment  Patient started on neoadjuvant TCHP on 12/12/2024  Near complete radiologic remission after 3 cycles of TCHP  Completed 6 cycle of TCHP on 3/27/2025    History of Present Illness    Ms. Adrienne Ivory is here for follow-up.  She is due for her sixth cycle today.  She feels fine.  She has continued to have severe nausea after each cycle.  Will give her IV fluids and IV nausea medication in the last cycle which really helped with the symptoms as oral medications have not helped.  She also complains of tiredness which lasted longer in the last cycle.  She has numbness at the tips of her fingers which is persisting throughout the 3 weeks.    Review of systems  A comprehensive 12 point review of system was done that was negative except what is mentioned in the history of present illness    Past History    Past Medical History:   Diagnosis Date     Abnormal Pap smear, can't excl hi gd sq intraepithelial lesion (ASC-H) 03/01/2015    LSIL also     Anxiety state, unspecified      Herpes simplex without mention of complication      HSIL on Pap smear of cervix 07/01/2014    colp - WNL     Human papillomavirus in conditions classified elsewhere and of unspecified site 11/01/2010    + HPV 45 & 82 with ASCUS     Hx of colposcopy with cervical biopsy 11/08/2016    Brush ECC neg.      Obese      PONV (postoperative nausea and vomiting)      Premenstrual tension syndromes        Past Surgical History:   Procedure Laterality Date     C IUD,MIRENA  2/08-3/11    removed for cramping     COLONOSCOPY N/A 12/4/2020    Procedure: COLONOSCOPY, WITH POLYPECTOMY;  Surgeon: Moises Pride MD;  Location: [facility] [place]      "DILATION AND CURETTAGE, ABLATE ENDOMETRIUM NOVASURE, COMBINED N/A 12/31/2015    Procedure: COMBINED DILATION AND CURETTAGE, ABLATE ENDOMETRIUM NOVASURE;  Surgeon: Shakira Penaloza MD;  Location: [facility] OR     HYSTEROSCOPY DIAGNOSTIC N/A 12/31/2015    Procedure: HYSTEROSCOPY DIAGNOSTIC;  Surgeon: Shakira Penaloza MD;  Location: [facility] OR     LEEP TX, CERVICAL  4/3/15    ARNAV 2-3, negative margins     NO HISTORY OF SURGERY         Physical Exam    BP (!) 147/87 (BP Location: Left arm, Patient Position: Sitting, Cuff Size: Adult Regular)   Pulse 94   Temp 97.4  F (36.3  C) (Tympanic)   Resp 17   Ht 1.651 m (5' 5\")   Wt 89.8 kg (198 lb)   SpO2 98%   BMI 32.95 kg/m      General: alert, awake, not in acute distress  HEENT: Head: Normal, normocephalic, atraumatic.  Eye: Normal external eye, conjunctiva, lids cornea, SITA.  Nose: Normal external nose, mucus membranes and septum.  Pharynx: Normal buccal mucosa. Normal pharynx.  Neck / Thyroid: Supple, no masses, nodes, nodules or enlargement.  Lymphatics: No abnormally enlarged lymph nodes.  Chest: Normal chest wall and respirations. Clear to auscultation.  No crackles or rhonchi's  Heart: S1 S2 RRR, no murmur.   Abdomen: abdomen is soft without significant tenderness, masses, organomegaly or guarding  Extremities: normal strength, tone, and muscle mass  Skin: normal. no rash or abnormalities  CNS: non focal.    Breast exam on the left side did show shrinkage in the mass I could still feel a lymph node in the axilla      Lab Results    Recent Results (from the past 240 hours)   UA with Microscopic reflex to Culture - Clinic Collect    Collection Time: 03/21/25 10:50 AM    Specimen: Urine, Clean Catch   Result Value Ref Range    Color Urine Colorless Colorless, Straw, Light Yellow, Yellow    Appearance Urine Clear Clear    Glucose Urine Negative Negative mg/dL    Bilirubin Urine Negative Negative    Ketones Urine Negative Negative mg/dL    Specific Gravity Urine " 1.001 1.001 - 1.030    Blood Urine Negative Negative    pH Urine 6.5 5.0 - 7.0    Protein Albumin Urine Negative Negative mg/dL    Urobilinogen Urine <2.0 <2.0 mg/dL    Nitrite Urine Negative Negative    Leukocyte Esterase Urine 25 Claudia/uL (A) Negative    RBC Urine 0 <=2 /HPF    WBC Urine 2 <=5 /HPF   Comprehensive metabolic panel    Collection Time: 03/27/25  8:47 AM   Result Value Ref Range    Sodium 140 135 - 145 mmol/L    Potassium 3.3 (L) 3.4 - 5.3 mmol/L    Carbon Dioxide (CO2) 26 22 - 29 mmol/L    Anion Gap 10 7 - 15 mmol/L    Urea Nitrogen 9.6 6.0 - 20.0 mg/dL    Creatinine 0.74 0.51 - 0.95 mg/dL    GFR Estimate >90 >60 mL/min/1.73m2    Calcium 8.7 (L) 8.8 - 10.4 mg/dL    Chloride 104 98 - 107 mmol/L    Glucose 106 (H) 70 - 99 mg/dL    Alkaline Phosphatase 63 40 - 150 U/L    AST 21 0 - 45 U/L    ALT 12 0 - 50 U/L    Protein Total 5.9 (L) 6.4 - 8.3 g/dL    Albumin 3.5 3.5 - 5.2 g/dL    Bilirubin Total 0.3 <=1.2 mg/dL   CBC with platelets and differential    Collection Time: 03/27/25  8:47 AM   Result Value Ref Range    WBC Count 11.7 (H) 4.0 - 11.0 10e3/uL    RBC Count 3.28 (L) 3.80 - 5.20 10e6/uL    Hemoglobin 10.2 (L) 11.7 - 15.7 g/dL    Hematocrit 30.4 (L) 35.0 - 47.0 %    MCV 93 78 - 100 fL    MCH 31.1 26.5 - 33.0 pg    MCHC 33.6 31.5 - 36.5 g/dL    RDW 17.2 (H) 10.0 - 15.0 %    Platelet Count 225 150 - 450 10e3/uL    % Neutrophils 72 %    % Lymphocytes 19 %    % Monocytes 9 %    % Eosinophils 0 %    % Basophils 0 %    % Immature Granulocytes 0 %    NRBCs per 100 WBC 0 <1 /100    Absolute Neutrophils 8.4 (H) 1.6 - 8.3 10e3/uL    Absolute Lymphocytes 2.2 0.8 - 5.3 10e3/uL    Absolute Monocytes 1.0 0.0 - 1.3 10e3/uL    Absolute Eosinophils 0.0 0.0 - 0.7 10e3/uL    Absolute Basophils 0.1 0.0 - 0.2 10e3/uL    Absolute Immature Granulocytes 0.1 <=0.4 10e3/uL    Absolute NRBCs 0.0 10e3/uL         Imaging    Echocardiogram Complete    Result Date: 3/4/2025  [id number]^GINA^[place] [address]  Name: BRYAN SHI MRN: [id number] : 1965 Study Date: 2025 08:41 AM Age: 59 yrs Gender: Female Patient Location: [facility] Reason For Study: Malignant neoplasm of upper-inner quadrant of left breast in fem Ordering Physician: TAYLOR FUENTES Referring Physician: TAYLOR FUENTES Performed By:   BSA: 2.0 m2 Height: 65 in Weight: 204 lb HR: 86 BP: 110/63 mmHg ______________________________________________________________________________ Procedure Echocardiogram with two-dimensional, color and spectral Doppler. No hemodynamically significant valvular abnormalities on 2D or color flow imaging. Compared to the prior study dated 24, there have been no changes. ______________________________________________________________________________ Interpretation Summary  The left ventricle is normal in size. There is normal left ventricular wall thickness. The visual ejection fraction is 55-60%. Left ventricular diastolic function is normal. Normal right ventricle size and systolic function. Global peak LV longitudinal strain is averaged at -19%. This is within reported normal limits (normal <-18%). Compared to the prior study dated 24, there have been no changes. ______________________________________________________________________________ Left Ventricle The left ventricle is normal in size. There is normal left ventricular wall thickness. Global peak LV longitudinal strain is averaged at -19%. This is within reported normal limits (normal <-18%). Left ventricular diastolic function is normal. The visual ejection fraction is 55-60%. No regional wall motion abnormalities noted.  Right Ventricle Normal right ventricle size and systolic function.  Atria Normal left atrial size. Right atrial size is normal. There is no color Doppler evidence of an atrial shunt.  Mitral Valve Mitral valve leaflets appear normal. There is no evidence of mitral stenosis or  clinically significant mitral regurgitation.  Tricuspid Valve There is trace tricuspid regurgitation. Right ventricular systolic pressure could not be approximated due to inadequate tricuspid regurgitation.  Aortic Valve Aortic valve leaflets appear normal. There is no evidence of aortic stenosis or clinically significant aortic regurgitation.  Pulmonic Valve The pulmonic valve is not well seen, but is grossly normal. This degree of valvular regurgitation is within normal limits.  Vessels The aorta root is normal. Normal size ascending aorta. IVC diameter <2.1 cm collapsing >50% with sniff suggests a normal RA pressure of 3 mmHg.  Pericardium There is no pericardial effusion.  ______________________________________________________________________________ MMode/2D Measurements & Calculations IVSd: 0.92 cm LVIDd: 5.0 cm LVIDs: 2.8 cm LVPWd: 0.94 cm FS: 43.4 % LV mass(C)d: 165.6 grams LV mass(C)dI: 83.0 grams/m2  Ao root diam: 3.3 cm asc Aorta Diam: 3.5 cm LVOT diam: 2.1 cm LVOT area: 3.5 cm2 Ao root diam index Ht(cm/m): 2.0 Ao root diam index BSA (cm/m2): 1.6 Asc Ao diam index BSA (cm/m2): 1.8 Asc Ao diam index Ht(cm/m): 2.1 EF Biplane: 61.4 % LA Volume (BP): 51.4 ml LA Volume Index (BP): 25.8 ml/m2  LA Volume Indexed (AL/bp): 27.0 ml/m2 RV Base: 3.6 cm RWT: 0.37 TAPSE: 2.6 cm  Doppler Measurements & Calculations MV E max lee: 106.0 cm/sec MV A max lee: 88.6 cm/sec MV E/A: 1.2 MV dec slope: 527.0 cm/sec2 MV dec time: 0.20 sec Ao V2 max: 151.0 cm/sec Ao max P.0 mmHg Ao V2 mean: 110.0 cm/sec Ao mean P.0 mmHg Ao V2 VTI: 32.2 cm YURY(I,D): 2.5 cm2 YURY(V,D): 2.7 cm2  LV V1 max P.5 mmHg LV V1 max: 117.0 cm/sec LV V1 VTI: 23.4 cm SV(LVOT): 81.0 ml SI(LVOT): 40.6 ml/m2 PA V2 max: 96.2 cm/sec PA max PG: 3.7 mmHg PA acc time: 0.09 sec AV Lee Ratio (DI): 0.77 YURY Index (cm2/m2): 1.3 E/E' av.6 Lateral E/e': 8.7 Medial E/e': 10.6 RV S Lee: 14.4 cm/sec   ______________________________________________________________________________ Report approved by: Óscar Pan on 03/04/2025 11:14 AM            Assessment and Plan    Stage IIIc breast cancer HER2 positive, ER/[place] positive  Patient is here in follow-up.  She is due for her sixth cycle of TCHP today.  She we will proceed with that without any dose changes I will see her in 3 weeks and we will switch her to trastuzumab until we have a final pathology report from her surgery.  A decision to switch her to a different HER2 directed agent will be made based on her pathology report.  She also needs to see our surgery colleagues so a date of surgery can be decided.  She already has an appointment with Dr. Matthews next Monday    Cardiac monitoring  Patient does needs continued cardiac monitoring because of the use of HER2 directed agents.  Her last echo was in March and this is her second dose after that echo.  We will plan to give her 2 more doses before repeating the echo    Nausea  patient has had severe nausea after each cycle.  Oral medications have not helped.  We brought her in for IV fluids and IV nausea medication and her last cycle which really helped.  I will plan to give her IV nausea medication and IV fluid once a week for the next 2 weeks    Patient will be referred for an opinion to our radiation oncology colleagues after surgical management is complete hormonal therapy will be started after radiation therapy has been completed  Signed by: Josseline Harkins MD        CC: Kallie Ta NP       Again, thank you for allowing me to participate in the care of your patient.        Sincerely,        Josseline Harkins MD    Electronically signed

## 2025-03-30 NOTE — PROGRESS NOTES
History:  Adrienne Ivory presents for follow-up of breast cancer.  She is s/p neoadjuvant chemotherapy for a HER2 positive carcinoma involving the left breast.  She was initially diagnosed with this cancer in November 2024.  Imaging before her chemo demonstrated axillary, subpectoral, and possible supraclavicalar/internal mammary involvement.  Breast MRI from February now demonstrates normal appearing lymph nodes and no unusual enhancement in the area of her tumor.  Chemo has been hard on her.  She is happy to be done with this first step.    Physical exam:  BREAST: No visible or palpable abnormalities bilaterally    Imaging:  Pertinent images personally reviewed by myself and discussed with the patient.  Radiology reports:  EXAM: Bilateral Breast MRI with and without contrast, and computer aided kinetic analysis.   LOCATION: [facility]  DATE: 2/10/2025     HISTORY/INDICATION: 59-year-old female with known, biopsy-proven left breast IDC with left axillary lymph node metastases. Current neoadjuvant chemotherapy.     COMPARISON: Prior breast MRI dated 11/27/2024. Additional prior examinations dated 12/9/2024, 11/18/2024, 11/15/2024, 11/12/2024, 7/11/2022.     TECHNIQUE: Multiplanar, multisequence imaging performed prior to contrast administration and at multiple time points after contrast administration. Post-processing including subtractions, MIPs and color encoding of post contrast dynamic acquisitions.   IV contrast: 10ml Gadavist  SEDATION: None     FINDINGS:  Right Breast:  Breast composition: Scattered fibroglandular tissue  Background parenchymal enhancement: Minimal  The previously seen oval, enhancing mass within the right breast at the 6:00 position, posterior depth has decreased in size and is less conspicuous. Otherwise, there are no new or suspicious masses or areas of nonmass enhancement identified within the   right breast.     Right Axilla: No lymphadenopathy.   Right  Internal Mammary Chain: No lymphadenopathy.      Left Breast:   Breast composition: Scattered fibroglandular tissue  Background parenchymal enhancement: Minimal  There is no residual abnormal enhancement at the site of the previously seen irregular, enhancing mass within the left breast at the 2:00 position, posterior depth, consistent with the known, biopsy-proven malignancy. Additionally, the previously seen   oval, enhancing mass within the left breast in the retroareolar plane at middle depth has also resolved and the second oval, enhancing mass within the retroareolar plane at posterior depth has decreased in size and is less conspicuous.     Left Axilla: Left level I-III lymphadenopathy has also resolved with all visualized lymph nodes within normal limits. The previously biopsied left axillary lymph node consistent with metastasis is demonstrated on axial postcontrast image 59/118).  Left Internal Mammary Chain: The previously described two internal mammary chain lymph nodes measuring up to 0.2 cm are grossly unchanged (axial postcontrast image 62/118 and 59/118).                                                                   IMPRESSION:   1.  No residual abnormal enhancement at the site of known, biopsy-proven malignancy within the left breast at the 2:00 position, posterior depth consistent with treatment response.  2.  Level I-III left axillary lymphadenopathy has resolved and all visualized lymph nodes are within normal limits, also consistent with treatment response. The two previously seen 0.2 cm left internal mammary chain lymph nodes are unchanged.  3.  The previously seen oval, enhancing masses within the right breast at the 6:00 position, posterior depth and within the left breast in the retroareolar region at middle and posterior depth are resolved or have decreased in size and are less   conspicuous.    ASSESSMENT:  Adrienne Ivory is s/p neoadjuvant chemotherapy for a stage III left breast  invasive ductal carcinoma    - T2 N3 ER+ DC- HER2+    PLAN:  She has had a great clinical response.  Based on breast MRI from nearly 2 months ago, she has already had a complete response.  We discussed pursuing either lumpectomy or mastectomy as the next step.  She understands that she would need radiation no matter what surgery she underwent.  She is interested in pursuing bilateral mastectomy without reconstruction.  Since she had a great clinical response to the neoadjuvant chemotherapy, she would be a candidate for a targeted axillary dissection.  We would plan on wire localization to the clipped left axillary node along with dual tracers.  A frozen section would be performed on the clipped node.  Preoperative orders have been placed for bilateral mastectomy with left targeted axillary dissection.  The procedure will be performed under general anesthesia with hopeful discharge home the same day with drains.  We reviewed the postoperative recovery and restrictions.  She would follow-up with myself about 2 weeks after surgery.  Her systemic therapy would then continue with Dr. Harkins depending on the pathologic response and then she would follow-up with radiation.    50 minutes was spent in chart prep, discussion, and documentation.    Katerine Matthews DO  General Surgeon  [facility] [place]  [address] [place] [id number]  Office: [phone]  Employed by - [facility] [employer]

## 2025-03-31 ENCOUNTER — TELEPHONE (OUTPATIENT)
Dept: SURGERY | Facility: CLINIC | Age: 60
End: 2025-03-31

## 2025-03-31 ENCOUNTER — PREP FOR PROCEDURE (OUTPATIENT)
Dept: SURGERY | Facility: CLINIC | Age: 60
End: 2025-03-31

## 2025-03-31 ENCOUNTER — OFFICE VISIT (OUTPATIENT)
Dept: SURGERY | Facility: CLINIC | Age: 60
End: 2025-03-31
Attending: SURGERY
Payer: COMMERCIAL

## 2025-03-31 VITALS — BODY MASS INDEX: 32.99 KG/M2 | HEIGHT: 65 IN | WEIGHT: 198 LBS

## 2025-03-31 DIAGNOSIS — C50.912 INVASIVE DUCTAL CARCINOMA OF LEFT BREAST (H): Primary | ICD-10-CM

## 2025-03-31 PROCEDURE — 99213 OFFICE O/P EST LOW 20 MIN: CPT | Performed by: SURGERY

## 2025-03-31 PROCEDURE — 99215 OFFICE O/P EST HI 40 MIN: CPT | Performed by: SURGERY

## 2025-03-31 PROCEDURE — G2211 COMPLEX E/M VISIT ADD ON: HCPCS | Performed by: SURGERY

## 2025-03-31 NOTE — LETTER
3/31/2025      Adrienne Ivory  [address]  [place] [address]      Dear Colleague,    Thank you for referring your patient, Adrienne Ivory, to the [facility] [place]. Please see a copy of my visit note below.    History:  Adrienne Ivory presents for follow-up of breast cancer.  She is s/p neoadjuvant chemotherapy for a HER2 positive carcinoma involving the left breast.  She was initially diagnosed with this cancer in November 2024.  Imaging before her chemo demonstrated axillary, subpectoral, and possible supraclavicalar/internal mammary involvement.  Breast MRI from February now demonstrates normal appearing lymph nodes and no unusual enhancement in the area of her tumor.  Chemo has been hard on her.  She is happy to be done with this first step.    Physical exam:  BREAST: No visible or palpable abnormalities bilaterally    Imaging:  Pertinent images personally reviewed by myself and discussed with the patient.  Radiology reports:  EXAM: Bilateral Breast MRI with and without contrast, and computer aided kinetic analysis.   LOCATION: [facility]  DATE: 2/10/2025     HISTORY/INDICATION: 59-year-old female with known, biopsy-proven left breast IDC with left axillary lymph node metastases. Current neoadjuvant chemotherapy.     COMPARISON: Prior breast MRI dated 11/27/2024. Additional prior examinations dated 12/9/2024, 11/18/2024, 11/15/2024, 11/12/2024, 7/11/2022.     TECHNIQUE: Multiplanar, multisequence imaging performed prior to contrast administration and at multiple time points after contrast administration. Post-processing including subtractions, MIPs and color encoding of post contrast dynamic acquisitions.   IV contrast: 10ml Gadavist  SEDATION: None     FINDINGS:  Right Breast:  Breast composition: Scattered fibroglandular tissue  Background parenchymal enhancement: Minimal  The previously seen oval, enhancing mass within the right breast at the  6:00 position, posterior depth has decreased in size and is less conspicuous. Otherwise, there are no new or suspicious masses or areas of nonmass enhancement identified within the   right breast.     Right Axilla: No lymphadenopathy.   Right Internal Mammary Chain: No lymphadenopathy.      Left Breast:   Breast composition: Scattered fibroglandular tissue  Background parenchymal enhancement: Minimal  There is no residual abnormal enhancement at the site of the previously seen irregular, enhancing mass within the left breast at the 2:00 position, posterior depth, consistent with the known, biopsy-proven malignancy. Additionally, the previously seen   oval, enhancing mass within the left breast in the retroareolar plane at middle depth has also resolved and the second oval, enhancing mass within the retroareolar plane at posterior depth has decreased in size and is less conspicuous.     Left Axilla: Left level I-III lymphadenopathy has also resolved with all visualized lymph nodes within normal limits. The previously biopsied left axillary lymph node consistent with metastasis is demonstrated on axial postcontrast image 59/118).  Left Internal Mammary Chain: The previously described two internal mammary chain lymph nodes measuring up to 0.2 cm are grossly unchanged (axial postcontrast image 62/118 and 59/118).                                                                   IMPRESSION:   1.  No residual abnormal enhancement at the site of known, biopsy-proven malignancy within the left breast at the 2:00 position, posterior depth consistent with treatment response.  2.  Level I-III left axillary lymphadenopathy has resolved and all visualized lymph nodes are within normal limits, also consistent with treatment response. The two previously seen 0.2 cm left internal mammary chain lymph nodes are unchanged.  3.  The previously seen oval, enhancing masses within the right breast at the 6:00 position, posterior depth and  within the left breast in the retroareolar region at middle and posterior depth are resolved or have decreased in size and are less   conspicuous.    ASSESSMENT:  Adrienne Ivory is s/p neoadjuvant chemotherapy for a stage III left breast invasive ductal carcinoma    - T2 N3 ER+ ID- HER2+    PLAN:  She has had a great clinical response.  Based on breast MRI from nearly 2 months ago, she has already had a complete response.  We discussed pursuing either lumpectomy or mastectomy as the next step.  She understands that she would need radiation no matter what surgery she underwent.  She is interested in pursuing bilateral mastectomy without reconstruction.  Since she had a great clinical response to the neoadjuvant chemotherapy, she would be a candidate for a targeted axillary dissection.  We would plan on wire localization to the clipped left axillary node along with dual tracers.  A frozen section would be performed on the clipped node.  Preoperative orders have been placed for bilateral mastectomy with left targeted axillary dissection.  The procedure will be performed under general anesthesia with hopeful discharge home the same day with drains.  We reviewed the postoperative recovery and restrictions.  She would follow-up with myself about 2 weeks after surgery.  Her systemic therapy would then continue with Dr. Harkins depending on the pathologic response and then she would follow-up with radiation.    50 minutes was spent in chart prep, discussion, and documentation.    Katerine Matthews DO  General Surgeon  [facility]  Breast Center [address] [address]  Office: [phone]  Employed by - [facility] [employer] [employer]      Again, thank you for allowing me to participate in the care of your patient.        Sincerely,        Katerine Matthews DO    Electronically signed

## 2025-03-31 NOTE — LETTER
Pre-op Physical: 4/28/2025 @ 8:40 am with Kallie.    Surgery Date: 4/29/2025     Location: [place] [id number] [place] [id number]    Approximate Arrival Time: 6:00 am  (Unless instructed differently by the pre-op call nurse)     Post op Appointment: 5/12/2025 at 8:45 am with  Dr [facility]   [facility] [address], [place] [id number].       Pre-Surgical Tasks:     Review all medications with your primary care or prescribing physician; they will advise you which meds to stop and when, and when you can resume taking.  Certain medications like blood thinners and weight loss medications need to be stopped in advance of surgery to proceed safely.      Blood thinners including but not exclusive to drugs like Xarelto, Eliquis, Warfarin and Aspirin, should be stopped five days before surgery, if your prescribing provider agrees. Follow your provider's advice on stopping blood thinners because they know you best.  If you are unsure if your medication is a blood thinner, ask your prescribing provider.    Weight loss medications: There are multiple medications being used for weight management and diabetes today, and the list is growing.  Phentermine, Ozempic, Wegovy, Trulicity, and other similar medications need to be stopped one week before surgery to avoid being cancelled.  Victoza and Saxenda can be continued longer but must be stopped one full day before surgery.  Please ask your prescribing provider for advice.    Diabetic medications: in addition to the medications talked about above that are used for either weight loss or diabetes, some people are on insulin that may require adjustment.  Please discuss managing diabetic medications with your prescribing doctor as these medications may require modification prior to surgery.     Please shower the evening before and morning of surgery with Hibiclens soap.  [facility]  Pharmacy can provide this to you at no cost, or it can be found at your local pharmacy.     Fasting instructions will be provided by the pre-op nurse who will call you 1-3 days before surgery.  Typically, we advise normal food up to 8 hours before you arrive for surgery. Clear liquids only from then until 2 hours before you arrive surgery, then nothing at all by mouth.  The nurse will review your specific instructions with you at the call.      Smoking impacts your body's ability to heal properly so we advise patients to quit if possible before surgery.  Plastic Surgery patients are required to be nicotine free for at least 8 weeks before surgery.      You will need an adult to drive you home and stay with you 24 hours after surgery. Public transportation or Medical Van Services are not permitted.    Visitor restrictions are subject to change, please verify with the pre-op nurse when they call how many people are permitted to accompany you.    We always encourage you to notify your insurance any time you have medical tests or procedures scheduled including surgery. The number is usually right on the back of your insurance card. To obtain pricing for surgery, please call  [employer] [place] [facility] of [facility] at [phone] or email [email or web address].        Call our office if you have any questions! Thank you!     Enio Crenshaw  Complex   [facility]  [phone]    Electronically signed

## 2025-03-31 NOTE — NURSING NOTE
Adrienne presents to [facility] for a follow up surgical consult with Dr. Matthews  regarding her Her 2+ breast cancer.  She is accompanied by her  for consult.  She has received NAC with Dr. Harkins.  Last dose was 4-27-25. RN assessment and EMR update.  Patient given a Breast Cancer Packet, contents reviewed.  She met with Dr. Matthews  see dictation for details of visit. She will plan bilateral mastectomies with targeted lymph node dissection, no recon.  Pre and post op teaching complete.  Walked her to surgery scheduler for surgery scheduling.  Support provided, invited calls.

## 2025-03-31 NOTE — TELEPHONE ENCOUNTER
Spoke with patient today to schedule surgery as ordered by the provider.    WC/MVA Related?: No    Went over details/instructions as written on the letter.    Pre Op By: H&P by Primary MD  Post Op Scheduled : YES    Medications:  Blood Thinners? No  Weight Loss Meds? No  Diabetes Meds? No    Surgery Letter sent via  given in clinic.      (Please see LETTERS TAB in chart to retrieve a copy of this letter)    
normal...

## 2025-04-02 ENCOUNTER — INFUSION THERAPY VISIT (OUTPATIENT)
Dept: INFUSION THERAPY | Facility: HOSPITAL | Age: 60
End: 2025-04-02
Attending: INTERNAL MEDICINE
Payer: COMMERCIAL

## 2025-04-02 VITALS
OXYGEN SATURATION: 94 % | TEMPERATURE: 99.6 F | DIASTOLIC BLOOD PRESSURE: 75 MMHG | SYSTOLIC BLOOD PRESSURE: 118 MMHG | HEART RATE: 108 BPM | RESPIRATION RATE: 16 BRPM

## 2025-04-02 DIAGNOSIS — R11.2 NAUSEA AND VOMITING, UNSPECIFIED VOMITING TYPE: Primary | ICD-10-CM

## 2025-04-02 DIAGNOSIS — C50.919 BREAST CANCER (H): ICD-10-CM

## 2025-04-02 PROCEDURE — 258N000003 HC RX IP 258 OP 636: Performed by: INTERNAL MEDICINE

## 2025-04-02 PROCEDURE — 96374 THER/PROPH/DIAG INJ IV PUSH: CPT

## 2025-04-02 PROCEDURE — 96375 TX/PRO/DX INJ NEW DRUG ADDON: CPT

## 2025-04-02 PROCEDURE — 96361 HYDRATE IV INFUSION ADD-ON: CPT

## 2025-04-02 PROCEDURE — 250N000011 HC RX IP 250 OP 636: Performed by: INTERNAL MEDICINE

## 2025-04-02 RX ORDER — HEPARIN SODIUM (PORCINE) LOCK FLUSH IV SOLN 100 UNIT/ML 100 UNIT/ML
SOLUTION INTRAVENOUS
Status: COMPLETED
Start: 2025-04-02 | End: 2025-04-02

## 2025-04-02 RX ORDER — PALONOSETRON 0.05 MG/ML
0.25 INJECTION, SOLUTION INTRAVENOUS ONCE
Start: 2025-04-02 | End: 2025-04-02

## 2025-04-02 RX ORDER — DEXAMETHASONE SODIUM PHOSPHATE 10 MG/ML
8 INJECTION, SOLUTION INTRAMUSCULAR; INTRAVENOUS ONCE
Start: 2025-04-02 | End: 2025-04-02

## 2025-04-02 RX ORDER — SODIUM CHLORIDE 9 MG/ML
INJECTION, SOLUTION INTRAVENOUS ONCE
Start: 2025-04-02 | End: 2025-04-02

## 2025-04-02 RX ORDER — PALONOSETRON 0.05 MG/ML
0.25 INJECTION, SOLUTION INTRAVENOUS ONCE
Status: COMPLETED | OUTPATIENT
Start: 2025-04-02 | End: 2025-04-02

## 2025-04-02 RX ORDER — HEPARIN SODIUM,PORCINE 10 UNIT/ML
5-20 VIAL (ML) INTRAVENOUS DAILY PRN
OUTPATIENT
Start: 2025-04-02

## 2025-04-02 RX ORDER — SODIUM CHLORIDE 9 MG/ML
INJECTION, SOLUTION INTRAVENOUS ONCE
Status: COMPLETED | OUTPATIENT
Start: 2025-04-02 | End: 2025-04-02

## 2025-04-02 RX ORDER — HEPARIN SODIUM (PORCINE) LOCK FLUSH IV SOLN 100 UNIT/ML 100 UNIT/ML
5 SOLUTION INTRAVENOUS
Status: DISCONTINUED | OUTPATIENT
Start: 2025-04-02 | End: 2025-04-02 | Stop reason: HOSPADM

## 2025-04-02 RX ORDER — HEPARIN SODIUM (PORCINE) LOCK FLUSH IV SOLN 100 UNIT/ML 100 UNIT/ML
5 SOLUTION INTRAVENOUS
OUTPATIENT
Start: 2025-04-02

## 2025-04-02 RX ORDER — DEXAMETHASONE SODIUM PHOSPHATE 10 MG/ML
8 INJECTION, SOLUTION INTRAMUSCULAR; INTRAVENOUS ONCE
Status: COMPLETED | OUTPATIENT
Start: 2025-04-02 | End: 2025-04-02

## 2025-04-02 RX ADMIN — SODIUM CHLORIDE 1000 ML: 0.9 INJECTION, SOLUTION INTRAVENOUS at 14:06

## 2025-04-02 RX ADMIN — PALONOSETRON 0.25 MG: 0.05 INJECTION, SOLUTION INTRAVENOUS at 14:10

## 2025-04-02 RX ADMIN — DEXAMETHASONE SODIUM PHOSPHATE 8 MG: 10 INJECTION INTRAMUSCULAR; INTRAVENOUS at 14:06

## 2025-04-02 RX ADMIN — HEPARIN 5 ML: 100 SYRINGE at 15:06

## 2025-04-02 RX ADMIN — HEPARIN SODIUM (PORCINE) LOCK FLUSH IV SOLN 100 UNIT/ML 5 ML: 100 SOLUTION at 15:06

## 2025-04-02 NOTE — PROGRESS NOTES
Infusion Nursing Note:  Adrienne Ivory presents today for IV fluids anti-nausea medications.    Patient seen by provider today: No   present during visit today: Not Applicable.    Note: Patient comes to Infusion center for IV fluids. Pt. Complains of nausea, no emesis. Denies any problems eating or drinking today. Nausea subsided after she received her premedications.    Premeds Given: aloxi and dexamethasone IV    Intravenous Access:  Implanted Port.    Treatment Conditions:  Not Applicable.      Post Infusion Assessment:  Patient tolerated infusion without incident.  Site patent and intact, free from redness, edema or discomfort.  No evidence of extravasations.  Access discontinued per protocol.       Discharge Plan:   Patient and/or family verbalized understanding of discharge instructions and all questions answered.      Estefani Bruno RN

## 2025-04-08 DIAGNOSIS — R12 HEARTBURN: ICD-10-CM

## 2025-04-08 RX ORDER — OMEPRAZOLE 40 MG/1
40 CAPSULE, DELAYED RELEASE ORAL DAILY
Qty: 90 CAPSULE | Refills: 0 | OUTPATIENT
Start: 2025-04-08

## 2025-04-08 NOTE — TELEPHONE ENCOUNTER
Refusing refill request, too early for refill.    Last Written Prescription Date:  3/5/25  Last Fill Quantity: 90,  # refills: 0   Last office visit provider:  3/27/25 with Dr. Harkins     Requested Prescriptions   Pending Prescriptions Disp Refills    omeprazole (PRILOSEC) 40 MG DR capsule [Pharmacy Med Name: OMEPRAZOLE 40MG CAPSULES] 90 capsule 0     Sig: TAKE 1 CAPSULE(40 MG) BY MOUTH DAILY       PPI Protocol Failed - 4/8/2025 10:34 AM        Failed - Recent (12 mo) or future (90 days) visit within the authorizing provider's specialty     The patient must have completed an in-person or virtual visit within the past 12 months or has a future visit scheduled within the next 90 days with the authorizing provider s specialty.  Urgent care and e-visits do not qualify as an office visit for this protocol.          Passed - Medication is active on med list and the sig matches. RN to manually verify dose and sig if red X/fail.     If the protocol passes (green check), you do not need to verify med dose and sig.    A prescription matches if they are the same clinical intention.    For Example: once daily and every morning are the same.    The protocol can not identify upper and lower case letters as matching and will fail.     For Example: Take 1 tablet (50 mg) by mouth daily     TAKE 1 TABLET (50 MG) BY MOUTH DAILY    For all fails (red x), verify dose and sig.    If the refill does match what is on file, the RN can still proceed to approve the refill request.       If they do not match, route to the appropriate provider.             Passed - Medication indicated for associated diagnosis     The medication is prescribed for one or more of the following conditions:     Erosive esophagitis    Gastritis   Gastric hypersecretion   Gastric ulcer   Gastroesophageal reflux disease   Helicobacter pylori gastrointestinal tract infection   Ulcer of duodenum   Drug-induced peptic ulcer   Esophageal stricture   Gastrointestinal  hemorrhage   Indigestion   Stress ulcer   Zollinger-Hyde syndrome   Patino s esophagus   Laryngopharyngeal reflux   Epigastric Pain   Morbid Obesity   Cough   History of Peptic Ulcer   Esophageal Atresia, Stenosis and Fistula   Cystic Fibrosis  Bronchiectasis          Passed - Patient is age 18 or older        Passed - No active pregnacy on record        Passed - No positive pregnancy test in past 12 months             Nissa Valdes RN 04/08/25 10:34 AM

## 2025-04-10 ENCOUNTER — INFUSION THERAPY VISIT (OUTPATIENT)
Dept: INFUSION THERAPY | Facility: HOSPITAL | Age: 60
End: 2025-04-10
Attending: INTERNAL MEDICINE
Payer: COMMERCIAL

## 2025-04-10 VITALS
OXYGEN SATURATION: 95 % | DIASTOLIC BLOOD PRESSURE: 74 MMHG | SYSTOLIC BLOOD PRESSURE: 129 MMHG | HEART RATE: 89 BPM | TEMPERATURE: 98.3 F | RESPIRATION RATE: 16 BRPM

## 2025-04-10 DIAGNOSIS — R11.2 NAUSEA AND VOMITING, UNSPECIFIED VOMITING TYPE: Primary | ICD-10-CM

## 2025-04-10 DIAGNOSIS — C50.919 BREAST CANCER (H): ICD-10-CM

## 2025-04-10 PROCEDURE — 258N000003 HC RX IP 258 OP 636: Performed by: INTERNAL MEDICINE

## 2025-04-10 PROCEDURE — 250N000011 HC RX IP 250 OP 636: Performed by: INTERNAL MEDICINE

## 2025-04-10 PROCEDURE — 250N000011 HC RX IP 250 OP 636: Performed by: NURSE PRACTITIONER

## 2025-04-10 RX ORDER — HEPARIN SODIUM,PORCINE 10 UNIT/ML
5-20 VIAL (ML) INTRAVENOUS DAILY PRN
OUTPATIENT
Start: 2025-04-10

## 2025-04-10 RX ORDER — SODIUM CHLORIDE 9 MG/ML
INJECTION, SOLUTION INTRAVENOUS ONCE
Status: COMPLETED | OUTPATIENT
Start: 2025-04-10 | End: 2025-04-10

## 2025-04-10 RX ORDER — DEXAMETHASONE SODIUM PHOSPHATE 10 MG/ML
8 INJECTION, SOLUTION INTRAMUSCULAR; INTRAVENOUS ONCE
Start: 2025-04-10 | End: 2025-04-10

## 2025-04-10 RX ORDER — HEPARIN SODIUM (PORCINE) LOCK FLUSH IV SOLN 100 UNIT/ML 100 UNIT/ML
5 SOLUTION INTRAVENOUS
OUTPATIENT
Start: 2025-04-10

## 2025-04-10 RX ORDER — PALONOSETRON 0.05 MG/ML
0.25 INJECTION, SOLUTION INTRAVENOUS ONCE
Status: COMPLETED | OUTPATIENT
Start: 2025-04-10 | End: 2025-04-10

## 2025-04-10 RX ORDER — HEPARIN SODIUM (PORCINE) LOCK FLUSH IV SOLN 100 UNIT/ML 100 UNIT/ML
5 SOLUTION INTRAVENOUS
Status: DISCONTINUED | OUTPATIENT
Start: 2025-04-10 | End: 2025-04-10 | Stop reason: HOSPADM

## 2025-04-10 RX ORDER — SODIUM CHLORIDE 9 MG/ML
INJECTION, SOLUTION INTRAVENOUS ONCE
Start: 2025-04-10 | End: 2025-04-10

## 2025-04-10 RX ORDER — DEXAMETHASONE SODIUM PHOSPHATE 10 MG/ML
8 INJECTION, SOLUTION INTRAMUSCULAR; INTRAVENOUS ONCE
Status: COMPLETED | OUTPATIENT
Start: 2025-04-10 | End: 2025-04-10

## 2025-04-10 RX ORDER — PALONOSETRON 0.05 MG/ML
0.25 INJECTION, SOLUTION INTRAVENOUS ONCE
Start: 2025-04-10 | End: 2025-04-10

## 2025-04-10 RX ADMIN — SODIUM CHLORIDE: 9 INJECTION, SOLUTION INTRAVENOUS at 11:02

## 2025-04-10 RX ADMIN — PALONOSETRON 0.25 MG: 0.05 INJECTION, SOLUTION INTRAVENOUS at 11:11

## 2025-04-10 RX ADMIN — DEXAMETHASONE SODIUM PHOSPHATE 8 MG: 10 INJECTION INTRAMUSCULAR; INTRAVENOUS at 11:11

## 2025-04-10 RX ADMIN — HEPARIN 5 ML: 100 SYRINGE at 11:39

## 2025-04-10 NOTE — PROGRESS NOTES
Infusion Nursing Note:  Adrienne Ivory presents today for IVF and nausea meds.    Patient seen by provider today: No   present during visit today: Not Applicable.    Note: /74 (Patient Position: Sitting)   Pulse 89   Temp 98.3  F (36.8  C)   Resp 16   SpO2 95%   .    Premeds Given: aloxi and dexamethasone IV    Intravenous Access:  Implanted Port.    Treatment Conditions:  Not Applicable.      Post Infusion Assessment:  Patient tolerated infusion without incident.  Blood return noted pre and post infusion.  Site patent and intact, free from redness, edema or discomfort.  No evidence of extravasations.  Access discontinued per protocol.       Discharge Plan:   Patient discharged in stable condition accompanied by: self.  Departure Mode: Ambulatory.      Keyla Hamilton RN

## 2025-04-15 ENCOUNTER — TRANSFERRED RECORDS (OUTPATIENT)
Dept: HEALTH INFORMATION MANAGEMENT | Facility: CLINIC | Age: 60
End: 2025-04-15
Payer: COMMERCIAL

## 2025-04-17 ENCOUNTER — PATIENT OUTREACH (OUTPATIENT)
Dept: ONCOLOGY | Facility: HOSPITAL | Age: 60
End: 2025-04-17

## 2025-04-17 ENCOUNTER — INFUSION THERAPY VISIT (OUTPATIENT)
Dept: INFUSION THERAPY | Facility: HOSPITAL | Age: 60
End: 2025-04-17
Attending: INTERNAL MEDICINE
Payer: COMMERCIAL

## 2025-04-17 ENCOUNTER — ONCOLOGY VISIT (OUTPATIENT)
Dept: ONCOLOGY | Facility: HOSPITAL | Age: 60
End: 2025-04-17
Attending: INTERNAL MEDICINE
Payer: COMMERCIAL

## 2025-04-17 VITALS
TEMPERATURE: 97.6 F | DIASTOLIC BLOOD PRESSURE: 86 MMHG | SYSTOLIC BLOOD PRESSURE: 129 MMHG | OXYGEN SATURATION: 98 % | WEIGHT: 197 LBS | BODY MASS INDEX: 32.82 KG/M2 | HEIGHT: 65 IN | HEART RATE: 105 BPM | RESPIRATION RATE: 17 BRPM

## 2025-04-17 DIAGNOSIS — Z17.0 MALIGNANT NEOPLASM OF UPPER-INNER QUADRANT OF LEFT BREAST IN FEMALE, ESTROGEN RECEPTOR POSITIVE (H): Primary | ICD-10-CM

## 2025-04-17 DIAGNOSIS — C50.212 MALIGNANT NEOPLASM OF UPPER-INNER QUADRANT OF LEFT BREAST IN FEMALE, ESTROGEN RECEPTOR POSITIVE (H): Primary | ICD-10-CM

## 2025-04-17 DIAGNOSIS — C50.412 MALIGNANT NEOPLASM OF UPPER-OUTER QUADRANT OF LEFT BREAST IN FEMALE, ESTROGEN RECEPTOR POSITIVE (H): Primary | ICD-10-CM

## 2025-04-17 DIAGNOSIS — C50.912 INVASIVE DUCTAL CARCINOMA OF LEFT BREAST (H): Primary | ICD-10-CM

## 2025-04-17 DIAGNOSIS — F41.9 ANXIETY: ICD-10-CM

## 2025-04-17 DIAGNOSIS — Z17.0 MALIGNANT NEOPLASM OF UPPER-OUTER QUADRANT OF LEFT BREAST IN FEMALE, ESTROGEN RECEPTOR POSITIVE (H): Primary | ICD-10-CM

## 2025-04-17 LAB
ALBUMIN SERPL BCG-MCNC: 3.5 G/DL (ref 3.5–5.2)
ALP SERPL-CCNC: 66 U/L (ref 40–150)
ALT SERPL W P-5'-P-CCNC: 13 U/L (ref 0–50)
ANION GAP SERPL CALCULATED.3IONS-SCNC: 12 MMOL/L (ref 7–15)
AST SERPL W P-5'-P-CCNC: 19 U/L (ref 0–45)
BASOPHILS # BLD AUTO: 0 10E3/UL (ref 0–0.2)
BASOPHILS NFR BLD AUTO: 0 %
BILIRUB SERPL-MCNC: 0.2 MG/DL
BUN SERPL-MCNC: 6.3 MG/DL (ref 6–20)
CALCIUM SERPL-MCNC: 8.6 MG/DL (ref 8.8–10.4)
CHLORIDE SERPL-SCNC: 104 MMOL/L (ref 98–107)
CREAT SERPL-MCNC: 0.67 MG/DL (ref 0.51–0.95)
EGFRCR SERPLBLD CKD-EPI 2021: >90 ML/MIN/1.73M2
EOSINOPHIL # BLD AUTO: 0 10E3/UL (ref 0–0.7)
EOSINOPHIL NFR BLD AUTO: 0 %
ERYTHROCYTE [DISTWIDTH] IN BLOOD BY AUTOMATED COUNT: 17.2 % (ref 10–15)
GLUCOSE SERPL-MCNC: 95 MG/DL (ref 70–99)
HCO3 SERPL-SCNC: 26 MMOL/L (ref 22–29)
HCT VFR BLD AUTO: 30.8 % (ref 35–47)
HGB BLD-MCNC: 10.1 G/DL (ref 11.7–15.7)
IMM GRANULOCYTES # BLD: 0.1 10E3/UL
IMM GRANULOCYTES NFR BLD: 1 %
LYMPHOCYTES # BLD AUTO: 2 10E3/UL (ref 0.8–5.3)
LYMPHOCYTES NFR BLD AUTO: 17 %
MCH RBC QN AUTO: 31.7 PG (ref 26.5–33)
MCHC RBC AUTO-ENTMCNC: 32.8 G/DL (ref 31.5–36.5)
MCV RBC AUTO: 97 FL (ref 78–100)
MONOCYTES # BLD AUTO: 1.2 10E3/UL (ref 0–1.3)
MONOCYTES NFR BLD AUTO: 9 %
NEUTROPHILS # BLD AUTO: 9 10E3/UL (ref 1.6–8.3)
NEUTROPHILS NFR BLD AUTO: 73 %
NRBC # BLD AUTO: 0 10E3/UL
NRBC BLD AUTO-RTO: 0 /100
PLATELET # BLD AUTO: 220 10E3/UL (ref 150–450)
POTASSIUM SERPL-SCNC: 3.4 MMOL/L (ref 3.4–5.3)
PROT SERPL-MCNC: 5.6 G/DL (ref 6.4–8.3)
RBC # BLD AUTO: 3.19 10E6/UL (ref 3.8–5.2)
SODIUM SERPL-SCNC: 142 MMOL/L (ref 135–145)
WBC # BLD AUTO: 12.3 10E3/UL (ref 4–11)

## 2025-04-17 PROCEDURE — 82374 ASSAY BLOOD CARBON DIOXIDE: CPT

## 2025-04-17 PROCEDURE — 250N000011 HC RX IP 250 OP 636: Performed by: INTERNAL MEDICINE

## 2025-04-17 PROCEDURE — 99213 OFFICE O/P EST LOW 20 MIN: CPT | Performed by: INTERNAL MEDICINE

## 2025-04-17 PROCEDURE — 82310 ASSAY OF CALCIUM: CPT

## 2025-04-17 PROCEDURE — 85025 COMPLETE CBC W/AUTO DIFF WBC: CPT

## 2025-04-17 PROCEDURE — 36591 DRAW BLOOD OFF VENOUS DEVICE: CPT

## 2025-04-17 PROCEDURE — 258N000003 HC RX IP 258 OP 636: Performed by: INTERNAL MEDICINE

## 2025-04-17 RX ORDER — ALBUTEROL SULFATE 90 UG/1
1-2 INHALANT RESPIRATORY (INHALATION)
Status: CANCELLED
Start: 2025-04-17

## 2025-04-17 RX ORDER — HEPARIN SODIUM,PORCINE 10 UNIT/ML
5-20 VIAL (ML) INTRAVENOUS DAILY PRN
Status: CANCELLED | OUTPATIENT
Start: 2025-04-17

## 2025-04-17 RX ORDER — DIPHENHYDRAMINE HYDROCHLORIDE 50 MG/ML
50 INJECTION, SOLUTION INTRAMUSCULAR; INTRAVENOUS
Status: CANCELLED
Start: 2025-04-17

## 2025-04-17 RX ORDER — METHYLPREDNISOLONE SODIUM SUCCINATE 40 MG/ML
40 INJECTION INTRAMUSCULAR; INTRAVENOUS
Status: CANCELLED
Start: 2025-04-17

## 2025-04-17 RX ORDER — EPINEPHRINE 1 MG/ML
0.3 INJECTION, SOLUTION INTRAMUSCULAR; SUBCUTANEOUS EVERY 5 MIN PRN
Status: CANCELLED | OUTPATIENT
Start: 2025-04-17

## 2025-04-17 RX ORDER — ACETAMINOPHEN 325 MG/1
650 TABLET ORAL ONCE
Status: CANCELLED | OUTPATIENT
Start: 2025-04-17

## 2025-04-17 RX ORDER — DIPHENHYDRAMINE HYDROCHLORIDE 50 MG/ML
25 INJECTION, SOLUTION INTRAMUSCULAR; INTRAVENOUS
Status: CANCELLED
Start: 2025-04-17

## 2025-04-17 RX ORDER — HEPARIN SODIUM (PORCINE) LOCK FLUSH IV SOLN 100 UNIT/ML 100 UNIT/ML
5 SOLUTION INTRAVENOUS
Status: DISCONTINUED | OUTPATIENT
Start: 2025-04-17 | End: 2025-04-17 | Stop reason: HOSPADM

## 2025-04-17 RX ORDER — DIPHENHYDRAMINE HCL 50 MG
50 CAPSULE ORAL ONCE
Status: CANCELLED | OUTPATIENT
Start: 2025-04-17

## 2025-04-17 RX ORDER — LORAZEPAM 2 MG/ML
0.5 INJECTION INTRAMUSCULAR EVERY 4 HOURS PRN
Status: CANCELLED | OUTPATIENT
Start: 2025-04-17

## 2025-04-17 RX ORDER — HEPARIN SODIUM (PORCINE) LOCK FLUSH IV SOLN 100 UNIT/ML 100 UNIT/ML
5 SOLUTION INTRAVENOUS
Status: CANCELLED | OUTPATIENT
Start: 2025-04-17

## 2025-04-17 RX ORDER — MEPERIDINE HYDROCHLORIDE 25 MG/ML
25 INJECTION INTRAMUSCULAR; INTRAVENOUS; SUBCUTANEOUS
Status: CANCELLED | OUTPATIENT
Start: 2025-04-17

## 2025-04-17 RX ORDER — ALBUTEROL SULFATE 0.83 MG/ML
2.5 SOLUTION RESPIRATORY (INHALATION)
Status: CANCELLED | OUTPATIENT
Start: 2025-04-17

## 2025-04-17 RX ADMIN — HEPARIN 5 ML: 100 SYRINGE at 11:56

## 2025-04-17 RX ADMIN — SODIUM CHLORIDE 540 MG: 0.9 INJECTION, SOLUTION INTRAVENOUS at 11:24

## 2025-04-17 RX ADMIN — SODIUM CHLORIDE 250 ML: 0.9 INJECTION, SOLUTION INTRAVENOUS at 11:23

## 2025-04-17 ASSESSMENT — PAIN SCALES - GENERAL: PAINLEVEL_OUTOF10: NO PAIN (0)

## 2025-04-17 NOTE — PROGRESS NOTES
[facility]: Cancer Care Follow-Up Note                                    Discussion with Patient:                                                      Met with Adrienne when she was in the infusion bay receiving Trastuzumab . She is happy to have completed AC. She is scheduled for bilateral mastectomies on 4/29  and is teary about surgery but knows that she is making a thoughtful choice for her treatment. We discussed body image changes. Her So is very supportive of her decision and this has been very helpful for her. She has many layers of support. Did offer oncology therapy  referral and she is interested in this. Writer placed referral and she can decide the timing that is best for her to schedule. Did encourage her to consider scheduling prior to her surgery. She is in the process of moving out of her apt and fully into her SO house and she has limited time available for extra apts. She is scheduled for follow up with Dr. Harkins post surgery, on 5.7, and will keep this apt as scheduled to review path and discuss treatment post surgery. She has writer's contact information and  was encouraged to call if any questions or concerns arise. Wished her well with her surgery and recovery until we see her back at our clinic in the future.        Dates of Treatment:                                                      Infusion given in last 28 days       Administered MAR Action Medication Dose Rate Visit    03/27/2025 10:52 New Bag pertuzumab (PERJETA) 420 mg in sodium chloride 0.9 % 289 mL infusion 420 mg 578 mL/hr Infusion Therapy Visit on 03/27/2025 in [facility]    03/27/2025 11:25 New Bag trastuzumab-qyyp (TRAZIMERA) 600 mg in sodium chloride 0.9 % 303.57 mL infusion 600 mg 607.1 mL/hr Infusion Therapy Visit on 03/27/2025 in [facility]    03/27/2025 12:00 New Bag DOCEtaxel (TAXOTERE) 160 mg in sodium chloride 0.9% in non-PVC container 283 mL infusion 160  mg 283 mL/hr Infusion Therapy Visit on 03/27/2025 in [facility]    03/27/2025 13:04 New Bag CARBOplatin 700 mg in sodium chloride 0.9 % 620 mL infusion 700 mg 1,240 mL/hr Infusion Therapy Visit on 03/27/2025 in [facility]    04/17/2025 11:24 New Bag trastuzumab-qyyp (TRAZIMERA) 540 mg in sodium chloride 0.9 % 300.71 mL infusion 540 mg 600 mL/hr Infusion Therapy Visit on 04/17/2025 in [facility]            Assessment:                                                      See above    Intervention/Education provided during outreach:                                                       See above documentation    Confirmed patient has clinic and triage numbers    Signature:  Radha Stephen RN

## 2025-04-17 NOTE — PROGRESS NOTES
"Oncology Rooming Note    April 17, 2025 9:44 AM   Adrienne Ivory is a 59 year old female who presents for:    Chief Complaint   Patient presents with    Oncology Clinic Visit     Malignant neoplasm of upper-inner quadrant of left breast in female, estrogen receptor positive     Initial Vitals: /86 (BP Location: Left arm, Patient Position: Sitting, Cuff Size: Adult Regular)   Pulse 105   Temp 97.6  F (36.4  C) (Tympanic)   Resp 17   Ht 1.651 m (5' 5\")   Wt 89.4 kg (197 lb)   SpO2 98%   BMI 32.78 kg/m   Estimated body mass index is 32.78 kg/m  as calculated from the following:    Height as of this encounter: 1.651 m (5' 5\").    Weight as of this encounter: 89.4 kg (197 lb). Body surface area is 2.02 meters squared.  No Pain (0) Comment: Data Unavailable   No LMP recorded. Patient has had an ablation.  Allergies reviewed: Yes  Medications reviewed: Yes    Medications: Medication refills not needed today.  Pharmacy name entered into [facility]:    [facility] - [place], [place] - [address] [place] [address] DRUG STORE #[id number] [address]  AT [address] HOME [facility] - [address]    Frailty Screening:   Is the patient here for a new oncology consult visit in cancer care? 2. No    PHQ9:  Did this patient require a PHQ9?: No      Clinical concerns:   Follow up labs.   Pt reported fatigue, neuropathy numbness to fingers. Watery eyes, sore eyes by bedtime from wateriness throughout day, and slight itchiness to bilateral eyes, started since chemo tx started per pt.       Shari Panchal MA            "

## 2025-04-17 NOTE — PROGRESS NOTES
[facility] Hematology and Oncology Progress Note    Patient: Adrienne Ivory  MRN: [id number]  Date of Service: Apr 17, 2025             ECOG Performance    0 - Independent          ______________________________________________________________________________  Oncologic history  T2 N3 M0 stage IIIc invasive ductal carcinoma of the left breast ER positive [place] positive HER2/wolf 3+.  November 2024  Patient had multiple level 1 level 2 and level 3 axillary lymph nodes involved    Treatment  Patient started on neoadjuvant TCHP on 12/12/2024  Near complete radiologic remission after 3 cycles of TCHP  Completed 6 cycle of TCHP on 3/27/2025  Switched to single agent trastuzumab on 4/17/2025 with plans to change further treatment based on pathologic response    History of Present Illness    Ms. Adrienne Ivory is here for follow-up.  She has finished 6 cycles of TCHP.  She is scheduled for surgery on 29 April.  She is slightly nervous about it.  She otherwise feels great and has no concerns.  She denies any shortness of breath or dyspnea on exertion she denies any fever chills pain or discomfort.    Review of systems  A comprehensive 12 point review of system was done that was negative except what is mentioned in the history of present illness    Past History    Past Medical History:   Diagnosis Date    Abnormal Pap smear, can't excl hi gd sq intraepithelial lesion (ASC-H) 03/01/2015    LSIL also    Anxiety state, unspecified     Herpes simplex without mention of complication     HSIL on Pap smear of cervix 07/01/2014    colp - WNL    Human papillomavirus in conditions classified elsewhere and of unspecified site 11/01/2010    + HPV 45 & 82 with ASCUS    Hx of colposcopy with cervical biopsy 11/08/2016    Telephone ECC neg.     Obese     PONV (postoperative nausea and vomiting)     Premenstrual tension syndromes        Past Surgical History:   Procedure Laterality Date    C IUD,MIRENA  2/08-3/11    removed for cramping  "   COLONOSCOPY N/A 12/4/2020    Procedure: COLONOSCOPY, WITH POLYPECTOMY;  Surgeon: Moises Pride MD;  Location: [facility] OR    DILATION AND CURETTAGE, ABLATE ENDOMETRIUM NOVASURE, COMBINED N/A 12/31/2015    Procedure: COMBINED DILATION AND CURETTAGE, ABLATE ENDOMETRIUM NOVASURE;  Surgeon: Shakira Penaloza MD;  Location: [facility]    HYSTEROSCOPY DIAGNOSTIC N/A 12/31/2015    Procedure: HYSTEROSCOPY DIAGNOSTIC;  Surgeon: Shakira Penaloza MD;  Location: [facility]    LEEP TX, CERVICAL  4/3/15    ARNAV 2-3, negative margins    NO HISTORY OF SURGERY         Physical Exam    /86 (BP Location: Left arm, Patient Position: Sitting, Cuff Size: Adult Regular)   Pulse 105   Temp 97.6  F (36.4  C) (Tympanic)   Resp 17   Ht 1.651 m (5' 5\")   Wt 89.4 kg (197 lb)   SpO2 98%   BMI 32.78 kg/m      General: alert, awake, not in acute distress  HEENT: Head: Normal, normocephalic, atraumatic.  Eye: Normal external eye, conjunctiva, lids cornea, SITA.  Nose: Normal external nose, mucus membranes and septum.  Pharynx: Normal buccal mucosa. Normal pharynx.  Neck / Thyroid: Supple, no masses, nodes, nodules or enlargement.  Lymphatics: No abnormally enlarged lymph nodes.  Chest: Normal chest wall and respirations. Clear to auscultation.  No crackles or rhonchi's  Heart: S1 S2 RRR, no murmur.   Abdomen: abdomen is soft without significant tenderness, masses, organomegaly or guarding  Extremities: normal strength, tone, and muscle mass  Skin: normal. no rash or abnormalities  CNS: non focal.    Breast exam on the left side did show shrinkage in the mass I could still feel a lymph node in the axilla      Lab Results    Recent Results (from the past 240 hours)   Comprehensive metabolic panel (BMP + Alb, Alk Phos, ALT, AST, Total. Bili, TP)    Collection Time: 04/17/25  9:22 AM   Result Value Ref Range    Sodium 142 135 - 145 mmol/L    Potassium 3.4 3.4 - 5.3 mmol/L    Carbon Dioxide (CO2) 26 22 - 29 mmol/L    Anion Gap 12 7 - 15 " mmol/L    Urea Nitrogen 6.3 6.0 - 20.0 mg/dL    Creatinine 0.67 0.51 - 0.95 mg/dL    GFR Estimate >90 >60 mL/min/1.73m2    Calcium 8.6 (L) 8.8 - 10.4 mg/dL    Chloride 104 98 - 107 mmol/L    Glucose 95 70 - 99 mg/dL    Alkaline Phosphatase 66 40 - 150 U/L    AST 19 0 - 45 U/L    ALT 13 0 - 50 U/L    Protein Total 5.6 (L) 6.4 - 8.3 g/dL    Albumin 3.5 3.5 - 5.2 g/dL    Bilirubin Total 0.2 <=1.2 mg/dL   CBC with platelets and differential    Collection Time: 04/17/25  9:22 AM   Result Value Ref Range    WBC Count 12.3 (H) 4.0 - 11.0 10e3/uL    RBC Count 3.19 (L) 3.80 - 5.20 10e6/uL    Hemoglobin 10.1 (L) 11.7 - 15.7 g/dL    Hematocrit 30.8 (L) 35.0 - 47.0 %    MCV 97 78 - 100 fL    MCH 31.7 26.5 - 33.0 pg    MCHC 32.8 31.5 - 36.5 g/dL    RDW 17.2 (H) 10.0 - 15.0 %    Platelet Count 220 150 - 450 10e3/uL    % Neutrophils 73 %    % Lymphocytes 17 %    % Monocytes 9 %    % Eosinophils 0 %    % Basophils 0 %    % Immature Granulocytes 1 %    NRBCs per 100 WBC 0 <1 /100    Absolute Neutrophils 9.0 (H) 1.6 - 8.3 10e3/uL    Absolute Lymphocytes 2.0 0.8 - 5.3 10e3/uL    Absolute Monocytes 1.2 0.0 - 1.3 10e3/uL    Absolute Eosinophils 0.0 0.0 - 0.7 10e3/uL    Absolute Basophils 0.0 0.0 - 0.2 10e3/uL    Absolute Immature Granulocytes 0.1 <=0.4 10e3/uL    Absolute NRBCs 0.0 10e3/uL         Imaging    No results found.        Assessment and Plan    Stage IIIc breast cancer HER2 positive, ER/NC positive  Patient is here in follow-up.  She has finished 6 cycles of TCHP.  She is awaiting surgery which is scheduled on 29 April.  For now I will continue her on single agent trastuzumab.  Further change in treatment will be based on her pathologic response.  We will discuss that on her next visit on 7 May.    Cardiac monitoring  Patient does needs continued cardiac monitoring because of the use of HER2 directed agents.  Her last echo was in March and this is her third dose after the echo.  Will plan to repeat the echo after the fourth  dose    Nausea  patient has had severe nausea after each cycle.  She was worried about it today but she is only getting trastuzumab and she was counseled that she should not have any nausea after today's dose    Patient will be referred for an opinion to our radiation oncology colleagues after surgical management is complete hormonal therapy will be started after radiation therapy has been completed    Signed by: Josseline Harkins MD        CC: Kallie Ta NP

## 2025-04-17 NOTE — PROGRESS NOTES
Infusion Nursing Note:  Adrienne Ivory presents today for D1C1 trastuzumab. Patient has had several doses already with completed chemo treatment plan  Patient seen by provider today: Yes: Dr. Harkins   present during visit today: Not Applicable.    Note: Reviewed plan of care, including chemotherapy is completed, no premedications needed.     Patient is packing up apartment she shared with partner of 30+ years and is scheduled for a double mastectomy 4/29. Patient teary-eyed, states her current partner, Viraj is supportive. Actively listened, offered support, and reminded patient of services of Caleb Das, Onc Psychothrapist.    Premeds Given: none    Intravenous Access:  Labs drawn and Implanted Port accessed by Maritza anderson blood return.    Treatment Conditions:  Lab Results   Component Value Date    HGB 10.1 (L) 04/17/2025    WBC 12.3 (H) 04/17/2025    ANEU 27.3 (H) 12/19/2024    ANEUTAUTO 9.0 (H) 04/17/2025     04/17/2025        Lab Results   Component Value Date     04/17/2025    POTASSIUM 3.4 04/17/2025    MAG 1.6 (L) 01/29/2025    CR 0.67 04/17/2025    CONNIE 8.6 (L) 04/17/2025    BILITOTAL 0.2 04/17/2025    ALBUMIN 3.5 04/17/2025    ALT 13 04/17/2025    AST 19 04/17/2025       Results reviewed, labs MET treatment parameters, ok to proceed with treatment.      Post Infusion Assessment:  Patient tolerated infusion without incident.  Blood return noted pre and post infusion.  Site patent and intact, free from redness, edema or discomfort.  Access discontinued per protocol.       Discharge Plan:   Patient will return 5/7 for next appointment.   Patient discharged in stable condition accompanied by: self.  Departure Mode: Ambulatory.      Daylin Sal RN

## 2025-04-17 NOTE — LETTER
"4/17/2025      Adrienne Ivory  [address]  [place] [address]      Dear Colleague,    Thank you for referring your patient, Adrienne Ivory, to the [facility] [place]. Please see a copy of my visit note below.    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    Weight as of this encounter: 89.4 kg (197 lb). Body surface area is 2.02 meters squared.  No Pain (0) Comment: Data Unavailable   No LMP recorded. Patient has had an ablation.  Allergies reviewed: Yes  Medications reviewed: Yes    Medications: Medication refills not needed today.  Pharmacy name entered into [employer]:    [facility] - [place], [place] - [address] [id number] [place], [place] - [address] [place] [address] AT [address] HOME DELIVERY - [address]    Frailty Screening:   Is the patient here for a new oncology consult visit in cancer care? 2. No    PHQ9:  Did this patient require a PHQ9?: No      Clinical concerns:   Follow up labs.   Pt reported fatigue, neuropathy numbness to fingers. Watery eyes, sore eyes by bedtime from wateriness throughout day, and slight itchiness to bilateral eyes, started since chemo tx started per pt.       Shari Panchal MA              [facility] Hematology and Oncology Progress Note    Patient: Adrienne ANTHONY" Diaenlys  MRN: [id number]  Date of Service: Apr 17, 2025             ECOG Performance    0 - Independent          ______________________________________________________________________________  Oncologic history  T2 N3 M0 stage IIIc invasive ductal carcinoma of the left breast ER positive [place] positive HER2/wolf 3+.  November 2024  Patient had multiple level 1 level 2 and level 3 axillary lymph nodes involved    Treatment  Patient started on neoadjuvant TCHP on 12/12/2024  Near complete radiologic remission after 3 cycles of TCHP  Completed 6 cycle of TCHP on 3/27/2025  Switched to single agent trastuzumab on 4/17/2025 with plans to change further treatment based on pathologic response    History of Present Illness    Ms. Adrienne Ivory is here for follow-up.  She has finished 6 cycles of TCHP.  She is scheduled for surgery on 29 April.  She is slightly nervous about it.  She otherwise feels great and has no concerns.  She denies any shortness of breath or dyspnea on exertion she denies any fever chills pain or discomfort.    Review of systems  A comprehensive 12 point review of system was done that was negative except what is mentioned in the history of present illness    Past History    Past Medical History:   Diagnosis Date     Abnormal Pap smear, can't excl hi gd sq intraepithelial lesion (ASC-H) 03/01/2015    LSIL also     Anxiety state, unspecified      Herpes simplex without mention of complication      HSIL on Pap smear of cervix 07/01/2014    colp - WNL     Human papillomavirus in conditions classified elsewhere and of unspecified site 11/01/2010    + HPV 45 & 82 with ASCUS     Hx of colposcopy with cervical biopsy 11/08/2016    Dinosaur ECC neg.      Obese      PONV (postoperative nausea and vomiting)      Premenstrual tension syndromes        Past Surgical History:   Procedure Laterality Date     C IUD,MIRENA  2/08-3/11    removed for cramping     COLONOSCOPY N/A 12/4/2020    Procedure: COLONOSCOPY, WITH  "POLYPECTOMY;  Surgeon: Moises Pride MD;  Location: [facility] OR     DILATION AND CURETTAGE, ABLATE ENDOMETRIUM NOVASURE, COMBINED N/A 12/31/2015    Procedure: COMBINED DILATION AND CURETTAGE, ABLATE ENDOMETRIUM NOVASURE;  Surgeon: Shakira Penaloza MD;  Location: [facility] OR     HYSTEROSCOPY DIAGNOSTIC N/A 12/31/2015    Procedure: HYSTEROSCOPY DIAGNOSTIC;  Surgeon: Shakira Penaloza MD;  [facility]     LEEP TX, CERVICAL  4/3/15    ARNAV 2-3, negative margins     NO HISTORY OF SURGERY         Physical Exam    /86 (BP Location: Left arm, Patient Position: Sitting, Cuff Size: Adult Regular)   Pulse 105   Temp 97.6  F (36.4  C) (Tympanic)   Resp 17   Ht 1.651 m (5' 5\")   Wt 89.4 kg (197 lb)   SpO2 98%   BMI 32.78 kg/m      General: alert, awake, not in acute distress  HEENT: Head: Normal, normocephalic, atraumatic.  Eye: Normal external eye, conjunctiva, lids cornea, SITA.  Nose: Normal external nose, mucus membranes and septum.  Pharynx: Normal buccal mucosa. Normal pharynx.  Neck / Thyroid: Supple, no masses, nodes, nodules or enlargement.  Lymphatics: No abnormally enlarged lymph nodes.  Chest: Normal chest wall and respirations. Clear to auscultation.  No crackles or rhonchi's  Heart: S1 S2 RRR, no murmur.   Abdomen: abdomen is soft without significant tenderness, masses, organomegaly or guarding  Extremities: normal strength, tone, and muscle mass  Skin: normal. no rash or abnormalities  CNS: non focal.    Breast exam on the left side did show shrinkage in the mass I could still feel a lymph node in the axilla      Lab Results    Recent Results (from the past 240 hours)   Comprehensive metabolic panel (BMP + Alb, Alk Phos, ALT, AST, Total. Bili, TP)    Collection Time: 04/17/25  9:22 AM   Result Value Ref Range    Sodium 142 135 - 145 mmol/L    Potassium 3.4 3.4 - 5.3 mmol/L    Carbon Dioxide (CO2) 26 22 - 29 mmol/L    Anion Gap 12 7 - 15 mmol/L    Urea Nitrogen 6.3 6.0 - 20.0 mg/dL    Creatinine " 0.67 0.51 - 0.95 mg/dL    GFR Estimate >90 >60 mL/min/1.73m2    Calcium 8.6 (L) 8.8 - 10.4 mg/dL    Chloride 104 98 - 107 mmol/L    Glucose 95 70 - 99 mg/dL    Alkaline Phosphatase 66 40 - 150 U/L    AST 19 0 - 45 U/L    ALT 13 0 - 50 U/L    Protein Total 5.6 (L) 6.4 - 8.3 g/dL    Albumin 3.5 3.5 - 5.2 g/dL    Bilirubin Total 0.2 <=1.2 mg/dL   CBC with platelets and differential    Collection Time: 04/17/25  9:22 AM   Result Value Ref Range    WBC Count 12.3 (H) 4.0 - 11.0 10e3/uL    RBC Count 3.19 (L) 3.80 - 5.20 10e6/uL    Hemoglobin 10.1 (L) 11.7 - 15.7 g/dL    Hematocrit 30.8 (L) 35.0 - 47.0 %    MCV 97 78 - 100 fL    MCH 31.7 26.5 - 33.0 pg    MCHC 32.8 31.5 - 36.5 g/dL    RDW 17.2 (H) 10.0 - 15.0 %    Platelet Count 220 150 - 450 10e3/uL    % Neutrophils 73 %    % Lymphocytes 17 %    % Monocytes 9 %    % Eosinophils 0 %    % Basophils 0 %    % Immature Granulocytes 1 %    NRBCs per 100 WBC 0 <1 /100    Absolute Neutrophils 9.0 (H) 1.6 - 8.3 10e3/uL    Absolute Lymphocytes 2.0 0.8 - 5.3 10e3/uL    Absolute Monocytes 1.2 0.0 - 1.3 10e3/uL    Absolute Eosinophils 0.0 0.0 - 0.7 10e3/uL    Absolute Basophils 0.0 0.0 - 0.2 10e3/uL    Absolute Immature Granulocytes 0.1 <=0.4 10e3/uL    Absolute NRBCs 0.0 10e3/uL         Imaging    No results found.        Assessment and Plan    Stage IIIc breast cancer HER2 positive, ER/RI positive  Patient is here in follow-up.  She has finished 6 cycles of TCHP.  She is awaiting surgery which is scheduled on 29 April.  For now I will continue her on single agent trastuzumab.  Further change in treatment will be based on her pathologic response.  We will discuss that on her next visit on 7 May.    Cardiac monitoring  Patient does needs continued cardiac monitoring because of the use of HER2 directed agents.  Her last echo was in March and this is her third dose after the echo.  Will plan to repeat the echo after the fourth dose    Nausea  patient has had severe nausea after each  cycle.  She was worried about it today but she is only getting trastuzumab and she was counseled that she should not have any nausea after today's dose    Patient will be referred for an opinion to our radiation oncology colleagues after surgical management is complete hormonal therapy will be started after radiation therapy has been completed    Signed by: Josseline Harkins MD        CC: Kallie Ta NP       Again, thank you for allowing me to participate in the care of your patient.        Sincerely,        Josseline Harkins MD    Electronically signed

## 2025-04-19 ENCOUNTER — HEALTH MAINTENANCE LETTER (OUTPATIENT)
Age: 60
End: 2025-04-19

## 2025-04-22 ENCOUNTER — MYC MEDICAL ADVICE (OUTPATIENT)
Dept: FAMILY MEDICINE | Facility: CLINIC | Age: 60
End: 2025-04-22

## 2025-04-22 ENCOUNTER — TELEPHONE (OUTPATIENT)
Dept: SURGERY | Facility: CLINIC | Age: 60
End: 2025-04-22

## 2025-04-22 NOTE — TELEPHONE ENCOUNTER
Adrienne called this am. Said she come down with a respiratory virus and is not feeling well. No fever, but has a bad cough, muscle pain and weakness.  Her upcoming surgery with Dr. Matthews for her breast cancer is 4-29-25.  She is worried about needing to postpone surgery.  Support provided. Encouraged her to contact her primary to see if they may want to test her for flu or Covid and she could possibly start an antiviral.  Encouraged rest, fluids and self care in the meantime. Told her to call early next week to check in and see how she is feeling. Encouraged calls sooner with any needs.

## 2025-04-23 DIAGNOSIS — C50.212 MALIGNANT NEOPLASM OF UPPER-INNER QUADRANT OF LEFT BREAST IN FEMALE, ESTROGEN RECEPTOR POSITIVE (H): ICD-10-CM

## 2025-04-23 DIAGNOSIS — R19.7 DIARRHEA, UNSPECIFIED TYPE: ICD-10-CM

## 2025-04-23 DIAGNOSIS — Z17.0 MALIGNANT NEOPLASM OF UPPER-INNER QUADRANT OF LEFT BREAST IN FEMALE, ESTROGEN RECEPTOR POSITIVE (H): ICD-10-CM

## 2025-04-23 RX ORDER — POTASSIUM CHLORIDE 1500 MG/1
20 TABLET, EXTENDED RELEASE ORAL 2 TIMES DAILY
Qty: 60 TABLET | Refills: 1 | Status: SHIPPED | OUTPATIENT
Start: 2025-04-23

## 2025-04-24 ENCOUNTER — ANCILLARY PROCEDURE (OUTPATIENT)
Dept: GENERAL RADIOLOGY | Facility: CLINIC | Age: 60
End: 2025-04-24
Payer: COMMERCIAL

## 2025-04-24 ENCOUNTER — NURSE TRIAGE (OUTPATIENT)
Dept: FAMILY MEDICINE | Facility: CLINIC | Age: 60
End: 2025-04-24

## 2025-04-24 ENCOUNTER — OFFICE VISIT (OUTPATIENT)
Dept: FAMILY MEDICINE | Facility: CLINIC | Age: 60
End: 2025-04-24
Payer: COMMERCIAL

## 2025-04-24 VITALS
SYSTOLIC BLOOD PRESSURE: 118 MMHG | WEIGHT: 190 LBS | OXYGEN SATURATION: 95 % | BODY MASS INDEX: 32.44 KG/M2 | TEMPERATURE: 98 F | RESPIRATION RATE: 16 BRPM | DIASTOLIC BLOOD PRESSURE: 73 MMHG | HEIGHT: 64 IN | HEART RATE: 96 BPM

## 2025-04-24 DIAGNOSIS — J06.9 VIRAL URI: ICD-10-CM

## 2025-04-24 DIAGNOSIS — J06.9 VIRAL URI: Primary | ICD-10-CM

## 2025-04-24 LAB
FLUAV RNA SPEC QL NAA+PROBE: NEGATIVE
FLUBV RNA RESP QL NAA+PROBE: NEGATIVE
RSV RNA SPEC NAA+PROBE: NEGATIVE
SARS-COV-2 RNA RESP QL NAA+PROBE: NEGATIVE

## 2025-04-24 ASSESSMENT — PATIENT HEALTH QUESTIONNAIRE - PHQ9
SUM OF ALL RESPONSES TO PHQ QUESTIONS 1-9: 6
SUM OF ALL RESPONSES TO PHQ QUESTIONS 1-9: 6
10. IF YOU CHECKED OFF ANY PROBLEMS, HOW DIFFICULT HAVE THESE PROBLEMS MADE IT FOR YOU TO DO YOUR WORK, TAKE CARE OF THINGS AT HOME, OR GET ALONG WITH OTHER PEOPLE: SOMEWHAT DIFFICULT

## 2025-04-24 ASSESSMENT — ENCOUNTER SYMPTOMS: COUGH: 1

## 2025-04-24 NOTE — TELEPHONE ENCOUNTER
"Pt calling to discuss illness and upcoming double mastectomy on 4/29  Symptoms started Monday with cough, fatigue, and fever.  Cough is mildly productive of clear, sometimes pink-tinged sputum.  Fever was up to 99.5.  \"I slept all of Mon and Tues.\"  Of note pt recently finished a course of chemotherapy.      Pt still feels \"so fatigued\" and had a \"hard coughing fit this morning that took my breath away.\"  Endorses feeling \"wheezy and tight\" but not acutely SOB.    Pt scheduled today for eval and testing (no COVID test yet).  Pt is markedly upset at the thought of having to postpone her surgery.    SUHA HarveyN, PHN, RN-[facility]  [phone]    "

## 2025-04-24 NOTE — PROGRESS NOTES
"  Assessment & Plan     Viral URI  Will obtain CXR, influenza, covid, RSV testing in clinic today, results pending and will follow-up with any necessary treatment.  Scheduled for bilateral mastectomy on 4/29, pre op is on Monday 4/28. Surgeon's office is aware she is not feeling well. Will see what above testing shows and monitor her symptoms throughout the weekend and go from there.   Discussed if worsening cough, shortness of breath, wheezing should be seen in ER  - Influenza A/B, RSV and SARS-CoV2 PCR (COVID-19)  - XR Chest 2 Views    Subjective   Adrienne is a 59 year old, presenting for the following health issues:  Cough        4/24/2025    12:44 PM   Additional Questions   Roomed by ruth   Accompanied by self     History of Present Illness       Reason for visit:  Cough that won't go away  Symptom onset:  3-7 days ago  Symptoms include:  Deep hacking cough, lack of energy, trouble taking deep breaths  Symptom intensity:  Severe  Symptom progression:  Staying the same  Had these symptoms before:  No  What makes it worse:  Moving fast, laying on my left side or stomach,  What makes it better:  Soaking in the bath tub and being asleep   She is taking medications regularly.        Pt presents with URI symptoms- started 1 week ago on Thurs 4/17. Worst of her symptoms was this past mon and tues- having higher temps 99.4 (nothing over 100) extreme fatigue, cough.     Cough is productive of phlegm   Some intermittent shortness of breath with exertion   Had a bad coughing fit where she was gasping for air     Took dayquil and nyquil as needed for cough.     Complicated as she is scheduled for double mastectomy on Tuesday 4/29 and she is worried about not being able to have surgery     Last chemo was about 4 weeks ago.        Objective    /73   Pulse 96   Temp 98  F (36.7  C) (Temporal)   Resp 16   Ht 1.63 m (5' 4.17\")   Wt 86.2 kg (190 lb)   SpO2 95%   BMI 32.44 kg/m    Body mass index is 32.44 " kg/m .  Physical Exam   GENERAL: alert and no distress  EYES: Eyes grossly normal to inspection  HENT: ear canals and TM's normal, nose and mouth without ulcers or lesions  RESP: dry hacking cough, rhonchi bilaterally without wheezing  CV: regular rate and rhythm, normal S1 S2, no S3 or S4, no murmur, click or rub  PSYCH: mentation appears normal, affect normal/bright            Signed Electronically by: AMARILIS Rivers CNP

## 2025-04-27 ASSESSMENT — PATIENT HEALTH QUESTIONNAIRE - PHQ9
SUM OF ALL RESPONSES TO PHQ QUESTIONS 1-9: 4
SUM OF ALL RESPONSES TO PHQ QUESTIONS 1-9: 4
10. IF YOU CHECKED OFF ANY PROBLEMS, HOW DIFFICULT HAVE THESE PROBLEMS MADE IT FOR YOU TO DO YOUR WORK, TAKE CARE OF THINGS AT HOME, OR GET ALONG WITH OTHER PEOPLE: SOMEWHAT DIFFICULT

## 2025-04-28 ENCOUNTER — OFFICE VISIT (OUTPATIENT)
Dept: FAMILY MEDICINE | Facility: CLINIC | Age: 60
End: 2025-04-28
Payer: COMMERCIAL

## 2025-04-28 ENCOUNTER — ANESTHESIA EVENT (OUTPATIENT)
Dept: SURGERY | Facility: CLINIC | Age: 60
End: 2025-04-28
Payer: COMMERCIAL

## 2025-04-28 VITALS
BODY MASS INDEX: 30.42 KG/M2 | HEART RATE: 110 BPM | HEIGHT: 65 IN | SYSTOLIC BLOOD PRESSURE: 102 MMHG | DIASTOLIC BLOOD PRESSURE: 70 MMHG | OXYGEN SATURATION: 96 % | RESPIRATION RATE: 22 BRPM | TEMPERATURE: 97.4 F | WEIGHT: 182.6 LBS

## 2025-04-28 DIAGNOSIS — J06.9 UPPER RESPIRATORY TRACT INFECTION, UNSPECIFIED TYPE: ICD-10-CM

## 2025-04-28 DIAGNOSIS — F33.1 MAJOR DEPRESSIVE DISORDER, RECURRENT EPISODE, MODERATE (H): ICD-10-CM

## 2025-04-28 DIAGNOSIS — C50.912 INVASIVE DUCTAL CARCINOMA OF LEFT BREAST (H): ICD-10-CM

## 2025-04-28 DIAGNOSIS — Z01.818 PREOP GENERAL PHYSICAL EXAM: Primary | ICD-10-CM

## 2025-04-28 PROCEDURE — G2211 COMPLEX E/M VISIT ADD ON: HCPCS | Performed by: NURSE PRACTITIONER

## 2025-04-28 PROCEDURE — 99214 OFFICE O/P EST MOD 30 MIN: CPT | Performed by: NURSE PRACTITIONER

## 2025-04-28 PROCEDURE — 3074F SYST BP LT 130 MM HG: CPT | Performed by: NURSE PRACTITIONER

## 2025-04-28 PROCEDURE — 3078F DIAST BP <80 MM HG: CPT | Performed by: NURSE PRACTITIONER

## 2025-04-28 PROCEDURE — 96127 BRIEF EMOTIONAL/BEHAV ASSMT: CPT | Performed by: NURSE PRACTITIONER

## 2025-04-28 PROCEDURE — 1126F AMNT PAIN NOTED NONE PRSNT: CPT | Performed by: NURSE PRACTITIONER

## 2025-04-28 RX ORDER — ALBUTEROL SULFATE 90 UG/1
2 INHALANT RESPIRATORY (INHALATION) EVERY 6 HOURS PRN
Qty: 18 G | Refills: 0 | Status: SHIPPED | OUTPATIENT
Start: 2025-04-28

## 2025-04-28 ASSESSMENT — PAIN SCALES - GENERAL: PAINLEVEL_OUTOF10: NO PAIN (0)

## 2025-04-28 NOTE — OR NURSING
Pt stated she is recovering from a URI last week. She is feeling much better and has not had any fever in over 1 week. Her only symptom is a occ lingering cough that is much improved. She will address this with her PCP today at her pre op.     Pt also stated that when she did have her URI and fever she dev a rash on her chest and scratched a few spots that are open. I asked her to address this with her PCP today as well as send a pic to Dr. Byron LOYD. I also asked her to address the use of hibiclens with the PCP/surgeon. Pt stated understanding.

## 2025-04-28 NOTE — H&P (VIEW-ONLY)
Preoperative Evaluation  [address]  [address]  [place] [address]  Phone: [phone]  Fax: [phone]  Primary Provider: Kallie Ta NP  Pre-op Performing Provider: Kallie Ta NP  Apr 28, 2025 4/23/2025   Surgical Information   What procedure is being done? Double mastectomy   Facility or Hospital where procedure/surgery will be performed: [facility]   Who is doing the procedure / surgery? Dr Katerine Matthews   Date of surgery / procedure: April 29   Time of surgery / procedure: 6 am   Where do you plan to recover after surgery? at home with family     Fax number for surgical facility: Note does not need to be faxed, will be available electronically in Epic.    Assessment & Plan     The proposed surgical procedure is considered INTERMEDIATE risk.    (Z01.818) Preop general physical exam  (primary encounter diagnosis)  Comment:   Plan: Discussed that rash on chest is not worrisome - can use hydrocortisone cream on it today, OK to use Hibiclens.    (C50.912) Invasive ductal carcinoma of left breast (H)  Comment:   Plan:     (F33.1) Major depressive disorder, recurrent episode, moderate (H)  Comment: stable  Plan:     (J06.9) Upper respiratory tract infection, unspecified type  Comment: improving  Plan: albuterol (PROAIR HFA/PROVENTIL HFA/VENTOLIN         HFA) 108 (90 Base) MCG/ACT inhaler        Use albuterol inhaler as needed for cough/wheezing.     The longitudinal plan of care for the diagnosis(es)/condition(s) as documented were addressed during this visit. Due to the added complexity in care, I will continue to support Adrienne in the subsequent management and with ongoing continuity of care.        - No identified additional risk factors other than previously addressed    Preoperative Medication Instructions  Antiplatelet or Anticoagulation Medication Instructions   - We reviewed the medication list and the patient is not on an  antiplatelet or anticoagulation medications.    Additional Medication Instructions  Take all scheduled medications on the day of surgery OK to take once she gets home from surgery    Recommendation  Approval given to proceed with proposed procedure, without further diagnostic evaluation.        Wolfgang Deleon is a 59 year old, presenting for the following:  Pre-Op Exam (DOS: 4/29/2025/Double mastectomy/)        HPI: She had a cold last week with fever, developed a mildly itchy rash on her chest, which she believes to be a heat rash (she did have a fever last week).  She still has na mild non-productive residual cough.              4/23/2025   Pre-Op Questionnaire   Have you ever had a heart attack or stroke? No   Have you ever had surgery on your heart or blood vessels, such as a stent placement, a coronary artery bypass, or surgery on an artery in your head, neck, heart, or legs? No   Do you have chest pain with activity? No   Do you have a history of heart failure? No   Do you currently have a cold, bronchitis or symptoms of other infection? (!) YES last week URI   Do you have a cough, shortness of breath, or wheezing? (!) YES mild residual cough   Do you or anyone in your family have previous history of blood clots? No   Do you or does anyone in your family have a serious bleeding problem such as prolonged bleeding following surgeries or cuts? No   Have you ever had problems with anemia or been told to take iron pills? No   Have you had any abnormal blood loss such as black, tarry or bloody stools, or abnormal vaginal bleeding? No   Have you ever had a blood transfusion? No   Are you willing to have a blood transfusion if it is medically needed before, during, or after your surgery? Yes   Have you or any of your relatives ever had problems with anesthesia? (!) YES difficulty waking up in distant past   Do you have sleep apnea, excessive snoring or daytime drowsiness? No   Do you have any artifical heart  valves or other implanted medical devices like a pacemaker, defibrillator, or continuous glucose monitor? No   Do you have artificial joints? No   Are you allergic to latex? No     Advance Care Planning    Discussed advance care planning with patient; informed AVS has link to [facility].    Preoperative Review of    reviewed - no record of controlled substances prescribed.          Patient Active Problem List    Diagnosis Date Noted    Nausea with vomiting 01/29/2025     Priority: Medium    Breast cancer (H) 11/25/2024     Priority: Medium    Invasive ductal carcinoma of left breast (H) 11/25/2024     Priority: Medium    Class 2 obesity due to excess calories with body mass index (BMI) of 36.0 to 36.9 in adult, unspecified whether serious comorbidity present 02/21/2024     Priority: Medium    Anxiety 01/24/2018     Priority: Medium    Major depressive disorder, recurrent episode, moderate (H) 05/23/2017     Priority: Medium    Left ankle pain 04/11/2016     Priority: Medium    CARDIOVASCULAR SCREENING; LDL GOAL LESS THAN 160 10/31/2010     Priority: Medium    ADHD (attention deficit hyperactivity disorder) 08/20/2009     Priority: Medium    Dysmenorrhea 01/23/2008     Priority: Medium    Insomnia 12/15/2006     Priority: Medium     Problem list name updated by automated process. Provider to review      Anxiety state 01/13/2005     Priority: Medium     Problem list name updated by automated process. Provider to review      Premenstrual tension syndrome 01/13/2005     Priority: Medium     Problem list name updated by automated process. Provider to review      Herpes simplex virus (HSV) infection 01/13/2005     Priority: Medium     Problem list name updated by automated process. Provider to review      Moderate dysplasia of cervix (ARNAV II)      Priority: Medium     11/2010: ASCUS, + HPV 45 & 82. Noncompliant with colp  8/12: NIL pap  7/14: HSIL. 8/19/14: [place]:WNL Plan pap/ecc in 6 months  3/3/15: ASC-H plus  LSIL pap, neg ECC. Plan  LEEP  4/2015 LEEP ARNAV II, clear margins.  cotest one year  09/29/16: diagnostic NIL pap, + HR HPV (not 16 or 18) result. Plan [place] per provider.  11/08/16: [place] ECC neg. Plan cotest in 1 year per provider.  01/24/18: NIL pap, Neg HR HPV result. Plan cotest in 1 year.   02/14/19 Patient is lost to pap tracking follow-up.   08/29/19: NIL Pap, Neg HR HPV result. Plan cotest in 3 years.  11/8/22 NIL pap, Neg HPV . Plan cotest in 3 years.          Past Medical History:   Diagnosis Date    Abnormal Pap smear, can't excl hi gd sq intraepithelial lesion (ASC-H) 03/01/2015    LSIL also    Anxiety state, unspecified     Herpes simplex without mention of complication     HSIL on Pap smear of cervix 07/01/2014    colp - WNL    Human papillomavirus in conditions classified elsewhere and of unspecified site 11/01/2010    + HPV 45 & 82 with ASCUS    Hx of colposcopy with cervical biopsy 11/08/2016    [place] ECC neg.     Obese     PONV (postoperative nausea and vomiting)     Premenstrual tension syndromes      Past Surgical History:   Procedure Laterality Date    C IUD,MIRENA  2/08-3/11    removed for cramping    COLONOSCOPY N/A 12/4/2020    Procedure: COLONOSCOPY, WITH POLYPECTOMY;  Surgeon: Moises Pride MD;  Location: [facility] OR    DILATION AND CURETTAGE, ABLATE ENDOMETRIUM NOVASURE, COMBINED N/A 12/31/2015    Procedure: COMBINED DILATION AND CURETTAGE, ABLATE ENDOMETRIUM NOVASURE;  Surgeon: Shakira Penaloza MD;  Location: [facility] OR    HYSTEROSCOPY DIAGNOSTIC N/A 12/31/2015    Procedure: HYSTEROSCOPY DIAGNOSTIC;  Surgeon: Shakira Penaloza MD;  Location: [facility] OR    LEEP TX, CERVICAL  4/3/15    ARNAV 2-3, negative margins    NO HISTORY OF SURGERY       Current Outpatient Medications   Medication Sig Dispense Refill    albuterol (PROAIR HFA/PROVENTIL HFA/VENTOLIN HFA) 108 (90 Base) MCG/ACT inhaler Inhale 2 puffs into the lungs every 6 hours as needed for shortness of breath, wheezing or cough. 18 g 0     "clobetasol propionate (TEMOVATE) 0.05 % external cream Apply topically 2 times daily. 45 g 0    diphenoxylate-atropine (LOMOTIL) 2.5-0.025 MG tablet Take 1 tablet by mouth 4 times daily as needed for diarrhea. 50 tablet 0    lidocaine-prilocaine (EMLA) 2.5-2.5 % external cream Apply a small amount to the port site 30-60 minutes prior to access. 30 g 2    omeprazole (PRILOSEC) 40 MG DR capsule Take 1 capsule (40 mg) by mouth daily. 90 capsule 0    potassium chloride kizzy ER (KLOR-CON M20) 20 MEQ CR tablet TAKE 1 TABLET(20 MEQ) BY MOUTH TWICE DAILY 60 tablet 1    valACYclovir (VALTREX) 500 MG tablet Take 1 tablet (500 mg) by mouth daily. 90 tablet 4       No Known Allergies     Social History     Tobacco Use    Smoking status: Never     Passive exposure: Past    Smokeless tobacco: Never   Substance Use Topics    Alcohol use: No       History   Drug Use No             Review of Systems  CONSTITUTIONAL: NEGATIVE for fever, chills, change in weight  INTEGUMENTARY/SKIN: see HPI  EYES: NEGATIVE for vision changes or irritation  ENT/MOUTH: NEGATIVE for ear, mouth and throat problems  RESP:see hPI  BREAST: NEGATIVE for masses, tenderness or discharge  CV: NEGATIVE for chest pain, palpitations or peripheral edema  GI: NEGATIVE for nausea, abdominal pain, heartburn, or change in bowel habits  : NEGATIVE for frequency, dysuria, or hematuria  MUSCULOSKELETAL: NEGATIVE for significant arthralgias or myalgia  NEURO: NEGATIVE for weakness, dizziness or paresthesias  ENDOCRINE: NEGATIVE for temperature intolerance, skin/hair changes  HEME: NEGATIVE for bleeding problems  PSYCHIATRIC: NEGATIVE for changes in mood or affect    Objective    /70 (BP Location: Right arm, Patient Position: Sitting, Cuff Size: Adult Regular)   Pulse 110   Temp 97.4  F (36.3  C) (Temporal)   Resp 22   Ht 1.65 m (5' 4.96\")   Wt 82.8 kg (182 lb 9.6 oz)   SpO2 96%   BMI 30.42 kg/m     Estimated body mass index is 30.42 kg/m  as calculated from " "the following:    Height as of this encounter: 1.65 m (5' 4.96\").    Weight as of this encounter: 82.8 kg (182 lb 9.6 oz).  Physical Exam  GENERAL: alert and no distress  EYES: Eyes grossly normal to inspection, PERRL and conjunctivae and sclerae normal  HENT: ear canals and TM's normal, nose and mouth without ulcers or lesions  NECK: no adenopathy, no asymmetry, masses, or scars  RESP: expiratory wheezes scattered bilaterally  CV: regular rate and rhythm, normal S1 S2, no S3 or S4, no murmur, click or rub, no peripheral edema  ABDOMEN: soft, nontender, no hepatosplenomegaly, no masses and bowel sounds normal  MS: no gross musculoskeletal defects noted, no edema  SKIN: erythematous papular rash on anterior chest  NEURO: Normal strength and tone, mentation intact and speech normal  PSYCH: mentation appears normal, affect normal/bright    Recent Labs   Lab Test 04/17/25  0922 03/27/25  0847   HGB 10.1* 10.2*    225    140   POTASSIUM 3.4 3.3*   CR 0.67 0.74        Diagnostics  Recent Results (from the past 720 hours)   Comprehensive metabolic panel (BMP + Alb, Alk Phos, ALT, AST, Total. Bili, TP)    Collection Time: 04/17/25  9:22 AM   Result Value Ref Range    Sodium 142 135 - 145 mmol/L    Potassium 3.4 3.4 - 5.3 mmol/L    Carbon Dioxide (CO2) 26 22 - 29 mmol/L    Anion Gap 12 7 - 15 mmol/L    Urea Nitrogen 6.3 6.0 - 20.0 mg/dL    Creatinine 0.67 0.51 - 0.95 mg/dL    GFR Estimate >90 >60 mL/min/1.73m2    Calcium 8.6 (L) 8.8 - 10.4 mg/dL    Chloride 104 98 - 107 mmol/L    Glucose 95 70 - 99 mg/dL    Alkaline Phosphatase 66 40 - 150 U/L    AST 19 0 - 45 U/L    ALT 13 0 - 50 U/L    Protein Total 5.6 (L) 6.4 - 8.3 g/dL    Albumin 3.5 3.5 - 5.2 g/dL    Bilirubin Total 0.2 <=1.2 mg/dL   CBC with platelets and differential    Collection Time: 04/17/25  9:22 AM   Result Value Ref Range    WBC Count 12.3 (H) 4.0 - 11.0 10e3/uL    RBC Count 3.19 (L) 3.80 - 5.20 10e6/uL    Hemoglobin 10.1 (L) 11.7 - 15.7 g/dL    " Hematocrit 30.8 (L) 35.0 - 47.0 %    MCV 97 78 - 100 fL    MCH 31.7 26.5 - 33.0 pg    MCHC 32.8 31.5 - 36.5 g/dL    RDW 17.2 (H) 10.0 - 15.0 %    Platelet Count 220 150 - 450 10e3/uL    % Neutrophils 73 %    % Lymphocytes 17 %    % Monocytes 9 %    % Eosinophils 0 %    % Basophils 0 %    % Immature Granulocytes 1 %    NRBCs per 100 WBC 0 <1 /100    Absolute Neutrophils 9.0 (H) 1.6 - 8.3 10e3/uL    Absolute Lymphocytes 2.0 0.8 - 5.3 10e3/uL    Absolute Monocytes 1.2 0.0 - 1.3 10e3/uL    Absolute Eosinophils 0.0 0.0 - 0.7 10e3/uL    Absolute Basophils 0.0 0.0 - 0.2 10e3/uL    Absolute Immature Granulocytes 0.1 <=0.4 10e3/uL    Absolute NRBCs 0.0 10e3/uL   Influenza A/B, RSV and SARS-CoV2 PCR (COVID-19) Nose    Collection Time: 04/24/25  3:51 PM    Specimen: Nose; Swab   Result Value Ref Range    Influenza A PCR Negative Negative    Influenza B PCR Negative Negative    RSV PCR Negative Negative    SARS CoV2 PCR Negative Negative      No EKG required, no history of coronary heart disease, significant arrhythmia, peripheral arterial disease or other structural heart disease.    Revised Cardiac Risk Index (RCRI)  The patient has the following serious cardiovascular risks for perioperative complications:   - No serious cardiac risks = 0 points     RCRI Interpretation: 0 points: Class I (very low risk - 0.4% complication rate)         Signed Electronically by: Klalie Ta NP  A copy of this evaluation report is provided to the requesting physician.         Answers submitted by the patient for this visit:  Patient Health Questionnaire (Submitted on 4/27/2025)  If you checked off any problems, how difficult have these problems made it for you to do your work, take care of things at home, or get along with other people?: Somewhat difficult  PHQ9 TOTAL SCORE: 4

## 2025-04-28 NOTE — PATIENT INSTRUCTIONS
How to Take Your Medication Before Surgery  Preoperative Medication Instructions   Antiplatelet or Anticoagulation Medication Instructions   - We reviewed the medication list and the patient is not on an antiplatelet or anticoagulation medications.    Additional Medication Instructions  Take all scheduled medications on the day of surgery OK to take once she gets home from surgery       Patient Education   Preparing for Your Surgery  For Adults  Getting started  In most cases, a nurse will call to review your health history and instructions. They will give you an arrival time based on your scheduled surgery time. Please be ready to share:  Your doctor's clinic name and phone number  Your medical, surgical, and anesthesia history  A list of allergies and sensitivities  A list of medicines, including herbal treatments and over-the-counter drugs  Whether the patient has a legal guardian (ask how to send us the papers in advance)  Note: You may not receive a call if you were seen at our PAC (Preoperative Assessment Center).  Please tell us if you're pregnant--or if there's any chance you might be pregnant. Some surgeries may injure a fetus (unborn baby), so they require a pregnancy test. Surgeries that are safe for a fetus don't always need a test, and you can choose whether to have one.   Preparing for surgery  Within 10 to 30 days of surgery: Have a pre-op exam (sometimes called an H&P, or History and Physical). This can be done at a clinic or pre-operative center.  If you're having a , you may not need this exam. Talk to your care team.  At your pre-op exam, talk to your care team about all medicines you take. (This includes CBD oil and any drugs, such as THC, marijuana, and other forms of cannabis.) If you need to stop any medicine before surgery, ask when to start taking it again.  This is for your safety. Many medicines and drugs can make you bleed too much during surgery. Some change how well surgery  (anesthesia) drugs work.  Call your insurance company to let them know you're having surgery. (If you don't have insurance, call [phone].)  Call your clinic if there's any change in your health. This includes a scrape or scratch near the surgery site, or any signs of a cold (sore throat, runny nose, cough, rash, fever).  Eating and drinking guidelines  For your safety: Unless your surgeon tells you otherwise, follow the guidelines below.  Eat and drink as normal until 8 hours before you arrive for surgery. After that, no food or milk. You can spit out gum when you arrive.  Drink clear liquids until 2 hours before you arrive. These are liquids you can see through, like water, Gatorade, and Propel Water. They also include plain black coffee and tea (no cream or milk).  No alcohol for 24 hours before you arrive. The night before surgery, stop any drinks that contain THC.  If your care team tells you to take medicine on the morning of surgery, it's okay to take it with a sip of water. No other medicines or drugs are allowed (including CBD oil)--follow your care team's instructions.  If you have questions the day of surgery, call your hospital or surgery center.   Preventing infection  Shower or bathe the night before and the morning of surgery. Follow the instructions your clinic gave you. (If no instructions, use regular soap.)  Don't shave or clip hair near your surgery site. We'll remove the hair if needed.  Don't smoke or vape the morning of surgery. No chewing tobacco for 6 hours before you arrive. A nicotine patch is okay. You may spit out nicotine gum when you arrive.  For some surgeries, the surgeon will tell you to fully quit smoking and nicotine.  We will make every effort to keep you safe from infection. We will:  Clean our hands often with soap and water (or an alcohol-based hand rub).  Clean the skin at your surgery site with a special soap that kills germs.  Give you a special gown to keep you warm.  (Cold raises the risk of infection.)  Wear hair covers, masks, gowns, and gloves during surgery.  Give antibiotic medicine, if prescribed. Not all surgeries need this medicine.  What to bring on the day of surgery  Photo ID and insurance card  Copy of your health care directive, if you have one  Glasses and hearing aids (bring cases)  You can't wear contacts during surgery  Inhaler and eye drops, if you use them (tell us about these when you arrive)  CPAP machine or breathing device, if you use them  A few personal items, if spending the night  If you have . . .  A pacemaker, ICD (cardiac defibrillator), or other implant: Bring the ID card.  An implanted stimulator: Bring the remote control.  A legal guardian: Bring a copy of the certified (court-stamped) guardianship papers.  Please remove any jewelry, including body piercings. Leave jewelry and other valuables at home.  If you're going home the day of surgery  You must have a responsible adult drive you home. They should stay with you overnight as well.  If you don't have someone to stay with you, and you aren't safe to go home alone, we may keep you overnight. Insurance often won't pay for this.  After surgery  If it's hard to control your pain or you need more pain medicine, please call your surgeon's office.  Questions?   If you have any questions for your care team, list them here:   ____________________________________________________________________________________________________________________________________________________________________________________________________________________________________________________________  For informational purposes only. Not to replace the advice of your health care provider. Copyright   2003, 2019 [facility]. All rights reserved. Clinically reviewed by Thee Servin MD. [email or web address] [id number] - REV 08/24.

## 2025-04-28 NOTE — PROGRESS NOTES
Preoperative Evaluation  [address]  [address]  [place] [address]  Phone: [phone]  Fax: [phone]  Primary Provider: Kallie Ta NP  Pre-op Performing Provider: Kallie Ta NP  Apr 28, 2025 4/23/2025   Surgical Information   What procedure is being done? Double mastectomy   Facility or Hospital where procedure/surgery will be performed: [facility]   Who is doing the procedure / surgery? Dr Katerine Matthews   Date of surgery / procedure: April 29   Time of surgery / procedure: 6 am   Where do you plan to recover after surgery? at home with family     Fax number for surgical facility: Note does not need to be faxed, will be available electronically in Epic.    Assessment & Plan     The proposed surgical procedure is considered INTERMEDIATE risk.    (Z01.818) Preop general physical exam  (primary encounter diagnosis)  Comment:   Plan: Discussed that rash on chest is not worrisome - can use hydrocortisone cream on it today, OK to use Hibiclens.    (C50.912) Invasive ductal carcinoma of left breast (H)  Comment:   Plan:     (F33.1) Major depressive disorder, recurrent episode, moderate (H)  Comment: stable  Plan:     (J06.9) Upper respiratory tract infection, unspecified type  Comment: improving  Plan: albuterol (PROAIR HFA/PROVENTIL HFA/VENTOLIN         HFA) 108 (90 Base) MCG/ACT inhaler        Use albuterol inhaler as needed for cough/wheezing.     The longitudinal plan of care for the diagnosis(es)/condition(s) as documented were addressed during this visit. Due to the added complexity in care, I will continue to support Adrienne in the subsequent management and with ongoing continuity of care.        - No identified additional risk factors other than previously addressed    Preoperative Medication Instructions  Antiplatelet or Anticoagulation Medication Instructions   - We reviewed the medication list and the patient is not on an  antiplatelet or anticoagulation medications.    Additional Medication Instructions  Take all scheduled medications on the day of surgery OK to take once she gets home from surgery    Recommendation  Approval given to proceed with proposed procedure, without further diagnostic evaluation.        Wolfgang Deleon is a 59 year old, presenting for the following:  Pre-Op Exam (DOS: 4/29/2025/Double mastectomy/)        HPI: She had a cold last week with fever, developed a mildly itchy rash on her chest, which she believes to be a heat rash (she did have a fever last week).  She still has na mild non-productive residual cough.              4/23/2025   Pre-Op Questionnaire   Have you ever had a heart attack or stroke? No   Have you ever had surgery on your heart or blood vessels, such as a stent placement, a coronary artery bypass, or surgery on an artery in your head, neck, heart, or legs? No   Do you have chest pain with activity? No   Do you have a history of heart failure? No   Do you currently have a cold, bronchitis or symptoms of other infection? (!) YES last week URI   Do you have a cough, shortness of breath, or wheezing? (!) YES mild residual cough   Do you or anyone in your family have previous history of blood clots? No   Do you or does anyone in your family have a serious bleeding problem such as prolonged bleeding following surgeries or cuts? No   Have you ever had problems with anemia or been told to take iron pills? No   Have you had any abnormal blood loss such as black, tarry or bloody stools, or abnormal vaginal bleeding? No   Have you ever had a blood transfusion? No   Are you willing to have a blood transfusion if it is medically needed before, during, or after your surgery? Yes   Have you or any of your relatives ever had problems with anesthesia? (!) YES difficulty waking up in distant past   Do you have sleep apnea, excessive snoring or daytime drowsiness? No   Do you have any artifical heart  valves or other implanted medical devices like a pacemaker, defibrillator, or continuous glucose monitor? No   Do you have artificial joints? No   Are you allergic to latex? No     Advance Care Planning    Discussed advance care planning with patient; informed AVS has link to [facility] [employer].    Preoperative Review of    reviewed - no record of controlled substances prescribed.          Patient Active Problem List    Diagnosis Date Noted    Nausea with vomiting 01/29/2025     Priority: Medium    Breast cancer (H) 11/25/2024     Priority: Medium    Invasive ductal carcinoma of left breast (H) 11/25/2024     Priority: Medium    Class 2 obesity due to excess calories with body mass index (BMI) of 36.0 to 36.9 in adult, unspecified whether serious comorbidity present 02/21/2024     Priority: Medium    Anxiety 01/24/2018     Priority: Medium    Major depressive disorder, recurrent episode, moderate (H) 05/23/2017     Priority: Medium    Left ankle pain 04/11/2016     Priority: Medium    CARDIOVASCULAR SCREENING; LDL GOAL LESS THAN 160 10/31/2010     Priority: Medium    ADHD (attention deficit hyperactivity disorder) 08/20/2009     Priority: Medium    Dysmenorrhea 01/23/2008     Priority: Medium    Insomnia 12/15/2006     Priority: Medium     Problem list name updated by automated process. Provider to review      Anxiety state 01/13/2005     Priority: Medium     Problem list name updated by automated process. Provider to review      Premenstrual tension syndrome 01/13/2005     Priority: Medium     Problem list name updated by automated process. Provider to review      Herpes simplex virus (HSV) infection 01/13/2005     Priority: Medium     Problem list name updated by automated process. Provider to review      Moderate dysplasia of cervix (ARNAV II)      Priority: Medium     11/2010: ASCUS, + HPV 45 & 82. Noncompliant with colp  8/12: NIL pap  7/14: HSIL. 8/19/14: [place]:WNL Plan pap/ecc in 6 months  3/3/15: ASC-H plus  LSIL pap, neg ECC. Plan  LEEP  4/2015 LEEP ARNAV II, clear margins.  cotest one year  09/29/16: diagnostic NIL pap, + HR HPV (not 16 or 18) result. Plan [place] per provider.  11/08/16: [place] ECC neg. Plan cotest in 1 year per provider.  01/24/18: NIL pap, Neg HR HPV result. Plan cotest in 1 year.   02/14/19 Patient is lost to pap tracking follow-up.   08/29/19: NIL Pap, Neg HR HPV result. Plan cotest in 3 years.  11/8/22 NIL pap, Neg HPV . Plan cotest in 3 years.          Past Medical History:   Diagnosis Date    Abnormal Pap smear, can't excl hi gd sq intraepithelial lesion (ASC-H) 03/01/2015    LSIL also    Anxiety state, unspecified     Herpes simplex without mention of complication     HSIL on Pap smear of cervix 07/01/2014    colp - WNL    Human papillomavirus in conditions classified elsewhere and of unspecified site 11/01/2010    + HPV 45 & 82 with ASCUS    Hx of colposcopy with cervical biopsy 11/08/2016    [place] ECC neg.     Obese     PONV (postoperative nausea and vomiting)     Premenstrual tension syndromes      Past Surgical History:   Procedure Laterality Date    C IUD,MIRENA  2/08-3/11    removed for cramping    COLONOSCOPY N/A 12/4/2020    Procedure: COLONOSCOPY, WITH POLYPECTOMY;  Surgeon: Moises Pride MD;  Location: [facility] OR    DILATION AND CURETTAGE, ABLATE ENDOMETRIUM NOVASURE, COMBINED N/A 12/31/2015    Procedure: COMBINED DILATION AND CURETTAGE, ABLATE ENDOMETRIUM NOVASURE;  Surgeon: Shakira Penaloza MD;  Location: [facility] OR    HYSTEROSCOPY DIAGNOSTIC N/A 12/31/2015    Procedure: HYSTEROSCOPY DIAGNOSTIC;  Surgeon: Shakira Penaloza MD;  Location: [facility] OR    LEEP TX, CERVICAL  4/3/15    ARNAV 2-3, negative margins    NO HISTORY OF SURGERY       Current Outpatient Medications   Medication Sig Dispense Refill    albuterol (PROAIR HFA/PROVENTIL HFA/VENTOLIN HFA) 108 (90 Base) MCG/ACT inhaler Inhale 2 puffs into the lungs every 6 hours as needed for shortness of breath, wheezing or cough. 18 g 0     "clobetasol propionate (TEMOVATE) 0.05 % external cream Apply topically 2 times daily. 45 g 0    diphenoxylate-atropine (LOMOTIL) 2.5-0.025 MG tablet Take 1 tablet by mouth 4 times daily as needed for diarrhea. 50 tablet 0    lidocaine-prilocaine (EMLA) 2.5-2.5 % external cream Apply a small amount to the port site 30-60 minutes prior to access. 30 g 2    omeprazole (PRILOSEC) 40 MG DR capsule Take 1 capsule (40 mg) by mouth daily. 90 capsule 0    potassium chloride kizzy ER (KLOR-CON M20) 20 MEQ CR tablet TAKE 1 TABLET(20 MEQ) BY MOUTH TWICE DAILY 60 tablet 1    valACYclovir (VALTREX) 500 MG tablet Take 1 tablet (500 mg) by mouth daily. 90 tablet 4       No Known Allergies     Social History     Tobacco Use    Smoking status: Never     Passive exposure: Past    Smokeless tobacco: Never   Substance Use Topics    Alcohol use: No       History   Drug Use No             Review of Systems  CONSTITUTIONAL: NEGATIVE for fever, chills, change in weight  INTEGUMENTARY/SKIN: see HPI  EYES: NEGATIVE for vision changes or irritation  ENT/MOUTH: NEGATIVE for ear, mouth and throat problems  RESP:see hPI  BREAST: NEGATIVE for masses, tenderness or discharge  CV: NEGATIVE for chest pain, palpitations or peripheral edema  GI: NEGATIVE for nausea, abdominal pain, heartburn, or change in bowel habits  : NEGATIVE for frequency, dysuria, or hematuria  MUSCULOSKELETAL: NEGATIVE for significant arthralgias or myalgia  NEURO: NEGATIVE for weakness, dizziness or paresthesias  ENDOCRINE: NEGATIVE for temperature intolerance, skin/hair changes  HEME: NEGATIVE for bleeding problems  PSYCHIATRIC: NEGATIVE for changes in mood or affect    Objective    /70 (BP Location: Right arm, Patient Position: Sitting, Cuff Size: Adult Regular)   Pulse 110   Temp 97.4  F (36.3  C) (Temporal)   Resp 22   Ht 1.65 m (5' 4.96\")   Wt 82.8 kg (182 lb 9.6 oz)   SpO2 96%   BMI 30.42 kg/m     Estimated body mass index is 30.42 kg/m  as calculated from " "the following:    Height as of this encounter: 1.65 m (5' 4.96\").    Weight as of this encounter: 82.8 kg (182 lb 9.6 oz).  Physical Exam  GENERAL: alert and no distress  EYES: Eyes grossly normal to inspection, PERRL and conjunctivae and sclerae normal  HENT: ear canals and TM's normal, nose and mouth without ulcers or lesions  NECK: no adenopathy, no asymmetry, masses, or scars  RESP: expiratory wheezes scattered bilaterally  CV: regular rate and rhythm, normal S1 S2, no S3 or S4, no murmur, click or rub, no peripheral edema  ABDOMEN: soft, nontender, no hepatosplenomegaly, no masses and bowel sounds normal  MS: no gross musculoskeletal defects noted, no edema  SKIN: erythematous papular rash on anterior chest  NEURO: Normal strength and tone, mentation intact and speech normal  PSYCH: mentation appears normal, affect normal/bright    Recent Labs   Lab Test 04/17/25  0922 03/27/25  0847   HGB 10.1* 10.2*    225    140   POTASSIUM 3.4 3.3*   CR 0.67 0.74        Diagnostics  Recent Results (from the past 720 hours)   Comprehensive metabolic panel (BMP + Alb, Alk Phos, ALT, AST, Total. Bili, TP)    Collection Time: 04/17/25  9:22 AM   Result Value Ref Range    Sodium 142 135 - 145 mmol/L    Potassium 3.4 3.4 - 5.3 mmol/L    Carbon Dioxide (CO2) 26 22 - 29 mmol/L    Anion Gap 12 7 - 15 mmol/L    Urea Nitrogen 6.3 6.0 - 20.0 mg/dL    Creatinine 0.67 0.51 - 0.95 mg/dL    GFR Estimate >90 >60 mL/min/1.73m2    Calcium 8.6 (L) 8.8 - 10.4 mg/dL    Chloride 104 98 - 107 mmol/L    Glucose 95 70 - 99 mg/dL    Alkaline Phosphatase 66 40 - 150 U/L    AST 19 0 - 45 U/L    ALT 13 0 - 50 U/L    Protein Total 5.6 (L) 6.4 - 8.3 g/dL    Albumin 3.5 3.5 - 5.2 g/dL    Bilirubin Total 0.2 <=1.2 mg/dL   CBC with platelets and differential    Collection Time: 04/17/25  9:22 AM   Result Value Ref Range    WBC Count 12.3 (H) 4.0 - 11.0 10e3/uL    RBC Count 3.19 (L) 3.80 - 5.20 10e6/uL    Hemoglobin 10.1 (L) 11.7 - 15.7 g/dL    " Hematocrit 30.8 (L) 35.0 - 47.0 %    MCV 97 78 - 100 fL    MCH 31.7 26.5 - 33.0 pg    MCHC 32.8 31.5 - 36.5 g/dL    RDW 17.2 (H) 10.0 - 15.0 %    Platelet Count 220 150 - 450 10e3/uL    % Neutrophils 73 %    % Lymphocytes 17 %    % Monocytes 9 %    % Eosinophils 0 %    % Basophils 0 %    % Immature Granulocytes 1 %    NRBCs per 100 WBC 0 <1 /100    Absolute Neutrophils 9.0 (H) 1.6 - 8.3 10e3/uL    Absolute Lymphocytes 2.0 0.8 - 5.3 10e3/uL    Absolute Monocytes 1.2 0.0 - 1.3 10e3/uL    Absolute Eosinophils 0.0 0.0 - 0.7 10e3/uL    Absolute Basophils 0.0 0.0 - 0.2 10e3/uL    Absolute Immature Granulocytes 0.1 <=0.4 10e3/uL    Absolute NRBCs 0.0 10e3/uL   Influenza A/B, RSV and SARS-CoV2 PCR (COVID-19) Nose    Collection Time: 04/24/25  3:51 PM    Specimen: Nose; Swab   Result Value Ref Range    Influenza A PCR Negative Negative    Influenza B PCR Negative Negative    RSV PCR Negative Negative    SARS CoV2 PCR Negative Negative      No EKG required, no history of coronary heart disease, significant arrhythmia, peripheral arterial disease or other structural heart disease.    Revised Cardiac Risk Index (RCRI)  The patient has the following serious cardiovascular risks for perioperative complications:   - No serious cardiac risks = 0 points     RCRI Interpretation: 0 points: Class I (very low risk - 0.4% complication rate)         Signed Electronically by: Kallie Ta NP  A copy of this evaluation report is provided to the requesting physician.         Answers submitted by the patient for this visit:  Patient Health Questionnaire (Submitted on 4/27/2025)  If you checked off any problems, how difficult have these problems made it for you to do your work, take care of things at home, or get along with other people?: Somewhat difficult  PHQ9 TOTAL SCORE: 4

## 2025-04-29 ENCOUNTER — HOSPITAL ENCOUNTER (OUTPATIENT)
Dept: MAMMOGRAPHY | Facility: CLINIC | Age: 60
Discharge: HOME | End: 2025-04-29
Attending: SURGERY | Admitting: SURGERY
Payer: COMMERCIAL

## 2025-04-29 ENCOUNTER — APPOINTMENT (OUTPATIENT)
Dept: MAMMOGRAPHY | Facility: CLINIC | Age: 60
End: 2025-04-29
Attending: SURGERY
Payer: COMMERCIAL

## 2025-04-29 ENCOUNTER — ANESTHESIA (OUTPATIENT)
Dept: SURGERY | Facility: CLINIC | Age: 60
End: 2025-04-29
Payer: COMMERCIAL

## 2025-04-29 ENCOUNTER — TELEPHONE (OUTPATIENT)
Dept: ONCOLOGY | Facility: HOSPITAL | Age: 60
End: 2025-04-29

## 2025-04-29 ENCOUNTER — DOCUMENTATION ONLY (OUTPATIENT)
Dept: OTHER | Facility: CLINIC | Age: 60
End: 2025-04-29

## 2025-04-29 ENCOUNTER — HOSPITAL ENCOUNTER (OUTPATIENT)
Facility: CLINIC | Age: 60
Discharge: HOME | End: 2025-04-30
Attending: SURGERY | Admitting: SURGERY
Payer: COMMERCIAL

## 2025-04-29 DIAGNOSIS — C50.912 INVASIVE DUCTAL CARCINOMA OF LEFT BREAST (H): Primary | ICD-10-CM

## 2025-04-29 DIAGNOSIS — C50.912 INVASIVE DUCTAL CARCINOMA OF LEFT BREAST (H): ICD-10-CM

## 2025-04-29 PROBLEM — Z98.890 POSTOPERATIVE HYPOXIA: Status: ACTIVE | Noted: 2025-04-29

## 2025-04-29 PROBLEM — R09.02 POSTOPERATIVE HYPOXIA: Status: ACTIVE | Noted: 2025-04-29

## 2025-04-29 LAB
PATH REPORT.COMMENTS IMP SPEC: NORMAL
PATH REPORT.INTRAOP OBS SPEC DOC: NORMAL

## 2025-04-29 PROCEDURE — 38525 BIOPSY/REMOVAL LYMPH NODES: CPT | Mod: 51 | Performed by: SURGERY

## 2025-04-29 PROCEDURE — 272N000001 HC OR GENERAL SUPPLY STERILE: Performed by: SURGERY

## 2025-04-29 PROCEDURE — 360N000076 HC SURGERY LEVEL 3, PER MIN: Performed by: SURGERY

## 2025-04-29 PROCEDURE — 710N000012 HC RECOVERY PHASE 2, PER MINUTE: Performed by: SURGERY

## 2025-04-29 PROCEDURE — 258N000003 HC RX IP 258 OP 636: Performed by: ANESTHESIOLOGY

## 2025-04-29 PROCEDURE — 19303 MAST SIMPLE COMPLETE: CPT | Mod: 50 | Performed by: SURGERY

## 2025-04-29 PROCEDURE — 88342 IMHCHEM/IMCYTCHM 1ST ANTB: CPT | Mod: 26 | Performed by: PATHOLOGY

## 2025-04-29 PROCEDURE — 250N000009 HC RX 250: Performed by: SURGERY

## 2025-04-29 PROCEDURE — 250N000011 HC RX IP 250 OP 636: Performed by: SURGERY

## 2025-04-29 PROCEDURE — 88305 TISSUE EXAM BY PATHOLOGIST: CPT | Mod: 26 | Performed by: PATHOLOGY

## 2025-04-29 PROCEDURE — 88307 TISSUE EXAM BY PATHOLOGIST: CPT | Mod: 26 | Performed by: PATHOLOGY

## 2025-04-29 PROCEDURE — 250N000013 HC RX MED GY IP 250 OP 250 PS 637: Performed by: SURGERY

## 2025-04-29 PROCEDURE — 88331 PATH CONSLTJ SURG 1 BLK 1SPC: CPT | Mod: TC | Performed by: SURGERY

## 2025-04-29 PROCEDURE — 343N000001 HC RX 343 MED OP 636: Performed by: SURGERY

## 2025-04-29 PROCEDURE — 370N000017 HC ANESTHESIA TECHNICAL FEE, PER MIN: Performed by: SURGERY

## 2025-04-29 PROCEDURE — 999N000141 HC STATISTIC PRE-PROCEDURE NURSING ASSESSMENT: Performed by: SURGERY

## 2025-04-29 PROCEDURE — 88309 TISSUE EXAM BY PATHOLOGIST: CPT | Mod: 26 | Performed by: PATHOLOGY

## 2025-04-29 PROCEDURE — A9520 TC99 TILMANOCEPT DIAG 0.5MCI: HCPCS | Performed by: SURGERY

## 2025-04-29 PROCEDURE — 250N000011 HC RX IP 250 OP 636: Mod: JZ | Performed by: ANESTHESIOLOGY

## 2025-04-29 PROCEDURE — 250N000013 HC RX MED GY IP 250 OP 250 PS 637: Performed by: ANESTHESIOLOGY

## 2025-04-29 PROCEDURE — 88331 PATH CONSLTJ SURG 1 BLK 1SPC: CPT | Mod: 26 | Performed by: PATHOLOGY

## 2025-04-29 PROCEDURE — 710N000010 HC RECOVERY PHASE 1, LEVEL 2, PER MIN: Performed by: SURGERY

## 2025-04-29 PROCEDURE — 999N000065 MA POST PROCEDURE LEFT

## 2025-04-29 PROCEDURE — 250N000011 HC RX IP 250 OP 636: Mod: JZ | Performed by: NURSE ANESTHETIST, CERTIFIED REGISTERED

## 2025-04-29 PROCEDURE — 250N000009 HC RX 250: Performed by: NURSE ANESTHETIST, CERTIFIED REGISTERED

## 2025-04-29 PROCEDURE — 38900 IO MAP OF SENT LYMPH NODE: CPT | Performed by: SURGERY

## 2025-04-29 PROCEDURE — 250N000013 HC RX MED GY IP 250 OP 250 PS 637: Performed by: NURSE ANESTHETIST, CERTIFIED REGISTERED

## 2025-04-29 PROCEDURE — C1819 TISSUE LOCALIZATION-EXCISION: HCPCS | Mod: LT

## 2025-04-29 RX ORDER — MAGNESIUM HYDROXIDE 1200 MG/15ML
LIQUID ORAL PRN
Status: DISCONTINUED | OUTPATIENT
Start: 2025-04-29 | End: 2025-04-29 | Stop reason: HOSPADM

## 2025-04-29 RX ORDER — CEFAZOLIN SODIUM/WATER 2 G/20 ML
2 SYRINGE (ML) INTRAVENOUS
Status: COMPLETED | OUTPATIENT
Start: 2025-04-29 | End: 2025-04-29

## 2025-04-29 RX ORDER — ONDANSETRON 2 MG/ML
INJECTION INTRAMUSCULAR; INTRAVENOUS PRN
Status: DISCONTINUED | OUTPATIENT
Start: 2025-04-29 | End: 2025-04-29

## 2025-04-29 RX ORDER — KETOROLAC TROMETHAMINE 30 MG/ML
30 INJECTION, SOLUTION INTRAMUSCULAR; INTRAVENOUS EVERY 6 HOURS PRN
Status: DISCONTINUED | OUTPATIENT
Start: 2025-04-29 | End: 2025-04-30 | Stop reason: HOSPADM

## 2025-04-29 RX ORDER — ONDANSETRON 4 MG/1
4 TABLET, ORALLY DISINTEGRATING ORAL EVERY 30 MIN PRN
Status: DISCONTINUED | OUTPATIENT
Start: 2025-04-29 | End: 2025-04-29 | Stop reason: HOSPADM

## 2025-04-29 RX ORDER — ONDANSETRON 2 MG/ML
4 INJECTION INTRAMUSCULAR; INTRAVENOUS EVERY 30 MIN PRN
Status: DISCONTINUED | OUTPATIENT
Start: 2025-04-29 | End: 2025-04-29 | Stop reason: HOSPADM

## 2025-04-29 RX ORDER — FENTANYL CITRATE 50 UG/ML
25 INJECTION, SOLUTION INTRAMUSCULAR; INTRAVENOUS EVERY 5 MIN PRN
Status: DISCONTINUED | OUTPATIENT
Start: 2025-04-29 | End: 2025-04-29 | Stop reason: HOSPADM

## 2025-04-29 RX ORDER — ACETAMINOPHEN 325 MG/1
650 TABLET ORAL EVERY 4 HOURS PRN
Status: DISCONTINUED | OUTPATIENT
Start: 2025-04-29 | End: 2025-04-30 | Stop reason: HOSPADM

## 2025-04-29 RX ORDER — NALOXONE HYDROCHLORIDE 0.4 MG/ML
0.4 INJECTION, SOLUTION INTRAMUSCULAR; INTRAVENOUS; SUBCUTANEOUS
Status: DISCONTINUED | OUTPATIENT
Start: 2025-04-29 | End: 2025-04-30 | Stop reason: HOSPADM

## 2025-04-29 RX ORDER — NALOXONE HYDROCHLORIDE 0.4 MG/ML
0.1 INJECTION, SOLUTION INTRAMUSCULAR; INTRAVENOUS; SUBCUTANEOUS
Status: DISCONTINUED | OUTPATIENT
Start: 2025-04-29 | End: 2025-04-29 | Stop reason: HOSPADM

## 2025-04-29 RX ORDER — DEXAMETHASONE SODIUM PHOSPHATE 4 MG/ML
4 INJECTION, SOLUTION INTRA-ARTICULAR; INTRALESIONAL; INTRAMUSCULAR; INTRAVENOUS; SOFT TISSUE
Status: DISCONTINUED | OUTPATIENT
Start: 2025-04-29 | End: 2025-04-29 | Stop reason: HOSPADM

## 2025-04-29 RX ORDER — OXYCODONE HYDROCHLORIDE 5 MG/1
5 TABLET ORAL
Status: COMPLETED | OUTPATIENT
Start: 2025-04-29 | End: 2025-04-29

## 2025-04-29 RX ORDER — HYDROCODONE BITARTRATE AND ACETAMINOPHEN 5; 325 MG/1; MG/1
1 TABLET ORAL EVERY 6 HOURS PRN
Qty: 20 TABLET | Refills: 0 | Status: SHIPPED | OUTPATIENT
Start: 2025-04-29

## 2025-04-29 RX ORDER — FENTANYL CITRATE 50 UG/ML
50 INJECTION, SOLUTION INTRAMUSCULAR; INTRAVENOUS EVERY 5 MIN PRN
Status: DISCONTINUED | OUTPATIENT
Start: 2025-04-29 | End: 2025-04-29 | Stop reason: HOSPADM

## 2025-04-29 RX ORDER — HYDROMORPHONE HCL IN WATER/PF 6 MG/30 ML
0.4 PATIENT CONTROLLED ANALGESIA SYRINGE INTRAVENOUS EVERY 5 MIN PRN
Status: DISCONTINUED | OUTPATIENT
Start: 2025-04-29 | End: 2025-04-29 | Stop reason: HOSPADM

## 2025-04-29 RX ORDER — ONDANSETRON 8 MG/1
8 TABLET, FILM COATED ORAL EVERY 8 HOURS PRN
COMMUNITY

## 2025-04-29 RX ORDER — PANTOPRAZOLE SODIUM 40 MG/1
40 TABLET, DELAYED RELEASE ORAL DAILY
Status: DISCONTINUED | OUTPATIENT
Start: 2025-04-30 | End: 2025-04-30 | Stop reason: HOSPADM

## 2025-04-29 RX ORDER — LIDOCAINE HYDROCHLORIDE 10 MG/ML
INJECTION, SOLUTION INFILTRATION; PERINEURAL PRN
Status: DISCONTINUED | OUTPATIENT
Start: 2025-04-29 | End: 2025-04-29

## 2025-04-29 RX ORDER — HYDROCODONE BITARTRATE AND ACETAMINOPHEN 5; 325 MG/1; MG/1
1 TABLET ORAL
Status: DISCONTINUED | OUTPATIENT
Start: 2025-04-29 | End: 2025-04-29

## 2025-04-29 RX ORDER — SODIUM CHLORIDE, SODIUM LACTATE, POTASSIUM CHLORIDE, CALCIUM CHLORIDE 600; 310; 30; 20 MG/100ML; MG/100ML; MG/100ML; MG/100ML
INJECTION, SOLUTION INTRAVENOUS CONTINUOUS
Status: DISCONTINUED | OUTPATIENT
Start: 2025-04-29 | End: 2025-04-29 | Stop reason: HOSPADM

## 2025-04-29 RX ORDER — NALOXONE HYDROCHLORIDE 0.4 MG/ML
0.2 INJECTION, SOLUTION INTRAMUSCULAR; INTRAVENOUS; SUBCUTANEOUS
Status: DISCONTINUED | OUTPATIENT
Start: 2025-04-29 | End: 2025-04-30 | Stop reason: HOSPADM

## 2025-04-29 RX ORDER — ALBUTEROL SULFATE 90 UG/1
INHALANT RESPIRATORY (INHALATION) PRN
Status: DISCONTINUED | OUTPATIENT
Start: 2025-04-29 | End: 2025-04-29

## 2025-04-29 RX ORDER — PROPOFOL 10 MG/ML
INJECTION, EMULSION INTRAVENOUS PRN
Status: DISCONTINUED | OUTPATIENT
Start: 2025-04-29 | End: 2025-04-29

## 2025-04-29 RX ORDER — HYDROMORPHONE HCL IN WATER/PF 6 MG/30 ML
0.2 PATIENT CONTROLLED ANALGESIA SYRINGE INTRAVENOUS EVERY 5 MIN PRN
Status: DISCONTINUED | OUTPATIENT
Start: 2025-04-29 | End: 2025-04-29 | Stop reason: HOSPADM

## 2025-04-29 RX ORDER — ALBUTEROL SULFATE 90 UG/1
2 INHALANT RESPIRATORY (INHALATION) EVERY 6 HOURS PRN
Status: DISCONTINUED | OUTPATIENT
Start: 2025-04-29 | End: 2025-04-30 | Stop reason: HOSPADM

## 2025-04-29 RX ORDER — ACETAMINOPHEN 650 MG/1
650 SUPPOSITORY RECTAL EVERY 4 HOURS PRN
Status: DISCONTINUED | OUTPATIENT
Start: 2025-04-29 | End: 2025-04-30 | Stop reason: HOSPADM

## 2025-04-29 RX ORDER — PROPOFOL 10 MG/ML
INJECTION, EMULSION INTRAVENOUS CONTINUOUS PRN
Status: DISCONTINUED | OUTPATIENT
Start: 2025-04-29 | End: 2025-04-29

## 2025-04-29 RX ORDER — DEXAMETHASONE SODIUM PHOSPHATE 10 MG/ML
INJECTION, SOLUTION INTRAMUSCULAR; INTRAVENOUS PRN
Status: DISCONTINUED | OUTPATIENT
Start: 2025-04-29 | End: 2025-04-29

## 2025-04-29 RX ORDER — HYDROCODONE BITARTRATE AND ACETAMINOPHEN 5; 325 MG/1; MG/1
1 TABLET ORAL EVERY 6 HOURS PRN
Status: DISCONTINUED | OUTPATIENT
Start: 2025-04-29 | End: 2025-04-30 | Stop reason: HOSPADM

## 2025-04-29 RX ORDER — ACETAMINOPHEN 325 MG/1
975 TABLET ORAL ONCE
Status: COMPLETED | OUTPATIENT
Start: 2025-04-29 | End: 2025-04-29

## 2025-04-29 RX ORDER — ONDANSETRON 4 MG/1
8 TABLET, FILM COATED ORAL EVERY 8 HOURS PRN
Status: DISCONTINUED | OUTPATIENT
Start: 2025-04-29 | End: 2025-04-30 | Stop reason: HOSPADM

## 2025-04-29 RX ORDER — LIDOCAINE 40 MG/G
CREAM TOPICAL
Status: DISCONTINUED | OUTPATIENT
Start: 2025-04-29 | End: 2025-04-29 | Stop reason: HOSPADM

## 2025-04-29 RX ORDER — OXYCODONE HYDROCHLORIDE 5 MG/1
10 TABLET ORAL
Status: DISCONTINUED | OUTPATIENT
Start: 2025-04-29 | End: 2025-04-29 | Stop reason: HOSPADM

## 2025-04-29 RX ORDER — VALACYCLOVIR HYDROCHLORIDE 500 MG/1
500 TABLET, FILM COATED ORAL DAILY
Status: DISCONTINUED | OUTPATIENT
Start: 2025-04-29 | End: 2025-04-30 | Stop reason: HOSPADM

## 2025-04-29 RX ORDER — FENTANYL CITRATE 50 UG/ML
INJECTION, SOLUTION INTRAMUSCULAR; INTRAVENOUS PRN
Status: DISCONTINUED | OUTPATIENT
Start: 2025-04-29 | End: 2025-04-29

## 2025-04-29 RX ADMIN — LIDOCAINE HYDROCHLORIDE 10 ML: 10 INJECTION, SOLUTION INFILTRATION; PERINEURAL at 07:53

## 2025-04-29 RX ADMIN — LIDOCAINE HYDROCHLORIDE 5 ML: 10 INJECTION, SOLUTION INFILTRATION; PERINEURAL at 08:54

## 2025-04-29 RX ADMIN — ACETAMINOPHEN 650 MG: 325 TABLET, FILM COATED ORAL at 18:51

## 2025-04-29 RX ADMIN — VALACYCLOVIR HYDROCHLORIDE 500 MG: 500 TABLET, FILM COATED ORAL at 16:59

## 2025-04-29 RX ADMIN — FENTANYL CITRATE 25 MCG: 50 INJECTION INTRAMUSCULAR; INTRAVENOUS at 11:45

## 2025-04-29 RX ADMIN — Medication 200 MG: at 10:48

## 2025-04-29 RX ADMIN — FENTANYL CITRATE 100 MCG: 50 INJECTION INTRAMUSCULAR; INTRAVENOUS at 08:54

## 2025-04-29 RX ADMIN — PROPOFOL 30 MG: 10 INJECTION, EMULSION INTRAVENOUS at 10:38

## 2025-04-29 RX ADMIN — HYDROMORPHONE HYDROCHLORIDE 0.5 MG: 1 INJECTION, SOLUTION INTRAMUSCULAR; INTRAVENOUS; SUBCUTANEOUS at 09:46

## 2025-04-29 RX ADMIN — MIDAZOLAM 2 MG: 1 INJECTION INTRAMUSCULAR; INTRAVENOUS at 08:46

## 2025-04-29 RX ADMIN — DEXAMETHASONE SODIUM PHOSPHATE 10 MG: 10 INJECTION, SOLUTION INTRAMUSCULAR; INTRAVENOUS at 09:01

## 2025-04-29 RX ADMIN — PROPOFOL 200 MG: 10 INJECTION, EMULSION INTRAVENOUS at 08:54

## 2025-04-29 RX ADMIN — ONDANSETRON 4 MG: 2 INJECTION INTRAMUSCULAR; INTRAVENOUS at 10:31

## 2025-04-29 RX ADMIN — ALBUTEROL SULFATE 6 PUFF: 90 AEROSOL, METERED RESPIRATORY (INHALATION) at 09:00

## 2025-04-29 RX ADMIN — HYDROMORPHONE HYDROCHLORIDE 0.5 MG: 1 INJECTION, SOLUTION INTRAMUSCULAR; INTRAVENOUS; SUBCUTANEOUS at 11:04

## 2025-04-29 RX ADMIN — PROPOFOL 200 MCG/KG/MIN: 10 INJECTION, EMULSION INTRAVENOUS at 08:57

## 2025-04-29 RX ADMIN — ACETAMINOPHEN 975 MG: 325 TABLET, FILM COATED ORAL at 13:31

## 2025-04-29 RX ADMIN — SODIUM CHLORIDE, SODIUM LACTATE, POTASSIUM CHLORIDE, AND CALCIUM CHLORIDE: .6; .31; .03; .02 INJECTION, SOLUTION INTRAVENOUS at 10:15

## 2025-04-29 RX ADMIN — SODIUM CHLORIDE, SODIUM LACTATE, POTASSIUM CHLORIDE, AND CALCIUM CHLORIDE: .6; .31; .03; .02 INJECTION, SOLUTION INTRAVENOUS at 08:39

## 2025-04-29 RX ADMIN — TILMANOCEPT 0.53 MILLICURIE: KIT at 08:35

## 2025-04-29 RX ADMIN — Medication 2 G: at 08:58

## 2025-04-29 RX ADMIN — ROCURONIUM BROMIDE 50 MG: 10 INJECTION, SOLUTION INTRAVENOUS at 08:54

## 2025-04-29 RX ADMIN — OXYCODONE 5 MG: 5 TABLET ORAL at 13:31

## 2025-04-29 RX ADMIN — ONDANSETRON 4 MG: 4 TABLET, ORALLY DISINTEGRATING ORAL at 15:05

## 2025-04-29 ASSESSMENT — ACTIVITIES OF DAILY LIVING (ADL)
ADLS_ACUITY_SCORE: 19
ADLS_ACUITY_SCORE: 18
ADLS_ACUITY_SCORE: 18
ADLS_ACUITY_SCORE: 19
ADLS_ACUITY_SCORE: 18
ADLS_ACUITY_SCORE: 19
ADLS_ACUITY_SCORE: 18
ADLS_ACUITY_SCORE: 19
ADLS_ACUITY_SCORE: 19
ADLS_ACUITY_SCORE: 18
ADLS_ACUITY_SCORE: 19
ADLS_ACUITY_SCORE: 19

## 2025-04-29 NOTE — PHARMACY-ADMISSION MEDICATION HISTORY
Pharmacist Admission Medication History    Admission medication history is complete. The information provided in this note is only as accurate as the sources available at the time of the update.    Information Source(s): Patient and CareEverywhere/SureScripts via in-person    Pertinent Information: None    Allergies reviewed with patient and updates made in EHR: yes    Medication History Completed By: Darrin Weaver [facility] 4/29/2025 7:20 AM    PTA Med List   Medication Sig Note Last Dose/Taking    albuterol (PROAIR HFA/PROVENTIL HFA/VENTOLIN HFA) 108 (90 Base) MCG/ACT inhaler Inhale 2 puffs into the lungs every 6 hours as needed for shortness of breath, wheezing or cough. 4/29/2025: Not started yet Taking As Needed    clobetasol propionate (TEMOVATE) 0.05 % external cream Apply topically 2 times daily.  Past Month    diphenoxylate-atropine (LOMOTIL) 2.5-0.025 MG tablet Take 1 tablet by mouth 4 times daily as needed for diarrhea.  Past Month    lidocaine-prilocaine (EMLA) 2.5-2.5 % external cream Apply a small amount to the port site 30-60 minutes prior to access.  Past Month    omeprazole (PRILOSEC) 40 MG DR capsule Take 1 capsule (40 mg) by mouth daily.  4/28/2025 Morning    ondansetron (ZOFRAN) 8 MG tablet Take 8 mg by mouth every 8 hours as needed for nausea.  Past Week    valACYclovir (VALTREX) 500 MG tablet Take 1 tablet (500 mg) by mouth daily.  4/28/2025 Morning

## 2025-04-29 NOTE — PROVIDER NOTIFICATION
Dr Matthews called and message left regarding possible options for pain management as she will be getting admitted to the hospital; waiting for call back from Dr. Matthews for orders.  Shania Ashford RN

## 2025-04-29 NOTE — PROVIDER NOTIFICATION
Dr. Matthews called back and was updated on the FALLON not draining. She was not worried about it as long as the NS came back in the FALLON. Also, updated on patient unable to keep her oxygen sats above 92% on RA. She will then put orders in for her to stay overnight.

## 2025-04-29 NOTE — ANESTHESIA POSTPROCEDURE EVALUATION
Patient: Adrienne Ivory    Procedure: Procedure(s):  BILATERAL MASTECTOMY  WITH LEFT TARGETED AXILLARY DISSECTION       Anesthesia Type:  General    Note:     Postop Pain Control: Uneventful            Sign Out: Well controlled pain   PONV: No   Neuro/Psych: Uneventful            Sign Out: Acceptable/Baseline neuro status   Airway/Respiratory: Uneventful            Sign Out: Acceptable/Baseline resp. status   CV/Hemodynamics: Uneventful            Sign Out: Acceptable CV status; No obvious hypovolemia; No obvious fluid overload   Other NRE: NONE   DID A NON-ROUTINE EVENT OCCUR? No           Last vitals:  Vitals Value Taken Time   /73 04/29/25 1210   Temp 36.8  C (98.2  F) 04/29/25 1210   Pulse 100 04/29/25 1210   Resp 17 04/29/25 1210   SpO2 94 % 04/29/25 1210       Electronically Signed By: Leslie Goldberg, MD  April 29, 2025  4:56 PM

## 2025-04-29 NOTE — TELEPHONE ENCOUNTER
Hugh request for surgical reports, office notes and allotted time off after surgery. Forms forwarded to Dr. Matthews office to fill out for mastectomy.    [place] office notes and infusion notes from [facility] 2025 faxed to [phone] with Right Fax confirmation and signed YASH.  Floridalma COTTO CMA

## 2025-04-29 NOTE — OP NOTE
Name:  Adrienne ANTHONY Suzannedelphine  PCP:  Kallie Ta  Procedure Date:  4/29/2025      BILATERAL MASTECTOMY WITH LEFT TARGETED AXILLARY DISSECTION       Pre-Procedure Diagnosis:  Invasive ductal carcinoma of left breast     Post-Procedure Diagnosis:    Invasive ductal carcinoma of left breast     Surgeon:  Katerine Matthews DO    Assist:  Stella Marrero PA-C    Anesthesia Type:    GET    Estimated Blood Loss:   30 mL    Specimens:    Left axillary lymph node sent for frozen  Left breast with axillary lymph nodes.  Stitch superior.  Right breast.  Stitch superior.       Drains:   15 F Main    Complications:    None apparent    [place] Node Biopsy for Breast Cancer - Left  Operation performed with curative intent Yes   Tracer(s) used to identify sentinel nodes in the upfront surgery (non-neoadjuvant) setting N/A   Tracer(s) used to identify sentinel nodes in the neoadjuvant setting Dye and Radioactive tracer   All nodes (colored or non-colored) present at the end of a dye-filled lymphatic channel were removed Yes   All significantly radioactive nodes were removed Yes   All palpably suspicious nodes were removed N/A   Biopsy-proven positive nodes marked with clips prior to chemotherapy were identified and removed Yes       Indication for procedure:  This is a 59-year-old female who was diagnosed with a locally advanced left breast HER2 positive invasive ductal carcinoma.  She has been undergoing neoadjuvant chemotherapy.  She had a great clinical response.  She has elected for bilateral mastectomy for her surgical treatment.  With the clinically great response to the neoadjuvant chemotherapy, we will plan on a targeted axillary dissection to further evaluate her response and to the potential need for axillary lymph node dissection.    Operative Report:    The patient was properly identified and brought to the operating suite where she was placed in the supine position.  General anesthesia and perioperative antibiotics  were administered.  Preoperatively the patient's left breast was injected with Lymphoseek by nuclear medicine and 2 mL of methylene blue by myself for her sentinel lymph node identification.  A wire was placed to the clipped left axillary lymph node by radiology so that the targeted dissection could be completed.  The patient was prepped and draped in a sterile fashion.  An elliptical shaped incision encompassing the left nipple was made and skin flaps raised superiorly to the clavicle, posteriorly to the pectoralis major, medial to the sternum, inferior to the rectus sheath and laterally to the lateral chest wall.  The skin was removed to allow for a flat tension-free closure.  The breast tissue was swept from the chest wall using electrocautery.  It was marked for orientation.  Hemostasis was assured within the wound.  The clavipectoral fascia was incised and the axilla was palpated for abnormalities.  No abnormal lymph nodes were palpated.  The wire was delivered into the incision and the lymph node that the wire was going to was removed.  The gamma probe identified this as a sentinel lymph node with a count of 300.  This was sent to the lab for frozen section, which ultimately came back demonstrating chemo effect and no residual tumor cells.  As we were waiting for the lab, 2 more sentinel lymph nodes were identified using the gamma probe, with a count of 1300.  These were removed with electrocautery and sent for permanent sectioning along with the left breast.  There was no significant remaining gamma activity and no blue dye mated to the axilla.  Attention was then turned the right where a mirror incision was made and skin flaps raised superiorly to the clavicle, posteriorly to the pectoralis muscle, medially to the sternum, inferior to the rectus sheath, and lateral to lateral chest wall.  The port on the right upper chest was carefully protected.  The breast tissue was swept from the chest wall using  electrocautery.  The tissue was marked for orientation.  The wound was inspected for hemostasis.  A 15F Main drain was brought out through a separate stab incision and secured to the skin with 2-0 Nylon.  Both incisions were then closed with interrupted 2-0 Vicryl.  Due to extra skin redundancy, a skin island was removed over the sternum and the incisions were connected.  Closure was followed with a running subcuticular 4-0 Monocryl and skin glue.  Sterile dressings were placed.    Stella Marrero PA-C was present during the case to assist with retraction and exposure.    Disposition:  The patient tolerated the procedure well and was transferred to the cover room in stable condition.    Katerine Matthews DO  General Surgeon  [address] [address]  Office: [phone]  Employed by - [employer]

## 2025-04-29 NOTE — PROGRESS NOTES
A/Ox4. Pt on room air since arriving to unit from PACU with sats 92-95%. VSS. C/o pain 4/10, declining interventions besides ice at this time. Plan for discharge home tomorrow.

## 2025-04-29 NOTE — ANESTHESIA CARE TRANSFER NOTE
Patient: Adrienne Ivory    Procedure: Procedure(s):  BILATERAL MASTECTOMY  WITH LEFT TARGETED AXILLARY DISSECTION       Diagnosis: Invasive ductal carcinoma of left breast (H) [C50.912]  Diagnosis Additional Information: No value filed.    Anesthesia Type:   General     Note:    Oropharynx: oropharynx clear of all foreign objects and spontaneously breathing  Level of Consciousness: drowsy  Oxygen Supplementation: face mask  Level of Supplemental Oxygen (L/min / FiO2): 6  Independent Airway: airway patency satisfactory and stable  Dentition: dentition unchanged  Vital Signs Stable: post-procedure vital signs reviewed and stable  Report to RN Given: handoff report given  Patient transferred to: PACU    Handoff Report: Identifed the Patient, Identified the Reponsible Provider, Reviewed the pertinent medical history, Discussed the surgical course, Reviewed Intra-OP anesthesia mangement and issues during anesthesia, Set expectations for post-procedure period and Allowed opportunity for questions and acknowledgement of understanding      Vitals:  Vitals Value Taken Time   /64 04/29/25 1111   Temp 36.4  C (97.52  F) 04/29/25 1113   Pulse 97 04/29/25 1113   Resp 17 04/29/25 1113   SpO2 99 % 04/29/25 1113       Electronically Signed By: AMARILIS Yu CRNA  April 29, 2025  11:15 AM

## 2025-04-29 NOTE — ANESTHESIA PREPROCEDURE EVALUATION
Anesthesia Pre-Procedure Evaluation    Patient: Adrienne Ivory   MRN: [id number] : 1965        Procedure : Procedure(s):  BILATERAL MASTECTOMY  WITH LEFT TARGETED AXILLARY DISSECTION          Past Medical History:   Diagnosis Date    Abnormal Pap smear, can't excl hi gd sq intraepithelial lesion (ASC-H) 2015    LSIL also    Anxiety state, unspecified     Herpes simplex without mention of complication     HSIL on Pap smear of cervix 2014    colp - WNL    Human papillomavirus in conditions classified elsewhere and of unspecified site 2010    + HPV 45 & 82 with ASCUS    Hx of colposcopy with cervical biopsy 2016    [place] ECC neg.     Obese     PONV (postoperative nausea and vomiting)     Premenstrual tension syndromes       Past Surgical History:   Procedure Laterality Date    C IUD,MIRENA  -3/11    removed for cramping    COLONOSCOPY N/A 2020    Procedure: COLONOSCOPY, WITH POLYPECTOMY;  Surgeon: Moises Pride MD;  Location: [facility] OR    DILATION AND CURETTAGE, ABLATE ENDOMETRIUM NOVASURE, COMBINED N/A 2015    Procedure: COMBINED DILATION AND CURETTAGE, ABLATE ENDOMETRIUM NOVASURE;  Surgeon: Shakira Penaloza MD;  Location: [facility] OR    HYSTEROSCOPY DIAGNOSTIC N/A 2015    Procedure: HYSTEROSCOPY DIAGNOSTIC;  Surgeon: Shakira Penaloza MD;  Location: [facility] OR    LEEP TX, CERVICAL  4/3/15    ARNAV 2-3, negative margins    NO HISTORY OF SURGERY        No Known Allergies   Social History     Tobacco Use    Smoking status: Never     Passive exposure: Past    Smokeless tobacco: Never   Substance Use Topics    Alcohol use: No      Wt Readings from Last 1 Encounters:   25 82.8 kg (182 lb 9.6 oz)        Anesthesia Evaluation   Pt has had prior anesthetic.     History of anesthetic complications  - PONV.  Discussed additional antiemetic options, patient would like to try Decadron/Zofran/TIVA as previous procedures went well.    ROS/MED HX  ENT/Pulmonary: Comment:  Recent cough in preoperative area, been going on for a week - neg pulmonary ROS     Neurologic:  - neg neurologic ROS     Cardiovascular: Comment: Echo (3/4/2025):  The left ventricle is normal in size.  There is normal left ventricular wall thickness.  The visual ejection fraction is 55-60%.  Left ventricular diastolic function is normal.  Normal right ventricle size and systolic function.  Global peak LV longitudinal strain is averaged at -19%. This is within  reported normal limits (normal <-18%).  Compared to the prior study dated 12/2/24, there have been no changes. - neg cardiovascular ROS     METS/Exercise Tolerance:     Hematologic:  - neg hematologic  ROS     Musculoskeletal:  - neg musculoskeletal ROS     GI/Hepatic:  - neg GI/hepatic ROS     Renal/Genitourinary:  - neg Renal ROS     Endo:     (+)               Obesity (BMI 30),    (-) Type I DM, Type II DM and thyroid disease   Psychiatric/Substance Use:     (+) psychiatric history anxiety       Infectious Disease:  - neg infectious disease ROS     Malignancy:   (+) Malignancy, History of Breast.    Other:  - neg other ROS          Physical Exam    Airway  airway exam normal      Mallampati: II   TM distance: > 3 FB   Neck ROM: full   Mouth opening: > 3 cm    Respiratory Devices and Support         Dental       (+) Minor Abnormalities - some fillings, tiny chips      Cardiovascular   cardiovascular exam normal          Pulmonary   pulmonary exam normal                OUTSIDE LABS:  CBC:   Lab Results   Component Value Date    WBC 12.3 (H) 04/17/2025    WBC 11.7 (H) 03/27/2025    HGB 10.1 (L) 04/17/2025    HGB 10.2 (L) 03/27/2025    HCT 30.8 (L) 04/17/2025    HCT 30.4 (L) 03/27/2025     04/17/2025     03/27/2025     BMP:   Lab Results   Component Value Date     04/17/2025     03/27/2025    POTASSIUM 3.4 04/17/2025    POTASSIUM 3.3 (L) 03/27/2025    CHLORIDE 104 04/17/2025    CHLORIDE 104 03/27/2025    CO2 26 04/17/2025    CO2 26  "03/27/2025    BUN 6.3 04/17/2025    BUN 9.6 03/27/2025    CR 0.67 04/17/2025    CR 0.74 03/27/2025    GLC 95 04/17/2025     (H) 03/27/2025     COAGS: No results found for: \"PTT\", \"INR\", \"FIBR\"  POC:   Lab Results   Component Value Date    HCG Negative 08/19/2014    HCGS Negative 12/31/2015     HEPATIC:   Lab Results   Component Value Date    ALBUMIN 3.5 04/17/2025    PROTTOTAL 5.6 (L) 04/17/2025    ALT 13 04/17/2025    AST 19 04/17/2025    ALKPHOS 66 04/17/2025    BILITOTAL 0.2 04/17/2025     OTHER:   Lab Results   Component Value Date    CONNIE 8.6 (L) 04/17/2025    MAG 1.6 (L) 01/29/2025    TSH 2.23 06/28/2021       Anesthesia Plan    ASA Status:  3    NPO Status:  NPO Appropriate    Anesthesia Type: General.     - Airway: ETT   Induction: Intravenous.   Maintenance: TIVA.        Consents    Anesthesia Plan(s) and associated risks, benefits, and realistic alternatives discussed. Questions answered and patient/representative(s) expressed understanding.     - Discussed:     - Discussed with:  Patient            Postoperative Care    Pain management: IV analgesics, Oral pain medications.   PONV prophylaxis: Ondansetron (or other 5HT-3), Dexamethasone or Solumedrol     Comments:    Other Comments: Standard General Induction - ETT, lidocaine / fentanyl / propofol for induction. Decadron / Zofran antiemetics. Fentanyl / Dilaudid / Precedex as needed. O2 stats low 90s, will place O2 in preoperative area. Notes chest pain in preoperative area though only with coughing and describes as tightness.           Jabier Mccoy MD    Clinically Significant Risk Factors Present on Admission                             # Obesity: Estimated body mass index is 30.42 kg/m  as calculated from the following:    Height as of 4/28/25: 1.65 m (5' 4.96\").    Weight as of 4/28/25: 82.8 kg (182 lb 9.6 oz).                "

## 2025-04-29 NOTE — PROGRESS NOTES
I called [facility] and spoke to Marleny at 0818 to let her know I will be bringing Adrienne back up to [facility].  I called Nuclear medicine at 0818 and let them know Adrinene is returning to [facility] room 15.  Patient tolerated procedure well.  Escorted patient by wheelchair over to [facility] handed off to surgical RN.   I gave Adrienne the phone number for creative hair designs so she can look into a wig as she requested the information before the wire was placed.  I explained it to her boyfriend as well.  Adrienne and boyfriend denies questions further questions at this time.  I did giver her Susan Lombardi's number at Dr. Matthews' office should any questions arrise.  Adrienne expressed gratitude for the information.    Nenita Kern RN  RN Imaging Care Coordinator  [facility]/[place]

## 2025-04-29 NOTE — PROGRESS NOTES
"I was informed by [facility] staff pt is in [facility] room 15 and ready for transport to [facility] for wire localization.  Pt was identified using full name and . The labeled and highlighted \"[facility]\" wristband was placed on pt wrist. I informed [facility] nurse pt will return to [facility] in approximately one hour. [facility] will call [facility] when pt is ready to return to [facility] and [facility] staff will plan to escort pt back to [facility]. I escorted pt from [facility] room 15 to [facility] Consult room, via wheelchair.     Pt was able to state which procedure and correct side wire localization was occurring today. I reviewed the procedure with pt. I reviewed the consent form with the pt and pt was given the consent form to review before signing. Pt denied questions or concerns at this time.     I escorted pt from [facility] Consult room to [facility] Ultrasound room one.  I offered her a warm blanket and aroma therapy and she declined both. I notified US Tech, Ernie Blanco, pt was consented and ready in US room.    Nenita Kern RN  RN Imaging Care Coordinator  [facility]/[place]  "

## 2025-04-29 NOTE — PROVIDER NOTIFICATION
Pauline and MD notified of cough, low oxygenation and requiring oxygen, and chest tightness patient states from cough.

## 2025-04-29 NOTE — ANESTHESIA PROCEDURE NOTES
Airway       Patient location during procedure: OR       Procedure Start/Stop Times: 4/29/2025 8:57 AM  Staff -        Anesthesiologist:  Jabier Mccoy MD       CRNA: Skye Boles APRN CRNA       Performed By: CRNAIndications and Patient Condition       Indications for airway management: jacob-procedural       Induction type:intravenous       Mask difficulty assessment: 1 - vent by mask    Final Airway Details       Final airway type: endotracheal airway       Successful airway: ETT - single and Oral  Endotracheal Airway Details        ETT size (mm): 7.0       Cuffed: yes       Successful intubation technique: direct laryngoscopy       DL Blade Type: Wallace 2       Grade View of Cords: 1       Adjucts: stylet       Position: Right       Measured from: gums/teeth       Secured at (cm): 22       Bite block used: None    Post intubation assessment        Placement verified by: capnometry, equal breath sounds and chest rise        Number of attempts at approach: 1       Secured with: tape       Ease of procedure: easy       Dentition: Intact and Unchanged       Dental guard used and removed.    Medication(s) Administered   Medication Administration Time: 4/29/2025 8:57 AM

## 2025-04-29 NOTE — INTERVAL H&P NOTE
Patient seen in preop.  Feels like her cough continues to improve.  Wire localization to the clipped left axillary lymph node has been performed and imaging reviewed by myself.  Lymphoseek injected into the left breast by nuclear medicine.  2 mL of methylene blue injected by myself to the left breast.  All questions have been answered regarding procedure.  Consent obtained.  To the OR for bilateral mastectomy with left targeted axillary dissection.  Once I have the surgical pathology results, I will give the patient a phone call with them as opposed to her seeing them immediately through MyChart.    Katerine Matthews DO  General Surgeon  [facility]  Bre[facility] [place] - [address] [address]  Office: [phone]  Employed by - [facility]

## 2025-04-29 NOTE — PROVIDER NOTIFICATION
Dr. Matthews was called because patient's left FALLON drain is not draining anything. The drain was flushed with NS 10 ml and stripped multiple times. Awaiting a call back.

## 2025-04-30 VITALS
TEMPERATURE: 97.7 F | BODY MASS INDEX: 30.59 KG/M2 | DIASTOLIC BLOOD PRESSURE: 64 MMHG | HEART RATE: 104 BPM | RESPIRATION RATE: 16 BRPM | SYSTOLIC BLOOD PRESSURE: 136 MMHG | WEIGHT: 179.2 LBS | HEIGHT: 64 IN | OXYGEN SATURATION: 94 %

## 2025-04-30 PROCEDURE — 250N000013 HC RX MED GY IP 250 OP 250 PS 637: Performed by: SURGERY

## 2025-04-30 RX ADMIN — ACETAMINOPHEN 650 MG: 325 TABLET, FILM COATED ORAL at 01:01

## 2025-04-30 RX ADMIN — ACETAMINOPHEN 650 MG: 325 TABLET, FILM COATED ORAL at 06:16

## 2025-04-30 RX ADMIN — VALACYCLOVIR HYDROCHLORIDE 500 MG: 500 TABLET, FILM COATED ORAL at 09:29

## 2025-04-30 RX ADMIN — PANTOPRAZOLE SODIUM 40 MG: 40 TABLET, DELAYED RELEASE ORAL at 09:29

## 2025-04-30 ASSESSMENT — ACTIVITIES OF DAILY LIVING (ADL)
ADLS_ACUITY_SCORE: 18
ADLS_ACUITY_SCORE: 27
ADLS_ACUITY_SCORE: 18
ADLS_ACUITY_SCORE: 27
ADLS_ACUITY_SCORE: 19
ADLS_ACUITY_SCORE: 18

## 2025-04-30 NOTE — PLAN OF CARE
Goal Outcome Evaluation: Pt A&O X 4. VSS on RA. Denies pain. 2 FALLON drains in place, R side draining bloody/red drainage, but left side still doesn't have any output. Call light within reach. Discharge pending.   Problem: Adult Inpatient Plan of Care  Goal: Absence of Hospital-Acquired Illness or Injury  Intervention: Identify and Manage Fall Risk  Recent Flowsheet Documentation  Taken 4/29/2025 2130 by Felix Dodson, RN  Safety Promotion/Fall Prevention: safety round/check completed  Intervention: Prevent Skin Injury  Recent Flowsheet Documentation  Taken 4/29/2025 2130 by Felix Dodson, RN  Body Position: position changed independently  Intervention: Prevent Infection  Recent Flowsheet Documentation  Taken 4/29/2025 2130 by Felix Dodson, RN  Infection Prevention:   rest/sleep promoted   single patient room provided

## 2025-04-30 NOTE — PROGRESS NOTES
General Surgery Progress Note:    Hospital Day # 0    ASSESSMENT:  1. Invasive ductal carcinoma of left breast (H)        Adrienne Ivory is a 59 year old female who is s/p bilateral mastectomy on 4/29/2025 who had been admitted d/t postoperative hypoxia requiring oxygen in PACU.     She is POD1, Overnight with normal saturations on room air.  Pain appearing well-controlled with oral Tylenol. Left drain without output however productive with serosanguineous fluid following 10cc flush this morning.  Right drain with serosanguineous OP. Incision are CDI. Appearing appropriate for discharge today.     PLAN:  -Continue regular diet as tolerated  - Multimodal oral pain control  -Encourage movement/activity  -Drains to remain, bedside education for drain cares provided  -Postoperative instructions placed in AVS  -Plan for discharge today for which she will continue follow up OP with Dr. Matthews.     SUBJECTIVE:   She is reporting feeling overall well.  States feeling sore to her chest wall that is relieved with Tylenol.  She denies fever, chills nausea or vomiting.  Tolerating her diet without difficulties.  Denies shortness of breath.  Left drain without output overnight that had been productive following 10 ml flush with 40cc of serosanguinous fluid. Education given at bedside for drain cares.       Patient Vitals for the past 24 hrs:   BP Temp Temp src Pulse Resp SpO2 Weight   04/30/25 0809 136/64 97.7  F (36.5  C) Oral 104 16 -- 81.3 kg (179 lb 3.2 oz)   04/30/25 0052 114/72 97.5  F (36.4  C) Oral 93 16 94 % --   04/29/25 1659 -- -- -- -- -- 92 % --   04/29/25 1541 126/67 -- -- 99 16 93 % --   04/29/25 1517 (!) 142/79 97.5  F (36.4  C) Temporal 93 16 93 % --   04/29/25 1511 -- -- -- -- -- 96 % --   04/29/25 1500 (!) 143/71 97.5  F (36.4  C) Temporal 96 -- 96 % --   04/29/25 1430 138/78 -- -- 99 -- 95 % --   04/29/25 1421 -- -- -- 102 -- 95 % --   04/29/25 1400 127/77 -- -- 103 18 (!) 91 % --   04/29/25 1331 -- -- --  98 -- (!) 90 % --   04/29/25 1330 (!) 145/77 -- -- 95 16 94 % --   04/29/25 1300 (!) 142/67 -- -- 100 16 95 % --   04/29/25 1240 -- -- -- -- -- 98 % --   04/29/25 1230 (!) 141/73 -- -- -- 18 (!) 91 % --   04/29/25 1210 (!) 145/73 98.2  F (36.8  C) -- 100 17 94 % --   04/29/25 1200 (!) 143/71 98.2  F (36.8  C) -- 91 16 93 % --   04/29/25 1150 (!) 140/64 98.4  F (36.9  C) -- 96 14 92 % --   04/29/25 1140 (!) 148/70 98.4  F (36.9  C) -- 99 20 94 % --   04/29/25 1130 132/67 97.9  F (36.6  C) -- 96 19 93 % --   04/29/25 1120 134/72 97.7  F (36.5  C) Core 96 20 100 % --   04/29/25 1110 130/64 98  F (36.7  C) Temporal 101 18 99 % --         PHYSICAL EXAM:  General: patient seen resting in bed, no acute distress  Resp: no respiratory distress, breathing comfortably on room air.   Skin: Anterior chest wall transverse surgical incision consistent with bilateral mastectomy that is CDI but with trace focal drainage from mid right aspect of incision. Mild adjacent TTP. Right drain with small volume serosanguinous fluid, bulb holding suction. Left drain with trace clear fluid, flushed with 10cc of saline with 40 ml of serosanguinous fluid productive following flush.   Output by Drain (mL) 04/28/25 0700 - 04/28/25 1459 04/28/25 1500 - 04/28/25 2259 04/28/25 2300 - 04/29/25 0659 04/29/25 0700 - 04/29/25 1459 04/29/25 1500 - 04/29/25 2259 04/29/25 2300 - 04/30/25 0659 04/30/25 0700 - 04/30/25 1011   Drain Closed/Suction 1 Inferior;Right Breast Bulb    10 35 20    Drain Closed/Suction Inferior;Left Breast Bulb    10 0 0       Extremities: warm and well perfused    04/29 0700 - 04/30 0659  In: 1770 [P.O.:360; I.V.:1400]  Out: 105 [Drains:75]    Admission on 04/29/2025   Component Date Value    Case Report 04/29/2025                      Value:Surgical Pathology Report                         Case: [id number]                                  Authorizing Provider:  Katerine Matthews DO         Collected:           04/29/2025 09:38 AM           Ordering Location:     [facility]          Received:            04/29/2025 09:47 AM                                 [facility] OR                                                   Pathologist:           Berna Guzmán MD                                                           Intraop:               Berna Guzmán MD                                                           Specimen:    Lymph Node(s), Axillary, Left, left axillary lymph node                                    Intraoperative Consultat* 04/29/2025                      Value:A(1). Lymph Node(s), Axillary, Left, left axillary lymph node:  AFS1:  Lymph node with biopsy site.  No evidence of malignancy.  Pending permanent sections    Berna Guzmán MD on 4/29/2025 at 10:08 AM  Intra op performed at:[facility]OR[facility], [facility], [address] , [place] [address]  Intra-op Dx verbally delivered to Dr. Matthews by DANIELA at 1008           Stella Marrero PA-C  [facility]  Surgery Clinic - [facility]  [address]  [address]  [place] [address]?  Office: [phone]

## 2025-04-30 NOTE — DISCHARGE SUMMARY
Discharge Provider: RED Moreno  Admission Date: 4/29/2025  Discharge Date: 4/30/2025     Primary Diagnosis at Discharge:   Patient Active Problem List   Diagnosis    Anxiety state    Premenstrual tension syndrome    Moderate dysplasia of cervix (ARNAV II)    Herpes simplex virus (HSV) infection    Insomnia    Dysmenorrhea    ADHD (attention deficit hyperactivity disorder)    CARDIOVASCULAR SCREENING; LDL GOAL LESS THAN 160    Left ankle pain    Major depressive disorder, recurrent episode, moderate (H)    Anxiety    Class 2 obesity due to excess calories with body mass index (BMI) of 36.0 to 36.9 in adult, unspecified whether serious comorbidity present    Breast cancer (H)    Invasive ductal carcinoma of left breast (H)    Nausea with vomiting    Postoperative hypoxia      Disposition: Home or Self Care  Condition at Discharge: Stable     Surgery: Bilateral Mastectomy     Hospital Summary:   Adrienne Ivory was admitted for invasive breast cancer and underwent an uncomplicated bilateral mastectomy on 4/29/2025.  Postoperative course notable for hypoxia in PACU who had been admitted for postoperative monitoring. Following a brief recovery in the PACU, she was transferred to the Med/Surg floor for the remainder of her stay. Supplemental oxygen weaned 04/29 evening. Her left drain without initial output that resolved following flushing drain. Otherwise, her post operative course was uneventful, and her diet was advanced as tolerated.  On POD # 1 she was discharged home tolerating a general diet, ambulating without assistance, and pain controlled with oral analgesics.        Discharge Medications:      Medication List        Started      HYDROcodone-acetaminophen 5-325 MG tablet  Commonly known as: NORCO  1 tablet, Oral, EVERY 6 HOURS PRN              Physical Exam:  General appearance: patient seen resting in bed, no acute distress  Resp: no respiratory distress  Abdomen: soft, non-tender, non-distended.  Incisions clean/dry/intact   Skin: Anterior chest wall transverse surgical incision consistent with bilateral mastectomy that is CDI but with trace focal drainage from mid right aspect of incision. Mild adjacent TTP. Right drain with small volume serosanguinous fluid, bulb holding suction. Left drain with trace clear fluid, flushed with 10cc of saline with 40 ml of serosanguinous fluid productive following flush.   Extremities: warm and well perfused.    Discharge Instructions:  Follow up appointment with Primary Care Physician: Kallie Ta  Follow up appointment with Dr. Matthews  on 05/12    It is our practice to have all patients follow up with us 2-3 weeks after their surgery to ensure they are recovering well.  For straightforward laparoscopic procedures, this can be done either in clinic as a scheduled follow up appointment, or over the phone.  If you would like a scheduled follow up appointment in clinic, please call us at [phone] to schedule an appointment at your convenience.  If you would prefer to follow up with us by phone please let us know so that we may contact you 2-3 weeks following your procedure.    Post operative instructions:   Diet: Regular diet    Activity: You should continue to be active at home including ambulating frequently.  If possible try to limit the amount of time spent in bed.    Restrictions: Return to normal activity as tolerated.     Wound care: Your wounds are covered with a waterproof skin glue called Dermabond. Please do not scratch or pick off the glue, it will fall off on its own in 10-14 days.    Bathing: You may shower after 48 hours from surgery. It is ok to get your incisions wet, but avoid rubbing them. Avoid soaking in bath tubs, or swimming in lakes, pools, or streams for 2 weeks following surgery.      Drain care: You have a surgical drain that will remain in place when you leave the hospital. Please keep a log of the daily output from your drain to bring  to your follow-up appointment. Please note any changes in character of your drain output.      Stella Marrero PA-C  [facility]  Surgery [address] [address]?  Office: [phone]

## 2025-04-30 NOTE — PLAN OF CARE
Orientation: A&O  Mobility: independent  VS: . VSS  Pain:  Tylenol for neck pain  GI:  Last BM 4/29  Urinary:  voiding  Drain/lines:  2JP drains.  Drains stripped.  Left has no output, Right has small amount of serosanguinous output.   Skin:  mastectomy incisions, aproximated, CDI dermabonded, 2 FALLON's  Tx:  Pain control  Consults:  surgery  Disposition:  Plan for discharge today.

## 2025-05-05 ENCOUNTER — ALLIED HEALTH/NURSE VISIT (OUTPATIENT)
Dept: SURGERY | Facility: CLINIC | Age: 60
End: 2025-05-05
Attending: NURSE PRACTITIONER
Payer: COMMERCIAL

## 2025-05-05 DIAGNOSIS — C50.912 INVASIVE DUCTAL CARCINOMA OF LEFT BREAST (H): Primary | ICD-10-CM

## 2025-05-05 NOTE — NURSING NOTE
Adrienne presents to [facility] today for RN drain removal.  Patient states she has had 15 ml or less in a 24 hour period for at least 2 consecutive days from the right drain. The left side has been draining minimal amounts.  RN assessment and EMR update.  Both drains stripped. Right Drain removed without difficulty.  Patient tolerated well.  Applied 4x4. Told her to change as needed if it becomes wet, otherwise leave it on for 24 hours then shower as usual and cover as needed. Left drain had more drainage after stripping. Emptied 20 ml today. Will leave left drain in.  Helped her change dressings and re-wrap with ace for comfort.   Reviewed follow up plan and signs and symptoms of infection and seroma.  Support provided, invited calls.

## 2025-05-06 LAB
PATH REPORT.COMMENTS IMP SPEC: ABNORMAL
PATH REPORT.COMMENTS IMP SPEC: YES
PATH REPORT.FINAL DX SPEC: ABNORMAL
PATH REPORT.GROSS SPEC: ABNORMAL
PATH REPORT.INTRAOP OBS SPEC DOC: ABNORMAL
PATH REPORT.MICROSCOPIC SPEC OTHER STN: ABNORMAL
PATH REPORT.RELEVANT HX SPEC: ABNORMAL
PATHOLOGY SYNOPTIC REPORT: ABNORMAL
PHOTO IMAGE: ABNORMAL

## 2025-05-07 ENCOUNTER — INFUSION THERAPY VISIT (OUTPATIENT)
Dept: INFUSION THERAPY | Facility: HOSPITAL | Age: 60
End: 2025-05-07
Attending: INTERNAL MEDICINE
Payer: COMMERCIAL

## 2025-05-07 ENCOUNTER — PATIENT OUTREACH (OUTPATIENT)
Dept: ONCOLOGY | Facility: CLINIC | Age: 60
End: 2025-05-07

## 2025-05-07 ENCOUNTER — ONCOLOGY VISIT (OUTPATIENT)
Dept: ONCOLOGY | Facility: HOSPITAL | Age: 60
End: 2025-05-07
Attending: INTERNAL MEDICINE
Payer: COMMERCIAL

## 2025-05-07 ENCOUNTER — RESULTS FOLLOW-UP (OUTPATIENT)
Dept: SURGERY | Facility: CLINIC | Age: 60
End: 2025-05-07

## 2025-05-07 VITALS
DIASTOLIC BLOOD PRESSURE: 94 MMHG | HEIGHT: 65 IN | OXYGEN SATURATION: 99 % | WEIGHT: 180.1 LBS | HEART RATE: 92 BPM | RESPIRATION RATE: 16 BRPM | BODY MASS INDEX: 30.01 KG/M2 | SYSTOLIC BLOOD PRESSURE: 138 MMHG

## 2025-05-07 DIAGNOSIS — Z17.0 MALIGNANT NEOPLASM OF UPPER-INNER QUADRANT OF LEFT BREAST IN FEMALE, ESTROGEN RECEPTOR POSITIVE (H): Primary | ICD-10-CM

## 2025-05-07 DIAGNOSIS — F41.9 ANXIETY: ICD-10-CM

## 2025-05-07 DIAGNOSIS — C50.212 MALIGNANT NEOPLASM OF UPPER-INNER QUADRANT OF LEFT BREAST IN FEMALE, ESTROGEN RECEPTOR POSITIVE (H): Primary | ICD-10-CM

## 2025-05-07 LAB
ALBUMIN SERPL BCG-MCNC: 3.7 G/DL (ref 3.5–5.2)
ALP SERPL-CCNC: 58 U/L (ref 40–150)
ALT SERPL W P-5'-P-CCNC: 16 U/L (ref 0–50)
ANION GAP SERPL CALCULATED.3IONS-SCNC: 12 MMOL/L (ref 7–15)
AST SERPL W P-5'-P-CCNC: 26 U/L (ref 0–45)
BASOPHILS # BLD AUTO: 0.1 10E3/UL (ref 0–0.2)
BASOPHILS NFR BLD AUTO: 1 %
BILIRUB SERPL-MCNC: 0.2 MG/DL
BUN SERPL-MCNC: 12.6 MG/DL (ref 6–20)
CALCIUM SERPL-MCNC: 9.2 MG/DL (ref 8.8–10.4)
CHLORIDE SERPL-SCNC: 107 MMOL/L (ref 98–107)
CREAT SERPL-MCNC: 0.72 MG/DL (ref 0.51–0.95)
EGFRCR SERPLBLD CKD-EPI 2021: >90 ML/MIN/1.73M2
EOSINOPHIL # BLD AUTO: 0.2 10E3/UL (ref 0–0.7)
EOSINOPHIL NFR BLD AUTO: 2 %
ERYTHROCYTE [DISTWIDTH] IN BLOOD BY AUTOMATED COUNT: 15.4 % (ref 10–15)
GLUCOSE SERPL-MCNC: 95 MG/DL (ref 70–99)
HCO3 SERPL-SCNC: 23 MMOL/L (ref 22–29)
HCT VFR BLD AUTO: 32.3 % (ref 35–47)
HGB BLD-MCNC: 10.7 G/DL (ref 11.7–15.7)
IMM GRANULOCYTES # BLD: 0 10E3/UL
IMM GRANULOCYTES NFR BLD: 0 %
LDH SERPL L TO P-CCNC: 188 U/L (ref 0–250)
LYMPHOCYTES # BLD AUTO: 2.6 10E3/UL (ref 0.8–5.3)
LYMPHOCYTES NFR BLD AUTO: 28 %
MCH RBC QN AUTO: 31.9 PG (ref 26.5–33)
MCHC RBC AUTO-ENTMCNC: 33.1 G/DL (ref 31.5–36.5)
MCV RBC AUTO: 96 FL (ref 78–100)
MONOCYTES # BLD AUTO: 0.6 10E3/UL (ref 0–1.3)
MONOCYTES NFR BLD AUTO: 6 %
NEUTROPHILS # BLD AUTO: 5.8 10E3/UL (ref 1.6–8.3)
NEUTROPHILS NFR BLD AUTO: 63 %
NRBC # BLD AUTO: 0 10E3/UL
NRBC BLD AUTO-RTO: 0 /100
PLATELET # BLD AUTO: 335 10E3/UL (ref 150–450)
POTASSIUM SERPL-SCNC: 3.7 MMOL/L (ref 3.4–5.3)
PROT SERPL-MCNC: 6.7 G/DL (ref 6.4–8.3)
RBC # BLD AUTO: 3.35 10E6/UL (ref 3.8–5.2)
SODIUM SERPL-SCNC: 142 MMOL/L (ref 135–145)
WBC # BLD AUTO: 9.2 10E3/UL (ref 4–11)

## 2025-05-07 PROCEDURE — 258N000003 HC RX IP 258 OP 636: Performed by: INTERNAL MEDICINE

## 2025-05-07 PROCEDURE — 96413 CHEMO IV INFUSION 1 HR: CPT

## 2025-05-07 PROCEDURE — 250N000011 HC RX IP 250 OP 636: Performed by: INTERNAL MEDICINE

## 2025-05-07 PROCEDURE — G2211 COMPLEX E/M VISIT ADD ON: HCPCS | Performed by: INTERNAL MEDICINE

## 2025-05-07 PROCEDURE — 36591 DRAW BLOOD OFF VENOUS DEVICE: CPT

## 2025-05-07 PROCEDURE — 99214 OFFICE O/P EST MOD 30 MIN: CPT | Performed by: INTERNAL MEDICINE

## 2025-05-07 PROCEDURE — 83615 LACTATE (LD) (LDH) ENZYME: CPT

## 2025-05-07 PROCEDURE — 85004 AUTOMATED DIFF WBC COUNT: CPT

## 2025-05-07 PROCEDURE — 36593 DECLOT VASCULAR DEVICE: CPT

## 2025-05-07 PROCEDURE — 82947 ASSAY GLUCOSE BLOOD QUANT: CPT

## 2025-05-07 RX ORDER — HEPARIN SODIUM (PORCINE) LOCK FLUSH IV SOLN 100 UNIT/ML 100 UNIT/ML
5 SOLUTION INTRAVENOUS
Status: CANCELLED | OUTPATIENT
Start: 2025-05-07

## 2025-05-07 RX ORDER — DIPHENHYDRAMINE HYDROCHLORIDE 50 MG/ML
25 INJECTION, SOLUTION INTRAMUSCULAR; INTRAVENOUS
Status: DISCONTINUED | OUTPATIENT
Start: 2025-05-07 | End: 2025-05-07 | Stop reason: HOSPADM

## 2025-05-07 RX ORDER — HEPARIN SODIUM,PORCINE 10 UNIT/ML
5-20 VIAL (ML) INTRAVENOUS DAILY PRN
Status: CANCELLED | OUTPATIENT
Start: 2025-05-07

## 2025-05-07 RX ORDER — ACETAMINOPHEN 325 MG/1
650 TABLET ORAL
Status: CANCELLED
Start: 2025-05-07

## 2025-05-07 RX ORDER — DIPHENHYDRAMINE HYDROCHLORIDE 50 MG/ML
50 INJECTION, SOLUTION INTRAMUSCULAR; INTRAVENOUS
Status: CANCELLED
Start: 2025-05-07

## 2025-05-07 RX ORDER — MEPERIDINE HYDROCHLORIDE 25 MG/ML
25 INJECTION INTRAMUSCULAR; INTRAVENOUS; SUBCUTANEOUS
Status: DISCONTINUED | OUTPATIENT
Start: 2025-05-07 | End: 2025-05-07 | Stop reason: HOSPADM

## 2025-05-07 RX ORDER — DIPHENHYDRAMINE HYDROCHLORIDE 50 MG/ML
50 INJECTION, SOLUTION INTRAMUSCULAR; INTRAVENOUS
Status: DISCONTINUED | OUTPATIENT
Start: 2025-05-07 | End: 2025-05-07 | Stop reason: HOSPADM

## 2025-05-07 RX ORDER — DIPHENHYDRAMINE HYDROCHLORIDE 50 MG/ML
25 INJECTION, SOLUTION INTRAMUSCULAR; INTRAVENOUS
Status: CANCELLED
Start: 2025-05-07

## 2025-05-07 RX ORDER — EPINEPHRINE 1 MG/ML
0.3 INJECTION, SOLUTION INTRAMUSCULAR; SUBCUTANEOUS EVERY 5 MIN PRN
Status: CANCELLED | OUTPATIENT
Start: 2025-05-07

## 2025-05-07 RX ORDER — LORAZEPAM 2 MG/ML
0.5 INJECTION INTRAMUSCULAR EVERY 4 HOURS PRN
Status: CANCELLED | OUTPATIENT
Start: 2025-05-07

## 2025-05-07 RX ORDER — ALBUTEROL SULFATE 0.83 MG/ML
2.5 SOLUTION RESPIRATORY (INHALATION)
Status: DISCONTINUED | OUTPATIENT
Start: 2025-05-07 | End: 2025-05-07 | Stop reason: HOSPADM

## 2025-05-07 RX ORDER — METHYLPREDNISOLONE SODIUM SUCCINATE 40 MG/ML
40 INJECTION INTRAMUSCULAR; INTRAVENOUS
Status: CANCELLED
Start: 2025-05-07

## 2025-05-07 RX ORDER — DIPHENHYDRAMINE HCL 50 MG
50 CAPSULE ORAL
Status: CANCELLED
Start: 2025-05-07

## 2025-05-07 RX ORDER — ALBUTEROL SULFATE 90 UG/1
1-2 INHALANT RESPIRATORY (INHALATION)
Status: CANCELLED
Start: 2025-05-07

## 2025-05-07 RX ORDER — ALBUTEROL SULFATE 90 UG/1
1-2 INHALANT RESPIRATORY (INHALATION)
Status: DISCONTINUED | OUTPATIENT
Start: 2025-05-07 | End: 2025-05-07 | Stop reason: HOSPADM

## 2025-05-07 RX ORDER — HEPARIN SODIUM (PORCINE) LOCK FLUSH IV SOLN 100 UNIT/ML 100 UNIT/ML
5 SOLUTION INTRAVENOUS
OUTPATIENT
Start: 2025-05-07

## 2025-05-07 RX ORDER — METHYLPREDNISOLONE SODIUM SUCCINATE 40 MG/ML
40 INJECTION INTRAMUSCULAR; INTRAVENOUS
Status: DISCONTINUED | OUTPATIENT
Start: 2025-05-07 | End: 2025-05-07 | Stop reason: HOSPADM

## 2025-05-07 RX ORDER — EPINEPHRINE 1 MG/ML
0.3 INJECTION, SOLUTION INTRAMUSCULAR; SUBCUTANEOUS EVERY 5 MIN PRN
Status: DISCONTINUED | OUTPATIENT
Start: 2025-05-07 | End: 2025-05-07 | Stop reason: HOSPADM

## 2025-05-07 RX ORDER — ALBUTEROL SULFATE 0.83 MG/ML
2.5 SOLUTION RESPIRATORY (INHALATION)
Status: CANCELLED | OUTPATIENT
Start: 2025-05-07

## 2025-05-07 RX ORDER — MEPERIDINE HYDROCHLORIDE 25 MG/ML
25 INJECTION INTRAMUSCULAR; INTRAVENOUS; SUBCUTANEOUS
Status: CANCELLED | OUTPATIENT
Start: 2025-05-07

## 2025-05-07 RX ORDER — HEPARIN SODIUM (PORCINE) LOCK FLUSH IV SOLN 100 UNIT/ML 100 UNIT/ML
5 SOLUTION INTRAVENOUS
Status: DISCONTINUED | OUTPATIENT
Start: 2025-05-07 | End: 2025-05-07 | Stop reason: HOSPADM

## 2025-05-07 RX ADMIN — SODIUM CHLORIDE 540 MG: 0.9 INJECTION, SOLUTION INTRAVENOUS at 14:52

## 2025-05-07 RX ADMIN — ALTEPLASE 2 MG: 2.2 INJECTION, POWDER, LYOPHILIZED, FOR SOLUTION INTRAVENOUS at 14:25

## 2025-05-07 RX ADMIN — HEPARIN 5 ML: 100 SYRINGE at 15:28

## 2025-05-07 RX ADMIN — SODIUM CHLORIDE 250 ML: 0.9 INJECTION, SOLUTION INTRAVENOUS at 14:52

## 2025-05-07 ASSESSMENT — PAIN SCALES - GENERAL: PAINLEVEL_OUTOF10: NO PAIN (0)

## 2025-05-07 NOTE — PROGRESS NOTES
[facility] Progress Note    Patient: Adrienne Ivory  MRN: [id number]  Date of Service: May 7, 2025             ECOG Performance    0 - Independent          ______________________________________________________________________________  Oncologic history  T2 N3 M0 stage IIIc invasive ductal carcinoma of the left breast ER positive DE positive HER2/wolf 3+.  November 2024  Patient had multiple level 1 level 2 and level 3 axillary lymph nodes involved    Treatment  1.Patient started on neoadjuvant TCHP on 12/12/2024  2.Near complete radiologic remission after 3 cycles of TCHP  3.Completed 6 cycle of TCHP on 3/27/2025  4.Switched to single agent trastuzumab on 4/17/2025 with plans to change further treatment based on    pathologic response  5.  Complete pathologic response at the time of surgery.  Patient underwent bilateral mastectomies on 29 April 2025  6.  Plan to complete 1 year of trastuzumab which will finish in December 2025    History of Present Illness    Ms. Adrienne Ivory is here for follow-up.  She has completed her surgical management.  She had bilateral mastectomies done and is here to discuss the pathology report.  She otherwise feels fine and has no other concerns.  Surgery was only about a week ago so she is still recovering from that.    Review of systems  A comprehensive 12 point review of system was done that was negative except what is mentioned in the history of present illness    Past History    Past Medical History:   Diagnosis Date    Abnormal Pap smear, can't excl hi gd sq intraepithelial lesion (ASC-H) 03/01/2015    LSIL also    Anxiety state, unspecified     Herpes simplex without mention of complication     HSIL on Pap smear of cervix 07/01/2014    colp - WNL    Human papillomavirus in conditions classified elsewhere and of unspecified site 11/01/2010    + HPV 45 & 82 with ASCUS    Hx of colposcopy with cervical biopsy 11/08/2016    [place] ECC neg.     Obese   "   PONV (postoperative nausea and vomiting)     Premenstrual tension syndromes        Past Surgical History:   Procedure Laterality Date    C IUD,MIRENA  2/08-3/11    removed for cramping    COLONOSCOPY N/A 12/4/2020    Procedure: COLONOSCOPY, WITH POLYPECTOMY;  Surgeon: Moises Pride MD;  Location: [facility] OR    DILATION AND CURETTAGE, ABLATE ENDOMETRIUM NOVASURE, COMBINED N/A 12/31/2015    Procedure: COMBINED DILATION AND CURETTAGE, ABLATE ENDOMETRIUM NOVASURE;  Surgeon: Shakira Penaloza MD;  Location: [facility] OR    DISSECT LYMPH NODE AXILLA Left 4/29/2025    Procedure: WITH LEFT TARGETED AXILLARY DISSECTION;  Surgeon: Katerine Matthews DO;  Location: [facility] OR    HYSTEROSCOPY DIAGNOSTIC N/A 12/31/2015    Procedure: HYSTEROSCOPY DIAGNOSTIC;  Surgeon: Shakira Penaloza MD;  Location: [facility] OR    LEEP TX, CERVICAL  4/3/15    ARNAV 2-3, negative margins    MASTECTOMY SIMPLE Bilateral 4/29/2025    Procedure: BILATERAL MASTECTOMY;  Surgeon: Katerine Matthews DO;  Location: [facility] OR    NO HISTORY OF SURGERY         Physical Exam    BP (!) 138/94 (BP Location: Left arm, Patient Position: Sitting, Cuff Size: Adult Regular)   Pulse 92   Resp 16   Ht 1.651 m (5' 5\")   Wt 81.7 kg (180 lb 1.6 oz)   SpO2 99%   BMI 29.97 kg/m      General: alert, awake, not in acute distress  HEENT: Head: Normal, normocephalic, atraumatic.  Eye: Normal external eye, conjunctiva, lids cornea, SITA.  Nose: Normal external nose, mucus membranes and septum.  Pharynx: Normal buccal mucosa. Normal pharynx.  Neck / Thyroid: Supple, no masses, nodes, nodules or enlargement.  Lymphatics: No abnormally enlarged lymph nodes.  Chest: Normal chest wall and respirations. Clear to auscultation.  No crackles or rhonchi's  Heart: S1 S2 RRR, no murmur.   Abdomen: abdomen is soft without significant tenderness, masses, organomegaly or guarding  Extremities: normal strength, tone, and muscle mass  Skin: normal. no rash or abnormalities  CNS: non " focal.    Breast exam on the left side did show shrinkage in the mass I could still feel a lymph node in the axilla      Lab Results    Recent Results (from the past 240 hours)   Surgical Pathology Exam    Collection Time: 04/29/25  9:38 AM   Result Value Ref Range    Case Report       Surgical Pathology Report                         Case: [id number]                                  Authorizing Provider:  Katerine Matthews DO         Collected:           04/29/2025 09:38 AM          Ordering Location:     [facility]          Received:            04/29/2025 09:47 AM                                 [facility] OR                                                   Pathologist:           Berna Guzmán MD                                                           Intraop:               Berna Guzmán MD                                                           Specimens:   A) - Lymph Node(s), Axillary, Left, left axillary lymph node                                        B) - Breast, Left, left breast and axillary lymph nodes, stitch is superior                         C) - Breast, Right, right breast, stitch superior                                          Final Diagnosis       A) LEFT AXILLARY LYMPH NODE, LYMPH NODE BIOPSY:        1.  NO EVIDENCE OF METASTATIC CARCINOMA (0 OF 1)        2.  EXTENSIVE FIBROSIS, CONSISTENT WITH REGRESSED TUMOR SITE        3.  BIOPSY SITE PRESENT    B) LEFT BREAST, SIMPLE MASTECTOMY:        1.  RESIDUAL IN SITU AND INVASIVE MALIGNANCY NOT IDENTIFIED (SEE SYNOPTIC REPORT)  A.  15 X 15 X 9 MM FIBROTIC TUMOR BED, 2:00  B.  RESIDUAL IN SITU AND INVASIVE MALIGNANCY NOT IDENTIFIED (CONFIRMED WITH NEGATIVE       CYTOKERATIN STAIN)  C.  TUMOR BED 5 MM FROM THE ANTERIOR SUPERIOR SOFT TISSUE MARGIN AND 10 MM FROM DEEP       MARGIN  D.  RESIDUAL CANCER BURDEN: 0; RESIDUAL CANCER BURDEN CLASS: RCB-0 (pCR)  E.  STAGE: ypT0 ypN0        2.  AXILLARY LYMPH NODES:  A.  NO EVIDENCE OF  METASTATIC CARCINOMA (0 OF 4)  B.  3 LYMPH NODES WITH EXTENSIVE STROMAL FIBROSIS, CONSISTENT WITH REGRESSED TUMOR SITE        3.  BIOPSY SITES PRESENT (X 2)        4.  FIBROCYSTIC CHANGES WITH FIBROSIS, DUCT ECTASIA, COLUMNAR CELL CHANGES, PERIDUCTAL             CHRONIC INFLAMMATION AND USUAL DUCTAL HYPERPLASIA        5.  NIPPLE WITHOUT PAGET'S DISEASE OR DERMAL LYMPHATIC TUMOR SPREAD    C) RIGHT BREAST, SIMPLE MASTECTOMY:        1.  NO EVIDENCE OF IN SITU OR INVASIVE MALIGNANCY        2.  BIOPSY SITE PRESENT        3.  FIBROCYSTIC CHANGES WITH DENSE FIBROSIS, DUCT ECTASIA WITH CLUSTERED ECTATIC DUCTS,             COLUMNAR CELL CHANGES, SCLEROSING ADENOSIS AND PERIDUCTAL CHRONIC INFLAMMATION.              ASSOCIATED MICROCALCIFICATIONS PRESENT        4.  NIPPLE WITHOUT HISTOLOGIC ABNORMALITY          Comment       A and B) The lack of residual metastatic adenocarcinoma in the axillary lymph nodes and residual invasive carcinoma within the left breast is confirmed with negative staining for cytokeratin on multiple tissue blocks.      Synoptic Checklist       INVASIVE CARCINOMA OF THE BREAST: Resection   INVASIVE CARCINOMA OF THE BREAST: RESECTION - A, B   8th Edition - Protocol posted: 6/19/2024      SPECIMEN      Procedure:    Total mastectomy       Specimen Laterality:    Left       TUMOR      Tumor Site:    Clock position       :    2 o'clock     Tumor Site:    Distance from nipple (Centimeters): 7 cm    Histologic Type:    No residual invasive carcinoma     Tumor Size:    No residual invasive carcinoma     Tumor Focality:    Cannot be determined     Ductal Carcinoma In Situ (DCIS):    Not identified     Lymphatic and / or Vascular Invasion:    Not identified     Dermal Lymphatic and / or Vascular Invasion:    Not identified     Microcalcifications:    Not identified     Treatment Effect in the Breast:    Probable or definite response to presurgical therapy in the invasive carcinoma     Treatment Effect in the  Lymph Nodes:    Probable or definite response to presurgical therapy in metastatic carcinoma     Residual Cancer [place] (RCB) Parameters:          Greatest Dimension of Primary Tumor Bed Area (Millimeters):    15 mm      Second Greatest Dimension of Primary Tumor Bed Area (Millimeters):    15 mm      Percentage of Overall Cancer Cellularity:    0 %      Percentage of Cancer that is In Situ Disease:    0 %      Number of Positive Lymph Nodes:    0       Residual Cancer [place]:    0       Residual Cancer [place] Class:    RCB-0 (pCR)       REGIONAL LYMPH NODES    Regional Lymph Node Status:          :    All regional lymph nodes negative for tumor       Total Number of Lymph Nodes Examined (sentinel and non-sentinel):    5       pTNM CLASSIFICATION (AJCC 8th Edition)      Reporting of pT, pN, and (when applicable) pM categories is based on information available to the pathologist at the time the report is issued. As per the AJCC (Chapter 1, 8th Ed.) it is the managing physician's responsibility to establish the final pathologic stage based upon all pertinent information, including but potentially not limited to this pathology report.    Modified Classification:    y     pT Category:    pT0     pN Category:    pN0       Clinical Information       Procedure:  BILATERAL MASTECTOMY - Bilateral  WITH LEFT TARGETED AXILLARY DISSECTION - Left  Pre-op Diagnosis: Invasive ductal carcinoma of left breast (H) [C50.912]  Post-op Diagnosis: C50.912 - Invasive ductal carcinoma of left breast (H) [ICD-10-CM]      Intraoperative Consultation       A(1). Lymph Node(s), Axillary, Left, left axillary lymph node:  AFS1:  Lymph node with biopsy site.  No evidence of malignancy.  Pending permanent sections    Berna Guzmán MD on 4/29/2025 at 10:08 AM  Intra op performed at:[facility], [facility], [address], [place] [address]  Intra-op Dx verbally delivered to Dr. Matthews by YARI at 1009        Gross Description   "     A(1). Lymph Node(s), Axillary, Left, left axillary lymph node:  The specimen is received fresh for frozen section evaluation and is identified as \"lymph node, axillary, left\".  It consists of a 4.5 cm fragment of adipose tissue with a central localizing wire.  There is a palpable 1.4 x 1.4 x 1.1 cm white lymph node.  There is a 2 mm defect, suggestive of a possible biopsy site.  The lymph node is serially sectioned and 2 portions are submitted for frozen section evaluation.  The lymph node is entirely submitted for histologic examination-3C.  B(2). Breast, Left, left breast and axillary lymph nodes, stitch is superior:  The specimen is received fresh with proper patient identification labeled \"left breast and axillary lymph nodes, stitch is superior\".  The specimen consists of an 870 g, 25.5 cm from lateral to medial, 22.0 cm from superior to inferior, and 5.2 cm from anterior to posterior oriented breast mastectomy with an attached 23.1 x 12.5 cm portion of tan-white, hairbearing and homogenous skin with a centrally located 1.5 x 0.9 x 0.6 cm everted nipple.  There are no grossly identifiable masses lesions on the skin surface.  The specimen is marked with a suture designated as superior.  Additionally within the specimen container is a separate 7.7 x 4.5 x 2.0 cm aggregate of disrupted tan-yellow, soft and lobulated fibroadipose tissue.  Sectioning through this aggregate of separate fibroadipose tissue shows 5 tan-yellow to tan-pink, semifirm lymph nodes (1.0-2.0 cm in greatest dimension) each with tan-pink, semifirm and homogenous cut surfaces and no masses or lesions.  The mastectomy is inked as follows: Anterior/superior blue, anterior/inferior-green and posterior-black.  The specimen is serially sectioned from medial (slice 1) to lateral (slice 38) into 38 slices.  Located within slices 27-29 and the upper outer quadrant at approximately 2:00 is a 1.4 cm from lateral to medial, 1.5 cm from superior to " inferior, and 0.9 cm from anterior to posterior tan-white, stellate and ill-defined mass.  Located within the mass in slices 28-29 is a 0.5 x 0.5 x 0.5 cm tan-pink, smooth and glistening previous biopsy site containing a clear gelatinous plug and a coiled biopsy clip.  The mass and biopsy site are 1.0 cm to the posterior margin, 0.5 cm to the anterior/superior margin, 5.5 cm to the superior margin, 9.5 cm to the anterior/inferior margin, 11.5 cm to the inferior margin, 6.5 cm to the nipple, 5.5 cm to the lateral margin, and 18.5 cm to the medial margin.  Additionally sectioning through the specimen shows a second 0.5 x 0.5 x 0.5 cm tan-pink, smooth, glistening and homogenous previous biopsy site located in slice 13 at 6:00 in the lower inner quadrant.  There are no grossly identifiable masses or lesions adjacent to or involving the previous biopsy site.  The previous biopsy site is 4.0 cm to the anterior/superior margin, 5.0 cm to the posterior margin, 5.5 cm to the inferior margin, 10.5 cm to the anterior/superior margin, 12.0 cm to the superior margin, 5.5 cm to the medial margin, 10.7 cm to the lateral margin and 3.0 cm to the nipple.  The remaining uninvolved breast parenchyma is 50% tan-white, rubbery and glistening fibrous tissue and 50% tan-yellow, soft and lobulated adipose tissue with no other grossly identifiable masses, previous biopsy sites, lymph nodes or lesions.  Representative sections are submitted.  B1-B2-1 lymph node, serially sectioned  B3-1 lymph node, serially sectioned  B 4-1 lymph node, serially sectioned  B5-1 lymph node, serially sectioned  B6-1 lymph node, serially sectioned  B7-slice 26, uninvolved parenchyma medial to the mass  B8-slice 27, mass with surrounding uninvolved parenchyma and anterior/superior margin  B9-slice 28 mass and biopsy site with surrounding uninvolved parenchyma and anterior/superior margin  S50-boptm 29, mass and biopsy site with surrounding uninvolved parenchyma  and anterior/superior margin and posterior margin  E80-agueu 30, uninvolved parenchyma lateral to the mass and biopsy site  [id number] 13, secondary previous biopsy site with surrounding uninvolved parenchyma, thinned  B14-upper inner quadrant representatives  B15-lower inner quadrant representatives  B16-lower outer quadrant representatives  K11-pxtphw representative  The specimen is collected at 950 and placed in fixative at 1022 on 4/29/2025.  RED Franco(3). Breast, Right, right breast, stitch superior:  Received unfixed labeled with the patient's name and right breast is an 807 g, oriented, 24.6 cm from medial to lateral, 16.8 cm from superior to inferior and 6.1 cm from anterior to posterior product of a right breast mastectomy.  The specimen consists of yellow-white-pink fibrofatty tissue which is anteriorly surfaced by a tan-white, wrinkled and laterally purple stained 24.2 x 12.4 cm skin ellipse.  The ellipse has a central 1.6 x 0.3 cm, everted nipple.  No suspicious cutaneous lesions are identified.  The specimen is received oriented with a black stitch designating the superior margin, per the surgeon.  The specimen is inked black on the deep-posterior margin, blue on the soft tissue superior to the ellipse and green on the soft tissue inferior to the ellipse.  The specimen is serially sectioned from lateral to medial.    Sectioning through the mid superior aspect of the specimen, 2.4 cm superior-deep to the nipple, 7.2 cm from the lateral margin, 15.1 cm from the medial margin, 3.8 cm from the superior margin, 3.1 cm from the posterior margin and 13.8 cm from the inferior margin is a 1.3 x 0.5 cm previous site of biopsy.  The site of biopsy has a gelatinous substance and a mammotome clip which are removed prior to processing.  This biopsy site is present within a large lobule of fatty tissue (blocks 3-5).  There is surrounding white-pink, rubbery bands of fibrous tissue.  No indurated foci  or suspicious granular lesions are identified.    The remaining cut surface of the specimen consists predominantly of homogeneous, yellow lobulated fat which occupies approximately 70%.  There are thin to focally dense bands of white-pink, rubbery fibrous tissue making up the remaining cut surface.  Several minute to 0.3 cm, smooth lined cysts are identified within the dense bands of fibrous tissue.  No discrete indurated lesions or suspicious granular foci are identified upon sectioning.  No lymph nodes are identified upon sectioning or palpation.  An additional, unoriented 3.3 x 2.1 x 1.6 cm portion of fibrofatty tissue is present within the specimen container.  No suspicious lesions are identified upon sectioning through this detached tissue.  RS-13C    Summary of cassettes  1-2 nipple  3-5 previous biopsy site  6 nearest deep-posterior margin to nearest previous biopsy site, perpendicular section  7 upper outer quadrant  8 lower outer quadrant  9 lower inner quadrant  10 upper inner quadrant  11-12 representative mid breast  13 detached tissue representative    Note: The specimen is collected at 1004 and placed into formalin at 1022, 4/29/2025   RED Liang        Microscopic Description       Microscopic examination  performed, substantiating the above diagnosis.      MCRS Yes (A) N/A    Performing Labs       The technical component of this testing was completed at [facility].    Stain controls for all stains resulted within this report have been reviewed and show appropriate reactivity.       Case Images     CBC with platelets and differential    Collection Time: 05/07/25  1:31 PM   Result Value Ref Range    WBC Count 9.2 4.0 - 11.0 10e3/uL    RBC Count 3.35 (L) 3.80 - 5.20 10e6/uL    Hemoglobin 10.7 (L) 11.7 - 15.7 g/dL    Hematocrit 32.3 (L) 35.0 - 47.0 %    MCV 96 78 - 100 fL    MCH 31.9 26.5 - 33.0 pg    MCHC 33.1 31.5 - 36.5 g/dL    RDW  15.4 (H) 10.0 - 15.0 %    Platelet Count 335 150 - 450 10e3/uL    % Neutrophils 63 %    % Lymphocytes 28 %    % Monocytes 6 %    % Eosinophils 2 %    % Basophils 1 %    % Immature Granulocytes 0 %    NRBCs per 100 WBC 0 <1 /100    Absolute Neutrophils 5.8 1.6 - 8.3 10e3/uL    Absolute Lymphocytes 2.6 0.8 - 5.3 10e3/uL    Absolute Monocytes 0.6 0.0 - 1.3 10e3/uL    Absolute Eosinophils 0.2 0.0 - 0.7 10e3/uL    Absolute Basophils 0.1 0.0 - 0.2 10e3/uL    Absolute Immature Granulocytes 0.0 <=0.4 10e3/uL    Absolute NRBCs 0.0 10e3/uL         Imaging    US Breast Loc w [place] Node Injection Left  Result Date: 4/29/2025  INDICATION: Pre-operative localization of lymph node metastasis within the left axilla PROCEDURE: Informed consent was obtained from the patient. The axilla was cleansed with ChloraPrep. Lidocaine was used for local anesthesia. A 20-gauge needle was then advanced to the area of abnormality. A localization wire was then deployed.  Post-procedure mammograms demonstrate the localization wire in appropriate position. The previously placed marker was visualized. The patient tolerated this well. 525 uci 99mTc Lymphoseek was injected subdermally along the upper outer aspect of the areolar margin at 8:35am by NELSON Dawn.     IMPRESSION: Sonographically guided  wire localization.     MA Post Procedure Left  Result Date: 4/29/2025  INDICATION: Pre-operative localization of lymph node metastasis within the left axilla PROCEDURE: Informed consent was obtained from the patient. The axilla was cleansed with ChloraPrep. Lidocaine was used for local anesthesia. A 20-gauge needle was then advanced to the area of abnormality. A localization wire was then deployed.  Post-procedure mammograms demonstrate the localization wire in appropriate position. The previously placed marker was visualized. The patient tolerated this well. 525 uci 99mTc Lymphoseek was injected subdermally along the upper outer aspect of the  areolar margin at 8:35am by NELSON Dawn.     IMPRESSION: Sonographically guided  wire localization.     XR Chest 2 Views  Result Date: 4/25/2025  EXAM: XR CHEST 2 VIEWS LOCATION: [facility] DATE: 4/24/2025 INDICATION:  Viral URI COMPARISON: CT chest 12/3/2024     IMPRESSION: Left perihilar patchy opacity suggestive of infectious/inflammatory process. No pleural effusion or pneumothorax. Right chest port tip at the upper SVC. Similar cardiomediastinal silhouette.          Assessment and Plan    Stage IIIc breast cancer HER2 positive, ER/NC positive  Patient is here in follow-up.  She had her surgery done and is here to discuss the pathology report.  That was reviewed by me in detail.  Patient has had a complete pathologic response and she had no evidence of residual invasive cancer.  The implication of that was explained to the patient.  I explained to the patient the patient to have a complete pathologic response after neoadjuvant chemoimmunotherapy have an excellent prognosis and the risk of relapse in those patients is very low.  We also will plan not to change her treatment and continue her trastuzumab to complete 1 year which will finish in December.  She will get a dose of trastuzumab today and will be back in 3 weeks    Cardiac monitoring  Patient does needs continued cardiac monitoring because of the use of HER2 directed agents.  Her last echo was in March and we will plan to check an echo prior to her next visit      Nausea  patient has had severe nausea after each cycle.  This has already resolved as we have stopped the cytotoxic part of her regimen      Will refer patient to see one of our radiation therapy colleagues to get their opinion on adjuvant radiation    Signed by: Josseline Harkins MD        CC: Kallie Ta NP

## 2025-05-07 NOTE — LETTER
"5/7/2025      Adrienne Ivory  [address] [place] [address]      Dear Colleague,    Thank you for referring your patient, Adrienne Ivory, to the [facility]. Please see a copy of my visit note below.    Oncology Rooming Note    May 7, 2025 1:42 PM   Adrienne Ivory is a 59 year old female who presents for:    Chief Complaint   Patient presents with     Oncology Clinic Visit     Malignant neoplasm of upper-inner quadrant of left breast in female, estrogen receptor positive     Initial Vitals: BP (!) 138/94 (BP Location: Left arm, Patient Position: Sitting, Cuff Size: Adult Regular)   Pulse 92   Resp 16   Ht 1.651 m (5' 5\")   Wt 81.7 kg (180 lb 1.6 oz)   SpO2 99%   BMI 29.97 kg/m   Estimated body mass index is 29.97 kg/m  as calculated from the following:    Height as of this encounter: 1.651 m (5' 5\").    Weight as of this encounter: 81.7 kg (180 lb 1.6 oz). Body surface area is 1.94 meters squared.  No Pain (0) Comment: Data Unavailable   No LMP recorded. Patient has had an ablation.  Allergies reviewed: Yes  Medications reviewed: Yes    Medications: Medication refills not needed today.  Pharmacy name entered into [facility]:    [facility] - [place], [place] - [address] [id number] [place] [address] [facility] [address] [facility] - [address]    Frailty Screening:   Is the patient here for a new oncology consult visit in cancer care? 2. No    PHQ9:  Did this patient require a PHQ9?: No      Clinical concerns:   Follow up.   Pt requested for a RX for a wig.       Shari Panchal MA              [facility] Hematology and Oncology Progress Note    Patient: Adrienne Ivory  MRN: [id number]  Date of Service: May 7, 2025             ECOG Performance    0 - " Independent          ______________________________________________________________________________  Oncologic history  T2 N3 M0 stage IIIc invasive ductal carcinoma of the left breast ER positive [place] positive HER2/wolf 3+.  November 2024  Patient had multiple level 1 level 2 and level 3 axillary lymph nodes involved    Treatment  1.Patient started on neoadjuvant TCHP on 12/12/2024  2.Near complete radiologic remission after 3 cycles of TCHP  3.Completed 6 cycle of TCHP on 3/27/2025  4.Switched to single agent trastuzumab on 4/17/2025 with plans to change further treatment based on    pathologic response  5.  Complete pathologic response at the time of surgery.  Patient underwent bilateral mastectomies on 29 April 2025  6.  Plan to complete 1 year of trastuzumab which will finish in December 2025    History of Present Illness    Ms. Adrienne Ivory is here for follow-up.  She has completed her surgical management.  She had bilateral mastectomies done and is here to discuss the pathology report.  She otherwise feels fine and has no other concerns.  Surgery was only about a week ago so she is still recovering from that.    Review of systems  A comprehensive 12 point review of system was done that was negative except what is mentioned in the history of present illness    Past History    Past Medical History:   Diagnosis Date     Abnormal Pap smear, can't excl hi gd sq intraepithelial lesion (ASC-H) 03/01/2015    LSIL also     Anxiety state, unspecified      Herpes simplex without mention of complication      HSIL on Pap smear of cervix 07/01/2014    colp - WNL     Human papillomavirus in conditions classified elsewhere and of unspecified site 11/01/2010    + HPV 45 & 82 with ASCUS     Hx of colposcopy with cervical biopsy 11/08/2016    [place] ECC neg.      Obese      PONV (postoperative nausea and vomiting)      Premenstrual tension syndromes        Past Surgical History:   Procedure Laterality Date     C IUD,MIRENA   "2/08-3/11    removed for cramping     COLONOSCOPY N/A 12/4/2020    Procedure: COLONOSCOPY, WITH POLYPECTOMY;  Surgeon: Moises Pride MD;  Location: [facility] OR     DILATION AND CURETTAGE, ABLATE ENDOMETRIUM NOVASURE, COMBINED N/A 12/31/2015    Procedure: COMBINED DILATION AND CURETTAGE, ABLATE ENDOMETRIUM NOVASURE;  Surgeon: Shakira Penaloza MD;  Location: [facility] OR     DISSECT LYMPH NODE AXILLA Left 4/29/2025    Procedure: WITH LEFT TARGETED AXILLARY DISSECTION;  Surgeon: Katerine Matthews DO;  Location: [facility] Main OR     HYSTEROSCOPY DIAGNOSTIC N/A 12/31/2015    Procedure: HYSTEROSCOPY DIAGNOSTIC;  Surgeon: Shakira Penaloza MD;  Location: [facility] OR     LEEP TX, CERVICAL  4/3/15    ARNAV 2-3, negative margins     MASTECTOMY SIMPLE Bilateral 4/29/2025    Procedure: BILATERAL MASTECTOMY;  Surgeon: Katerine Matthews DO;  Location: [facility] Main OR     NO HISTORY OF SURGERY         Physical Exam    BP (!) 138/94 (BP Location: Left arm, Patient Position: Sitting, Cuff Size: Adult Regular)   Pulse 92   Resp 16   Ht 1.651 m (5' 5\")   Wt 81.7 kg (180 lb 1.6 oz)   SpO2 99%   BMI 29.97 kg/m      General: alert, awake, not in acute distress  HEENT: Head: Normal, normocephalic, atraumatic.  Eye: Normal external eye, conjunctiva, lids cornea, SITA.  Nose: Normal external nose, mucus membranes and septum.  Pharynx: Normal buccal mucosa. Normal pharynx.  Neck / Thyroid: Supple, no masses, nodes, nodules or enlargement.  Lymphatics: No abnormally enlarged lymph nodes.  Chest: Normal chest wall and respirations. Clear to auscultation.  No crackles or rhonchi's  Heart: S1 S2 RRR, no murmur.   Abdomen: abdomen is soft without significant tenderness, masses, organomegaly or guarding  Extremities: normal strength, tone, and muscle mass  Skin: normal. no rash or abnormalities  CNS: non focal.    Breast exam on the left side did show shrinkage in the mass I could still feel a lymph node in the axilla      Lab Results    Recent " Results (from the past 240 hours)   Surgical Pathology Exam    Collection Time: 04/29/25  9:38 AM   Result Value Ref Range    Case Report       Surgical Pathology Report                         Case: [id number]                                  Authorizing Provider:  Katerine Matthews DO         Collected:           04/29/2025 09:38 AM          Ordering Location:     [facility]          Received:            04/29/2025 09:47 AM                                 [facility] OR                                                   Pathologist:           Berna Guzmán MD                                                           Intraop:               Berna Guzmán MD                                                           Specimens:   A) - Lymph Node(s), Axillary, Left, left axillary lymph node                                        B) - Breast, Left, left breast and axillary lymph nodes, stitch is superior                         C) - Breast, Right, right breast, stitch superior                                          Final Diagnosis       A) LEFT AXILLARY LYMPH NODE, LYMPH NODE BIOPSY:        1.  NO EVIDENCE OF METASTATIC CARCINOMA (0 OF 1)        2.  EXTENSIVE FIBROSIS, CONSISTENT WITH REGRESSED TUMOR SITE        3.  BIOPSY SITE PRESENT    B) LEFT BREAST, SIMPLE MASTECTOMY:        1.  RESIDUAL IN SITU AND INVASIVE MALIGNANCY NOT IDENTIFIED (SEE SYNOPTIC REPORT)  A.  15 X 15 X 9 MM FIBROTIC TUMOR BED, 2:00  B.  RESIDUAL IN SITU AND INVASIVE MALIGNANCY NOT IDENTIFIED (CONFIRMED WITH NEGATIVE       CYTOKERATIN STAIN)  C.  TUMOR BED 5 MM FROM THE ANTERIOR SUPERIOR SOFT TISSUE MARGIN AND 10 MM FROM DEEP       MARGIN  D.  RESIDUAL CANCER BURDEN: 0; RESIDUAL CANCER BURDEN CLASS: RCB-0 (pCR)  E.  STAGE: ypT0 ypN0        2.  AXILLARY LYMPH NODES:  A.  NO EVIDENCE OF METASTATIC CARCINOMA (0 OF 4)  B.  3 LYMPH NODES WITH EXTENSIVE STROMAL FIBROSIS, CONSISTENT WITH REGRESSED TUMOR SITE        3.  BIOPSY SITES  PRESENT (X 2)        4.  FIBROCYSTIC CHANGES WITH FIBROSIS, DUCT ECTASIA, COLUMNAR CELL CHANGES, PERIDUCTAL             CHRONIC INFLAMMATION AND USUAL DUCTAL HYPERPLASIA        5.  NIPPLE WITHOUT PAGET'S DISEASE OR DERMAL LYMPHATIC TUMOR SPREAD    C) RIGHT BREAST, SIMPLE MASTECTOMY:        1.  NO EVIDENCE OF IN SITU OR INVASIVE MALIGNANCY        2.  BIOPSY SITE PRESENT        3.  FIBROCYSTIC CHANGES WITH DENSE FIBROSIS, DUCT ECTASIA WITH CLUSTERED ECTATIC DUCTS,             COLUMNAR CELL CHANGES, SCLEROSING ADENOSIS AND PERIDUCTAL CHRONIC INFLAMMATION.              ASSOCIATED MICROCALCIFICATIONS PRESENT        4.  NIPPLE WITHOUT HISTOLOGIC ABNORMALITY          Comment       A and B) The lack of residual metastatic adenocarcinoma in the axillary lymph nodes and residual invasive carcinoma within the left breast is confirmed with negative staining for cytokeratin on multiple tissue blocks.      Synoptic Checklist       INVASIVE CARCINOMA OF THE BREAST: Resection   INVASIVE CARCINOMA OF THE BREAST: RESECTION - A, B   8th Edition - Protocol posted: 6/19/2024      SPECIMEN      Procedure:    Total mastectomy       Specimen Laterality:    Left       TUMOR      Tumor Site:    Clock position       :    2 o'clock     Tumor Site:    Distance from nipple (Centimeters): 7 cm    Histologic Type:    No residual invasive carcinoma     Tumor Size:    No residual invasive carcinoma     Tumor Focality:    Cannot be determined     Ductal Carcinoma In Situ (DCIS):    Not identified     Lymphatic and / or Vascular Invasion:    Not identified     Dermal Lymphatic and / or Vascular Invasion:    Not identified     Microcalcifications:    Not identified     Treatment Effect in the Breast:    Probable or definite response to presurgical therapy in the invasive carcinoma     Treatment Effect in the Lymph Nodes:    Probable or definite response to presurgical therapy in metastatic carcinoma     Residual Cancer [place] (RCB) Parameters:           Greatest Dimension of Primary Tumor Bed Area (Millimeters):    15 mm      Second Greatest Dimension of Primary Tumor Bed Area (Millimeters):    15 mm      Percentage of Overall Cancer Cellularity:    0 %      Percentage of Cancer that is In Situ Disease:    0 %      Number of Positive Lymph Nodes:    0       Residual Cancer [place]:    0       Residual Cancer [place] Class:    RCB-0 (pCR)       REGIONAL LYMPH NODES    Regional Lymph Node Status:          :    All regional lymph nodes negative for tumor       Total Number of Lymph Nodes Examined (sentinel and non-sentinel):    5       pTNM CLASSIFICATION (AJCC 8th Edition)      Reporting of pT, pN, and (when applicable) pM categories is based on information available to the pathologist at the time the report is issued. As per the AJCC (Chapter 1, 8th Ed.) it is the managing physician's responsibility to establish the final pathologic stage based upon all pertinent information, including but potentially not limited to this pathology report.    Modified Classification:    y     pT Category:    pT0     pN Category:    pN0       Clinical Information       Procedure:  BILATERAL MASTECTOMY - Bilateral  WITH LEFT TARGETED AXILLARY DISSECTION - Left  Pre-op Diagnosis: Invasive ductal carcinoma of left breast (H) [C50.912]  Post-op Diagnosis: C50.912 - Invasive ductal carcinoma of left breast (H) [ICD-10-CM]      Intraoperative Consultation       A(1). Lymph Node(s), Axillary, Left, left axillary lymph node:  AFS1:  Lymph node with biopsy site.  No evidence of malignancy.  Pending permanent sections    Berna Guzmán MD on 4/29/2025 at 10:08 AM  Intra op performed at:[facility], [facility], [address] , [facility] [address]  Intra-op Dx verbally delivered to Dr. Matthews by [facility] at 1008        Gross Description       A(1). Lymph Node(s), Axillary, Left, left axillary lymph node:  The specimen is received fresh for frozen section evaluation and is  "identified as \"lymph node, axillary, left\".  It consists of a 4.5 cm fragment of adipose tissue with a central localizing wire.  There is a palpable 1.4 x 1.4 x 1.1 cm white lymph node.  There is a 2 mm defect, suggestive of a possible biopsy site.  The lymph node is serially sectioned and 2 portions are submitted for frozen section evaluation.  The lymph node is entirely submitted for histologic examination-3C.  B(2). Breast, Left, left breast and axillary lymph nodes, stitch is superior:  The specimen is received fresh with proper patient identification labeled \"left breast and axillary lymph nodes, stitch is superior\".  The specimen consists of an 870 g, 25.5 cm from lateral to medial, 22.0 cm from superior to inferior, and 5.2 cm from anterior to posterior oriented breast mastectomy with an attached 23.1 x 12.5 cm portion of tan-white, hairbearing and homogenous skin with a centrally located 1.5 x 0.9 x 0.6 cm everted nipple.  There are no grossly identifiable masses lesions on the skin surface.  The specimen is marked with a suture designated as superior.  Additionally within the specimen container is a separate 7.7 x 4.5 x 2.0 cm aggregate of disrupted tan-yellow, soft and lobulated fibroadipose tissue.  Sectioning through this aggregate of separate fibroadipose tissue shows 5 tan-yellow to tan-pink, semifirm lymph nodes (1.0-2.0 cm in greatest dimension) each with tan-pink, semifirm and homogenous cut surfaces and no masses or lesions.  The mastectomy is inked as follows: Anterior/superior blue, anterior/inferior-green and posterior-black.  The specimen is serially sectioned from medial (slice 1) to lateral (slice 38) into 38 slices.  Located within slices 27-29 and the upper outer quadrant at approximately 2:00 is a 1.4 cm from lateral to medial, 1.5 cm from superior to inferior, and 0.9 cm from anterior to posterior tan-white, stellate and ill-defined mass.  Located within the mass in slices 28-29 is a 0.5 " x 0.5 x 0.5 cm tan-pink, smooth and glistening previous biopsy site containing a clear gelatinous plug and a coiled biopsy clip.  The mass and biopsy site are 1.0 cm to the posterior margin, 0.5 cm to the anterior/superior margin, 5.5 cm to the superior margin, 9.5 cm to the anterior/inferior margin, 11.5 cm to the inferior margin, 6.5 cm to the nipple, 5.5 cm to the lateral margin, and 18.5 cm to the medial margin.  Additionally sectioning through the specimen shows a second 0.5 x 0.5 x 0.5 cm tan-pink, smooth, glistening and homogenous previous biopsy site located in slice 13 at 6:00 in the lower inner quadrant.  There are no grossly identifiable masses or lesions adjacent to or involving the previous biopsy site.  The previous biopsy site is 4.0 cm to the anterior/superior margin, 5.0 cm to the posterior margin, 5.5 cm to the inferior margin, 10.5 cm to the anterior/superior margin, 12.0 cm to the superior margin, 5.5 cm to the medial margin, 10.7 cm to the lateral margin and 3.0 cm to the nipple.  The remaining uninvolved breast parenchyma is 50% tan-white, rubbery and glistening fibrous tissue and 50% tan-yellow, soft and lobulated adipose tissue with no other grossly identifiable masses, previous biopsy sites, lymph nodes or lesions.  Representative sections are submitted.  B1-B2-1 lymph node, serially sectioned  B3-1 lymph node, serially sectioned  B 4-1 lymph node, serially sectioned  B5-1 lymph node, serially sectioned  B6-1 lymph node, serially sectioned  B7-slice 26, uninvolved parenchyma medial to the mass  B8-slice 27, mass with surrounding uninvolved parenchyma and anterior/superior margin  B9-slice 28 mass and biopsy site with surrounding uninvolved parenchyma and anterior/superior margin  K38-ccutx 29, mass and biopsy site with surrounding uninvolved parenchyma and anterior/superior margin and posterior margin  H88-iphfh 30, uninvolved parenchyma lateral to the mass and biopsy site  [id number] 13,  secondary previous biopsy site with surrounding uninvolved parenchyma, thinned  B14-upper inner quadrant representatives  B15-lower inner quadrant representatives  B16-lower outer quadrant representatives  K78-nvyjlb representative  The specimen is collected at 950 and placed in fixative at 1022 on 4/29/2025.  RED Franco(3). Breast, Right, right breast, stitch superior:  Received unfixed labeled with the patient's name and right breast is an 807 g, oriented, 24.6 cm from medial to lateral, 16.8 cm from superior to inferior and 6.1 cm from anterior to posterior product of a right breast mastectomy.  The specimen consists of yellow-white-pink fibrofatty tissue which is anteriorly surfaced by a tan-white, wrinkled and laterally purple stained 24.2 x 12.4 cm skin ellipse.  The ellipse has a central 1.6 x 0.3 cm, everted nipple.  No suspicious cutaneous lesions are identified.  The specimen is received oriented with a black stitch designating the superior margin, per the surgeon.  The specimen is inked black on the deep-posterior margin, blue on the soft tissue superior to the ellipse and green on the soft tissue inferior to the ellipse.  The specimen is serially sectioned from lateral to medial.    Sectioning through the mid superior aspect of the specimen, 2.4 cm superior-deep to the nipple, 7.2 cm from the lateral margin, 15.1 cm from the medial margin, 3.8 cm from the superior margin, 3.1 cm from the posterior margin and 13.8 cm from the inferior margin is a 1.3 x 0.5 cm previous site of biopsy.  The site of biopsy has a gelatinous substance and a mammotome clip which are removed prior to processing.  This biopsy site is present within a large lobule of fatty tissue (blocks 3-5).  There is surrounding white-pink, rubbery bands of fibrous tissue.  No indurated foci or suspicious granular lesions are identified.    The remaining cut surface of the specimen consists predominantly of homogeneous, yellow  lobulated fat which occupies approximately 70%.  There are thin to focally dense bands of white-pink, rubbery fibrous tissue making up the remaining cut surface.  Several minute to 0.3 cm, smooth lined cysts are identified within the dense bands of fibrous tissue.  No discrete indurated lesions or suspicious granular foci are identified upon sectioning.  No lymph nodes are identified upon sectioning or palpation.  An additional, unoriented 3.3 x 2.1 x 1.6 cm portion of fibrofatty tissue is present within the specimen container.  No suspicious lesions are identified upon sectioning through this detached tissue.  RS-13C    Summary of cassettes  1-2 nipple  3-5 previous biopsy site  6 nearest deep-posterior margin to nearest previous biopsy site, perpendicular section  7 upper outer quadrant  8 lower outer quadrant  9 lower inner quadrant  10 upper inner quadrant  11-12 representative mid breast  13 detached tissue representative    Note: The specimen is collected at 1004 and placed into formalin at 1022, 4/29/2025   RED Liang        Microscopic Description       Microscopic examination  performed, substantiating the above diagnosis.      MCRS Yes (A) N/A    Performing Labs       The technical component of this testing was completed at [facility].    Stain controls for all stains resulted within this report have been reviewed and show appropriate reactivity.       Case Images     CBC with platelets and differential    Collection Time: 05/07/25  1:31 PM   Result Value Ref Range    WBC Count 9.2 4.0 - 11.0 10e3/uL    RBC Count 3.35 (L) 3.80 - 5.20 10e6/uL    Hemoglobin 10.7 (L) 11.7 - 15.7 g/dL    Hematocrit 32.3 (L) 35.0 - 47.0 %    MCV 96 78 - 100 fL    MCH 31.9 26.5 - 33.0 pg    MCHC 33.1 31.5 - 36.5 g/dL    RDW 15.4 (H) 10.0 - 15.0 %    Platelet Count 335 150 - 450 10e3/uL    % Neutrophils 63 %    % Lymphocytes 28 %    % Monocytes 6 %    %  Eosinophils 2 %    % Basophils 1 %    % Immature Granulocytes 0 %    NRBCs per 100 WBC 0 <1 /100    Absolute Neutrophils 5.8 1.6 - 8.3 10e3/uL    Absolute Lymphocytes 2.6 0.8 - 5.3 10e3/uL    Absolute Monocytes 0.6 0.0 - 1.3 10e3/uL    Absolute Eosinophils 0.2 0.0 - 0.7 10e3/uL    Absolute Basophils 0.1 0.0 - 0.2 10e3/uL    Absolute Immature Granulocytes 0.0 <=0.4 10e3/uL    Absolute NRBCs 0.0 10e3/uL         Imaging    US Breast Loc w [place] Node Injection Left  Result Date: 4/29/2025  INDICATION: Pre-operative localization of lymph node metastasis within the left axilla PROCEDURE: Informed consent was obtained from the patient. The axilla was cleansed with ChloraPrep. Lidocaine was used for local anesthesia. A 20-gauge needle was then advanced to the area of abnormality. A localization wire was then deployed.  Post-procedure mammograms demonstrate the localization wire in appropriate position. The previously placed marker was visualized. The patient tolerated this well. 525 uci 99mTc Lymphoseek was injected subdermally along the upper outer aspect of the areolar margin at 8:35am by NELSON Dawn.     IMPRESSION: Sonographically guided  wire localization.     MA Post Procedure Left  Result Date: 4/29/2025  INDICATION: Pre-operative localization of lymph node metastasis within the left axilla PROCEDURE: Informed consent was obtained from the patient. The axilla was cleansed with ChloraPrep. Lidocaine was used for local anesthesia. A 20-gauge needle was then advanced to the area of abnormality. A localization wire was then deployed.  Post-procedure mammograms demonstrate the localization wire in appropriate position. The previously placed marker was visualized. The patient tolerated this well. 525 uci 99mTc Lymphoseek was injected subdermally along the upper outer aspect of the areolar margin at 8:35am by NELSON Dawn.     IMPRESSION: Sonographically guided  wire localization.     XR Chest 2  Views  Result Date: 4/25/2025  EXAM: XR CHEST 2 VIEWS LOCATION: [facility] DATE: 4/24/2025 INDICATION:  Viral URI COMPARISON: CT chest 12/3/2024     IMPRESSION: Left perihilar patchy opacity suggestive of infectious/inflammatory process. No pleural effusion or pneumothorax. Right chest port tip at the upper SVC. Similar cardiomediastinal silhouette.          Assessment and Plan    Stage IIIc breast cancer HER2 positive, ER/GA positive  Patient is here in follow-up.  She had her surgery done and is here to discuss the pathology report.  That was reviewed by me in detail.  Patient has had a complete pathologic response and she had no evidence of residual invasive cancer.  The implication of that was explained to the patient.  I explained to the patient the patient to have a complete pathologic response after neoadjuvant chemoimmunotherapy have an excellent prognosis and the risk of relapse in those patients is very low.  We also will plan not to change her treatment and continue her trastuzumab to complete 1 year which will finish in December.  She will get a dose of trastuzumab today and will be back in 3 weeks    Cardiac monitoring  Patient does needs continued cardiac monitoring because of the use of HER2 directed agents.  Her last echo was in March and we will plan to check an echo prior to her next visit      Nausea  patient has had severe nausea after each cycle.  This has already resolved as we have stopped the cytotoxic part of her regimen      Will refer patient to see one of our radiation therapy colleagues to get their opinion on adjuvant radiation    Signed by: Josseline Harkins MD        CC: Kallie aT NP     Again, thank you for allowing me to participate in the care of your patient.        Sincerely,        Josseline Harkins MD    Electronically signed

## 2025-05-07 NOTE — PROGRESS NOTES
Port accessed with difficulty today cathflow given, and labs drawn through peripheral IV. Jenni Sidhu RN

## 2025-05-07 NOTE — PROGRESS NOTES
New Patient Oncology Nurse Navigator Note     Referring provider:     Hang Harkins MD        Referring Clinic/Organization: [facility]      Referred to (specialty:) Radiation Oncology     Requested provider (if applicable): DHIRAJ. Location: [facility]     Date Referral Received: May 7, 2025     Evaluation for:    C50.212, Z17.0 (ICD-10-CM) - Malignant neoplasm of upper-inner quadrant of left breast in female, estrogen receptor positive (H)        Stage IIIc breast cancer HER2 positive, ER/IN positive  Patient is here in follow-up.  She had her surgery done and is here to discuss the pathology report.  That was reviewed by me in detail.  Patient has had a complete pathologic response and she had no evidence of residual invasive cancer.  The implication of that was explained to the patient.  I explained to the patient the patient to have a complete pathologic response after neoadjuvant chemoimmunotherapy have an excellent prognosis and the risk of relapse in those patients is very low.  We also will plan not to change her treatment and continue her trastuzumab to complete 1 year which will finish in December.  She will get a dose of trastuzumab today and will be back in 3 weeks. Will refer patient to see one of our radiation therapy colleagues to get their opinion on adjuvant radiation       Payor: [employer] / Plan: [employer] OPEN ACCESS / Product Type: O /     May 7, 2025  Referral received and reviewed.   Sent to NPS to schedule.     Nelia BORDENN, RN, OCN  Oncology Nurse Navigator   [facility]  Cancer Care Service Line   New Patient Hem/Onc Scheduling / Referrals: [phone] (fax: [phone] )

## 2025-05-07 NOTE — PROGRESS NOTES
"Oncology Rooming Note    May 7, 2025 1:42 PM   Adrienne Ivory is a 59 year old female who presents for:    Chief Complaint   Patient presents with    Oncology Clinic Visit     Malignant neoplasm of upper-inner quadrant of left breast in female, estrogen receptor positive     Initial Vitals: BP (!) 138/94 (BP Location: Left arm, Patient Position: Sitting, Cuff Size: Adult Regular)   Pulse 92   Resp 16   Ht 1.651 m (5' 5\")   Wt 81.7 kg (180 lb 1.6 oz)   SpO2 99%   BMI 29.97 kg/m   Estimated body mass index is 29.97 kg/m  as calculated from the following:    Height as of this encounter: 1.651 m (5' 5\").    Weight as of this encounter: 81.7 kg (180 lb 1.6 oz). Body surface area is 1.94 meters squared.  No Pain (0) Comment: Data Unavailable   No LMP recorded. Patient has had an ablation.  Allergies reviewed: Yes  Medications reviewed: Yes    Medications: Medication refills not needed today.  Pharmacy name entered into [facility]:    [facility] - [place], [place] - [address] [place] [address] DRUG STORE #[id number] [place], [place] - [address] [place] [address] AT [address] HOME [facility] - [address]    Frailty Screening:   Is the patient here for a new oncology consult visit in cancer care? 2. No    PHQ9:  Did this patient require a PHQ9?: No      Clinical concerns:   Follow up.   Pt requested for a RX for a wig.       Shari Panchal MA            "

## 2025-05-07 NOTE — PROGRESS NOTES
Infusion Nursing Note:  Adrienne Ivory presents today for Trastuzumab.    Patient seen by provider today: Yes: Dr. Harkins   present during visit today: Not Applicable.    Note: Adrienne arrived ambulatory by herself for treatment following her office visit with Dr. Harkins. Plan of care reviewed and she has no questions.    Intravenous Access:  Implanted Port accessed by DIEGO Cunningham. Peripheral IV placed and labs drawn by DIEGO Cunningham, as she was unable to obtain blood return from port.  Writer instilled TPA in port at 1425. TPA was withdrawn after 60 minutes and blood return was noted. Port was flushed with 20 ml NS, heparinized and de accessed.     Treatment Conditions:  Lab Results   Component Value Date    HGB 10.7 (L) 05/07/2025    WBC 9.2 05/07/2025    ANEU 5.8 05/07/2025     05/07/2025        Lab Results   Component Value Date     05/07/2025    POTASSIUM 3.7 05/07/2025    MAG 1.6 (L) 01/29/2025    CR 0.72 05/07/2025    CONNIE 9.2 05/07/2025    BILITOTAL 0.2 05/07/2025    ALBUMIN 3.7 05/07/2025    ALT 16 05/07/2025    AST 26 05/07/2025     ECHO was completed 2/13/25.  Results reviewed, labs MET treatment parameters, ok to proceed with treatment.    Post Infusion Assessment:  Patient tolerated infusion without incident through peripheral IV.  Blood return noted pre and post infusion.  Site patent and intact, free from redness, edema or discomfort.  No evidence of extravasations.  Access discontinued per protocol.     Discharge Plan:   Patient will return May 28th for next appointment.   Patient discharged in stable condition accompanied by: self.  Departure Mode: Ambulatory.      Marissa Gipson RN

## 2025-05-08 NOTE — TELEPHONE ENCOUNTER
RECORDS STATUS - BREAST    RECORDS REQUESTED FROM: [facility] - Internal records   DATE REQUESTED: 5/8

## 2025-05-08 NOTE — PROGRESS NOTES
History:  Adrienne Ivory is s/p bilateral mastectomy with left targeted axillary dissection on April 29.  The right sided drain was removed last week.  She is doing well.  Really sick of the left drain.    Physical exam:  CHEST: No unusual swelling.  No signs of infection.  No dogears.  Prineo removed.    Pathology:  A) LEFT AXILLARY LYMPH NODE, LYMPH NODE BIOPSY:        1.  NO EVIDENCE OF METASTATIC CARCINOMA (0 OF 1)        2.  EXTENSIVE FIBROSIS, CONSISTENT WITH REGRESSED TUMOR SITE        3.  BIOPSY SITE PRESENT     B) LEFT BREAST, SIMPLE MASTECTOMY:        1.  RESIDUAL IN SITU AND INVASIVE MALIGNANCY NOT IDENTIFIED (SEE SYNOPTIC REPORT)  A.  15 X 15 X 9 MM FIBROTIC TUMOR BED, 2:00  B.  RESIDUAL IN SITU AND INVASIVE MALIGNANCY NOT IDENTIFIED (CONFIRMED WITH NEGATIVE       CYTOKERATIN STAIN)  C.  TUMOR BED 5 MM FROM THE ANTERIOR SUPERIOR SOFT TISSUE MARGIN AND 10 MM FROM DEEP       MARGIN  D.  RESIDUAL CANCER BURDEN: 0; RESIDUAL CANCER BURDEN CLASS: RCB-0 (pCR)  E.  STAGE: ypT0 ypN0        2.  AXILLARY LYMPH NODES:  A.  NO EVIDENCE OF METASTATIC CARCINOMA (0 OF 4)  B.  3 LYMPH NODES WITH EXTENSIVE STROMAL FIBROSIS, CONSISTENT WITH REGRESSED TUMOR SITE        3.  BIOPSY SITES PRESENT (X 2)        4.  FIBROCYSTIC CHANGES WITH FIBROSIS, DUCT ECTASIA, COLUMNAR CELL CHANGES, PERIDUCTAL             CHRONIC INFLAMMATION AND USUAL DUCTAL HYPERPLASIA        5.  NIPPLE WITHOUT PAGET'S DISEASE OR DERMAL LYMPHATIC TUMOR SPREAD     C) RIGHT BREAST, SIMPLE MASTECTOMY:        1.  NO EVIDENCE OF IN SITU OR INVASIVE MALIGNANCY        2.  BIOPSY SITE PRESENT        3.  FIBROCYSTIC CHANGES WITH DENSE FIBROSIS, DUCT ECTASIA WITH CLUSTERED ECTATIC DUCTS,             COLUMNAR CELL CHANGES, SCLEROSING ADENOSIS AND PERIDUCTAL CHRONIC INFLAMMATION.              ASSOCIATED MICROCALCIFICATIONS PRESENT        4.  NIPPLE WITHOUT HISTOLOGIC ABNORMALITY    ASSESSMENT:  Adrienne Ivory is s/p neoadjuvant chemotherapy and bilateral mastectomy  with left targeted axillary dissection for a T2 N3 ER+ DE- HER2+ invasive carcinoma    - Had a complete pathologic response    PLAN:  Prineo removed  Left drain removed  Pathology was discussed  Referrals were placed for prosthetics and radiation oncology  Discontinue yearly mammograms.  These will be replaced with physical exams.  150 minutes of aerobic exercise/week with 2 days of strength training is recommended     - This can improve survival and decrease recurrence risk  Return to the breast clinic in 1 year, or earlier as needed (such as for port removal in the general surgery clinic under local anesthetic)    Katerine Matthews DO  General Surgeon  [facility]  Breast Center [facility]  [address] [place] [id number]  Office: [phone]  Employed by - [facility]

## 2025-05-12 ENCOUNTER — OFFICE VISIT (OUTPATIENT)
Dept: SURGERY | Facility: CLINIC | Age: 60
End: 2025-05-12
Attending: INTERNAL MEDICINE
Payer: COMMERCIAL

## 2025-05-12 DIAGNOSIS — C50.912 INVASIVE DUCTAL CARCINOMA OF LEFT BREAST (H): Primary | ICD-10-CM

## 2025-05-12 PROCEDURE — 99213 OFFICE O/P EST LOW 20 MIN: CPT | Performed by: SURGERY

## 2025-05-12 PROCEDURE — 99024 POSTOP FOLLOW-UP VISIT: CPT | Performed by: SURGERY

## 2025-05-12 NOTE — NURSING NOTE
Adrienne presents to [facility] for a post op appointment with Dr. Matthews .  She is accompanied by herself for appointment.  RN assessment and EMR update. She states she is doing well, minimal pain.  She has good ROM, reviewed ROM exercises with her.  Patient met with Dr. Matthews .  See dictation for details of visit.  Last drain removed by MD.  She has appts with Medical Oncology and Radiation Oncology and will plan to follow up with Dr. Matthews  in one year.  Invited calls sooner if she has any questions.

## 2025-05-12 NOTE — LETTER
5/12/2025      Adrienne Ivory  [address]  [place] [address]      Dear Colleague,    Thank you for referring your patient, Adrienne Ivory, to the [facility] [place]. Please see a copy of my visit note below.    History:  Adrienne Ivory is s/p bilateral mastectomy with left targeted axillary dissection on April 29.  The right sided drain was removed last week.  She is doing well.  Really sick of the left drain.    Physical exam:  CHEST: No unusual swelling.  No signs of infection.  No dogears.  Prineo removed.    Pathology:  A) LEFT AXILLARY LYMPH NODE, LYMPH NODE BIOPSY:        1.  NO EVIDENCE OF METASTATIC CARCINOMA (0 OF 1)        2.  EXTENSIVE FIBROSIS, CONSISTENT WITH REGRESSED TUMOR SITE        3.  BIOPSY SITE PRESENT     B) LEFT BREAST, SIMPLE MASTECTOMY:        1.  RESIDUAL IN SITU AND INVASIVE MALIGNANCY NOT IDENTIFIED (SEE SYNOPTIC REPORT)  A.  15 X 15 X 9 MM FIBROTIC TUMOR BED, 2:00  B.  RESIDUAL IN SITU AND INVASIVE MALIGNANCY NOT IDENTIFIED (CONFIRMED WITH NEGATIVE       CYTOKERATIN STAIN)  C.  TUMOR BED 5 MM FROM THE ANTERIOR SUPERIOR SOFT TISSUE MARGIN AND 10 MM FROM DEEP       MARGIN  D.  RESIDUAL CANCER BURDEN: 0; RESIDUAL CANCER BURDEN CLASS: RCB-0 (pCR)  E.  STAGE: ypT0 ypN0        2.  AXILLARY LYMPH NODES:  A.  NO EVIDENCE OF METASTATIC CARCINOMA (0 OF 4)  B.  3 LYMPH NODES WITH EXTENSIVE STROMAL FIBROSIS, CONSISTENT WITH REGRESSED TUMOR SITE        3.  BIOPSY SITES PRESENT (X 2)        4.  FIBROCYSTIC CHANGES WITH FIBROSIS, DUCT ECTASIA, COLUMNAR CELL CHANGES, PERIDUCTAL             CHRONIC INFLAMMATION AND USUAL DUCTAL HYPERPLASIA        5.  NIPPLE WITHOUT PAGET'S DISEASE OR DERMAL LYMPHATIC TUMOR SPREAD     C) RIGHT BREAST, SIMPLE MASTECTOMY:        1.  NO EVIDENCE OF IN SITU OR INVASIVE MALIGNANCY        2.  BIOPSY SITE PRESENT        3.  FIBROCYSTIC CHANGES WITH DENSE FIBROSIS, DUCT ECTASIA WITH CLUSTERED ECTATIC DUCTS,             COLUMNAR CELL  CHANGES, SCLEROSING ADENOSIS AND PERIDUCTAL CHRONIC INFLAMMATION.              ASSOCIATED MICROCALCIFICATIONS PRESENT        4.  NIPPLE WITHOUT HISTOLOGIC ABNORMALITY    ASSESSMENT:  Adrienne Ivory is s/p neoadjuvant chemotherapy and bilateral mastectomy with left targeted axillary dissection for a T2 N3 ER+ MD- HER2+ invasive carcinoma    - Had a complete pathologic response    PLAN:  Regina removed  Left drain removed  Pathology was discussed  Referrals were placed for prosthetics and radiation oncology  Discontinue yearly mammograms.  These will be replaced with physical exams.  150 minutes of aerobic exercise/week with 2 days of strength training is recommended     - This can improve survival and decrease recurrence risk  Return to the breast clinic in 1 year, or earlier as needed (such as for port removal in the general surgery clinic under local anesthetic)    Katerine Matthews DO  General Surgeon  [facility]  Breast Center [address] [address]  Office: [phone]  Employed by - [facility]      Again, thank you for allowing me to participate in the care of your patient.        Sincerely,        Katerine Matthews DO    Electronically signed

## 2025-05-13 ENCOUNTER — OFFICE VISIT (OUTPATIENT)
Dept: RADIATION ONCOLOGY | Facility: CLINIC | Age: 60
End: 2025-05-13
Attending: INTERNAL MEDICINE
Payer: COMMERCIAL

## 2025-05-13 ENCOUNTER — MYC MEDICAL ADVICE (OUTPATIENT)
Dept: ONCOLOGY | Facility: HOSPITAL | Age: 60
End: 2025-05-13

## 2025-05-13 ENCOUNTER — PRE VISIT (OUTPATIENT)
Dept: RADIATION ONCOLOGY | Facility: CLINIC | Age: 60
End: 2025-05-13
Payer: COMMERCIAL

## 2025-05-13 VITALS
OXYGEN SATURATION: 96 % | DIASTOLIC BLOOD PRESSURE: 74 MMHG | RESPIRATION RATE: 20 BRPM | HEART RATE: 110 BPM | SYSTOLIC BLOOD PRESSURE: 132 MMHG | BODY MASS INDEX: 29.42 KG/M2 | WEIGHT: 176.8 LBS

## 2025-05-13 DIAGNOSIS — Z17.0 MALIGNANT NEOPLASM OF UPPER-INNER QUADRANT OF LEFT BREAST IN FEMALE, ESTROGEN RECEPTOR POSITIVE (H): ICD-10-CM

## 2025-05-13 DIAGNOSIS — C50.212 MALIGNANT NEOPLASM OF UPPER-INNER QUADRANT OF LEFT BREAST IN FEMALE, ESTROGEN RECEPTOR POSITIVE (H): ICD-10-CM

## 2025-05-13 DIAGNOSIS — L63.9 ALOPECIA AREATA: Primary | ICD-10-CM

## 2025-05-13 DIAGNOSIS — F41.9 ANXIETY: ICD-10-CM

## 2025-05-13 PROCEDURE — 99214 OFFICE O/P EST MOD 30 MIN: CPT | Performed by: RADIOLOGY

## 2025-05-13 ASSESSMENT — PAIN SCALES - GENERAL: PAINLEVEL_OUTOF10: MODERATE PAIN (4)

## 2025-05-13 NOTE — PROGRESS NOTES
"Oncology Rooming Note    May 13, 2025 9:14 AM   Adrienne Ivory is a 59 year old female who presents for:    Chief Complaint   Patient presents with    Oncology Clinic Visit    Radiation Therapy     New consult     Initial Vitals: /74 (BP Location: Right arm, Patient Position: Sitting)   Pulse 110   Resp 20   Wt 80.2 kg (176 lb 12.8 oz)   SpO2 96%   BMI 29.42 kg/m   Estimated body mass index is 29.42 kg/m  as calculated from the following:    Height as of 5/7/25: 1.651 m (5' 5\").    Weight as of this encounter: 80.2 kg (176 lb 12.8 oz). Body surface area is 1.92 meters squared.  Moderate Pain (4) Comment: Data Unavailable   No LMP recorded. Patient has had an ablation.  Allergies reviewed: Yes  Medications reviewed: Yes    Medications: Medication refills not needed today.  Pharmacy name entered into [facility]:    [facility] - [place], [place] - [address] [place] [address] DRUG STORE #[id number] [place] [address] AT [address] HOME DELIVERY [address]    Frailty Screening:   Is the patient here for a new oncology consult visit in cancer care? 2. No    PHQ9:  Did this patient require a PHQ9?: No      Clinical concerns: New consult for left breast cancer,s/p toy masectomy on 4/29 Dr. Walters was notified.    Considerations for radiation treatment   Pregnancy status: Female age 55+   Implanted Cardiac Devices: No   Any previous radiation therapy: No    Radiation Therapy Patient Education    Person involved with teaching: Patient    Patient educational needs for self management of treatment-related side effects assessment completed.  EPIC Patient Ed tab contains Patient Learning Assessment    Education Materials Given  Managing Side Effects of Radiation Therapy: Care Instructions, Learning About External Beam Radiation Treatment, Dealing With Being Tired From Cancer Treatment: Care Instructions, Radiation Treatment For " Cancer, Radiation Therapy to the Breast Guidelines, Exercising After a Breast Cancer Surgery: Care Instructions, Breathing Instructions for Deep Inspiration Breath Hold (DIBH), Nutrition for the Person with Cancer During Treatment, Oncology Supportive Care Services , Welcome Letter, Insurance PA Information, Map [facility], Social Work Services, and Radiation Therapy and You    Educational Topics Discussed  Side effects expected, Pain management, Skin care, Activity, Nutrition and weight loss, and When to call MD/RN    Response To Teaching  More review necessary and Verbalizes understanding    GYN Only  Vaginal Dilator-given and educated: N/A    Referrals sent: None    Chemotherapy?  Yes: had neuoadjuvant, Herceptin to follow          Cathleen Wright RN

## 2025-05-13 NOTE — TELEPHONE ENCOUNTER
[facility]: Cancer Care                                                                                        Called Adrienne to follow up on the My Chart message that she sent requesting a wig referral. She relayed  that she contacted her insurance company and they provided her a list of 3 facilities that will bill her insurance directly, and her insurance carrier is requesting that she go to one of the preferred locations. She will be going to [facility] in [place], Order for  Cranial prosthesis will be entered and she will come to clinic on 5/14 to pick this up, at the scheduling desk, at our clinic, when she is in the area for another apt. She is scheduled for a prosthetic fitting on 5/14 prior to her wig fitting. She reports that she is healing well and getting used to the post op changes. She had rad consult and is scheduled for CT SIM on 5/19. She shared her gratitude with the results of her surgical path report. Support and encouragement provided. She will keep her future apts as scheduled.      Signature:  Rahda Stephen RN

## 2025-05-13 NOTE — LETTER
5/13/2025      Adrienne Ivory  [address] [place] [address]      Dear Colleague,    Thank you for referring your patient, Adrienne Ivory, to the [facility] RADIATION ONCOLOGY [place]. Please see a copy of my visit note below.    [facility] Radiation Oncology Consult Note     Patient: Adrienne Ivory  MRN: [id number]  Date of Service: 05/13/2025          Hang Harkins MD  [facility] [place] [address]       Dear Dr. Harkins:    Thank you very much for referring this patient for consideration of radiotherapy. As you know Ms. Ivory is a 59 year old female with a diagnosis of left breast cancer, clinical stage T2N3M0, ER/HER2 positive, TN negative, status post neoadjuvant chemotherapy with 6 cycles of TCHP followed by bilateral mastectomy and left sentinel lymph node resection with complete response and pathologic stage [id number].  The patient is referred to radiation oncology for evaluation and consideration of possible postop radiation therapy.    HISTORY OF PRESENT ILLNESS:   Ms. Ivory is a 59 year old female who was in her regular state of health when she had her yearly screening mammogram done on 11/4/2024. A possible asymmetry was seen in the left breast at the 2 o'clock position and further imaging was recommended and patient had a diagnostic mammogram with ultrasound on 15 November which showed an abnormal area about 2.4 into 2.2 to 1.7 cm at the 2 o'clock position which was suspicious.  The patient underwent ultrasound-guided needle biopsy on 11/18/2024 with pathology confirmed invasive ductal carcinoma of the left breast, ER positive, TN negative, and HER2 positive.  Patient had MRI breast on 11/27/2024.  This confirmed biopsy-proven left 2:00 malignancy measuring 3.2 cm with associated left axillary level 1, 2 and 3 axillary adenopathy consistent with metastases. A couple of diminutive left internal mammary lymph nodes are technically within normal  limits.  There is also a 9 x 5 x 4 mm mildly enhancing mass at 12:00 posterior position of right breast.    Patient underwent staging CT chest abdomen pelvics on 12/3/2024 this confirmed left breast cancer with level 1, 2 and 3 lymph node metastases.  There is an 14 mm hepatic lesion recommend follow-up MRI scan.  MRI liver on 12/5/2024 showed no suspicious hepatic lesions.     The patient underwent ultrasound-guided biopsy to the left axilla lymph node and left breast 6:00 mass and right breast mass on 12/9/2024.  The pathology confirmed lymph node metastasis of the left axillar lymph nodes.  The left and the right breast additional biopsy was negative.    Patient proceeded with neoadjuvant chemotherapy with 6 cycles of TCHP followed by single agent trastuzumab.    Restaging MRI breast on 2/10/2025 showed continued response.  The patient then proceeded with bilateral mastectomy and sentinel lymph node resection on 4/29/2025.  The pathology showed complete response of left breast cancer and 5 removed lymph nodes showed no evidence of metastasis.  Her pathologic stage is [id number].      The patient has been recovering well since surgery.  She is here for evaluation and discussion of possible postop radiation therapy.    CHEMOTHERAPY HISTORY: Concurrent Chemotherapy: No    RADIATION THERAPY HISTORY: Prior Radiation: No    IMPLANTED CARDIAC DEVICE: none     PREGNANCY: The patient is informed not to be pregnant during the radiation therapy.  She is post menopausal.    Current Outpatient Medications   Medication Sig Dispense Refill     clobetasol propionate (TEMOVATE) 0.05 % external cream Apply topically 2 times daily. 45 g 0     diphenoxylate-atropine (LOMOTIL) 2.5-0.025 MG tablet Take 1 tablet by mouth 4 times daily as needed for diarrhea. 50 tablet 0     lidocaine-prilocaine (EMLA) 2.5-2.5 % external cream Apply a small amount to the port site 30-60 minutes prior to access. 30 g 2     omeprazole (PRILOSEC) 40 MG   capsule Take 1 capsule (40 mg) by mouth daily. 90 capsule 0     ondansetron (ZOFRAN) 8 MG tablet Take 8 mg by mouth every 8 hours as needed for nausea.       valACYclovir (VALTREX) 500 MG tablet Take 1 tablet (500 mg) by mouth daily. 90 tablet 4     Past Medical History:   Diagnosis Date     Abnormal Pap smear, can't excl hi gd sq intraepithelial lesion (ASC-H) 03/01/2015    LSIL also     Anxiety state, unspecified      Herpes simplex without mention of complication      HSIL on Pap smear of cervix 07/01/2014    colp - WNL     Human papillomavirus in conditions classified elsewhere and of unspecified site 11/01/2010    + HPV 45 & 82 with ASCUS     Hx of colposcopy with cervical biopsy 11/08/2016    [place] ECC neg.      Obese      PONV (postoperative nausea and vomiting)      Premenstrual tension syndromes      Past Surgical History:   Procedure Laterality Date     C IUD,MIRENA  2/08-3/11    removed for cramping     COLONOSCOPY N/A 12/4/2020    Procedure: COLONOSCOPY, WITH POLYPECTOMY;  Surgeon: Moises Pride MD;  Location: [facility] OR     DILATION AND CURETTAGE, ABLATE ENDOMETRIUM NOVASURE, COMBINED N/A 12/31/2015    Procedure: COMBINED DILATION AND CURETTAGE, ABLATE ENDOMETRIUM NOVASURE;  Surgeon: Shakira Penaloza MD;  Location: [facility] OR     DISSECT LYMPH NODE AXILLA Left 4/29/2025    Procedure: WITH LEFT TARGETED AXILLARY DISSECTION;  Surgeon: Katerine Matthews DO;  Location: [facility] Main OR     HYSTEROSCOPY DIAGNOSTIC N/A 12/31/2015    Procedure: HYSTEROSCOPY DIAGNOSTIC;  Surgeon: Shakira Penaloza MD;  Location: [facility] OR     LEEP TX, CERVICAL  4/3/15    ARNAV 2-3, negative margins     MASTECTOMY SIMPLE Bilateral 4/29/2025    Procedure: BILATERAL MASTECTOMY;  Surgeon: Katerine Matthews DO;  Location: [facility] Main OR     NO HISTORY OF SURGERY       No Known Allergies  Family History   Problem Relation Age of Onset     C.A.D. Father         double bypass,preventative     Skin Cancer Father      Psychotic Disorder  Mother         mild schizophrenia     Diabetes Mother         type 2     Hypertension Mother         ?     Skin Cancer Brother      Cerebrovascular Disease Maternal Grandfather      C.A.D. Paternal Grandfather      Cancer - colorectal No family hx of      Breast Cancer No family hx of      Social History     Socioeconomic History     Marital status: Single     Spouse name: Not on file     Number of children: 0     Years of education: Not on file     Highest education level: Not on file   Occupational History     Occupation: new business rep/artist   Tobacco Use     Smoking status: Never     Passive exposure: Past     Smokeless tobacco: Never   Vaping Use     Vaping status: Never Used   Substance and Sexual Activity     Alcohol use: No     Drug use: No     Sexual activity: Not Currently     Partners: Male     Birth control/protection: None   Other Topics Concern     Parent/sibling w/ CABG, MI or angioplasty before 65F 55M? No   Social History Narrative     Not on file     Social Drivers of Health     Financial Resource Strain: Low Risk  (2/20/2024)    Financial Resource Strain      Within the past 12 months, have you or your family members you live with been unable to get utilities (heat, electricity) when it was really needed?: No   Food Insecurity: Low Risk  (2/20/2024)    Food Insecurity      Within the past 12 months, did you worry that your food would run out before you got money to buy more?: No      Within the past 12 months, did the food you bought just not last and you didn t have money to get more?: No   Transportation Needs: Low Risk  (2/20/2024)    Transportation Needs      Within the past 12 months, has lack of transportation kept you from medical appointments, getting your medicines, non-medical meetings or appointments, work, or from getting things that you need?: No   Physical Activity: Inactive (2/20/2024)    Exercise Vital Sign      Days of Exercise per Week: 0 days      Minutes of Exercise per  Session: 0 min   Stress: Stress Concern Present (2/20/2024)    [employer] [place] - Occupational Stress Questionnaire      Feeling of Stress : Rather much   Social Connections: Unknown (2/20/2024)    Social Connection and Isolation Panel [NHANES]      Frequency of Communication with Friends and Family: Not on file      Frequency of Social Gatherings with Friends and Family: Three times a week      Attends Jewish Services: Not on file      Active Member of Clubs or Organizations: Not on file      Attends Club or Organization Meetings: Not on file      Marital Status: Not on file   Interpersonal Safety: Low Risk  (4/29/2025)    Interpersonal Safety      Do you feel physically and emotionally safe where you currently live?: Yes      Within the past 12 months, have you been hit, slapped, kicked or otherwise physically hurt by someone?: No      Within the past 12 months, have you been humiliated or emotionally abused in other ways by your partner or ex-partner?: No   Housing Stability: Low Risk  (2/20/2024)    Housing Stability      Do you have housing? : Yes      Are you worried about losing your housing?: No        REVIEW OF SYMPTOMS:  A full 14-point review of systems was performed. Pertinent findings are noted in the HPI.    General  Constitutional  Constitutional (WDL): Exceptions to WDL  Fatigue: Fatigue relieved by rest  EENT  Eye Disorders  Eye Disorder (WDL): Exceptions to WDL  Watering Eyes: Intervention not indicated  Ear Disorders  Ear Disorder (WDL): All ear disorder elements are within defined limits  Respiratory  Respiratory  Respiratory (WDL): Exceptions to WDL  Cough: Mild symptoms OR nonprescription intervention indicated  Dyspnea: Shortness of breath with moderate exertion  Cardiovascular  Cardiovascular  Cardiovascular (WDL): Exceptions to WDL (no pacemaker)  Gastrointestinal  Gastrointestinal  Gastrointestinal (WDL): All gastrointestinal elements are within defined  limits  Musculoskeletal  Musculoskeletal and Connective Tissue Disorders  Musculoskeletal & Connective (WDL): Exceptions to WDL  Arthralgia: Mild pain  Integumentary  Integumentary  Integumentary (WDL): Exceptions to WDL (healing where drain was in)  Alopecia: Hair loss of less than 50% of normal for that individual that is not obvious from a distance but only on close inspection OR a different hair style may be required to cover the hair loss but it does not require a wig or hair piece to camouflage  Neurological  Neurosensory  Neurosensory (WDL): Exceptions to WDL  Peripheral Sensory Neuropathy: Moderate symptoms OR limiting instrumental ADL (toy feet and hands)  Genitourinary/Reproductive  Genitourinary  Genitourinary (WDL): All genitourinary elements are within defined limits  Lymphatic  Lymph System Disorders  Lymph (WDL): All lymph elements are within defined limits  Pain  Pain Score: Moderate Pain (4)  AUA Assessment                                                              Accompanied by  Accompanied By: family    ECOG Status: 0    Imaging: Reviewed    Pathology: Reviewed    Objective:      PHYSICAL EXAMINATION:    /74 (BP Location: Right arm, Patient Position: Sitting)   Pulse 110   Resp 20   Wt 80.2 kg (176 lb 12.8 oz)   SpO2 96%   BMI 29.42 kg/m      Gen: Alert, in NAD  Eyes: PERRL, EOMI, sclera anicteric  Neck: Supple, full ROM, no LAD  Chest:  There is well-healed surgical scar in the left and right chest wall consistent with a recent history of surgery.  Pulm: No wheezing, stridor or respiratory distress  CV: Well-perfused, no cyanosis, no pedal edema  Back: No step-offs or pain to palpation along the thoracolumbar spine  Rectal: Deferred  : Deferred  Musculoskeletal: Normal muscle bulk and tone  Skin: Normal color and turgor  Neurologic: A/Ox3, CN II-XII intact, normal gait and station  Psychiatric: Appropriate mood and affect    Intent of Therapy: Curative  We recommend adjuvant  radiation as part of her breast conserving therapy to decrease her chance of local recurrence to the single digits. ROM stretches and skin care were discussed with the patient. She understands that these maneuvers need to continue for 6 months following completion of radiation as skin and muscle fibrosis continue to form for weeks to months following completion of therapy.      Side effects that may occur during or within weeks after radiation therapy    Fatigue and general weakness  Darkening, irritation, itchiness, redness, dryness, erythema, peeling, scabbing, ulceration and contraction of the skin of the breast and chest  Swelling of the breast  Loss of armpit hair  Lung irritation  Decrease in appetite    Side effects that may occur months or years after radiation therapy    Development of another tumor or cancer  Thickening, telangiectasias (development of spider like blood vessels in the skin) and ulceration of the skin of the breast and chest  Firming, fibrosis (scar tissue), fat necrosis, and distortion of the breast  Poor healing after a trauma or surgery in the irradiated area  Nerve damage resulting in loss of arm strength and sensation  Coronary artery blockage causing angina pain or a heart attack  Lung inflammation of fibrosis causing cough, fever and shortness of breath  Fracture of the ribs  Swellingof an arm and hand    The risks, benefits and alternatives to radiation therapy were outlined with the patient. All questions were answered and a consent was signed.     Impression     59 year old female with a diagnosis of left breast cancer, clinical stage T2N3M0, ER/HER2 positive, PA negative, status post neoadjuvant chemotherapy with 6 cycles of TCHP followed by bilateral mastectomy and left sentinel lymph node resection with complete response and pathologic stage [id number].     Assessment & Plan:     I have personally reviewed her upcoming medical record today.  I have also reviewed her most recent  radiology study including mammogram.  The possible treatment options including surgery, systemic therapy, and radiation therapy has been discussed with patient in detail and at great lengths.  The NCCN guideline for breast recommendation was reviewed. The possible risks and side effects of radiation therapy has also been explained to the patient.  Questions are answered to patient satisfaction.  I agree the patient is a good candidate and indicated for postop radiation therapy to further reduce likelihood of cancer recurrence.  The radiation therapy is offered to the patient and she is willing to proceed being aware of potential risks and side effects involved.  She is scheduled to return to radiation oncology in 1 to 2 weeks for simulation.  I plan to give her radiation therapy to a total dose of 5000 cGy in 25 treatments targeted to the left breast/chest wall and regional lymph nodes including internal mammary nodes.  I will consider to use IMRT/IGRT technique to help us to better locate target and to protect normal tissues.    Again, thank you very much for the referral and allowing me to participate in the care of this patient.  If you have any questions or concerns about this consultation, please do not hesitate to call.  I spent approximately 45 minutes today with the patient and 80% time was used for counseling.      Sincerely,          Lyssa Walters MD, PhD  Department of Radiation Oncology   [facility] Radiation Oncology  Tel: [phone]  Cell: [phone]    [facility]  [address] [place] [address]     [place] [id number] [facility] [place] [address]    CC:  Patient Care Team:  Kallie Ta NP as PCP - General  Kallie Ta NP as Assigned PCP  Kin Murray LICSW (Inactive) as Assigned Behavioral Health Provider  Hang Harkins MD as Physician (Hematology & Oncology)  Radha Stephen, DIEGO as Specialty Care Coordinator (Hematology &  "Oncology)  Hang Harkins MD as Assigned Cancer Care Provider  Katerine Matthews DO as Assigned Surgical Provider        Oncology Rooming Note    May 13, 2025 9:14 AM   Adrienne Ivory is a 59 year old female who presents for:    Chief Complaint   Patient presents with     Oncology Clinic Visit     Radiation Therapy     New consult     Initial Vitals: /74 (BP Location: Right arm, Patient Position: Sitting)   Pulse 110   Resp 20   Wt 80.2 kg (176 lb 12.8 oz)   SpO2 96%   BMI 29.42 kg/m   Estimated body mass index is 29.42 kg/m  as calculated from the following:    Height as of 5/7/25: 1.651 m (5' 5\").    Weight as of this encounter: 80.2 kg (176 lb 12.8 oz). Body surface area is 1.92 meters squared.  Moderate Pain (4) Comment: Data Unavailable   No LMP recorded. Patient has had an ablation.  Allergies reviewed: Yes  Medications reviewed: Yes    Medications: Medication refills not needed today.  Pharmacy name entered into Ask Ziggy:    [facility] - [place], [place] - [address] [place] [address] DRUG STORE #[id number] [place], [place] - [facility] [address] AT [address]    Frailty Screening:   Is the patient here for a new oncology consult visit in cancer care? 2. No    PHQ9:  Did this patient require a PHQ9?: No      Clinical concerns: New consult for left breast cancer,s/p toy masectomy on 4/29 Dr. Walters was notified.    Considerations for radiation treatment   Pregnancy status: Female age 55+   Implanted Cardiac Devices: No   Any previous radiation therapy: No    Radiation Therapy Patient Education    Person involved with teaching: Patient    Patient educational needs for self management of treatment-related side effects assessment completed.  EPIC Patient Ed tab contains Patient Learning Assessment    Education Materials Given  Managing Side Effects of Radiation Therapy: Care Instructions, Learning " About External Beam Radiation Treatment, Dealing With Being Tired From Cancer Treatment: Care Instructions, Radiation Treatment For Cancer, Radiation Therapy to the Breast Guidelines, Exercising After a Breast Cancer Surgery: Care Instructions, Breathing Instructions for Deep Inspiration Breath Hold (DIBH), Nutrition for the Person with Cancer During Treatment, Oncology Supportive Care Services , Welcome Letter, Insurance PA Information, [place], Social Work Services, and Radiation Therapy and You    Educational Topics Discussed  Side effects expected, Pain management, Skin care, Activity, Nutrition and weight loss, and When to call MD/RN    Response To Teaching  More review necessary and Verbalizes understanding    GYN Only  Vaginal Dilator-given and educated: N/A    Referrals sent: None    Chemotherapy?  Yes: had neuoadjuvant, Herceptin to follow          Cathleen Wright RN                Again, thank you for allowing me to participate in the care of your patient.        Sincerely,        Lyssa Walters MD    Electronically signed

## 2025-05-13 NOTE — PROGRESS NOTES
[facility] Radiation Oncology Consult Note     Patient: Adrienne Ivory  MRN: [id number]  Date of Service: 05/13/2025          Hang Harkins MD  [address] [place], [place] [id number]       Dear Dr. Harkins:    Thank you very much for referring this patient for consideration of radiotherapy. As you know Ms. Ivory is a 59 year old female with a diagnosis of left breast cancer, clinical stage T2N3M0, ER/HER2 positive, [place] negative, status post neoadjuvant chemotherapy with 6 cycles of TCHP followed by bilateral mastectomy and left sentinel lymph node resection with complete response and pathologic stage [id number].  The patient is referred to radiation oncology for evaluation and consideration of possible postop radiation therapy.    HISTORY OF PRESENT ILLNESS:   Ms. Ivory is a 59 year old female who was in her regular state of health when she had her yearly screening mammogram done on 11/4/2024. A possible asymmetry was seen in the left breast at the 2 o'clock position and further imaging was recommended and patient had a diagnostic mammogram with ultrasound on 15 November which showed an abnormal area about 2.4 into 2.2 to 1.7 cm at the 2 o'clock position which was suspicious.  The patient underwent ultrasound-guided needle biopsy on 11/18/2024 with pathology confirmed invasive ductal carcinoma of the left breast, ER positive, AK negative, and HER2 positive.  Patient had MRI breast on 11/27/2024.  This confirmed biopsy-proven left 2:00 malignancy measuring 3.2 cm with associated left axillary level 1, 2 and 3 axillary adenopathy consistent with metastases. A couple of diminutive left internal mammary lymph nodes are technically within normal limits.  There is also a 9 x 5 x 4 mm mildly enhancing mass at 12:00 posterior position of right breast.    Patient underwent staging CT chest abdomen pelvics on 12/3/2024 this confirmed left breast cancer with level 1, 2 and 3 lymph node metastases.   There is an 14 mm hepatic lesion recommend follow-up MRI scan.  MRI liver on 12/5/2024 showed no suspicious hepatic lesions.     The patient underwent ultrasound-guided biopsy to the left axilla lymph node and left breast 6:00 mass and right breast mass on 12/9/2024.  The pathology confirmed lymph node metastasis of the left axillar lymph nodes.  The left and the right breast additional biopsy was negative.    Patient proceeded with neoadjuvant chemotherapy with 6 cycles of TCHP followed by single agent trastuzumab.    Restaging MRI breast on 2/10/2025 showed continued response.  The patient then proceeded with bilateral mastectomy and sentinel lymph node resection on 4/29/2025.  The pathology showed complete response of left breast cancer and 5 removed lymph nodes showed no evidence of metastasis.  Her pathologic stage is [id number].      The patient has been recovering well since surgery.  She is here for evaluation and discussion of possible postop radiation therapy.    CHEMOTHERAPY HISTORY: Concurrent Chemotherapy: No    RADIATION THERAPY HISTORY: Prior Radiation: No    IMPLANTED CARDIAC DEVICE: none     PREGNANCY: The patient is informed not to be pregnant during the radiation therapy.  She is post menopausal.    Current Outpatient Medications   Medication Sig Dispense Refill    clobetasol propionate (TEMOVATE) 0.05 % external cream Apply topically 2 times daily. 45 g 0    diphenoxylate-atropine (LOMOTIL) 2.5-0.025 MG tablet Take 1 tablet by mouth 4 times daily as needed for diarrhea. 50 tablet 0    lidocaine-prilocaine (EMLA) 2.5-2.5 % external cream Apply a small amount to the port site 30-60 minutes prior to access. 30 g 2    omeprazole (PRILOSEC) 40 MG DR capsule Take 1 capsule (40 mg) by mouth daily. 90 capsule 0    ondansetron (ZOFRAN) 8 MG tablet Take 8 mg by mouth every 8 hours as needed for nausea.      valACYclovir (VALTREX) 500 MG tablet Take 1 tablet (500 mg) by mouth daily. 90 tablet 4     Past  Medical History:   Diagnosis Date    Abnormal Pap smear, can't excl hi gd sq intraepithelial lesion (ASC-H) 03/01/2015    LSIL also    Anxiety state, unspecified     Herpes simplex without mention of complication     HSIL on Pap smear of cervix 07/01/2014    colp - WNL    Human papillomavirus in conditions classified elsewhere and of unspecified site 11/01/2010    + HPV 45 & 82 with ASCUS    Hx of colposcopy with cervical biopsy 11/08/2016    [facility] [facility] neg.     Obese     PONV (postoperative nausea and vomiting)     Premenstrual tension syndromes      Past Surgical History:   Procedure Laterality Date    C IUD,MIRENA  2/08-3/11    removed for cramping    COLONOSCOPY N/A 12/4/2020    Procedure: COLONOSCOPY, WITH POLYPECTOMY;  Surgeon: Moises Pride MD;  Location: [facility] OR    DILATION AND CURETTAGE, ABLATE ENDOMETRIUM NOVASURE, COMBINED N/A 12/31/2015    Procedure: COMBINED DILATION AND CURETTAGE, ABLATE ENDOMETRIUM NOVASURE;  Surgeon: Shakira Penaloza MD;  Location: [facility] OR    DISSECT LYMPH NODE AXILLA Left 4/29/2025    Procedure: WITH LEFT TARGETED AXILLARY DISSECTION;  Surgeon: Katerine Matthews DO;  Location: [facility] OR    HYSTEROSCOPY DIAGNOSTIC N/A 12/31/2015    Procedure: HYSTEROSCOPY DIAGNOSTIC;  Surgeon: Shakira Penaloza MD;  Location: [facility] OR    LEEP TX, CERVICAL  4/3/15    ARNAV 2-3, negative margins    MASTECTOMY SIMPLE Bilateral 4/29/2025    Procedure: BILATERAL MASTECTOMY;  Surgeon: Katerine Matthews DO;  Location: [facility] OR    NO HISTORY OF SURGERY       No Known Allergies  Family History   Problem Relation Age of Onset    C.A.D. Father         double bypass,preventative    Skin Cancer Father     Psychotic Disorder Mother         mild schizophrenia    Diabetes Mother         type 2    Hypertension Mother         ?    Skin Cancer Brother     Cerebrovascular Disease Maternal Grandfather     C.A.D. Paternal Grandfather     Cancer - colorectal No family hx of     Breast Cancer No family hx of       Social History     Socioeconomic History    Marital status: Single     Spouse name: Not on file    Number of children: 0    Years of education: Not on file    Highest education level: Not on file   Occupational History    Occupation: new business rep/artist   Tobacco Use    Smoking status: Never     Passive exposure: Past    Smokeless tobacco: Never   Vaping Use    Vaping status: Never Used   Substance and Sexual Activity    Alcohol use: No    Drug use: No    Sexual activity: Not Currently     Partners: Male     Birth control/protection: None   Other Topics Concern    Parent/sibling w/ CABG, MI or angioplasty before 65F 55M? No   Social History Narrative    Not on file     Social Drivers of Health     Financial Resource Strain: Low Risk  (2/20/2024)    Financial Resource Strain     Within the past 12 months, have you or your family members you live with been unable to get utilities (heat, electricity) when it was really needed?: No   Food Insecurity: Low Risk  (2/20/2024)    Food Insecurity     Within the past 12 months, did you worry that your food would run out before you got money to buy more?: No     Within the past 12 months, did the food you bought just not last and you didn t have money to get more?: No   Transportation Needs: Low Risk  (2/20/2024)    Transportation Needs     Within the past 12 months, has lack of transportation kept you from medical appointments, getting your medicines, non-medical meetings or appointments, work, or from getting things that you need?: No   Physical Activity: Inactive (2/20/2024)    Exercise Vital Sign     Days of Exercise per Week: 0 days     Minutes of Exercise per Session: 0 min   Stress: Stress Concern Present (2/20/2024)    [employer] [place] [employer] - Occupational Stress Questionnaire     Feeling of Stress : Rather much   Social Connections: Unknown (2/20/2024)    Social Connection and Isolation Panel [NHANES]     Frequency of Communication with  Friends and Family: Not on file     Frequency of Social Gatherings with Friends and Family: Three times a week     Attends Bahai Services: Not on file     Active Member of Clubs or Organizations: Not on file     Attends Club or Organization Meetings: Not on file     Marital Status: Not on file   Interpersonal Safety: Low Risk  (4/29/2025)    Interpersonal Safety     Do you feel physically and emotionally safe where you currently live?: Yes     Within the past 12 months, have you been hit, slapped, kicked or otherwise physically hurt by someone?: No     Within the past 12 months, have you been humiliated or emotionally abused in other ways by your partner or ex-partner?: No   Housing Stability: Low Risk  (2/20/2024)    Housing Stability     Do you have housing? : Yes     Are you worried about losing your housing?: No        REVIEW OF SYMPTOMS:  A full 14-point review of systems was performed. Pertinent findings are noted in the HPI.    General  Constitutional  Constitutional (WDL): Exceptions to WDL  Fatigue: Fatigue relieved by rest  EENT  Eye Disorders  Eye Disorder (WDL): Exceptions to WDL  Watering Eyes: Intervention not indicated  Ear Disorders  Ear Disorder (WDL): All ear disorder elements are within defined limits  Respiratory  Respiratory  Respiratory (WDL): Exceptions to WDL  Cough: Mild symptoms OR nonprescription intervention indicated  Dyspnea: Shortness of breath with moderate exertion  Cardiovascular  Cardiovascular  Cardiovascular (WDL): Exceptions to WDL (no pacemaker)  Gastrointestinal  Gastrointestinal  Gastrointestinal (WDL): All gastrointestinal elements are within defined limits  Musculoskeletal  Musculoskeletal and Connective Tissue Disorders  Musculoskeletal & Connective (WDL): Exceptions to WDL  Arthralgia: Mild pain  Integumentary  Integumentary  Integumentary (WDL): Exceptions to WDL (healing where drain was in)  Alopecia: Hair loss of less than 50% of normal for that individual that is  not obvious from a distance but only on close inspection OR a different hair style may be required to cover the hair loss but it does not require a wig or hair piece to camouflage  Neurological  Neurosensory  Neurosensory (WDL): Exceptions to WDL  Peripheral Sensory Neuropathy: Moderate symptoms OR limiting instrumental ADL (toy feet and hands)  Genitourinary/Reproductive  Genitourinary  Genitourinary (WDL): All genitourinary elements are within defined limits  Lymphatic  Lymph System Disorders  Lymph (WDL): All lymph elements are within defined limits  Pain  Pain Score: Moderate Pain (4)  AUA Assessment                                                              Accompanied by  Accompanied By: family    ECOG Status: 0    Imaging: Reviewed    Pathology: Reviewed    Objective:      PHYSICAL EXAMINATION:    /74 (BP Location: Right arm, Patient Position: Sitting)   Pulse 110   Resp 20   Wt 80.2 kg (176 lb 12.8 oz)   SpO2 96%   BMI 29.42 kg/m      Gen: Alert, in NAD  Eyes: PERRL, EOMI, sclera anicteric  Neck: Supple, full ROM, no LAD  Chest:  There is well-healed surgical scar in the left and right chest wall consistent with a recent history of surgery.  Pulm: No wheezing, stridor or respiratory distress  CV: Well-perfused, no cyanosis, no pedal edema  Back: No step-offs or pain to palpation along the thoracolumbar spine  Rectal: Deferred  : Deferred  Musculoskeletal: Normal muscle bulk and tone  Skin: Normal color and turgor  Neurologic: A/Ox3, CN II-XII intact, normal gait and station  Psychiatric: Appropriate mood and affect    Intent of Therapy: Curative  We recommend adjuvant radiation as part of her breast conserving therapy to decrease her chance of local recurrence to the single digits. ROM stretches and skin care were discussed with the patient. She understands that these maneuvers need to continue for 6 months following completion of radiation as skin and muscle fibrosis continue to form for  weeks to months following completion of therapy.      Side effects that may occur during or within weeks after radiation therapy    Fatigue and general weakness  Darkening, irritation, itchiness, redness, dryness, erythema, peeling, scabbing, ulceration and contraction of the skin of the breast and chest  Swelling of the breast  Loss of armpit hair  Lung irritation  Decrease in appetite    Side effects that may occur months or years after radiation therapy    Development of another tumor or cancer  Thickening, telangiectasias (development of spider like blood vessels in the skin) and ulceration of the skin of the breast and chest  Firming, fibrosis (scar tissue), fat necrosis, and distortion of the breast  Poor healing after a trauma or surgery in the irradiated area  Nerve damage resulting in loss of arm strength and sensation  Coronary artery blockage causing angina pain or a heart attack  Lung inflammation of fibrosis causing cough, fever and shortness of breath  Fracture of the ribs  Swellingof an arm and hand    The risks, benefits and alternatives to radiation therapy were outlined with the patient. All questions were answered and a consent was signed.     Impression     59 year old female with a diagnosis of left breast cancer, clinical stage T2N3M0, ER/HER2 positive, [place] negative, status post neoadjuvant chemotherapy with 6 cycles of TCHP followed by bilateral mastectomy and left sentinel lymph node resection with complete response and pathologic stage [id number].     Assessment & Plan:     I have personally reviewed her upcoming medical record today.  I have also reviewed her most recent radiology study including mammogram.  The possible treatment options including surgery, systemic therapy, and radiation therapy has been discussed with patient in detail and at great lengths.  The NCCN guideline for breast recommendation was reviewed. The possible risks and side effects of radiation therapy has also been  explained to the patient.  Questions are answered to patient satisfaction.  I agree the patient is a good candidate and indicated for postop radiation therapy to further reduce likelihood of cancer recurrence.  The radiation therapy is offered to the patient and she is willing to proceed being aware of potential risks and side effects involved.  She is scheduled to return to radiation oncology in 1 to 2 weeks for simulation.  I plan to give her radiation therapy to a total dose of 5000 cGy in 25 treatments targeted to the left breast/chest wall and regional lymph nodes including internal mammary nodes.  I will consider to use IMRT/IGRT technique to help us to better locate target and to protect normal tissues.    Again, thank you very much for the referral and allowing me to participate in the care of this patient.  If you have any questions or concerns about this consultation, please do not hesitate to call.  I spent approximately 45 minutes today with the patient and 80% time was used for counseling.      Sincerely,          Lyssa Walters MD, PhD  Department of Radiation Oncology   [facility] Radiation Oncology  Tel: [phone]  Cell: [phone]    [facility]  [address] [place] [address]     [id number] [place] [address]    CC:  Patient Care Team:  Kallie Ta NP as PCP - General  Kallie Ta NP as Assigned PCP  Kin Murray LICSW (Inactive) as Assigned Behavioral Health Provider  Hang Harkins MD as Physician (Hematology & Oncology)  Radha Stehpen, RN as Specialty Care Coordinator (Hematology & Oncology)  Hang Harkins MD as Assigned Cancer Care Provider  Katerine Matthews DO as Assigned Surgical Provider

## 2025-05-19 ENCOUNTER — ALLIED HEALTH/NURSE VISIT (OUTPATIENT)
Dept: RADIATION ONCOLOGY | Facility: HOSPITAL | Age: 60
End: 2025-05-19
Payer: COMMERCIAL

## 2025-05-19 PROCEDURE — 77334 RADIATION TREATMENT AID(S): CPT | Performed by: RADIOLOGY

## 2025-05-19 PROCEDURE — 77334 RADIATION TREATMENT AID(S): CPT | Mod: 26 | Performed by: RADIOLOGY

## 2025-05-19 NOTE — PROGRESS NOTES
SIMULATION NOTE:       DIAGNOSIS: 59 year old female with a diagnosis of left breast cancer, clinical stage T2N3M0, ER/HER2 positive, [place] negative, status post neoadjuvant chemotherapy with 6 cycles of TCHP followed by bilateral mastectomy and left sentinel lymph node resection with complete response and pathologic stage [id number].     INDICATION:  Postoperative radiation therapy is recommended to further reduce the likelihood of cancer recurrence.    CONSENT:  The possible risks and the side effects of radiation therapy have been discussed with patient in detail and at great length.  Questions are answered to patient's satisfaction.  Written consent was obtained.    SIMULATION:  The patient is in a supine position with a wing breast board to help keep the same position during the daily radiation therapy.  Tentative isocenter is set up in the center of the thoracic region.  We will acquire CT information to help us to better locate target and design radiation therapy field.    We are also going to obtain 4-D CT and use respiratory gating (or breath holdidng) technique to help us to reduce the radiation dose to the heart and lung.      BLOCKS:  Custom blocks will be drawn to minimize radiation to normal tissues and to protect normal organs including, but not limited to, lungs, heart, liver, bone and soft tissue.    DOSAGE:  I plan to give her radiation therapy to a total dose of 5000 cGy in 25 treatments targeted to the left breast/chest wall and regional lymph nodes including internal mammary nodes. I will consider to use IMRT/IGRT technique to help us to better locate target and to protect normal tissues.       Lyssa Walters MD, PhD  Department of Radiation Oncology   [facility] Radiation Oncology  Cell: [phone]    [facility]  [address] [place] [address]     [address]  [place] [address]

## 2025-05-19 NOTE — PROCEDURES
Clinical Treatment Planning Note    The complex radiotherapy planning will be completed for the patient to plan the treatment for her breast cancer.  The patient had a planning CT earlier today for planning.  The treatment aids were used for planning, including headrest and Wing Board to help keep the same position during the daily radiation therapy.  The therapy planning is necessary to reduce radiotherapy dose to the normal critical organs which are not possible with simple treatment.  In addition, dose to the target and the critical structures requires three-dimensional analysis of the isodose distribution.  The planning will be done to reduce dose to the lungs, spinal cord, liver, and heart.     I will contour the clinical tumor volume  CTV , with expanded volume of planning treatment volume  PTV  on the treatment planning system.  The critical structures will be outlined, including spinal cord, lungs, heart, liver, bone and soft tissue.     Treatment planning will be done on the computer treatment planning system.  The multiple field will be used to achieve optimal coverage of the target volume.  Dose distribution to the above critical structures will be reviewed.  Isodose distribution along with the X, Y, Z plan will also be reviewed.  Custom blocking will be used to shield normal structures.  The beam's eye views will be reviewed and the digital reconstructed image will be reviewed on the planning software.      The patient will receive 5000 cGy in 25 treatments targeted to the left chest/breast and regional lymph nodes including internal mammary nodes using 6-MV or 10-MV photons.       Lyssa Walters MD, PhD  Department of Radiation Oncology   [facility] Radiation Oncology  Cell: [phone]    [facility]  [address] [place] [address]     [id number] [facility] [place] [address]

## 2025-05-19 NOTE — PROCEDURES
SIMULATION NOTE:         DIAGNOSIS: 59 year old female with a diagnosis of left breast cancer, clinical stage T2N3M0, ER/HER2 positive, [place] negative, status post neoadjuvant chemotherapy with 6 cycles of TCHP followed by bilateral mastectomy and left sentinel lymph node resection with complete response and pathologic stage [id number].      INDICATION:  Postoperative radiation therapy is recommended to further reduce the likelihood of cancer recurrence.     CONSENT:  The possible risks and the side effects of radiation therapy have been discussed with patient in detail and at great length.  Questions are answered to patient's satisfaction.  Written consent was obtained.     SIMULATION:  The patient is in a supine position with a wing breast board to help keep the same position during the daily radiation therapy.  Tentative isocenter is set up in the center of the thoracic region.  We will acquire CT information to help us to better locate target and design radiation therapy field.     We are also going to obtain 4-D CT and use respiratory gating (or breath holdidng) technique to help us to reduce the radiation dose to the heart and lung.      BLOCKS:  Custom blocks will be drawn to minimize radiation to normal tissues and to protect normal organs including, but not limited to, lungs, heart, liver, bone and soft tissue.     DOSAGE:  I plan to give her radiation therapy to a total dose of 5000 cGy in 25 treatments targeted to the left breast/chest wall and regional lymph nodes including internal mammary nodes. I will consider to use IMRT/IGRT technique to help us to better locate target and to protect normal tissues.         Lyssa Walters MD, PhD  Department of Radiation Oncology   [facility] Radiation Oncology  Cell: [phone]     [facility]  [address] [place] [address]      [address]  [place] [address]

## 2025-05-21 ENCOUNTER — APPOINTMENT (OUTPATIENT)
Dept: RADIATION ONCOLOGY | Facility: CLINIC | Age: 60
End: 2025-05-21
Attending: RADIOLOGY
Payer: COMMERCIAL

## 2025-05-21 PROCEDURE — 77338 DESIGN MLC DEVICE FOR IMRT: CPT | Mod: 26 | Performed by: RADIOLOGY

## 2025-05-21 PROCEDURE — 77301 RADIOTHERAPY DOSE PLAN IMRT: CPT | Performed by: RADIOLOGY

## 2025-05-21 PROCEDURE — 77300 RADIATION THERAPY DOSE PLAN: CPT | Mod: 26 | Performed by: RADIOLOGY

## 2025-05-21 PROCEDURE — 77300 RADIATION THERAPY DOSE PLAN: CPT | Performed by: RADIOLOGY

## 2025-05-21 PROCEDURE — 77338 DESIGN MLC DEVICE FOR IMRT: CPT | Performed by: RADIOLOGY

## 2025-05-21 PROCEDURE — 77301 RADIOTHERAPY DOSE PLAN IMRT: CPT | Mod: 26 | Performed by: RADIOLOGY

## 2025-05-27 ENCOUNTER — MYC MEDICAL ADVICE (OUTPATIENT)
Dept: ONCOLOGY | Facility: HOSPITAL | Age: 60
End: 2025-05-27

## 2025-05-27 ENCOUNTER — HOSPITAL ENCOUNTER (OUTPATIENT)
Dept: CARDIOLOGY | Facility: CLINIC | Age: 60
Discharge: HOME | End: 2025-05-27
Attending: INTERNAL MEDICINE
Payer: COMMERCIAL

## 2025-05-27 DIAGNOSIS — C50.212 MALIGNANT NEOPLASM OF UPPER-INNER QUADRANT OF LEFT BREAST IN FEMALE, ESTROGEN RECEPTOR POSITIVE (H): ICD-10-CM

## 2025-05-27 DIAGNOSIS — Z17.0 MALIGNANT NEOPLASM OF UPPER-INNER QUADRANT OF LEFT BREAST IN FEMALE, ESTROGEN RECEPTOR POSITIVE (H): ICD-10-CM

## 2025-05-27 DIAGNOSIS — F41.9 ANXIETY: ICD-10-CM

## 2025-05-27 DIAGNOSIS — Z17.0 MALIGNANT NEOPLASM OF UPPER-INNER QUADRANT OF LEFT BREAST IN FEMALE, ESTROGEN RECEPTOR POSITIVE (H): Primary | ICD-10-CM

## 2025-05-27 DIAGNOSIS — C50.212 MALIGNANT NEOPLASM OF UPPER-INNER QUADRANT OF LEFT BREAST IN FEMALE, ESTROGEN RECEPTOR POSITIVE (H): Primary | ICD-10-CM

## 2025-05-27 LAB — LVEF ECHO: NORMAL

## 2025-05-27 PROCEDURE — 93325 DOPPLER ECHO COLOR FLOW MAPG: CPT | Mod: 26 | Performed by: INTERNAL MEDICINE

## 2025-05-27 PROCEDURE — 93321 DOPPLER ECHO F-UP/LMTD STD: CPT | Mod: 26 | Performed by: INTERNAL MEDICINE

## 2025-05-27 PROCEDURE — 93356 MYOCRD STRAIN IMG SPCKL TRCK: CPT | Performed by: INTERNAL MEDICINE

## 2025-05-27 PROCEDURE — 93308 TTE F-UP OR LMTD: CPT | Mod: 26 | Performed by: INTERNAL MEDICINE

## 2025-05-27 PROCEDURE — 93321 DOPPLER ECHO F-UP/LMTD STD: CPT

## 2025-05-28 ENCOUNTER — DOCUMENTATION ONLY (OUTPATIENT)
Dept: ONCOLOGY | Facility: HOSPITAL | Age: 60
End: 2025-05-28

## 2025-05-28 ENCOUNTER — INFUSION THERAPY VISIT (OUTPATIENT)
Dept: INFUSION THERAPY | Facility: HOSPITAL | Age: 60
End: 2025-05-28
Attending: INTERNAL MEDICINE
Payer: COMMERCIAL

## 2025-05-28 ENCOUNTER — ONCOLOGY VISIT (OUTPATIENT)
Dept: ONCOLOGY | Facility: HOSPITAL | Age: 60
End: 2025-05-28
Attending: INTERNAL MEDICINE
Payer: COMMERCIAL

## 2025-05-28 VITALS
DIASTOLIC BLOOD PRESSURE: 88 MMHG | OXYGEN SATURATION: 98 % | WEIGHT: 176 LBS | SYSTOLIC BLOOD PRESSURE: 135 MMHG | HEIGHT: 65 IN | BODY MASS INDEX: 29.32 KG/M2 | HEART RATE: 88 BPM | RESPIRATION RATE: 16 BRPM

## 2025-05-28 DIAGNOSIS — Z17.0 MALIGNANT NEOPLASM OF UPPER-INNER QUADRANT OF LEFT BREAST IN FEMALE, ESTROGEN RECEPTOR POSITIVE (H): Primary | ICD-10-CM

## 2025-05-28 DIAGNOSIS — C50.212 MALIGNANT NEOPLASM OF UPPER-INNER QUADRANT OF LEFT BREAST IN FEMALE, ESTROGEN RECEPTOR POSITIVE (H): Primary | ICD-10-CM

## 2025-05-28 DIAGNOSIS — R19.7 DIARRHEA, UNSPECIFIED TYPE: ICD-10-CM

## 2025-05-28 PROCEDURE — 99207 PR NO BILLABLE SERVICE THIS VISIT: CPT | Performed by: INTERNAL MEDICINE

## 2025-05-28 PROCEDURE — 99214 OFFICE O/P EST MOD 30 MIN: CPT | Performed by: INTERNAL MEDICINE

## 2025-05-28 PROCEDURE — G2211 COMPLEX E/M VISIT ADD ON: HCPCS | Performed by: INTERNAL MEDICINE

## 2025-05-28 PROCEDURE — 99213 OFFICE O/P EST LOW 20 MIN: CPT | Performed by: INTERNAL MEDICINE

## 2025-05-28 RX ORDER — ALBUTEROL SULFATE 0.83 MG/ML
2.5 SOLUTION RESPIRATORY (INHALATION)
Status: CANCELLED | OUTPATIENT
Start: 2025-05-28

## 2025-05-28 RX ORDER — HEPARIN SODIUM,PORCINE 10 UNIT/ML
5-20 VIAL (ML) INTRAVENOUS DAILY PRN
Status: CANCELLED | OUTPATIENT
Start: 2025-05-28

## 2025-05-28 RX ORDER — DIPHENHYDRAMINE HCL 50 MG
50 CAPSULE ORAL
Status: CANCELLED
Start: 2025-05-28

## 2025-05-28 RX ORDER — ALBUTEROL SULFATE 90 UG/1
1-2 INHALANT RESPIRATORY (INHALATION)
Status: CANCELLED
Start: 2025-05-28

## 2025-05-28 RX ORDER — DIPHENHYDRAMINE HYDROCHLORIDE 50 MG/ML
25 INJECTION, SOLUTION INTRAMUSCULAR; INTRAVENOUS
Status: CANCELLED
Start: 2025-05-28

## 2025-05-28 RX ORDER — LORAZEPAM 2 MG/ML
0.5 INJECTION INTRAMUSCULAR EVERY 4 HOURS PRN
Status: CANCELLED | OUTPATIENT
Start: 2025-05-28

## 2025-05-28 RX ORDER — HEPARIN SODIUM (PORCINE) LOCK FLUSH IV SOLN 100 UNIT/ML 100 UNIT/ML
5 SOLUTION INTRAVENOUS
Status: CANCELLED | OUTPATIENT
Start: 2025-05-28

## 2025-05-28 RX ORDER — DIPHENHYDRAMINE HYDROCHLORIDE 50 MG/ML
50 INJECTION, SOLUTION INTRAMUSCULAR; INTRAVENOUS
Status: CANCELLED
Start: 2025-05-28

## 2025-05-28 RX ORDER — ACETAMINOPHEN 325 MG/1
650 TABLET ORAL
Status: CANCELLED
Start: 2025-05-28

## 2025-05-28 RX ORDER — EPINEPHRINE 1 MG/ML
0.3 INJECTION, SOLUTION INTRAMUSCULAR; SUBCUTANEOUS EVERY 5 MIN PRN
Status: CANCELLED | OUTPATIENT
Start: 2025-05-28

## 2025-05-28 RX ORDER — MEPERIDINE HYDROCHLORIDE 25 MG/ML
25 INJECTION INTRAMUSCULAR; INTRAVENOUS; SUBCUTANEOUS
Status: CANCELLED | OUTPATIENT
Start: 2025-05-28

## 2025-05-28 RX ORDER — METHYLPREDNISOLONE SODIUM SUCCINATE 40 MG/ML
40 INJECTION INTRAMUSCULAR; INTRAVENOUS
Status: CANCELLED
Start: 2025-05-28

## 2025-05-28 ASSESSMENT — PAIN SCALES - GENERAL: PAINLEVEL_OUTOF10: MILD PAIN (3)

## 2025-05-28 NOTE — PROGRESS NOTES
[facility] Hematology and Oncology Progress Note    Patient: Adrienne Ivory  MRN: [id number]  Date of Service: May 28, 2025             ECOG Performance    0 - Independent          ______________________________________________________________________________  Oncologic history  T2 N3 M0 stage IIIc invasive ductal carcinoma of the left breast ER positive FL positive HER2/wolf 3+.  November 2024  Patient had multiple level 1 level 2 and level 3 axillary lymph nodes involved    Treatment  1.Patient started on neoadjuvant TCHP on 12/12/2024  2.Near complete radiologic remission after 3 cycles of TCHP  3.Completed 6 cycle of TCHP on 3/27/2025  4.Switched to single agent trastuzumab on 4/17/2025 with plans to change further treatment based on    pathologic response  5.  Complete pathologic response at the time of surgery.  Patient underwent bilateral mastectomies on 29 April 2025  6.  Plan to complete 1 year of trastuzumab which will finish in December 2025  7.  Patient to receive adjuvant radiation to the left chest wall.  5000 cGy in 25 fractions    History of Present Illness    Ms. Adrienne Ivory is here for follow-up.  She is due to get her next dose of Herceptin today.  She did had an echo done.  She otherwise continues to feel great.  Her energy level has continued to improve and she can taste food again.  She is happy about those changes.  She is waiting for her hair to grow back.  She did have an appointment with radiation oncology.    Review of systems  A comprehensive 12 point review of system was done that was negative except what is mentioned in the history of present illness    Past History    Past Medical History:   Diagnosis Date    Abnormal Pap smear, can't excl hi gd sq intraepithelial lesion (ASC-H) 03/01/2015    LSIL also    Anxiety state, unspecified     Herpes simplex without mention of complication     HSIL on Pap smear of cervix 07/01/2014    colp - WNL    Human papillomavirus in  conditions classified elsewhere and of unspecified site 11/01/2010    + HPV 45 & 82 with ASCUS    Hx of colposcopy with cervical biopsy 11/08/2016    [facility] ECC neg.     Obese     PONV (postoperative nausea and vomiting)     Premenstrual tension syndromes        Past Surgical History:   Procedure Laterality Date    C IUD,MIRENA  2/08-3/11    removed for cramping    COLONOSCOPY N/A 12/4/2020    Procedure: COLONOSCOPY, WITH POLYPECTOMY;  Surgeon: Moises Pride MD;  Location: [facility] OR    DILATION AND CURETTAGE, ABLATE ENDOMETRIUM NOVASURE, COMBINED N/A 12/31/2015    Procedure: COMBINED DILATION AND CURETTAGE, ABLATE ENDOMETRIUM NOVASURE;  Surgeon: Shakira Penaloza MD;  Location: [facility] OR    DISSECT LYMPH NODE AXILLA Left 4/29/2025    Procedure: WITH LEFT TARGETED AXILLARY DISSECTION;  Surgeon: Katerine Matthews DO;  Location: [facility] OR    HYSTEROSCOPY DIAGNOSTIC N/A 12/31/2015    Procedure: HYSTEROSCOPY DIAGNOSTIC;  Surgeon: Shakira Penaloza MD;  Location: [facility] OR    LEEP TX, CERVICAL  4/3/15    ARNAV 2-3, negative margins    MASTECTOMY SIMPLE Bilateral 4/29/2025    Procedure: BILATERAL MASTECTOMY;  Surgeon: Katerine Matthews DO;  Location: [facility] OR    NO HISTORY OF SURGERY         Physical Exam    There were no vitals taken for this visit.    General: alert, awake, not in acute distress  HEENT: Head: Normal, normocephalic, atraumatic.  Eye: Normal external eye, conjunctiva, lids cornea, SITA.  Nose: Normal external nose, mucus membranes and septum.  Pharynx: Normal buccal mucosa. Normal pharynx.  Neck / Thyroid: Supple, no masses, nodes, nodules or enlargement.  Lymphatics: No abnormally enlarged lymph nodes.  Chest: Normal chest wall and respirations. Clear to auscultation.  No crackles or rhonchi's  Heart: S1 S2 RRR, no murmur.   Abdomen: abdomen is soft without significant tenderness, masses, organomegaly or guarding  Extremities: normal strength, tone, and muscle mass  Skin: normal. no rash or  abnormalities  CNS: non focal.    Breast exam on the left side did show shrinkage in the mass I could still feel a lymph node in the axilla      Lab Results    Recent Results (from the past 240 hours)   Echocardiogram Limited    Collection Time: 25  8:32 AM   Result Value Ref Range    LVEF  55-60%          Imaging    Echocardiogram Limited  Result Date: 2025  [id number]^SEAMUS^CYRUS^[place] [address]  Name: BRYAN SHI MRN: [id number] : 1965 Study Date: 2025 07:54 AM Age: 59 yrs Gender: Female Patient Location: [facility] Reason For Study: Malignant neoplasm of upper-inner quadrant of left breast in [facility] Ordering Physician: CYRUS WAY Referring Physician: CYRUS WAY Performed By: VANDANA  BSA: 1.9 m2 Height: 65 in Weight: 176 lb HR: 85 ______________________________________________________________________________ Procedure Limited Echocardiogram with two-dimensional, color and spectral Doppler. Compared to prior study, there is no significant change. ______________________________________________________________________________ Interpretation Summary  Left ventricular size, wall motion and function are normal. The ejection fraction is 55-60%. Global peak LV longitudinal strain is averaged at -19.6%. This is within reported normal limits (normal <-18%). Normal right ventricle size and systolic function. On limited evaluation no significant valve disease identified. Compared to prior study, there is no significant change. ______________________________________________________________________________ Left Ventricle Left ventricular size, wall motion and function are normal. The ejection fraction is 55-60%. Global peak LV longitudinal strain is averaged at -19.6%. This is within reported normal limits (normal <-18%).  Right Ventricle Normal right ventricle size and systolic function.   Atria Normal left atrial size. Right atrial size is normal.  Mitral Valve Mitral valve leaflets appear normal. There is no evidence of mitral stenosis or clinically significant mitral regurgitation.  Tricuspid Valve Tricuspid valve leaflets appear normal. There is no evidence of tricuspid stenosis or clinically significant tricuspid regurgitation. Right ventricle systolic pressure estimate normal. The right ventricular systolic pressure is approximated at 19.1 mmHg plus the right atrial pressure.  Aortic Valve Aortic valve leaflets appear normal. There is no evidence of aortic stenosis or clinically significant aortic regurgitation.  Pericardium There is no pericardial effusion.  Rhythm Sinus rhythm was noted.  ______________________________________________________________________________ MMode/2D Measurements & Calculations IVSd: 0.84 cm LVIDd: 4.8 cm LVIDs: 3.1 cm LVPWd: 1.00 cm FS: 35.8 % LV mass(C)d: 151.4 grams LV mass(C)dI: 80.8 grams/m2 Ao root diam: 2.8 cm LVOT diam: 2.1 cm LVOT area: 3.5 cm2  Ao root diam index Ht(cm/m): 1.7 Ao root diam index BSA (cm/m2): 1.5 EF Biplane: 57.3 % LA Volume Indexed (AL/bp): 33.8 ml/m2 RWT: 0.42 TAPSE: 2.3 cm  Time Measurements MM HR: 78.0 BPM  Doppler Measurements & Calculations MV E max lee: 84.8 cm/sec MV A max lee: 63.4 cm/sec MV E/A: 1.3 MV dec slope: 440.1 cm/sec2 MV dec time: 0.19 sec Ao V2 max: 130.3 cm/sec Ao max P.0 mmHg Ao V2 mean: 94.4 cm/sec Ao mean P.0 mmHg Ao V2 VTI: 27.7 cm YURY(I,D): 3.1 cm2 YURY(V,D): 3.0 cm2 LV V1 max P.2 mmHg LV V1 max: 113.5 cm/sec LV V1 VTI: 24.8 cm SV(LVOT): 86.0 ml SI(LVOT): 45.9 ml/m2 TR max lee: 218.7 cm/sec TR max P.1 mmHg AV Lee Ratio (DI): 0.87 YURY Index (cm2/m2): 1.7 E/E': 11.3 Medial E/e': 11.3 Peak E' Lee: 7.5 cm/sec  Measurements from QLAB LV GLS Endo Peak A2C (AS): -18.9 % LV GLS Endo Peak A3C (AS): -19.8 %  LV GLS Endo Peak A4C (AS): -20.2 % LV GLS Endo Peak Avg (AS): -19.6 %  ______________________________________________________________________________ Report approved by: Garcia Shine MD on 05/27/2025 10:00 AM       US Breast Loc w [place] Node Injection Left  Result Date: 4/29/2025  INDICATION: Pre-operative localization of lymph node metastasis within the left axilla PROCEDURE: Informed consent was obtained from the patient. The axilla was cleansed with ChloraPrep. Lidocaine was used for local anesthesia. A 20-gauge needle was then advanced to the area of abnormality. A localization wire was then deployed.  Post-procedure mammograms demonstrate the localization wire in appropriate position. The previously placed marker was visualized. The patient tolerated this well. 525 uci 99mTc Lymphoseek was injected subdermally along the upper outer aspect of the areolar margin at 8:35am by NELSON Dawn.     IMPRESSION: Sonographically guided  wire localization.     MA Post Procedure Left  Result Date: 4/29/2025  INDICATION: Pre-operative localization of lymph node metastasis within the left axilla PROCEDURE: Informed consent was obtained from the patient. The axilla was cleansed with ChloraPrep. Lidocaine was used for local anesthesia. A 20-gauge needle was then advanced to the area of abnormality. A localization wire was then deployed.  Post-procedure mammograms demonstrate the localization wire in appropriate position. The previously placed marker was visualized. The patient tolerated this well. 525 uci 99mTc Lymphoseek was injected subdermally along the upper outer aspect of the areolar margin at 8:35am by NELSON Dawn.     IMPRESSION: Sonographically guided  wire localization.           Assessment and Plan    Stage IIIc breast cancer HER2 positive, ER/UT positive  Patient is here in follow-up.  She is due for her next dose of Herceptin.  The plan is to continue Herceptin every 3 weeks until we complete a total of 1 year which will be in December of this year.  She will get a  dose today and will come back in 3 weeks for her next dose    Cardiac monitoring  Patient does needs continued cardiac monitoring.  She just had an echo done which showed well-preserved ejection fraction of 55%.  Will now plan to repeat an echo in 3 months    Adjuvant radiation  Patient has seen radiation oncology.  The plan is to give her adjuvant radiation and then she will receive 5000 cGy in 25 fractions.  I will plan to start hormonal therapy once radiation therapy is complete    Signed by: Josseline Harkins MD        CC: Kallie Ta NP

## 2025-05-28 NOTE — LETTER
"5/28/2025      Adrienne Ivory  [address]  [place] [address]      Dear Colleague,    Thank you for referring your patient, Adrienne Ivory, to the [facility]. Please see a copy of my visit note below.    Oncology Rooming Note    May 28, 2025 1:35 PM   Adrienne Ivory is a 59 year old female who presents for:    Chief Complaint   Patient presents with     Oncology Clinic Visit       Invasive ductal carcinoma of left breast        Initial Vitals: /88 (BP Location: Left arm, Patient Position: Sitting, Cuff Size: Adult Regular)   Pulse 88   Resp 16   Ht 1.651 m (5' 5\")   Wt 79.8 kg (176 lb)   SpO2 98%   BMI 29.29 kg/m   Estimated body mass index is 29.29 kg/m  as calculated from the following:    Height as of this encounter: 1.651 m (5' 5\").    Weight as of this encounter: 79.8 kg (176 lb). Body surface area is 1.91 meters squared.  Mild Pain (3) Comment: Data Unavailable   No LMP recorded. Patient has had an ablation.  Allergies reviewed: Yes  Medications reviewed: Yes    Medications: Medication refills not needed today.  Pharmacy name entered into [employer]:    [facility] - [place], [place] - [address] [id number] [place], [place] - [address] [place] [address] AT [address]    Frailty Screening:   Is the patient here for a new oncology consult visit in cancer care? 2. No    PHQ9:  Did this patient require a PHQ9?: No      Clinical concerns:  follow up; Return to work form for [employer] signed and faxed to [phone] with Right Fax confirmation, pt informed in office.       Floridalma Navarrete CMA                Again, thank you for allowing me to participate in the care of your patient.        Sincerely,        Josseline Harkins MD    Electronically signed"

## 2025-05-28 NOTE — PROGRESS NOTES
"Oncology Rooming Note    May 28, 2025 1:35 PM   Adrienne Ivory is a 59 year old female who presents for:    Chief Complaint   Patient presents with    Oncology Clinic Visit       Invasive ductal carcinoma of left breast        Initial Vitals: /88 (BP Location: Left arm, Patient Position: Sitting, Cuff Size: Adult Regular)   Pulse 88   Resp 16   Ht 1.651 m (5' 5\")   Wt 79.8 kg (176 lb)   SpO2 98%   BMI 29.29 kg/m   Estimated body mass index is 29.29 kg/m  as calculated from the following:    Height as of this encounter: 1.651 m (5' 5\").    Weight as of this encounter: 79.8 kg (176 lb). Body surface area is 1.91 meters squared.  Mild Pain (3) Comment: Data Unavailable   No LMP recorded. Patient has had an ablation.  Allergies reviewed: Yes  Medications reviewed: Yes    Medications: Medication refills not needed today.  Pharmacy name entered into [employer]:    [facility] - [place], [place] - [address]  [id number] [place], [place] -  [address] AT [address]    Frailty Screening:   Is the patient here for a new oncology consult visit in cancer care? 2. No    PHQ9:  Did this patient require a PHQ9?: No      Clinical concerns:  follow up; Return to work form for [employer] signed and faxed to [phone] with Right Fax confirmation, pt informed in office.       Floridalma Navarrete CMA              "

## 2025-05-29 ENCOUNTER — INFUSION THERAPY VISIT (OUTPATIENT)
Dept: INFUSION THERAPY | Facility: HOSPITAL | Age: 60
End: 2025-05-29
Attending: INTERNAL MEDICINE
Payer: COMMERCIAL

## 2025-05-29 VITALS
DIASTOLIC BLOOD PRESSURE: 76 MMHG | OXYGEN SATURATION: 99 % | WEIGHT: 178.4 LBS | SYSTOLIC BLOOD PRESSURE: 153 MMHG | BODY MASS INDEX: 29.69 KG/M2 | HEART RATE: 85 BPM | TEMPERATURE: 98.2 F

## 2025-05-29 DIAGNOSIS — C50.212 MALIGNANT NEOPLASM OF UPPER-INNER QUADRANT OF LEFT BREAST IN FEMALE, ESTROGEN RECEPTOR POSITIVE (H): Primary | ICD-10-CM

## 2025-05-29 DIAGNOSIS — Z17.0 MALIGNANT NEOPLASM OF UPPER-INNER QUADRANT OF LEFT BREAST IN FEMALE, ESTROGEN RECEPTOR POSITIVE (H): Primary | ICD-10-CM

## 2025-05-29 PROCEDURE — 250N000011 HC RX IP 250 OP 636: Performed by: INTERNAL MEDICINE

## 2025-05-29 PROCEDURE — 258N000003 HC RX IP 258 OP 636: Performed by: INTERNAL MEDICINE

## 2025-05-29 RX ORDER — METHYLPREDNISOLONE SODIUM SUCCINATE 40 MG/ML
40 INJECTION INTRAMUSCULAR; INTRAVENOUS
Status: DISCONTINUED | OUTPATIENT
Start: 2025-05-29 | End: 2025-05-29 | Stop reason: HOSPADM

## 2025-05-29 RX ORDER — HEPARIN SODIUM (PORCINE) LOCK FLUSH IV SOLN 100 UNIT/ML 100 UNIT/ML
5 SOLUTION INTRAVENOUS
Status: DISCONTINUED | OUTPATIENT
Start: 2025-05-29 | End: 2025-05-29 | Stop reason: HOSPADM

## 2025-05-29 RX ORDER — ALBUTEROL SULFATE 0.83 MG/ML
2.5 SOLUTION RESPIRATORY (INHALATION)
Status: DISCONTINUED | OUTPATIENT
Start: 2025-05-29 | End: 2025-05-29 | Stop reason: HOSPADM

## 2025-05-29 RX ORDER — EPINEPHRINE 1 MG/ML
0.3 INJECTION, SOLUTION INTRAMUSCULAR; SUBCUTANEOUS EVERY 5 MIN PRN
Status: DISCONTINUED | OUTPATIENT
Start: 2025-05-29 | End: 2025-05-29 | Stop reason: HOSPADM

## 2025-05-29 RX ORDER — MEPERIDINE HYDROCHLORIDE 25 MG/ML
25 INJECTION INTRAMUSCULAR; INTRAVENOUS; SUBCUTANEOUS
Status: DISCONTINUED | OUTPATIENT
Start: 2025-05-29 | End: 2025-05-29 | Stop reason: HOSPADM

## 2025-05-29 RX ORDER — ALBUTEROL SULFATE 90 UG/1
1-2 INHALANT RESPIRATORY (INHALATION)
Status: DISCONTINUED | OUTPATIENT
Start: 2025-05-29 | End: 2025-05-29 | Stop reason: HOSPADM

## 2025-05-29 RX ORDER — DIPHENHYDRAMINE HYDROCHLORIDE 50 MG/ML
25 INJECTION, SOLUTION INTRAMUSCULAR; INTRAVENOUS
Status: DISCONTINUED | OUTPATIENT
Start: 2025-05-29 | End: 2025-05-29 | Stop reason: HOSPADM

## 2025-05-29 RX ORDER — DIPHENHYDRAMINE HYDROCHLORIDE 50 MG/ML
50 INJECTION, SOLUTION INTRAMUSCULAR; INTRAVENOUS
Status: DISCONTINUED | OUTPATIENT
Start: 2025-05-29 | End: 2025-05-29 | Stop reason: HOSPADM

## 2025-05-29 RX ADMIN — SODIUM CHLORIDE 250 ML: 0.9 INJECTION, SOLUTION INTRAVENOUS at 09:26

## 2025-05-29 RX ADMIN — HEPARIN 5 ML: 100 SYRINGE at 10:24

## 2025-05-29 RX ADMIN — SODIUM CHLORIDE 546 MG: 0.9 INJECTION, SOLUTION INTRAVENOUS at 09:48

## 2025-05-29 NOTE — PROGRESS NOTES
Infusion Nursing Note:  Adrienne Ivory presents today for C3 Trastuzumab.    Patient seen by provider today: No. Pt saw  yesterday.    Note: Pt arrives ambulatory to [facility]. Pt reports nerve sensitivity at surgical site across chest. Pt reports eating well.    BP (!) 153/76   Pulse 85   Temp 98.2  F (36.8  C) (Oral)   Wt 80.9 kg (178 lb 6.4 oz)   SpO2 99%   BMI 29.69 kg/m      Premeds Given: none    Intravenous Access:  Implanted Port.    Treatment Conditions:  No labs to be evaluated today.  Echo 5/27    Post Infusion Assessment:  Patient tolerated infusion without incident.    Site patent and intact, free from redness, edema or discomfort.  No evidence of extravasations.  Access discontinued per protocol.     Discharge Plan:   Patient discharged in stable condition accompanied by: self.  Departure Mode: Ambulatory.      Winifred Paulino RN

## 2025-06-03 ENCOUNTER — MYC MEDICAL ADVICE (OUTPATIENT)
Dept: ONCOLOGY | Facility: HOSPITAL | Age: 60
End: 2025-06-03
Payer: COMMERCIAL

## 2025-06-03 ENCOUNTER — APPOINTMENT (OUTPATIENT)
Dept: RADIATION ONCOLOGY | Facility: CLINIC | Age: 60
End: 2025-06-03
Attending: RADIOLOGY
Payer: COMMERCIAL

## 2025-06-03 PROCEDURE — 77385 HC IMRT TREATMENT DELIVERY, SIMPLE: CPT | Performed by: RADIOLOGY

## 2025-06-03 PROCEDURE — 77014 PR CT GUIDE FOR PLACEMENT RADIATION THERAPY FIELDS: CPT | Mod: 26 | Performed by: RADIOLOGY

## 2025-06-03 NOTE — TELEPHONE ENCOUNTER
[facility] disability forms for long and short term faxed to [phone] with Right Fax confirmation. Patient is working intermittently 0-40 hours per week, she is starting radiation daily after having had chemotherapy already. Informed patient by Bebestoret laura COTTO CMA

## 2025-06-04 ENCOUNTER — APPOINTMENT (OUTPATIENT)
Dept: RADIATION ONCOLOGY | Facility: CLINIC | Age: 60
End: 2025-06-04
Attending: RADIOLOGY
Payer: COMMERCIAL

## 2025-06-04 VITALS
DIASTOLIC BLOOD PRESSURE: 67 MMHG | TEMPERATURE: 98.2 F | SYSTOLIC BLOOD PRESSURE: 134 MMHG | OXYGEN SATURATION: 100 % | BODY MASS INDEX: 29.32 KG/M2 | RESPIRATION RATE: 16 BRPM | HEART RATE: 72 BPM | WEIGHT: 176.2 LBS

## 2025-06-04 DIAGNOSIS — C50.212 MALIGNANT NEOPLASM OF UPPER-INNER QUADRANT OF LEFT BREAST IN FEMALE, ESTROGEN RECEPTOR POSITIVE (H): Primary | ICD-10-CM

## 2025-06-04 DIAGNOSIS — Z17.0 MALIGNANT NEOPLASM OF UPPER-INNER QUADRANT OF LEFT BREAST IN FEMALE, ESTROGEN RECEPTOR POSITIVE (H): Primary | ICD-10-CM

## 2025-06-04 PROCEDURE — 1126F AMNT PAIN NOTED NONE PRSNT: CPT | Performed by: RADIOLOGY

## 2025-06-04 PROCEDURE — 3075F SYST BP GE 130 - 139MM HG: CPT | Performed by: RADIOLOGY

## 2025-06-04 PROCEDURE — 77385 HC IMRT TREATMENT DELIVERY, SIMPLE: CPT | Performed by: RADIOLOGY

## 2025-06-04 PROCEDURE — 3078F DIAST BP <80 MM HG: CPT | Performed by: RADIOLOGY

## 2025-06-04 PROCEDURE — 77014 PR CT GUIDE FOR PLACEMENT RADIATION THERAPY FIELDS: CPT | Mod: 26 | Performed by: STUDENT IN AN ORGANIZED HEALTH CARE EDUCATION/TRAINING PROGRAM

## 2025-06-04 ASSESSMENT — PAIN SCALES - GENERAL: PAINLEVEL_OUTOF10: NO PAIN (0)

## 2025-06-04 NOTE — PROGRESS NOTES
RADIATION ONCOLOGY WEEKLY TREATMENT VISIT NOTE      Assessment / Impression       Visit Dx:  (C50.212,  Z17.0) Malignant neoplasm of upper-inner quadrant of left breast in female, estrogen receptor positive (H)  (primary encounter diagnosis)    Tolerating radiation therapy well.  All questions and concerns addressed.    Plan:     Continue radiation treatment as prescribed.    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    Assessment                  Body Site:  Breast                           Site: Lt. Breast  Stereotactic Radiosurgery: No  Concurrent Therapy: No  Today's Dose: 400  Total Dose for Breast: 5000  Today's Fraction/Total Fraction Breast:                                    Sexuality Alteration                    Emotional Alteration    Copin: Effective  Comfort Alteration   KPS: 100 % Normal, no complaints  Fatigue (ONS scale): 0: No Fatigue  Pain Location: nerve sensations in chestwall  Pain Intensity. Rate degree of pain ranging from 0 (no pain) to 10 (severe pain): 0-5  Pain Description: Pricking - Pricking pain, tingling nerve endings  Pain Intervention: 0: None  Effectiveness of pain intervention: 0: No relief   Nutrition Alteration   Anorexia: 0: None  Nausea: 0: None  Vomitin: None  Weight: 79.9 kg (176 lb 3.2 oz)  Skin Alteration   Skin Sensation: 0: No problem  Skin Reaction: 0: None (Radiaplex given with verbal/written information)  AUA Assessment                                           Accompanied by       Objective:     Exam: no Erythema.    Vitals:    25 1322   BP:  134/67   Pulse: 72   Resp: 16   Temp: 98.2  F (36.8  C)   TempSrc: Oral   SpO2: 100%   Weight: 79.9 kg (176 lb 3.2 oz)   PainSc: No Pain (0)       Wt Readings from Last 8 Encounters:   06/04/25 79.9 kg (176 lb 3.2 oz)   05/29/25 80.9 kg (178 lb 6.4 oz)   05/28/25 79.8 kg (176 lb)   05/13/25 80.2 kg (176 lb 12.8 oz)   05/07/25 81.7 kg (180 lb 1.6 oz)   04/30/25 81.3 kg (179 lb 3.2 oz)   04/28/25 82.8 kg (182 lb 9.6 oz)   04/24/25 86.2 kg (190 lb)       General: Alert and oriented, in no acute distress  Adrienne has no Erythema.  Aria chart and setup information reviewed    Lyssa Walters MD

## 2025-06-04 NOTE — LETTER
2025      Adrienne Ivory  [address] [place] [address]      Dear Colleague,    Thank you for referring your patient, Adrienne Ivory, to the [facility] RADIATION ONCOLOGY [place]. Please see a copy of my visit note below.    RADIATION ONCOLOGY WEEKLY TREATMENT VISIT NOTE      Assessment / Impression       Visit Dx:  (C50.212,  Z17.0) Malignant neoplasm of upper-inner quadrant of left breast in female, estrogen receptor positive (H)  (primary encounter diagnosis)    Tolerating radiation therapy well.  All questions and concerns addressed.    Plan:     Continue radiation treatment as prescribed.    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    Assessment                  Body Site:  Breast                           Site: Lt. Breast  Stereotactic Radiosurgery: No  Concurrent Therapy: No  Today's Dose: 400  Total Dose for Breast: 5000  Today's Fraction/Total Fraction Breast:                                    Sexuality Alteration                    Emotional Alteration    Copin: Effective  Comfort Alteration   KPS: 100 % Normal, no complaints  Fatigue (ONS scale): 0: No Fatigue  Pain Location: nerve sensations in chestwall  Pain Intensity. Rate degree of pain ranging from 0 (no pain) to 10 (severe pain): 0-5  Pain Description: Pricking - Pricking pain, tingling nerve endings  Pain Intervention: 0: None  Effectiveness of pain intervention: 0: No relief   Nutrition Alteration   Anorexia: 0: None  Nausea: 0: None  Vomitin: None  Weight: 79.9 kg (176 lb 3.2 oz)  Skin Alteration    Skin Sensation: 0: No problem  Skin Reaction: 0: None (Radiaplex given with verbal/written information)  AUA Assessment                                           Accompanied by       Objective:     Exam: no Erythema.    Vitals:    06/04/25 1322   BP: 134/67   Pulse: 72   Resp: 16   Temp: 98.2  F (36.8  C)   TempSrc: Oral   SpO2: 100%   Weight: 79.9 kg (176 lb 3.2 oz)   PainSc: No Pain (0)       Wt Readings from Last 8 Encounters:   06/04/25 79.9 kg (176 lb 3.2 oz)   05/29/25 80.9 kg (178 lb 6.4 oz)   05/28/25 79.8 kg (176 lb)   05/13/25 80.2 kg (176 lb 12.8 oz)   05/07/25 81.7 kg (180 lb 1.6 oz)   04/30/25 81.3 kg (179 lb 3.2 oz)   04/28/25 82.8 kg (182 lb 9.6 oz)   04/24/25 86.2 kg (190 lb)       General: Alert and oriented, in no acute distress  Adrienne has no Erythema.  Aria chart and setup information reviewed    Lyssa Walters MD    Again, thank you for allowing me to participate in the care of your patient.        Sincerely,        Lyssa Walters MD    Electronically signed

## 2025-06-05 ENCOUNTER — APPOINTMENT (OUTPATIENT)
Dept: RADIATION ONCOLOGY | Facility: CLINIC | Age: 60
End: 2025-06-05
Attending: RADIOLOGY
Payer: COMMERCIAL

## 2025-06-05 PROCEDURE — 77385 HC IMRT TREATMENT DELIVERY, SIMPLE: CPT | Performed by: RADIOLOGY

## 2025-06-05 PROCEDURE — 77014 PR CT GUIDE FOR PLACEMENT RADIATION THERAPY FIELDS: CPT | Mod: 26 | Performed by: RADIOLOGY

## 2025-06-06 ENCOUNTER — APPOINTMENT (OUTPATIENT)
Dept: RADIATION ONCOLOGY | Facility: CLINIC | Age: 60
End: 2025-06-06
Attending: RADIOLOGY
Payer: COMMERCIAL

## 2025-06-06 PROCEDURE — 77014 PR CT GUIDE FOR PLACEMENT RADIATION THERAPY FIELDS: CPT | Mod: 26 | Performed by: STUDENT IN AN ORGANIZED HEALTH CARE EDUCATION/TRAINING PROGRAM

## 2025-06-06 PROCEDURE — 77385 HC IMRT TREATMENT DELIVERY, SIMPLE: CPT | Performed by: STUDENT IN AN ORGANIZED HEALTH CARE EDUCATION/TRAINING PROGRAM

## 2025-06-09 ENCOUNTER — APPOINTMENT (OUTPATIENT)
Dept: RADIATION ONCOLOGY | Facility: CLINIC | Age: 60
End: 2025-06-09
Attending: RADIOLOGY
Payer: COMMERCIAL

## 2025-06-09 PROCEDURE — 77385 HC IMRT TREATMENT DELIVERY, SIMPLE: CPT | Performed by: STUDENT IN AN ORGANIZED HEALTH CARE EDUCATION/TRAINING PROGRAM

## 2025-06-09 PROCEDURE — 77014 PR CT GUIDE FOR PLACEMENT RADIATION THERAPY FIELDS: CPT | Mod: 26 | Performed by: STUDENT IN AN ORGANIZED HEALTH CARE EDUCATION/TRAINING PROGRAM

## 2025-06-09 PROCEDURE — 77336 RADIATION PHYSICS CONSULT: CPT | Performed by: RADIOLOGY

## 2025-06-09 PROCEDURE — 77427 RADIATION TX MANAGEMENT X5: CPT | Performed by: RADIOLOGY

## 2025-06-10 ENCOUNTER — APPOINTMENT (OUTPATIENT)
Dept: RADIATION ONCOLOGY | Facility: CLINIC | Age: 60
End: 2025-06-10
Attending: RADIOLOGY
Payer: COMMERCIAL

## 2025-06-10 PROCEDURE — 77014 PR CT GUIDE FOR PLACEMENT RADIATION THERAPY FIELDS: CPT | Mod: 26 | Performed by: RADIOLOGY

## 2025-06-10 PROCEDURE — 77385 HC IMRT TREATMENT DELIVERY, SIMPLE: CPT | Performed by: RADIOLOGY

## 2025-06-11 ENCOUNTER — OFFICE VISIT (OUTPATIENT)
Dept: RADIATION ONCOLOGY | Facility: CLINIC | Age: 60
End: 2025-06-11
Attending: RADIOLOGY
Payer: COMMERCIAL

## 2025-06-11 VITALS
DIASTOLIC BLOOD PRESSURE: 71 MMHG | SYSTOLIC BLOOD PRESSURE: 130 MMHG | TEMPERATURE: 98 F | BODY MASS INDEX: 29.7 KG/M2 | HEART RATE: 76 BPM | OXYGEN SATURATION: 100 % | WEIGHT: 178.5 LBS | RESPIRATION RATE: 16 BRPM

## 2025-06-11 DIAGNOSIS — C50.212 MALIGNANT NEOPLASM OF UPPER-INNER QUADRANT OF LEFT BREAST IN FEMALE, ESTROGEN RECEPTOR POSITIVE (H): Primary | ICD-10-CM

## 2025-06-11 DIAGNOSIS — Z17.0 MALIGNANT NEOPLASM OF UPPER-INNER QUADRANT OF LEFT BREAST IN FEMALE, ESTROGEN RECEPTOR POSITIVE (H): Primary | ICD-10-CM

## 2025-06-11 PROCEDURE — 3075F SYST BP GE 130 - 139MM HG: CPT | Performed by: RADIOLOGY

## 2025-06-11 PROCEDURE — 1125F AMNT PAIN NOTED PAIN PRSNT: CPT | Performed by: RADIOLOGY

## 2025-06-11 PROCEDURE — 77385 HC IMRT TREATMENT DELIVERY, SIMPLE: CPT | Performed by: RADIOLOGY

## 2025-06-11 PROCEDURE — 77014 PR CT GUIDE FOR PLACEMENT RADIATION THERAPY FIELDS: CPT | Mod: 26 | Performed by: RADIOLOGY

## 2025-06-11 PROCEDURE — 3078F DIAST BP <80 MM HG: CPT | Performed by: RADIOLOGY

## 2025-06-11 ASSESSMENT — PAIN SCALES - GENERAL: PAINLEVEL_OUTOF10: MILD PAIN (2)

## 2025-06-11 NOTE — PROGRESS NOTES
RADIATION ONCOLOGY WEEKLY TREATMENT VISIT NOTE      Assessment / Impression       Visit Dx:  (C50.212,  Z17.0) Malignant neoplasm of upper-inner quadrant of left breast in female, estrogen receptor positive (H)  (primary encounter diagnosis)    Tolerating radiation therapy well.  All questions and concerns addressed.    Plan:     Continue radiation treatment as prescribed.    Discussed with patient about skin care.    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    Assessment                  Body Site:  Breast                           Site: Lt. Breast  Stereotactic Radiosurgery: No  Concurrent Therapy: No  Today's Dose: 1400  Total Dose for Breast: 5000  Today's Fraction/Total Fraction Breast:                                    Sexuality Alteration                    Emotional Alteration    Copin: Effective  Comfort Alteration   KPS: 100 % Normal, no complaints  Fatigue (ONS scale): 0: No Fatigue  Pain Location: nerve sensation in chestwall  Pain Intensity. Rate degree of pain ranging from 0 (no pain) to 10 (severe pain): 0-5  Pain Description: Pricking - Pricking pain, tingling nerve endings  Pain Intervention: 0: None  Effectiveness of pain intervention: 0: No relief   Nutrition Alteration   Anorexia: 0: None  Nausea: 0: None  Vomitin: None  Weight: 81 kg (178 lb 8 oz)  Skin Alteration   Skin Sensation: 0: No problem  Skin Reaction: 0: None  AUA Assessment                                           Accompanied by       Objective:     Exam:  mild Erythema.    Vitals:    25 1305   BP: 130/71    Pulse: 76   Resp: 16   Temp: 98  F (36.7  C)   TempSrc: Oral   SpO2: 100%   Weight: 81 kg (178 lb 8 oz)   PainSc: Mild Pain (2)   PainLoc: Shoulder       Wt Readings from Last 8 Encounters:   06/11/25 81 kg (178 lb 8 oz)   06/04/25 79.9 kg (176 lb 3.2 oz)   05/29/25 80.9 kg (178 lb 6.4 oz)   05/28/25 79.8 kg (176 lb)   05/13/25 80.2 kg (176 lb 12.8 oz)   05/07/25 81.7 kg (180 lb 1.6 oz)   04/30/25 81.3 kg (179 lb 3.2 oz)   04/28/25 82.8 kg (182 lb 9.6 oz)       General: Alert and oriented, in no acute distress  Adrienne has mild Erythema.  Aria chart and setup information reviewed    Lyssa Walters MD

## 2025-06-11 NOTE — LETTER
2025      Adrienne Ivory  [address] [place] [address]      Dear Colleague,    Thank you for referring your patient, Adrienne Ivory, to the [facility] RADIATION ONCOLOGY [place]. Please see a copy of my visit note below.    RADIATION ONCOLOGY WEEKLY TREATMENT VISIT NOTE      Assessment / Impression       Visit Dx:  (C50.212,  Z17.0) Malignant neoplasm of upper-inner quadrant of left breast in female, estrogen receptor positive (H)  (primary encounter diagnosis)    Tolerating radiation therapy well.  All questions and concerns addressed.    Plan:     Continue radiation treatment as prescribed.    Discussed with patient about skin care.    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    Assessment                  Body Site:  Breast                           Site: Lt. Breast  Stereotactic Radiosurgery: No  Concurrent Therapy: No  Today's Dose: 1400  Total Dose for Breast: 5000  Today's Fraction/Total Fraction Breast:                                    Sexuality Alteration                    Emotional Alteration    Copin: Effective  Comfort Alteration   KPS: 100 % Normal, no complaints  Fatigue (ONS scale): 0: No Fatigue  Pain Location: nerve sensation in chestwall  Pain Intensity. Rate degree of pain ranging from 0 (no pain) to 10 (severe pain): 0-5  Pain Description: Pricking - Pricking pain, tingling nerve endings  Pain Intervention: 0: None  Effectiveness of pain intervention: 0: No relief   Nutrition Alteration   Anorexia: 0: None  Nausea: 0: None  Vomitin: None  Weight:  81 kg (178 lb 8 oz)  Skin Alteration   Skin Sensation: 0: No problem  Skin Reaction: 0: None  AUA Assessment                                           Accompanied by       Objective:     Exam:  mild Erythema.    Vitals:    06/11/25 1305   BP: 130/71   Pulse: 76   Resp: 16   Temp: 98  F (36.7  C)   TempSrc: Oral   SpO2: 100%   Weight: 81 kg (178 lb 8 oz)   PainSc: Mild Pain (2)   PainLoc: Shoulder       Wt Readings from Last 8 Encounters:   06/11/25 81 kg (178 lb 8 oz)   06/04/25 79.9 kg (176 lb 3.2 oz)   05/29/25 80.9 kg (178 lb 6.4 oz)   05/28/25 79.8 kg (176 lb)   05/13/25 80.2 kg (176 lb 12.8 oz)   05/07/25 81.7 kg (180 lb 1.6 oz)   04/30/25 81.3 kg (179 lb 3.2 oz)   04/28/25 82.8 kg (182 lb 9.6 oz)       General: Alert and oriented, in no acute distress  Adrienne has mild Erythema.  Aria chart and setup information reviewed    Lyssa Walters MD    Again, thank you for allowing me to participate in the care of your patient.        Sincerely,        Lyssa Walters MD    Electronically signed

## 2025-06-12 ENCOUNTER — APPOINTMENT (OUTPATIENT)
Dept: RADIATION ONCOLOGY | Facility: CLINIC | Age: 60
End: 2025-06-12
Attending: RADIOLOGY
Payer: COMMERCIAL

## 2025-06-12 PROCEDURE — 77385 HC IMRT TREATMENT DELIVERY, SIMPLE: CPT | Performed by: STUDENT IN AN ORGANIZED HEALTH CARE EDUCATION/TRAINING PROGRAM

## 2025-06-12 PROCEDURE — 77014 PR CT GUIDE FOR PLACEMENT RADIATION THERAPY FIELDS: CPT | Mod: 26 | Performed by: STUDENT IN AN ORGANIZED HEALTH CARE EDUCATION/TRAINING PROGRAM

## 2025-06-13 ENCOUNTER — APPOINTMENT (OUTPATIENT)
Dept: RADIATION ONCOLOGY | Facility: CLINIC | Age: 60
End: 2025-06-13
Attending: RADIOLOGY
Payer: COMMERCIAL

## 2025-06-13 PROCEDURE — 77385 HC IMRT TREATMENT DELIVERY, SIMPLE: CPT | Performed by: RADIOLOGY

## 2025-06-13 PROCEDURE — 77014 PR CT GUIDE FOR PLACEMENT RADIATION THERAPY FIELDS: CPT | Mod: 26 | Performed by: RADIOLOGY

## 2025-06-16 ENCOUNTER — APPOINTMENT (OUTPATIENT)
Dept: RADIATION ONCOLOGY | Facility: CLINIC | Age: 60
End: 2025-06-16
Attending: RADIOLOGY
Payer: COMMERCIAL

## 2025-06-16 PROCEDURE — 77427 RADIATION TX MANAGEMENT X5: CPT | Performed by: RADIOLOGY

## 2025-06-16 PROCEDURE — 77014 PR CT GUIDE FOR PLACEMENT RADIATION THERAPY FIELDS: CPT | Mod: 26 | Performed by: STUDENT IN AN ORGANIZED HEALTH CARE EDUCATION/TRAINING PROGRAM

## 2025-06-16 PROCEDURE — 77336 RADIATION PHYSICS CONSULT: CPT | Performed by: RADIOLOGY

## 2025-06-16 PROCEDURE — 77385 HC IMRT TREATMENT DELIVERY, SIMPLE: CPT | Performed by: STUDENT IN AN ORGANIZED HEALTH CARE EDUCATION/TRAINING PROGRAM

## 2025-06-17 ENCOUNTER — PATIENT OUTREACH (OUTPATIENT)
Dept: ONCOLOGY | Facility: HOSPITAL | Age: 60
End: 2025-06-17

## 2025-06-17 ENCOUNTER — ONCOLOGY VISIT (OUTPATIENT)
Dept: ONCOLOGY | Facility: HOSPITAL | Age: 60
End: 2025-06-17
Attending: INTERNAL MEDICINE
Payer: COMMERCIAL

## 2025-06-17 ENCOUNTER — INFUSION THERAPY VISIT (OUTPATIENT)
Dept: INFUSION THERAPY | Facility: HOSPITAL | Age: 60
End: 2025-06-17
Attending: INTERNAL MEDICINE
Payer: COMMERCIAL

## 2025-06-17 ENCOUNTER — APPOINTMENT (OUTPATIENT)
Dept: RADIATION ONCOLOGY | Facility: CLINIC | Age: 60
End: 2025-06-17
Attending: RADIOLOGY
Payer: COMMERCIAL

## 2025-06-17 VITALS
HEIGHT: 65 IN | WEIGHT: 174 LBS | TEMPERATURE: 97.4 F | SYSTOLIC BLOOD PRESSURE: 120 MMHG | OXYGEN SATURATION: 98 % | HEART RATE: 81 BPM | BODY MASS INDEX: 28.99 KG/M2 | DIASTOLIC BLOOD PRESSURE: 75 MMHG | RESPIRATION RATE: 16 BRPM

## 2025-06-17 DIAGNOSIS — C50.212 MALIGNANT NEOPLASM OF UPPER-INNER QUADRANT OF LEFT BREAST IN FEMALE, ESTROGEN RECEPTOR POSITIVE (H): Primary | ICD-10-CM

## 2025-06-17 DIAGNOSIS — Z17.0 MALIGNANT NEOPLASM OF UPPER-INNER QUADRANT OF LEFT BREAST IN FEMALE, ESTROGEN RECEPTOR POSITIVE (H): ICD-10-CM

## 2025-06-17 DIAGNOSIS — Z51.11 ENCOUNTER FOR ANTINEOPLASTIC CHEMOTHERAPY: ICD-10-CM

## 2025-06-17 DIAGNOSIS — R19.7 DIARRHEA, UNSPECIFIED TYPE: ICD-10-CM

## 2025-06-17 DIAGNOSIS — Z17.0 MALIGNANT NEOPLASM OF UPPER-INNER QUADRANT OF LEFT BREAST IN FEMALE, ESTROGEN RECEPTOR POSITIVE (H): Primary | ICD-10-CM

## 2025-06-17 DIAGNOSIS — F41.9 ANXIETY: ICD-10-CM

## 2025-06-17 DIAGNOSIS — C50.212 MALIGNANT NEOPLASM OF UPPER-INNER QUADRANT OF LEFT BREAST IN FEMALE, ESTROGEN RECEPTOR POSITIVE (H): ICD-10-CM

## 2025-06-17 LAB
ALBUMIN SERPL BCG-MCNC: 4.2 G/DL (ref 3.5–5.2)
ALP SERPL-CCNC: 69 U/L (ref 40–150)
ALT SERPL W P-5'-P-CCNC: 15 U/L (ref 0–50)
ANION GAP SERPL CALCULATED.3IONS-SCNC: 11 MMOL/L (ref 7–15)
AST SERPL W P-5'-P-CCNC: 23 U/L (ref 0–45)
BASOPHILS # BLD AUTO: 0 10E3/UL (ref 0–0.2)
BASOPHILS NFR BLD AUTO: 1 %
BILIRUB SERPL-MCNC: 0.3 MG/DL
BUN SERPL-MCNC: 13.8 MG/DL (ref 6–20)
CALCIUM SERPL-MCNC: 9.2 MG/DL (ref 8.8–10.4)
CHLORIDE SERPL-SCNC: 103 MMOL/L (ref 98–107)
CREAT SERPL-MCNC: 0.83 MG/DL (ref 0.51–0.95)
EGFRCR SERPLBLD CKD-EPI 2021: 81 ML/MIN/1.73M2
EOSINOPHIL # BLD AUTO: 0.2 10E3/UL (ref 0–0.7)
EOSINOPHIL NFR BLD AUTO: 4 %
ERYTHROCYTE [DISTWIDTH] IN BLOOD BY AUTOMATED COUNT: 13.2 % (ref 10–15)
GLUCOSE SERPL-MCNC: 91 MG/DL (ref 70–99)
HCO3 SERPL-SCNC: 24 MMOL/L (ref 22–29)
HCT VFR BLD AUTO: 34.9 % (ref 35–47)
HGB BLD-MCNC: 11.8 G/DL (ref 11.7–15.7)
IMM GRANULOCYTES # BLD: 0 10E3/UL
IMM GRANULOCYTES NFR BLD: 0 %
LDH SERPL L TO P-CCNC: 178 U/L (ref 0–250)
LYMPHOCYTES # BLD AUTO: 1.4 10E3/UL (ref 0.8–5.3)
LYMPHOCYTES NFR BLD AUTO: 27 %
MCH RBC QN AUTO: 31.3 PG (ref 26.5–33)
MCHC RBC AUTO-ENTMCNC: 33.8 G/DL (ref 31.5–36.5)
MCV RBC AUTO: 93 FL (ref 78–100)
MONOCYTES # BLD AUTO: 0.4 10E3/UL (ref 0–1.3)
MONOCYTES NFR BLD AUTO: 8 %
NEUTROPHILS # BLD AUTO: 3.3 10E3/UL (ref 1.6–8.3)
NEUTROPHILS NFR BLD AUTO: 60 %
NRBC # BLD AUTO: 0 10E3/UL
NRBC BLD AUTO-RTO: 0 /100
PLATELET # BLD AUTO: 226 10E3/UL (ref 150–450)
POTASSIUM SERPL-SCNC: 4 MMOL/L (ref 3.4–5.3)
PROT SERPL-MCNC: 7 G/DL (ref 6.4–8.3)
RBC # BLD AUTO: 3.77 10E6/UL (ref 3.8–5.2)
SODIUM SERPL-SCNC: 138 MMOL/L (ref 135–145)
WBC # BLD AUTO: 5.4 10E3/UL (ref 4–11)

## 2025-06-17 PROCEDURE — 77385 HC IMRT TREATMENT DELIVERY, SIMPLE: CPT | Performed by: RADIOLOGY

## 2025-06-17 PROCEDURE — 84295 ASSAY OF SERUM SODIUM: CPT

## 2025-06-17 PROCEDURE — 83615 LACTATE (LD) (LDH) ENZYME: CPT

## 2025-06-17 PROCEDURE — 250N000011 HC RX IP 250 OP 636: Performed by: INTERNAL MEDICINE

## 2025-06-17 PROCEDURE — 258N000003 HC RX IP 258 OP 636: Performed by: INTERNAL MEDICINE

## 2025-06-17 PROCEDURE — G2211 COMPLEX E/M VISIT ADD ON: HCPCS | Performed by: INTERNAL MEDICINE

## 2025-06-17 PROCEDURE — 99213 OFFICE O/P EST LOW 20 MIN: CPT | Performed by: INTERNAL MEDICINE

## 2025-06-17 PROCEDURE — 99214 OFFICE O/P EST MOD 30 MIN: CPT | Performed by: INTERNAL MEDICINE

## 2025-06-17 PROCEDURE — 36591 DRAW BLOOD OFF VENOUS DEVICE: CPT

## 2025-06-17 PROCEDURE — 77014 PR CT GUIDE FOR PLACEMENT RADIATION THERAPY FIELDS: CPT | Mod: 26 | Performed by: RADIOLOGY

## 2025-06-17 PROCEDURE — 85025 COMPLETE CBC W/AUTO DIFF WBC: CPT

## 2025-06-17 RX ORDER — HEPARIN SODIUM (PORCINE) LOCK FLUSH IV SOLN 100 UNIT/ML 100 UNIT/ML
5 SOLUTION INTRAVENOUS
Status: CANCELLED | OUTPATIENT
Start: 2025-06-17

## 2025-06-17 RX ORDER — ALPRAZOLAM 0.25 MG
TABLET ORAL
Qty: 20 TABLET | Refills: 0 | Status: SHIPPED | OUTPATIENT
Start: 2025-06-17

## 2025-06-17 RX ORDER — MEPERIDINE HYDROCHLORIDE 50 MG/ML
25 INJECTION INTRAMUSCULAR; INTRAVENOUS; SUBCUTANEOUS
Status: CANCELLED | OUTPATIENT
Start: 2025-06-17

## 2025-06-17 RX ORDER — EPINEPHRINE 1 MG/ML
0.3 INJECTION, SOLUTION INTRAMUSCULAR; SUBCUTANEOUS EVERY 5 MIN PRN
Status: CANCELLED | OUTPATIENT
Start: 2025-06-17

## 2025-06-17 RX ORDER — DIPHENHYDRAMINE HYDROCHLORIDE 50 MG/ML
25 INJECTION, SOLUTION INTRAMUSCULAR; INTRAVENOUS
Status: CANCELLED
Start: 2025-06-17

## 2025-06-17 RX ORDER — LORAZEPAM 2 MG/ML
0.5 INJECTION INTRAMUSCULAR EVERY 4 HOURS PRN
Status: CANCELLED | OUTPATIENT
Start: 2025-06-17

## 2025-06-17 RX ORDER — ACETAMINOPHEN 325 MG/1
650 TABLET ORAL
Status: CANCELLED
Start: 2025-06-17

## 2025-06-17 RX ORDER — DIPHENHYDRAMINE HCL 50 MG
50 CAPSULE ORAL
Status: CANCELLED
Start: 2025-06-17

## 2025-06-17 RX ORDER — ALBUTEROL SULFATE 0.83 MG/ML
2.5 SOLUTION RESPIRATORY (INHALATION)
Status: CANCELLED | OUTPATIENT
Start: 2025-06-17

## 2025-06-17 RX ORDER — ALBUTEROL SULFATE 90 UG/1
1-2 INHALANT RESPIRATORY (INHALATION)
Status: CANCELLED
Start: 2025-06-17

## 2025-06-17 RX ORDER — METHYLPREDNISOLONE SODIUM SUCCINATE 40 MG/ML
40 INJECTION INTRAMUSCULAR; INTRAVENOUS
Status: CANCELLED
Start: 2025-06-17

## 2025-06-17 RX ORDER — DIPHENHYDRAMINE HYDROCHLORIDE 50 MG/ML
50 INJECTION, SOLUTION INTRAMUSCULAR; INTRAVENOUS
Status: CANCELLED
Start: 2025-06-17

## 2025-06-17 RX ORDER — HEPARIN SODIUM,PORCINE 10 UNIT/ML
5-20 VIAL (ML) INTRAVENOUS DAILY PRN
Status: CANCELLED | OUTPATIENT
Start: 2025-06-17

## 2025-06-17 RX ORDER — HEPARIN SODIUM (PORCINE) LOCK FLUSH IV SOLN 100 UNIT/ML 100 UNIT/ML
SOLUTION INTRAVENOUS
Status: COMPLETED
Start: 2025-06-17 | End: 2025-06-17

## 2025-06-17 RX ADMIN — HEPARIN 500 UNITS: 100 SYRINGE at 15:44

## 2025-06-17 RX ADMIN — SODIUM CHLORIDE 462 MG: 0.9 INJECTION, SOLUTION INTRAVENOUS at 15:11

## 2025-06-17 ASSESSMENT — PAIN SCALES - GENERAL: PAINLEVEL_OUTOF10: MODERATE PAIN (4)

## 2025-06-17 NOTE — PROGRESS NOTES
[facility]: Cancer Care Follow-Up Note                                    Discussion with Patient:                                                    Met with Adrienne when she was in the infusion area receiving treatment. She has had 10 radiation treatments and is starting to feel the side effects of treatment with changes in her swallowing, skin is starting to get pink. She has experienced some anxiety with her radiation treatment and postioning and is trying to work through this. She has supportive services set up to help with past trauma that is contributing to her experience. She is back to work full time and trying to get caught up from being gone and is also trying to keep up while experiencing side effects from treatment. She knows that [facility] paperwork can be adjusted in the future, if needed.  She continues to have good support from  her SO,  Desmond.  No  needs today from writer. She was encouraged to call if any questions or concerns arise.        Dates of Treatment:                                                      Infusion given in last 28 days       Administered MAR Action Medication Dose Rate Visit    05/29/2025 09:48 New Bag trastuzumab-qyyp (TRAZIMERA) 546 mg in sodium chloride 0.9 % 301 mL infusion 546 mg 602 mL/hr Infusion Therapy Visit on 05/29/2025 in [facility]    06/17/2025 15:11 New Bag trastuzumab-qyyp (TRAZIMERA) 462 mg in sodium chloride 0.9 % 297 mL infusion 462 mg 594 mL/hr Infusion Therapy Visit on 06/17/2025 in [facility]            Assessment:                                                      Trastzumab -tolerating treatment expectedly        Intervention/Education provided during outreach:                                                       See above    Confirmed patient has clinic and triage numbers    Signature:  Radha Stephen RN

## 2025-06-17 NOTE — PROGRESS NOTES
Infusion Nursing Note:  Adrienne Ivory presents today for C4 D1 Trastuzumab 462mg.    Patient seen by provider today: Yes:     Note: Pt arrives ambulatory to [facility] Infusion after Radiation and visit with . Discussed plan for today's visit. Pt's weight is down and dosage has been adjusted.    Premeds Given: none    Intravenous Access:  Implanted Port.    Treatment Conditions:  Lab Results   Component Value Date    HGB 11.8 06/17/2025    WBC 5.4 06/17/2025    ANEU 3.3 06/17/2025     06/17/2025        Lab Results   Component Value Date     06/17/2025    POTASSIUM 4.0 06/17/2025    MAG 1.6 (L) 01/29/2025    CR 0.83 06/17/2025    CONNIE 9.2 06/17/2025    BILITOTAL 0.3 06/17/2025    ALBUMIN 4.2 06/17/2025    ALT 15 06/17/2025    AST 23 06/17/2025     Echo 5/27/25    Results reviewed, labs MET treatment parameters, ok to proceed with treatment.    Post Infusion Assessment:  Patient tolerated infusion without incident.    Site patent and intact, free from redness, edema or discomfort.  No evidence of extravasations.  Access discontinued per protocol.     Discharge Plan:   Patient discharged in stable condition accompanied by: self.  Departure Mode: Ambulatory.      Winifred Paulino RN

## 2025-06-17 NOTE — PROGRESS NOTES
[facility] Hematology and Oncology Progress Note    Patient: Adrienne Ivory  MRN: [id number]  Date of Service: Jun 17, 2025             ECOG Performance    0 - Independent          ______________________________________________________________________________  Oncologic history  T2 N3 M0 stage IIIc invasive ductal carcinoma of the left breast ER positive MD positive HER2/wolf 3+.  November 2024  Patient had multiple level 1 level 2 and level 3 axillary lymph nodes involved    Treatment  1.Patient started on neoadjuvant TCHP on 12/12/2024  2.Near complete radiologic remission after 3 cycles of TCHP  3.Completed 6 cycle of TCHP on 3/27/2025  4.Switched to single agent trastuzumab on 4/17/2025 with plans to change further treatment based on    pathologic response  5.  Complete pathologic response at the time of surgery.  Patient underwent bilateral mastectomies on 29 April 2025  6.  Plan to complete 1 year of trastuzumab which will finish in December 2025  7.  Patient to receive adjuvant radiation to the left chest wall.  5000 cGy in 25 fractions.  Started on 6/3/2025    History of Present Illness    Ms. Adrienne Ivory is here for follow-up.  She has started on adjuvant radiation she is having a hard time dealing with it and is extremely anxious about it.  It reminds her of some of her previous traumas so she has been extremely anxious.  Physically she seems to be doing fine.  She is not having any trouble with her Herceptin and is due for another dose today.  She denies any shortness of breath or dyspnea on exertion.    Review of systems  A comprehensive 12 point review of system was done that was negative except what is mentioned in the history of present illness    Past History    Past Medical History:   Diagnosis Date    Abnormal Pap smear, can't excl hi gd sq intraepithelial lesion (ASC-H) 03/01/2015    LSIL also    Anxiety state, unspecified     Herpes simplex without mention of complication     HSIL on  "Pap smear of cervix 07/01/2014    colp - WNL    Human papillomavirus in conditions classified elsewhere and of unspecified site 11/01/2010    + HPV 45 & 82 with ASCUS    Hx of colposcopy with cervical biopsy 11/08/2016    [place] ECC neg.     Obese     PONV (postoperative nausea and vomiting)     Premenstrual tension syndromes        Past Surgical History:   Procedure Laterality Date    C IUD,MIRENA  2/08-3/11    removed for cramping    COLONOSCOPY N/A 12/4/2020    Procedure: COLONOSCOPY, WITH POLYPECTOMY;  Surgeon: Moises Pride MD;  Location: [facility] OR    DILATION AND CURETTAGE, ABLATE ENDOMETRIUM NOVASURE, COMBINED N/A 12/31/2015    Procedure: COMBINED DILATION AND CURETTAGE, ABLATE ENDOMETRIUM NOVASURE;  Surgeon: Shakira Penaloza MD;  Location: [facility] OR    DISSECT LYMPH NODE AXILLA Left 4/29/2025    Procedure: WITH LEFT TARGETED AXILLARY DISSECTION;  Surgeon: Katerine Matthews DO;  Location: [facility] OR    HYSTEROSCOPY DIAGNOSTIC N/A 12/31/2015    Procedure: HYSTEROSCOPY DIAGNOSTIC;  Surgeon: Shakira Penaloza MD;  Location: [facility] OR    LEEP TX, CERVICAL  4/3/15    ARNAV 2-3, negative margins    MASTECTOMY SIMPLE Bilateral 4/29/2025    Procedure: BILATERAL MASTECTOMY;  Surgeon: Katerine Matthews DO;  Location: [facility] OR    NO HISTORY OF SURGERY         Physical Exam    /75 (BP Location: Right arm, Patient Position: Sitting, Cuff Size: Adult Regular)   Pulse 81   Temp 97.4  F (36.3  C) (Temporal)   Resp 16   Ht 1.651 m (5' 5\")   Wt 78.9 kg (174 lb)   SpO2 98%   BMI 28.96 kg/m      General: alert, awake, not in acute distress  HEENT: Head: Normal, normocephalic, atraumatic.  Eye: Normal external eye, conjunctiva, lids cornea, SITA.  Nose: Normal external nose, mucus membranes and septum.  Pharynx: Normal buccal mucosa. Normal pharynx.  Neck / Thyroid: Supple, no masses, nodes, nodules or enlargement.  Lymphatics: No abnormally enlarged lymph nodes.  Chest: Normal chest wall and respirations. " Clear to auscultation.  No crackles or rhonchi's  Heart: S1 S2 RRR, no murmur.   Abdomen: abdomen is soft without significant tenderness, masses, organomegaly or guarding  Extremities: normal strength, tone, and muscle mass  Skin: normal. no rash or abnormalities  CNS: non focal.    Breast exam on the left side did show shrinkage in the mass I could still feel a lymph node in the axilla      Lab Results    Recent Results (from the past 240 hours)   Comprehensive metabolic panel    Collection Time: 06/17/25  1:29 PM   Result Value Ref Range    Sodium 138 135 - 145 mmol/L    Potassium 4.0 3.4 - 5.3 mmol/L    Carbon Dioxide (CO2) 24 22 - 29 mmol/L    Anion Gap 11 7 - 15 mmol/L    Urea Nitrogen 13.8 6.0 - 20.0 mg/dL    Creatinine 0.83 0.51 - 0.95 mg/dL    GFR Estimate 81 >60 mL/min/1.73m2    Calcium 9.2 8.8 - 10.4 mg/dL    Chloride 103 98 - 107 mmol/L    Glucose 91 70 - 99 mg/dL    Alkaline Phosphatase 69 40 - 150 U/L    AST 23 0 - 45 U/L    ALT 15 0 - 50 U/L    Protein Total 7.0 6.4 - 8.3 g/dL    Albumin 4.2 3.5 - 5.2 g/dL    Bilirubin Total 0.3 <=1.2 mg/dL   Lactate Dehydrogenase    Collection Time: 06/17/25  1:29 PM   Result Value Ref Range    Lactate Dehydrogenase 178 0 - 250 U/L   CBC with platelets and differential    Collection Time: 06/17/25  1:29 PM   Result Value Ref Range    WBC Count 5.4 4.0 - 11.0 10e3/uL    RBC Count 3.77 (L) 3.80 - 5.20 10e6/uL    Hemoglobin 11.8 11.7 - 15.7 g/dL    Hematocrit 34.9 (L) 35.0 - 47.0 %    MCV 93 78 - 100 fL    MCH 31.3 26.5 - 33.0 pg    MCHC 33.8 31.5 - 36.5 g/dL    RDW 13.2 10.0 - 15.0 %    Platelet Count 226 150 - 450 10e3/uL    % Neutrophils 60 %    % Lymphocytes 27 %    % Monocytes 8 %    % Eosinophils 4 %    % Basophils 1 %    % Immature Granulocytes 0 %    NRBCs per 100 WBC 0 <1 /100    Absolute Neutrophils 3.3 1.6 - 8.3 10e3/uL    Absolute Lymphocytes 1.4 0.8 - 5.3 10e3/uL    Absolute Monocytes 0.4 0.0 - 1.3 10e3/uL    Absolute Eosinophils 0.2 0.0 - 0.7 10e3/uL     Absolute Basophils 0.0 0.0 - 0.2 10e3/uL    Absolute Immature Granulocytes 0.0 <=0.4 10e3/uL    Absolute NRBCs 0.0 10e3/uL         Imaging    Echocardiogram Limited  Result Date: 2025  [id number]^SEAMUS^CYRUS^[place] [address]  Name: BRYAN SHI MRN: [id number] : 1965 Study Date: 2025 07:54 AM Age: 59 yrs Gender: Female Patient Location: [facility] Reason For Study: Malignant neoplasm of upper-inner quadrant of left breast in fem Ordering Physician: CYRUS WAY Referring Physician: CYRUS WAY Performed By: VANDANA  BSA: 1.9 m2 Height: 65 in Weight: 176 lb HR: 85 ______________________________________________________________________________ Procedure Limited Echocardiogram with two-dimensional, color and spectral Doppler. Compared to prior study, there is no significant change. ______________________________________________________________________________ Interpretation Summary  Left ventricular size, wall motion and function are normal. The ejection fraction is 55-60%. Global peak LV longitudinal strain is averaged at -19.6%. This is within reported normal limits (normal <-18%). Normal right ventricle size and systolic function. On limited evaluation no significant valve disease identified. Compared to prior study, there is no significant change. ______________________________________________________________________________ Left Ventricle Left ventricular size, wall motion and function are normal. The ejection fraction is 55-60%. Global peak LV longitudinal strain is averaged at -19.6%. This is within reported normal limits (normal <-18%).  Right Ventricle Normal right ventricle size and systolic function.  Atria Normal left atrial size. Right atrial size is normal.  Mitral Valve Mitral valve leaflets appear normal. There is no evidence of mitral stenosis or clinically significant mitral  regurgitation.  Tricuspid Valve Tricuspid valve leaflets appear normal. There is no evidence of tricuspid stenosis or clinically significant tricuspid regurgitation. Right ventricle systolic pressure estimate normal. The right ventricular systolic pressure is approximated at 19.1 mmHg plus the right atrial pressure.  Aortic Valve Aortic valve leaflets appear normal. There is no evidence of aortic stenosis or clinically significant aortic regurgitation.  Pericardium There is no pericardial effusion.  Rhythm Sinus rhythm was noted.  ______________________________________________________________________________ MMode/2D Measurements & Calculations IVSd: 0.84 cm LVIDd: 4.8 cm LVIDs: 3.1 cm LVPWd: 1.00 cm FS: 35.8 % LV mass(C)d: 151.4 grams LV mass(C)dI: 80.8 grams/m2 Ao root diam: 2.8 cm LVOT diam: 2.1 cm LVOT area: 3.5 cm2  Ao root diam index Ht(cm/m): 1.7 Ao root diam index BSA (cm/m2): 1.5 EF Biplane: 57.3 % LA Volume Indexed (AL/bp): 33.8 ml/m2 RWT: 0.42 TAPSE: 2.3 cm  Time Measurements MM HR: 78.0 BPM  Doppler Measurements & Calculations MV E max lee: 84.8 cm/sec MV A max lee: 63.4 cm/sec MV E/A: 1.3 MV dec slope: 440.1 cm/sec2 MV dec time: 0.19 sec Ao V2 max: 130.3 cm/sec Ao max P.0 mmHg Ao V2 mean: 94.4 cm/sec Ao mean P.0 mmHg Ao V2 VTI: 27.7 cm YURY(I,D): 3.1 cm2 YURY(V,D): 3.0 cm2 LV V1 max P.2 mmHg LV V1 max: 113.5 cm/sec LV V1 VTI: 24.8 cm SV(LVOT): 86.0 ml SI(LVOT): 45.9 ml/m2 TR max lee: 218.7 cm/sec TR max P.1 mmHg AV Lee Ratio (DI): 0.87 YURY Index (cm2/m2): 1.7 E/E': 11.3 Medial E/e': 11.3 Peak E' Lee: 7.5 cm/sec  Measurements from QLAB LV GLS Endo Peak A2C (AS): -18.9 % LV GLS Endo Peak A3C (AS): -19.8 %  LV GLS Endo Peak A4C (AS): -20.2 % LV GLS Endo Peak Avg (AS): -19.6 % ______________________________________________________________________________ Report approved by: Garcia Shine MD on 2025 10:00 AM             Assessment and Plan    Stage IIIc breast cancer HER2 positive,  ER/NV positive  Patient is here in follow-up. She is due for her next dose of Herceptin and I will proceed with that without any dose changes.  This is her second dose from her echo we will repeat echo after the fourth dose    Cardiac monitoring  Patient does needs continued cardiac monitoring.  She will get another echo after 2 more doses after today  Adjuvant radiation  Patient  has started her radiation.  She has received 10 fractions she is post to get 25 fractions.  She is extremely anxious with her radiation due tothe fact that it reminded her of some earlier trauma.     Anxiety   patient is extremely anxious today.  This anxiety has been triggered by radiation as it reminded her of some previous trauma.  I gave her a prescription for alprazolam 20 pills I did tell her that if she needs more pills she will have to talk to her primary care physician.  I also advised her to get in touch with her therapist again so he can help guide her through this really stressful  Time.    Signed by: Josseline Harkins MD        CC: Kallie Ta NP

## 2025-06-17 NOTE — LETTER
"6/17/2025      Adrienne Ivory  [address] [place] [address]      Dear Colleague,    Thank you for referring your patient, Adrienne Ivory, to the [facility] CANCER [facility]. Please see a copy of my visit note below.    Oncology Rooming Note    June 17, 2025 1:53 PM   Adrienne Ivory is a 59 year old female who presents for:    Chief Complaint   Patient presents with     Oncology Clinic Visit     Malignant neoplasm of upper-inner quadrant of left breast in female, estrogen receptor positive      Initial Vitals: /75 (BP Location: Right arm, Patient Position: Sitting, Cuff Size: Adult Regular)   Pulse 81   Temp 97.4  F (36.3  C) (Temporal)   Resp 16   Ht 1.651 m (5' 5\")   Wt 78.9 kg (174 lb)   SpO2 98%   BMI 28.96 kg/m   Estimated body mass index is 28.96 kg/m  as calculated from the following:    Height as of this encounter: 1.651 m (5' 5\").    Weight as of this encounter: 78.9 kg (174 lb). Body surface area is 1.9 meters squared.  Moderate Pain (4) Comment: Data Unavailable   No LMP recorded. Patient has had an ablation.  Allergies reviewed: Yes  Medications reviewed: Yes    Medications: Medication refills not needed today.  Pharmacy name entered into [facility]:    [facility] - [place], [place] - [address] [id number] [place], [place] - [facility] [address] AT [address] HOME DELIVERY - [address]    Frailty Screening:   Is the patient here for a new oncology consult visit in cancer care? 2. 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  November 2024  Patient had multiple level 1 level 2 and level 3 axillary lymph nodes involved    Treatment  1.Patient started on neoadjuvant TCHP on 12/12/2024  2.Near complete radiologic remission after 3 cycles of TCHP  3.Completed 6 cycle of TCHP on 3/27/2025  4.Switched to single agent trastuzumab on 4/17/2025 with plans to change further treatment based on    pathologic response  5.  Complete pathologic response at the time of surgery.  Patient underwent bilateral mastectomies on 29 April 2025  6.  Plan to complete 1 year of trastuzumab which will finish in December 2025  7.  Patient to receive adjuvant radiation to the left chest wall.  5000 cGy in 25 fractions.  Started on 6/3/2025    History of Present Illness    Ms. Adrienne Ivory is here for follow-up.  She has started on adjuvant radiation she is having a hard time dealing with it and is extremely anxious about it.  It reminds her of some of her previous traumas so she has been extremely anxious.  Physically she seems to be doing fine.  She is not having any trouble with her Herceptin and is due for another dose today.  She denies any shortness of breath or dyspnea on exertion.    Review of systems  A comprehensive 12 point review of system was done that was negative except what is mentioned in the history of present illness    Past History    Past Medical History:   Diagnosis Date     Abnormal Pap smear, can't excl hi gd sq intraepithelial lesion (ASC-H) 03/01/2015    LSIL also     Anxiety state, unspecified      Herpes simplex without mention of complication      HSIL on Pap smear of cervix 07/01/2014    colp - WNL     Human papillomavirus in conditions classified elsewhere and of unspecified site 11/01/2010    + HPV 45 & 82 with  "ASCUS     Hx of colposcopy with cervical biopsy 11/08/2016    [place] ECC neg.      Obese      PONV (postoperative nausea and vomiting)      Premenstrual tension syndromes        Past Surgical History:   Procedure Laterality Date     C IUD,MIRENA  2/08-3/11    removed for cramping     COLONOSCOPY N/A 12/4/2020    Procedure: COLONOSCOPY, WITH POLYPECTOMY;  Surgeon: Moises Pride MD;  Location: [facility] OR     DILATION AND CURETTAGE, ABLATE ENDOMETRIUM NOVASURE, COMBINED N/A 12/31/2015    Procedure: COMBINED DILATION AND CURETTAGE, ABLATE ENDOMETRIUM NOVASURE;  Surgeon: Shakira Penaloza MD;  Location: [facility] OR     DISSECT LYMPH NODE AXILLA Left 4/29/2025    Procedure: WITH LEFT TARGETED AXILLARY DISSECTION;  Surgeon: Katerine Matthews DO;  Location: [facility] Main OR     HYSTEROSCOPY DIAGNOSTIC N/A 12/31/2015    Procedure: HYSTEROSCOPY DIAGNOSTIC;  Surgeon: Shakira Penaloza MD;  Location: [facility] OR     LEEP TX, CERVICAL  4/3/15    ARNAV 2-3, negative margins     MASTECTOMY SIMPLE Bilateral 4/29/2025    Procedure: BILATERAL MASTECTOMY;  Surgeon: Katerine Matthews DO;  Location: [facility] Main OR     NO HISTORY OF SURGERY         Physical Exam    /75 (BP Location: Right arm, Patient Position: Sitting, Cuff Size: Adult Regular)   Pulse 81   Temp 97.4  F (36.3  C) (Temporal)   Resp 16   Ht 1.651 m (5' 5\")   Wt 78.9 kg (174 lb)   SpO2 98%   BMI 28.96 kg/m      General: alert, awake, not in acute distress  HEENT: Head: Normal, normocephalic, atraumatic.  Eye: Normal external eye, conjunctiva, lids cornea, SITA.  Nose: Normal external nose, mucus membranes and septum.  Pharynx: Normal buccal mucosa. Normal pharynx.  Neck / Thyroid: Supple, no masses, nodes, nodules or enlargement.  Lymphatics: No abnormally enlarged lymph nodes.  Chest: Normal chest wall and respirations. Clear to auscultation.  No crackles or rhonchi's  Heart: S1 S2 RRR, no murmur.   Abdomen: abdomen is soft without significant tenderness, masses, " organomegaly or guarding  Extremities: normal strength, tone, and muscle mass  Skin: normal. no rash or abnormalities  CNS: non focal.    Breast exam on the left side did show shrinkage in the mass I could still feel a lymph node in the axilla      Lab Results    Recent Results (from the past 240 hours)   Comprehensive metabolic panel    Collection Time: 06/17/25  1:29 PM   Result Value Ref Range    Sodium 138 135 - 145 mmol/L    Potassium 4.0 3.4 - 5.3 mmol/L    Carbon Dioxide (CO2) 24 22 - 29 mmol/L    Anion Gap 11 7 - 15 mmol/L    Urea Nitrogen 13.8 6.0 - 20.0 mg/dL    Creatinine 0.83 0.51 - 0.95 mg/dL    GFR Estimate 81 >60 mL/min/1.73m2    Calcium 9.2 8.8 - 10.4 mg/dL    Chloride 103 98 - 107 mmol/L    Glucose 91 70 - 99 mg/dL    Alkaline Phosphatase 69 40 - 150 U/L    AST 23 0 - 45 U/L    ALT 15 0 - 50 U/L    Protein Total 7.0 6.4 - 8.3 g/dL    Albumin 4.2 3.5 - 5.2 g/dL    Bilirubin Total 0.3 <=1.2 mg/dL   Lactate Dehydrogenase    Collection Time: 06/17/25  1:29 PM   Result Value Ref Range    Lactate Dehydrogenase 178 0 - 250 U/L   CBC with platelets and differential    Collection Time: 06/17/25  1:29 PM   Result Value Ref Range    WBC Count 5.4 4.0 - 11.0 10e3/uL    RBC Count 3.77 (L) 3.80 - 5.20 10e6/uL    Hemoglobin 11.8 11.7 - 15.7 g/dL    Hematocrit 34.9 (L) 35.0 - 47.0 %    MCV 93 78 - 100 fL    MCH 31.3 26.5 - 33.0 pg    MCHC 33.8 31.5 - 36.5 g/dL    RDW 13.2 10.0 - 15.0 %    Platelet Count 226 150 - 450 10e3/uL    % Neutrophils 60 %    % Lymphocytes 27 %    % Monocytes 8 %    % Eosinophils 4 %    % Basophils 1 %    % Immature Granulocytes 0 %    NRBCs per 100 WBC 0 <1 /100    Absolute Neutrophils 3.3 1.6 - 8.3 10e3/uL    Absolute Lymphocytes 1.4 0.8 - 5.3 10e3/uL    Absolute Monocytes 0.4 0.0 - 1.3 10e3/uL    Absolute Eosinophils 0.2 0.0 - 0.7 10e3/uL    Absolute Basophils 0.0 0.0 - 0.2 10e3/uL    Absolute Immature Granulocytes 0.0 <=0.4 10e3/uL    Absolute NRBCs 0.0 10e3/uL          Imaging    Echocardiogram Limited  Result Date: 2025  [id number]^SEAMUS^CYRUS^[place] [address]  Name: BRYAN SHI MRN: [id number] : 1965 Study Date: 2025 07:54 AM Age: 59 yrs Gender: Female Patient Location: [facility] Reason For Study: Malignant neoplasm of upper-inner quadrant of left breast in fem Ordering Physician: CYRUS WAY Referring Physician: CYRUS WAY Performed By: VANDANA  BSA: 1.9 m2 Height: 65 in Weight: 176 lb HR: 85 ______________________________________________________________________________ Procedure Limited Echocardiogram with two-dimensional, color and spectral Doppler. Compared to prior study, there is no significant change. ______________________________________________________________________________ Interpretation Summary  Left ventricular size, wall motion and function are normal. The ejection fraction is 55-60%. Global peak LV longitudinal strain is averaged at -19.6%. This is within reported normal limits (normal <-18%). Normal right ventricle size and systolic function. On limited evaluation no significant valve disease identified. Compared to prior study, there is no significant change. ______________________________________________________________________________ Left Ventricle Left ventricular size, wall motion and function are normal. The ejection fraction is 55-60%. Global peak LV longitudinal strain is averaged at -19.6%. This is within reported normal limits (normal <-18%).  Right Ventricle Normal right ventricle size and systolic function.  Atria Normal left atrial size. Right atrial size is normal.  Mitral Valve Mitral valve leaflets appear normal. There is no evidence of mitral stenosis or clinically significant mitral regurgitation.  Tricuspid Valve Tricuspid valve leaflets appear normal. There is no evidence of tricuspid stenosis or  clinically significant tricuspid regurgitation. Right ventricle systolic pressure estimate normal. The right ventricular systolic pressure is approximated at 19.1 mmHg plus the right atrial pressure.  Aortic Valve Aortic valve leaflets appear normal. There is no evidence of aortic stenosis or clinically significant aortic regurgitation.  Pericardium There is no pericardial effusion.  Rhythm Sinus rhythm was noted.  ______________________________________________________________________________ MMode/2D Measurements & Calculations IVSd: 0.84 cm LVIDd: 4.8 cm LVIDs: 3.1 cm LVPWd: 1.00 cm FS: 35.8 % LV mass(C)d: 151.4 grams LV mass(C)dI: 80.8 grams/m2 Ao root diam: 2.8 cm LVOT diam: 2.1 cm LVOT area: 3.5 cm2  Ao root diam index Ht(cm/m): 1.7 Ao root diam index BSA (cm/m2): 1.5 EF Biplane: 57.3 % LA Volume Indexed (AL/bp): 33.8 ml/m2 RWT: 0.42 TAPSE: 2.3 cm  Time Measurements MM HR: 78.0 BPM  Doppler Measurements & Calculations MV E max lee: 84.8 cm/sec MV A max lee: 63.4 cm/sec MV E/A: 1.3 MV dec slope: 440.1 cm/sec2 MV dec time: 0.19 sec Ao V2 max: 130.3 cm/sec Ao max P.0 mmHg Ao V2 mean: 94.4 cm/sec Ao mean P.0 mmHg Ao V2 VTI: 27.7 cm YURY(I,D): 3.1 cm2 YURY(V,D): 3.0 cm2 LV V1 max P.2 mmHg LV V1 max: 113.5 cm/sec LV V1 VTI: 24.8 cm SV(LVOT): 86.0 ml SI(LVOT): 45.9 ml/m2 TR max lee: 218.7 cm/sec TR max P.1 mmHg AV Ele Ratio (DI): 0.87 YURY Index (cm2/m2): 1.7 E/E': 11.3 Medial E/e': 11.3 Peak E' Lee: 7.5 cm/sec  Measurements from QLAB LV GLS Endo Peak A2C (AS): -18.9 % LV GLS Endo Peak A3C (AS): -19.8 %  LV GLS Endo Peak A4C (AS): -20.2 % LV GLS Endo Peak Avg (AS): -19.6 % ______________________________________________________________________________ Report approved by: Garcia Shine MD on 2025 10:00 AM             Assessment and Plan    Stage IIIc breast cancer HER2 positive, ER/SC positive  Patient is here in follow-up. She is due for her next dose of Herceptin and I will proceed with that  without any dose changes.  This is her second dose from her echo we will repeat echo after the fourth dose    Cardiac monitoring  Patient does needs continued cardiac monitoring.  She will get another echo after 2 more doses after today  Adjuvant radiation  Patient  has started her radiation.  She has received 10 fractions she is post to get 25 fractions.  She is extremely anxious with her radiation due tothe fact that it reminded her of some earlier trauma.     Anxiety   patient is extremely anxious today.  This anxiety has been triggered by radiation as it reminded her of some previous trauma.  I gave her a prescription for alprazolam 20 pills I did tell her that if she needs more pills she will have to talk to her primary care physician.  I also advised her to get in touch with her therapist again so he can help guide her through this really stressful  Time.    Signed by: Josseline Harkins MD        CC: Kallie Ta NP     Again, thank you for allowing me to participate in the care of your patient.        Sincerely,        Josseline Harkins MD    Electronically signed

## 2025-06-17 NOTE — PROGRESS NOTES
"Oncology Rooming Note    June 17, 2025 1:53 PM   Adrienne Ivory is a 59 year old female who presents for:    Chief Complaint   Patient presents with    Oncology Clinic Visit     Malignant neoplasm of upper-inner quadrant of left breast in female, estrogen receptor positive      Initial Vitals: /75 (BP Location: Right arm, Patient Position: Sitting, Cuff Size: Adult Regular)   Pulse 81   Temp 97.4  F (36.3  C) (Temporal)   Resp 16   Ht 1.651 m (5' 5\")   Wt 78.9 kg (174 lb)   SpO2 98%   BMI 28.96 kg/m   Estimated body mass index is 28.96 kg/m  as calculated from the following:    Height as of this encounter: 1.651 m (5' 5\").    Weight as of this encounter: 78.9 kg (174 lb). Body surface area is 1.9 meters squared.  Moderate Pain (4) Comment: Data Unavailable   No LMP recorded. Patient has had an ablation.  Allergies reviewed: Yes  Medications reviewed: Yes    Medications: Medication refills not needed today.  Pharmacy name entered into [facility]:    [facility] - [place], [place] - [address] [place] [address] DRUG STORE #[id number] [place] [address] [address] AT [address] HOME [facility] - [address]    Frailty Screening:   Is the patient here for a new oncology consult visit in cancer care? 2. No    PHQ9:  Did this patient require a PHQ9?: No      Clinical concerns: Review labs, infusion MD follow up       Shelley Dey MA            "

## 2025-06-18 ENCOUNTER — APPOINTMENT (OUTPATIENT)
Dept: RADIATION ONCOLOGY | Facility: CLINIC | Age: 60
End: 2025-06-18
Attending: RADIOLOGY
Payer: COMMERCIAL

## 2025-06-18 VITALS
OXYGEN SATURATION: 99 % | BODY MASS INDEX: 29.2 KG/M2 | HEART RATE: 74 BPM | WEIGHT: 175.5 LBS | TEMPERATURE: 97.7 F | RESPIRATION RATE: 16 BRPM | SYSTOLIC BLOOD PRESSURE: 131 MMHG | DIASTOLIC BLOOD PRESSURE: 80 MMHG

## 2025-06-18 DIAGNOSIS — Z17.0 MALIGNANT NEOPLASM OF UPPER-INNER QUADRANT OF LEFT BREAST IN FEMALE, ESTROGEN RECEPTOR POSITIVE (H): Primary | ICD-10-CM

## 2025-06-18 DIAGNOSIS — C50.212 MALIGNANT NEOPLASM OF UPPER-INNER QUADRANT OF LEFT BREAST IN FEMALE, ESTROGEN RECEPTOR POSITIVE (H): Primary | ICD-10-CM

## 2025-06-18 PROCEDURE — 77385 HC IMRT TREATMENT DELIVERY, SIMPLE: CPT | Performed by: RADIOLOGY

## 2025-06-18 PROCEDURE — 77014 PR CT GUIDE FOR PLACEMENT RADIATION THERAPY FIELDS: CPT | Mod: 26 | Performed by: RADIOLOGY

## 2025-06-18 ASSESSMENT — PAIN SCALES - GENERAL: PAINLEVEL_OUTOF10: NO PAIN (0)

## 2025-06-18 NOTE — PROGRESS NOTES
RADIATION ONCOLOGY WEEKLY TREATMENT VISIT NOTE      Assessment / Impression       Visit Dx:  (C50.212,  Z17.0) Malignant neoplasm of upper-inner quadrant of left breast in female, estrogen receptor positive (H)  (primary encounter diagnosis)    Tolerating radiation therapy well.  All questions and concerns addressed.    Plan:     Continue radiation treatment as prescribed.    Patient still have significant anxiety and stress at this time.  She is seeing a therapist for ongoing supportive care.    Discussed with the patient about skin care.    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    Assessment                  Body Site:  Breast                           Site: Lt. Breast  Stereotactic Radiosurgery: No  Concurrent Therapy: No  Today's Dose: 2400  Total Dose for Breast: 5000  Today's Fraction/Total Fraction Breast:                                    Sexuality Alteration                    Emotional Alteration    Copin: Effective  Comfort Alteration   KPS: 90% Can perform normal activity, minor signs of disease  Fatigue (ONS scale): 7: Extreme Fatigue  Pain Location: denies currently  Pain Intensity. Rate degree of pain ranging from 0 (no pain) to 10 (severe pain): 0  Pain Description: Pricking - Pricking pain, tingling nerve endings  Pain Intervention: 0: None  Effectiveness of pain intervention: 4: Pain relieved 100%   Nutrition Alteration   Anorexia: 0: None  Nausea: 0: None  Vomitin: None  Weight: 79.6 kg (175 lb 8 oz)  Skin Alteration   Skin Sensation: 0: No problem  Skin Reaction: 1: Faint  erythema or dry desquamation  AUA Assessment                                           Accompanied by       Objective:     Exam:  mild Erythema.    Vitals:    06/18/25 1305   BP: 131/80   Pulse: 74   Resp: 16   Temp: 97.7  F (36.5  C)   TempSrc: Oral   SpO2: 99%   Weight: 79.6 kg (175 lb 8 oz)   PainSc: No Pain (0)       Wt Readings from Last 8 Encounters:   06/18/25 79.6 kg (175 lb 8 oz)   06/17/25 78.9 kg (174 lb)   06/11/25 81 kg (178 lb 8 oz)   06/04/25 79.9 kg (176 lb 3.2 oz)   05/29/25 80.9 kg (178 lb 6.4 oz)   05/28/25 79.8 kg (176 lb)   05/13/25 80.2 kg (176 lb 12.8 oz)   05/07/25 81.7 kg (180 lb 1.6 oz)       General: Alert and oriented, in no acute distress  Adrienne has mild Erythema.  Aria chart and setup information reviewed    Lyssa Walters MD

## 2025-06-18 NOTE — LETTER
2025      Adrienne Ivory  [address] [place] [address]      Dear Colleague,    Thank you for referring your patient, Adrienne Ivory, to the [facility] RADIATION ONCOLOGY [place]. Please see a copy of my visit note below.    RADIATION ONCOLOGY WEEKLY TREATMENT VISIT NOTE      Assessment / Impression       Visit Dx:  (C50.212,  Z17.0) Malignant neoplasm of upper-inner quadrant of left breast in female, estrogen receptor positive (H)  (primary encounter diagnosis)    Tolerating radiation therapy well.  All questions and concerns addressed.    Plan:     Continue radiation treatment as prescribed.    Patient still have significant anxiety and stress at this time.  She is seeing a therapist for ongoing supportive care.    Discussed with the patient about skin care.    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    Assessment                  Body Site:  Breast                           Site: Lt. Breast  Stereotactic Radiosurgery: No  Concurrent Therapy: No  Today's Dose: 2400  Total Dose for Breast: 5000  Today's Fraction/Total Fraction Breast:                                    Sexuality Alteration                    Emotional Alteration    Copin: Effective  Comfort Alteration   KPS: 90% Can perform normal activity, minor signs of disease  Fatigue (ONS scale): 7: Extreme Fatigue  Pain Location: denies currently  Pain Intensity. Rate degree of pain ranging from 0 (no pain) to 10 (severe pain): 0  Pain Description: Pricking - Pricking pain, tingling nerve endings  Pain Intervention:  0: None  Effectiveness of pain intervention: 4: Pain relieved 100%   Nutrition Alteration   Anorexia: 0: None  Nausea: 0: None  Vomitin: None  Weight: 79.6 kg (175 lb 8 oz)  Skin Alteration   Skin Sensation: 0: No problem  Skin Reaction: 1: Faint erythema or dry desquamation  AUA Assessment                                           Accompanied by       Objective:     Exam:  mild Erythema.    Vitals:    25 1305   BP: 131/80   Pulse: 74   Resp: 16   Temp: 97.7  F (36.5  C)   TempSrc: Oral   SpO2: 99%   Weight: 79.6 kg (175 lb 8 oz)   PainSc: No Pain (0)       Wt Readings from Last 8 Encounters:   25 79.6 kg (175 lb 8 oz)   25 78.9 kg (174 lb)   25 81 kg (178 lb 8 oz)   25 79.9 kg (176 lb 3.2 oz)   25 80.9 kg (178 lb 6.4 oz)   25 79.8 kg (176 lb)   25 80.2 kg (176 lb 12.8 oz)   25 81.7 kg (180 lb 1.6 oz)       General: Alert and oriented, in no acute distress  Adrienne has mild Erythema.  Aria chart and setup information reviewed    Lyssa Walters MD    Again, thank you for allowing me to participate in the care of your patient.        Sincerely,        Lyssa Walters MD    Electronically signed

## 2025-06-19 ENCOUNTER — APPOINTMENT (OUTPATIENT)
Dept: RADIATION ONCOLOGY | Facility: CLINIC | Age: 60
End: 2025-06-19
Attending: RADIOLOGY
Payer: COMMERCIAL

## 2025-06-19 PROCEDURE — 77014 PR CT GUIDE FOR PLACEMENT RADIATION THERAPY FIELDS: CPT | Mod: 26 | Performed by: STUDENT IN AN ORGANIZED HEALTH CARE EDUCATION/TRAINING PROGRAM

## 2025-06-19 PROCEDURE — 77385 HC IMRT TREATMENT DELIVERY, SIMPLE: CPT | Performed by: STUDENT IN AN ORGANIZED HEALTH CARE EDUCATION/TRAINING PROGRAM

## 2025-06-20 ENCOUNTER — APPOINTMENT (OUTPATIENT)
Dept: RADIATION ONCOLOGY | Facility: CLINIC | Age: 60
End: 2025-06-20
Attending: RADIOLOGY
Payer: COMMERCIAL

## 2025-06-20 PROCEDURE — 77385 HC IMRT TREATMENT DELIVERY, SIMPLE: CPT | Performed by: STUDENT IN AN ORGANIZED HEALTH CARE EDUCATION/TRAINING PROGRAM

## 2025-06-20 PROCEDURE — 77014 PR CT GUIDE FOR PLACEMENT RADIATION THERAPY FIELDS: CPT | Mod: 26 | Performed by: STUDENT IN AN ORGANIZED HEALTH CARE EDUCATION/TRAINING PROGRAM

## 2025-06-23 ENCOUNTER — APPOINTMENT (OUTPATIENT)
Dept: RADIATION ONCOLOGY | Facility: CLINIC | Age: 60
End: 2025-06-23
Attending: RADIOLOGY
Payer: COMMERCIAL

## 2025-06-23 PROCEDURE — 77427 RADIATION TX MANAGEMENT X5: CPT | Performed by: RADIOLOGY

## 2025-06-23 PROCEDURE — 77014 PR CT GUIDE FOR PLACEMENT RADIATION THERAPY FIELDS: CPT | Mod: 26 | Performed by: STUDENT IN AN ORGANIZED HEALTH CARE EDUCATION/TRAINING PROGRAM

## 2025-06-23 PROCEDURE — 77336 RADIATION PHYSICS CONSULT: CPT | Performed by: RADIOLOGY

## 2025-06-23 PROCEDURE — 77385 HC IMRT TREATMENT DELIVERY, SIMPLE: CPT | Performed by: STUDENT IN AN ORGANIZED HEALTH CARE EDUCATION/TRAINING PROGRAM

## 2025-06-24 ENCOUNTER — APPOINTMENT (OUTPATIENT)
Dept: RADIATION ONCOLOGY | Facility: CLINIC | Age: 60
End: 2025-06-24
Attending: RADIOLOGY
Payer: COMMERCIAL

## 2025-06-24 ENCOUNTER — TELEPHONE (OUTPATIENT)
Dept: ONCOLOGY | Facility: HOSPITAL | Age: 60
End: 2025-06-24
Payer: COMMERCIAL

## 2025-06-24 PROCEDURE — 77014 PR CT GUIDE FOR PLACEMENT RADIATION THERAPY FIELDS: CPT | Mod: 26 | Performed by: RADIOLOGY

## 2025-06-24 PROCEDURE — 77385 HC IMRT TREATMENT DELIVERY, SIMPLE: CPT | Performed by: RADIOLOGY

## 2025-06-24 NOTE — TELEPHONE ENCOUNTER
"Received a esym questionnaire from Adrienne this afternoon which listed severe for her difficulty swallowing. I called her to discuss this with her. She states that she should have probably put moderate for this. She states that she has had some trouble eating and swallowing since her radiation started and that Dr. Walters is aware. She states that the last couple days though have seemed a little bit worse and she has felt a couple times like her food \"gets stuck.\" She states that surprised her a little bit, but she knows that she needs to be really careful with small and soft bites. She states that she has an appointment with Dr. Walters tomorrow and will discuss this with him then.       Karin Taylor RN on 6/24/2025 at 3:58 PM    "

## 2025-06-25 ENCOUNTER — OFFICE VISIT (OUTPATIENT)
Dept: RADIATION ONCOLOGY | Facility: CLINIC | Age: 60
End: 2025-06-25
Attending: RADIOLOGY
Payer: COMMERCIAL

## 2025-06-25 VITALS
TEMPERATURE: 97.7 F | SYSTOLIC BLOOD PRESSURE: 134 MMHG | WEIGHT: 178.8 LBS | BODY MASS INDEX: 29.75 KG/M2 | RESPIRATION RATE: 16 BRPM | HEART RATE: 74 BPM | DIASTOLIC BLOOD PRESSURE: 79 MMHG | OXYGEN SATURATION: 100 %

## 2025-06-25 DIAGNOSIS — C50.212 MALIGNANT NEOPLASM OF UPPER-INNER QUADRANT OF LEFT BREAST IN FEMALE, ESTROGEN RECEPTOR POSITIVE (H): Primary | ICD-10-CM

## 2025-06-25 DIAGNOSIS — Z17.0 MALIGNANT NEOPLASM OF UPPER-INNER QUADRANT OF LEFT BREAST IN FEMALE, ESTROGEN RECEPTOR POSITIVE (H): Primary | ICD-10-CM

## 2025-06-25 PROCEDURE — 1125F AMNT PAIN NOTED PAIN PRSNT: CPT | Performed by: RADIOLOGY

## 2025-06-25 PROCEDURE — 3075F SYST BP GE 130 - 139MM HG: CPT | Performed by: RADIOLOGY

## 2025-06-25 PROCEDURE — 77385 HC IMRT TREATMENT DELIVERY, SIMPLE: CPT | Performed by: RADIOLOGY

## 2025-06-25 PROCEDURE — 3078F DIAST BP <80 MM HG: CPT | Performed by: RADIOLOGY

## 2025-06-25 PROCEDURE — 77014 PR CT GUIDE FOR PLACEMENT RADIATION THERAPY FIELDS: CPT | Mod: 26 | Performed by: STUDENT IN AN ORGANIZED HEALTH CARE EDUCATION/TRAINING PROGRAM

## 2025-06-25 ASSESSMENT — PAIN SCALES - GENERAL: PAINLEVEL_OUTOF10: MODERATE PAIN (6)

## 2025-06-25 NOTE — LETTER
2025      Adrienne Ivory  [address] [place] [address]      Dear Colleague,    Thank you for referring your patient, Adrienne Ivory, to the [facility] RADIATION ONCOLOGY [place]. Please see a copy of my visit note below.    RADIATION ONCOLOGY WEEKLY TREATMENT VISIT NOTE      Assessment / Impression       Visit Dx:  (C50.212,  Z17.0) Malignant neoplasm of upper-inner quadrant of left breast in female, estrogen receptor positive (H)  (primary encounter diagnosis)     Tolerating radiation therapy well.  All questions and concerns addressed.    Plan:     Continue radiation treatment as prescribed.    The patient experienced mild to moderate dysphagia over the past few days.  Discussed with the patient about appropriate nutritional support with therapy.  Recommend patient to take over-the-counter pain medication for symptom control.    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    Assessment                  Body Site:  Breast                           Site: Lt. Breast  Stereotactic Radiosurgery: No  Concurrent Therapy: No  Today's Dose: 3400  Total Dose for Breast: 5000  Today's Fraction/Total Fraction Breast:                                    Sexuality Alteration                    Emotional Alteration    Copin: Effective  Comfort Alteration   KPS: 90% Can perform normal activity, minor signs of disease  Fatigue (ONS scale): 7: Extreme Fatigue  Pain Location: low back today  Pain Intensity. Rate degree of pain ranging from 0 (no pain) to 10 (severe pain): 7  Pain Description:  Dull intermittent - Dull type of ache which is intermittent  Pain Intervention: 1: Over the counter medications  Effectiveness of pain intervention: 4: Pain relieved 100%   Nutrition Alteration   Anorexia: 0: None  Nausea: 0: None  Vomitin: None  Weight: 81.1 kg (178 lb 12.8 oz)  Skin Alteration   Skin Sensation: 0: No problem  Skin Reaction: 1: Faint erythema or dry desquamation  AUA Assessment                                           Accompanied by       Objective:     Exam: mild, moderate Erythema.    Vitals:    25 1327   BP: 134/79   Pulse: 74   Resp: 16   Temp: 97.7  F (36.5  C)   TempSrc: Oral   SpO2: 100%   Weight: 81.1 kg (178 lb 12.8 oz)   PainSc: Moderate Pain (6)   PainLoc: Low Back       Wt Readings from Last 8 Encounters:   25 81.1 kg (178 lb 12.8 oz)   25 79.6 kg (175 lb 8 oz)   25 78.9 kg (174 lb)   25 81 kg (178 lb 8 oz)   25 79.9 kg (176 lb 3.2 oz)   25 80.9 kg (178 lb 6.4 oz)   25 79.8 kg (176 lb)   25 80.2 kg (176 lb 12.8 oz)       General: Alert and oriented, in no acute distress  Adrienne has mild, moderate Erythema.  Aria chart and setup information reviewed    Lyssa Walters MD    Again, thank you for allowing me to participate in the care of your patient.        Sincerely,        Lyssa Walters MD    Electronically signed

## 2025-06-25 NOTE — PROGRESS NOTES
RADIATION ONCOLOGY WEEKLY TREATMENT VISIT NOTE      Assessment / Impression       Visit Dx:  (C50.212,  Z17.0) Malignant neoplasm of upper-inner quadrant of left breast in female, estrogen receptor positive (H)  (primary encounter diagnosis)     Tolerating radiation therapy well.  All questions and concerns addressed.    Plan:     Continue radiation treatment as prescribed.    The patient experienced mild to moderate dysphagia over the past few days.  Discussed with the patient about appropriate nutritional support with therapy.  Recommend patient to take over-the-counter pain medication for symptom control.    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    Assessment                  Body Site:  Breast                           Site: Lt. Breast  Stereotactic Radiosurgery: No  Concurrent Therapy: No  Today's Dose: 3400  Total Dose for Breast: 5000  Today's Fraction/Total Fraction Breast:                                    Sexuality Alteration                    Emotional Alteration    Copin: Effective  Comfort Alteration   KPS: 90% Can perform normal activity, minor signs of disease  Fatigue (ONS scale): 7: Extreme Fatigue  Pain Location: low back today  Pain Intensity. Rate degree of pain ranging from 0 (no pain) to 10 (severe pain): 7  Pain Description: Dull intermittent - Dull type of ache which is intermittent  Pain Intervention: 1: Over the counter medications  Effectiveness of pain intervention: 4: Pain relieved 100%   Nutrition Alteration   Anorexia: 0: None  Nausea: 0: None  Vomitin:  None  Weight: 81.1 kg (178 lb 12.8 oz)  Skin Alteration   Skin Sensation: 0: No problem  Skin Reaction: 1: Faint erythema or dry desquamation  AUA Assessment                                           Accompanied by       Objective:     Exam: mild, moderate Erythema.    Vitals:    06/25/25 1327   BP: 134/79   Pulse: 74   Resp: 16   Temp: 97.7  F (36.5  C)   TempSrc: Oral   SpO2: 100%   Weight: 81.1 kg (178 lb 12.8 oz)   PainSc: Moderate Pain (6)   PainLoc: Low Back       Wt Readings from Last 8 Encounters:   06/25/25 81.1 kg (178 lb 12.8 oz)   06/18/25 79.6 kg (175 lb 8 oz)   06/17/25 78.9 kg (174 lb)   06/11/25 81 kg (178 lb 8 oz)   06/04/25 79.9 kg (176 lb 3.2 oz)   05/29/25 80.9 kg (178 lb 6.4 oz)   05/28/25 79.8 kg (176 lb)   05/13/25 80.2 kg (176 lb 12.8 oz)       General: Alert and oriented, in no acute distress  Adrienne has mild, moderate Erythema.  Aria chart and setup information reviewed    Lyssa Walters MD

## 2025-06-26 ENCOUNTER — APPOINTMENT (OUTPATIENT)
Dept: RADIATION ONCOLOGY | Facility: CLINIC | Age: 60
End: 2025-06-26
Attending: RADIOLOGY
Payer: COMMERCIAL

## 2025-06-26 PROCEDURE — 77385 HC IMRT TREATMENT DELIVERY, SIMPLE: CPT | Performed by: STUDENT IN AN ORGANIZED HEALTH CARE EDUCATION/TRAINING PROGRAM

## 2025-06-26 PROCEDURE — 77014 PR CT GUIDE FOR PLACEMENT RADIATION THERAPY FIELDS: CPT | Mod: 26 | Performed by: STUDENT IN AN ORGANIZED HEALTH CARE EDUCATION/TRAINING PROGRAM

## 2025-06-27 ENCOUNTER — APPOINTMENT (OUTPATIENT)
Dept: RADIATION ONCOLOGY | Facility: CLINIC | Age: 60
End: 2025-06-27
Attending: RADIOLOGY
Payer: COMMERCIAL

## 2025-06-27 PROCEDURE — 77385 HC IMRT TREATMENT DELIVERY, SIMPLE: CPT | Performed by: STUDENT IN AN ORGANIZED HEALTH CARE EDUCATION/TRAINING PROGRAM

## 2025-06-27 PROCEDURE — 77014 PR CT GUIDE FOR PLACEMENT RADIATION THERAPY FIELDS: CPT | Mod: 26 | Performed by: STUDENT IN AN ORGANIZED HEALTH CARE EDUCATION/TRAINING PROGRAM

## 2025-06-30 ENCOUNTER — APPOINTMENT (OUTPATIENT)
Dept: RADIATION ONCOLOGY | Facility: CLINIC | Age: 60
End: 2025-06-30
Attending: RADIOLOGY
Payer: COMMERCIAL

## 2025-06-30 PROCEDURE — 77385 HC IMRT TREATMENT DELIVERY, SIMPLE: CPT | Performed by: STUDENT IN AN ORGANIZED HEALTH CARE EDUCATION/TRAINING PROGRAM

## 2025-06-30 PROCEDURE — 77336 RADIATION PHYSICS CONSULT: CPT | Performed by: RADIOLOGY

## 2025-06-30 PROCEDURE — 77014 PR CT GUIDE FOR PLACEMENT RADIATION THERAPY FIELDS: CPT | Mod: 26 | Performed by: STUDENT IN AN ORGANIZED HEALTH CARE EDUCATION/TRAINING PROGRAM

## 2025-06-30 PROCEDURE — 77427 RADIATION TX MANAGEMENT X5: CPT | Performed by: RADIOLOGY

## 2025-07-01 ENCOUNTER — APPOINTMENT (OUTPATIENT)
Dept: RADIATION ONCOLOGY | Facility: CLINIC | Age: 60
End: 2025-07-01
Attending: RADIOLOGY
Payer: COMMERCIAL

## 2025-07-01 PROCEDURE — 77014 PR CT GUIDE FOR PLACEMENT RADIATION THERAPY FIELDS: CPT | Mod: 26 | Performed by: RADIOLOGY

## 2025-07-01 PROCEDURE — 77385 HC IMRT TREATMENT DELIVERY, SIMPLE: CPT | Performed by: RADIOLOGY

## 2025-07-02 ENCOUNTER — OFFICE VISIT (OUTPATIENT)
Dept: RADIATION ONCOLOGY | Facility: CLINIC | Age: 60
End: 2025-07-02
Attending: RADIOLOGY
Payer: COMMERCIAL

## 2025-07-02 VITALS
OXYGEN SATURATION: 97 % | TEMPERATURE: 97.9 F | RESPIRATION RATE: 16 BRPM | DIASTOLIC BLOOD PRESSURE: 74 MMHG | WEIGHT: 177.1 LBS | HEART RATE: 74 BPM | BODY MASS INDEX: 29.47 KG/M2 | SYSTOLIC BLOOD PRESSURE: 135 MMHG

## 2025-07-02 DIAGNOSIS — Z17.0 MALIGNANT NEOPLASM OF UPPER-INNER QUADRANT OF LEFT BREAST IN FEMALE, ESTROGEN RECEPTOR POSITIVE (H): Primary | ICD-10-CM

## 2025-07-02 DIAGNOSIS — C50.212 MALIGNANT NEOPLASM OF UPPER-INNER QUADRANT OF LEFT BREAST IN FEMALE, ESTROGEN RECEPTOR POSITIVE (H): Primary | ICD-10-CM

## 2025-07-02 PROCEDURE — 3078F DIAST BP <80 MM HG: CPT | Performed by: RADIOLOGY

## 2025-07-02 PROCEDURE — 77014 PR CT GUIDE FOR PLACEMENT RADIATION THERAPY FIELDS: CPT | Mod: 26 | Performed by: RADIOLOGY

## 2025-07-02 PROCEDURE — 77385 HC IMRT TREATMENT DELIVERY, SIMPLE: CPT | Performed by: RADIOLOGY

## 2025-07-02 PROCEDURE — 1126F AMNT PAIN NOTED NONE PRSNT: CPT | Performed by: RADIOLOGY

## 2025-07-02 PROCEDURE — 3075F SYST BP GE 130 - 139MM HG: CPT | Performed by: RADIOLOGY

## 2025-07-02 ASSESSMENT — PAIN SCALES - GENERAL: PAINLEVEL_OUTOF10: NO PAIN (0)

## 2025-07-02 NOTE — LETTER
2025      Adrienne Ivory  [address] [place] [address]      Dear Colleague,    Thank you for referring your patient, Adrienne Ivory, to the [facility] RADIATION ONCOLOGY [place]. Please see a copy of my visit note below.    RADIATION ONCOLOGY WEEKLY TREATMENT VISIT NOTE      Assessment / Impression       Visit Dx:  (C50.212,  Z17.0) Malignant neoplasm of upper-inner quadrant of left breast in female, estrogen receptor positive (H)  (primary encounter diagnosis)    Tolerating radiation therapy well.  All questions and concerns addressed.    Plan:     Continue radiation treatment as prescribed.    Discussed with the patient about skin care.     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    Assessment                  Body Site:  Breast                           Site: Lt. Breast  Stereotactic Radiosurgery: No  Concurrent Therapy: No  Today's Dose: 4400  Total Dose for Breast: 5000  Today's Fraction/Total Fraction Breast:                                    Sexuality Alteration                    Emotional Alteration    Copin: Effective  Comfort Alteration   KPS: 90% Can perform normal activity, minor signs of disease  Fatigue (ONS scale): 7: Extreme Fatigue  Pain Location: denies pain, discomfort only with other symptoms  Pain Intensity. Rate degree of pain ranging from 0 (no pain) to 10 (severe pain): 0  Pain Description: Dull intermittent - Dull type of ache which is intermittent  Pain Intervention: 1: Over the counter medications  Effectiveness of pain intervention: 4: Pain  relieved 100%   Nutrition Alteration   Anorexia: 1: Loss of appetite (swallowing more difficulty with esophagitis)  Nausea: 0: None  Vomitin: None  Weight: 80.3 kg (177 lb 1.6 oz)  Skin Alteration   Skin Sensation: 1:Pruitis;2: Burning  Skin Reaction: 3: Dry desquamation with or without erythema  AUA Assessment                                           Accompanied by       Objective:     Exam: moderate Erythema.    Vitals:    25 1301   BP: 135/74   Pulse: 74   Resp: 16   Temp: 97.9  F (36.6  C)   TempSrc: Oral   SpO2: 97%   Weight: 80.3 kg (177 lb 1.6 oz)   PainSc: No Pain (0)       Wt Readings from Last 8 Encounters:   25 80.3 kg (177 lb 1.6 oz)   25 81.1 kg (178 lb 12.8 oz)   25 79.6 kg (175 lb 8 oz)   25 78.9 kg (174 lb)   25 81 kg (178 lb 8 oz)   25 79.9 kg (176 lb 3.2 oz)   25 80.9 kg (178 lb 6.4 oz)   25 79.8 kg (176 lb)       General: Alert and oriented, in no acute distress  Adrienne has moderate Erythema.  Aria chart and setup information reviewed    Lyssa Walters MD    Again, thank you for allowing me to participate in the care of your patient.        Sincerely,        Lyssa Walters MD    Electronically signed

## 2025-07-02 NOTE — PROGRESS NOTES
RADIATION ONCOLOGY WEEKLY TREATMENT VISIT NOTE      Assessment / Impression       Visit Dx:  (C50.212,  Z17.0) Malignant neoplasm of upper-inner quadrant of left breast in female, estrogen receptor positive (H)  (primary encounter diagnosis)    Tolerating radiation therapy well.  All questions and concerns addressed.    Plan:     Continue radiation treatment as prescribed.    Discussed with the patient about skin care.     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    Assessment                  Body Site:  Breast                           Site: Lt. Breast  Stereotactic Radiosurgery: No  Concurrent Therapy: No  Today's Dose: 4400  Total Dose for Breast: 5000  Today's Fraction/Total Fraction Breast:                                    Sexuality Alteration                    Emotional Alteration    Copin: Effective  Comfort Alteration   KPS: 90% Can perform normal activity, minor signs of disease  Fatigue (ONS scale): 7: Extreme Fatigue  Pain Location: denies pain, discomfort only with other symptoms  Pain Intensity. Rate degree of pain ranging from 0 (no pain) to 10 (severe pain): 0  Pain Description: Dull intermittent - Dull type of ache which is intermittent  Pain Intervention: 1: Over the counter medications  Effectiveness of pain intervention: 4: Pain relieved 100%   Nutrition Alteration   Anorexia: 1: Loss of appetite (swallowing more difficulty with esophagitis)  Nausea: 0: None  Vomitin: None  Weight: 80.3 kg (177 lb 1.6 oz)  Skin Alteration   Skin Sensation: 1:Pruitis;2: Burning  Skin Reaction:  3: Dry desquamation with or without erythema  AUA Assessment                                           Accompanied by       Objective:     Exam: moderate Erythema.    Vitals:    07/02/25 1301   BP: 135/74   Pulse: 74   Resp: 16   Temp: 97.9  F (36.6  C)   TempSrc: Oral   SpO2: 97%   Weight: 80.3 kg (177 lb 1.6 oz)   PainSc: No Pain (0)       Wt Readings from Last 8 Encounters:   07/02/25 80.3 kg (177 lb 1.6 oz)   06/25/25 81.1 kg (178 lb 12.8 oz)   06/18/25 79.6 kg (175 lb 8 oz)   06/17/25 78.9 kg (174 lb)   06/11/25 81 kg (178 lb 8 oz)   06/04/25 79.9 kg (176 lb 3.2 oz)   05/29/25 80.9 kg (178 lb 6.4 oz)   05/28/25 79.8 kg (176 lb)       General: Alert and oriented, in no acute distress  Adrienne has moderate Erythema.  Aria chart and setup information reviewed    Lyssa Walters MD

## 2025-07-03 ENCOUNTER — APPOINTMENT (OUTPATIENT)
Dept: RADIATION ONCOLOGY | Facility: CLINIC | Age: 60
End: 2025-07-03
Attending: RADIOLOGY
Payer: COMMERCIAL

## 2025-07-03 PROCEDURE — 77014 PR CT GUIDE FOR PLACEMENT RADIATION THERAPY FIELDS: CPT | Mod: 26 | Performed by: STUDENT IN AN ORGANIZED HEALTH CARE EDUCATION/TRAINING PROGRAM

## 2025-07-03 PROCEDURE — 77385 HC IMRT TREATMENT DELIVERY, SIMPLE: CPT | Performed by: STUDENT IN AN ORGANIZED HEALTH CARE EDUCATION/TRAINING PROGRAM

## 2025-07-07 ENCOUNTER — APPOINTMENT (OUTPATIENT)
Dept: RADIATION ONCOLOGY | Facility: CLINIC | Age: 60
End: 2025-07-07
Attending: RADIOLOGY
Payer: COMMERCIAL

## 2025-07-07 DIAGNOSIS — R12 HEARTBURN: ICD-10-CM

## 2025-07-07 PROCEDURE — 77385 HC IMRT TREATMENT DELIVERY, SIMPLE: CPT | Performed by: STUDENT IN AN ORGANIZED HEALTH CARE EDUCATION/TRAINING PROGRAM

## 2025-07-07 PROCEDURE — 77014 PR CT GUIDE FOR PLACEMENT RADIATION THERAPY FIELDS: CPT | Mod: 26 | Performed by: STUDENT IN AN ORGANIZED HEALTH CARE EDUCATION/TRAINING PROGRAM

## 2025-07-08 ENCOUNTER — APPOINTMENT (OUTPATIENT)
Dept: RADIATION ONCOLOGY | Facility: CLINIC | Age: 60
End: 2025-07-08
Attending: RADIOLOGY
Payer: COMMERCIAL

## 2025-07-08 ENCOUNTER — RADIATION TREATMENT SUMMARY (OUTPATIENT)
Dept: RADIATION ONCOLOGY | Facility: CLINIC | Age: 60
End: 2025-07-08

## 2025-07-08 PROCEDURE — 77014 PR CT GUIDE FOR PLACEMENT RADIATION THERAPY FIELDS: CPT | Mod: 26 | Performed by: RADIOLOGY

## 2025-07-08 PROCEDURE — 77385 HC IMRT TREATMENT DELIVERY, SIMPLE: CPT | Performed by: RADIOLOGY

## 2025-07-08 PROCEDURE — 77336 RADIATION PHYSICS CONSULT: CPT | Performed by: RADIOLOGY

## 2025-07-08 PROCEDURE — 77427 RADIATION TX MANAGEMENT X5: CPT | Performed by: RADIOLOGY

## 2025-07-08 RX ORDER — OMEPRAZOLE 40 MG/1
40 CAPSULE, DELAYED RELEASE ORAL DAILY
Qty: 90 CAPSULE | Refills: 0 | Status: SHIPPED | OUTPATIENT
Start: 2025-07-08

## 2025-07-08 NOTE — PROGRESS NOTES
Radiation Treatment Summary    Patient: Adrienne Ivory   MRN: [id number]  : 1965  Care Provider: Lyssa Walters MD    Encounter Date: 2025      Hang Harkins MD  [address] [place] [id number]              Dear Dr. Harkins:     Your patient Mrs. Adrienne Ivory completed her radiation therapy on 2025.  As you know Ms. Ivory is a 59 year old female with a diagnosis of left breast cancer, clinical stage T2N3M0, ER/HER2 positive, OH negative, status post neoadjuvant chemotherapy with 6 cycles of TCHP followed by bilateral mastectomy and left sentinel lymph node resection with complete response and pathologic stage [id number].  Patient received postop radiation therapy with a total dose of 5000 cGy in 25 treatments given form 6/3/2025 - 2025.  She tolerated radiation therapy well with expected acute side effect.  She is scheduled to return to radiation oncology in 4 weeks for a routine post therapy office follow-up.    Again, thank you very much for the referral and allowing me to participate in the care of this patient.  If you have any questions or concerns about this patient, please do not hesitate to call.      Pain Management Plan: NA          Sincerely,    Lyssa Walters MD, PhD  Department of Radiation Oncology   [facility] Radiation Oncology  Cell: [phone]    [facility]  [address] [place], [place] [address] [place] [id number]    CC:  Patient Care Team:  Kallie Ta NP as PCP - General  Kallie Ta NP as Assigned PCP  Kin Murray LICSW (Inactive) as Assigned Behavioral Health Provider  Hang Harkins MD as Physician (Hematology & Oncology)  Radha Stephen, RN as Specialty Care Coordinator (Hematology & Oncology)  Hang Harkins MD as Assigned Cancer Care Provider  Katerine Matthews DO as Assigned Surgical Provider  Lyssa Walters MD as MD (Radiation Oncology)

## 2025-07-08 NOTE — LETTER
Radiation Treatment Summary    Patient: Adrienne Ivory   MRN: [id number]  : 1965  Care Provider: Lyssa Walters MD    Encounter Date: 2025      Hang Harkins MD  [address] [place] [address]              Dear Dr. Harkins:     Your patient Mrs. Adrienne Ivory completed her radiation therapy on 2025.  As you know Ms. Ivory is a 59 year old female with a diagnosis of left breast cancer, clinical stage T2N3M0, ER/HER2 positive, NH negative, status post neoadjuvant chemotherapy with 6 cycles of TCHP followed by bilateral mastectomy and left sentinel lymph node resection with complete response and pathologic stage [id number].  Patient received postop radiation therapy with a total dose of 5000 cGy in 25 treatments given form 6/3/2025 - 2025.  She tolerated radiation therapy well with expected acute side effect.  She is scheduled to return to radiation oncology in 4 weeks for a routine post therapy office follow-up.    Again, thank you very much for the referral and allowing me to participate in the care of this patient.  If you have any questions or concerns about this patient, please do not hesitate to call.      Pain Management Plan: NA          Sincerely,    Lyssa Walters MD, PhD  Department of Radiation Oncology   [facility] Radiation Oncology  Cell: [phone]    [facility]  [address] [place], [place] [address] [place] [address]    CC:  Patient Care Team:  Kallie Ta NP as PCP - General  Kallie Ta NP as Assigned PCP  Kin Murray LICSW (Inactive) as Assigned Behavioral Health Provider  Hang Harkins MD as Physician (Hematology & Oncology)  Radha Stephen, RN as Specialty Care Coordinator (Hematology & Oncology)  Hang Harkins MD as Assigned Cancer Care Provider  Katerine Matthews DO as Assigned Surgical Provider  Lyssa Walters MD as MD (Radiation Oncology)

## 2025-07-08 NOTE — PROGRESS NOTES
Pt here for their final radiation treatment. DC instructions given verbally and in writing, pt verbalized their understanding. Encouraged pt to make 4-6 week f/u apt on their way out today.   Pt c/o a full feeling in her left ear, no fever, no drainage from ear, no real pain. I told her to try OTC Sudafed and give it time, if it is from radiation to lymph nodes this would just take time to resolve.     Cheryl Bartlett RN  Rad Onc [place]

## 2025-07-09 ENCOUNTER — ONCOLOGY VISIT (OUTPATIENT)
Dept: ONCOLOGY | Facility: HOSPITAL | Age: 60
End: 2025-07-09
Attending: INTERNAL MEDICINE
Payer: COMMERCIAL

## 2025-07-09 ENCOUNTER — INFUSION THERAPY VISIT (OUTPATIENT)
Dept: INFUSION THERAPY | Facility: HOSPITAL | Age: 60
End: 2025-07-09
Attending: INTERNAL MEDICINE
Payer: COMMERCIAL

## 2025-07-09 ENCOUNTER — PATIENT OUTREACH (OUTPATIENT)
Dept: ONCOLOGY | Facility: HOSPITAL | Age: 60
End: 2025-07-09

## 2025-07-09 VITALS
DIASTOLIC BLOOD PRESSURE: 60 MMHG | TEMPERATURE: 97.1 F | SYSTOLIC BLOOD PRESSURE: 125 MMHG | HEART RATE: 76 BPM | BODY MASS INDEX: 29.32 KG/M2 | OXYGEN SATURATION: 99 % | WEIGHT: 176 LBS | HEIGHT: 65 IN | RESPIRATION RATE: 18 BRPM

## 2025-07-09 VITALS
HEART RATE: 76 BPM | SYSTOLIC BLOOD PRESSURE: 125 MMHG | DIASTOLIC BLOOD PRESSURE: 60 MMHG | TEMPERATURE: 97.1 F | RESPIRATION RATE: 18 BRPM | OXYGEN SATURATION: 99 %

## 2025-07-09 DIAGNOSIS — Z17.0 MALIGNANT NEOPLASM OF UPPER-OUTER QUADRANT OF LEFT BREAST IN FEMALE, ESTROGEN RECEPTOR POSITIVE (H): Primary | ICD-10-CM

## 2025-07-09 DIAGNOSIS — Z17.0 MALIGNANT NEOPLASM OF UPPER-INNER QUADRANT OF LEFT BREAST IN FEMALE, ESTROGEN RECEPTOR POSITIVE (H): Primary | ICD-10-CM

## 2025-07-09 DIAGNOSIS — Z51.11 ENCOUNTER FOR ANTINEOPLASTIC CHEMOTHERAPY: ICD-10-CM

## 2025-07-09 DIAGNOSIS — R12 HEARTBURN: ICD-10-CM

## 2025-07-09 DIAGNOSIS — C50.212 MALIGNANT NEOPLASM OF UPPER-INNER QUADRANT OF LEFT BREAST IN FEMALE, ESTROGEN RECEPTOR POSITIVE (H): Primary | ICD-10-CM

## 2025-07-09 DIAGNOSIS — N39.0 URINARY TRACT INFECTION WITHOUT HEMATURIA, SITE UNSPECIFIED: Primary | ICD-10-CM

## 2025-07-09 DIAGNOSIS — C50.412 MALIGNANT NEOPLASM OF UPPER-OUTER QUADRANT OF LEFT BREAST IN FEMALE, ESTROGEN RECEPTOR POSITIVE (H): ICD-10-CM

## 2025-07-09 DIAGNOSIS — Z17.0 MALIGNANT NEOPLASM OF UPPER-OUTER QUADRANT OF LEFT BREAST IN FEMALE, ESTROGEN RECEPTOR POSITIVE (H): ICD-10-CM

## 2025-07-09 DIAGNOSIS — C50.412 MALIGNANT NEOPLASM OF UPPER-OUTER QUADRANT OF LEFT BREAST IN FEMALE, ESTROGEN RECEPTOR POSITIVE (H): Primary | ICD-10-CM

## 2025-07-09 LAB
ALBUMIN UR-MCNC: NEGATIVE MG/DL
APPEARANCE UR: CLEAR
BACTERIA #/AREA URNS HPF: ABNORMAL /HPF
BILIRUB UR QL STRIP: NEGATIVE
COLOR UR AUTO: YELLOW
GLUCOSE UR STRIP-MCNC: NEGATIVE MG/DL
HGB UR QL STRIP: ABNORMAL
KETONES UR STRIP-MCNC: NEGATIVE MG/DL
LEUKOCYTE ESTERASE UR QL STRIP: ABNORMAL
NITRATE UR QL: NEGATIVE
PH UR STRIP: 5 [PH] (ref 5–7)
RBC URINE: 6 /HPF
SP GR UR STRIP: 1 (ref 1–1.03)
UROBILINOGEN UR STRIP-MCNC: NORMAL MG/DL
WBC URINE: 19 /HPF

## 2025-07-09 PROCEDURE — 250N000011 HC RX IP 250 OP 636: Performed by: INTERNAL MEDICINE

## 2025-07-09 PROCEDURE — 96413 CHEMO IV INFUSION 1 HR: CPT

## 2025-07-09 PROCEDURE — 87086 URINE CULTURE/COLONY COUNT: CPT

## 2025-07-09 PROCEDURE — 258N000003 HC RX IP 258 OP 636: Performed by: INTERNAL MEDICINE

## 2025-07-09 PROCEDURE — 81001 URINALYSIS AUTO W/SCOPE: CPT

## 2025-07-09 PROCEDURE — 99215 OFFICE O/P EST HI 40 MIN: CPT | Performed by: INTERNAL MEDICINE

## 2025-07-09 PROCEDURE — 99213 OFFICE O/P EST LOW 20 MIN: CPT | Performed by: INTERNAL MEDICINE

## 2025-07-09 RX ORDER — DIPHENHYDRAMINE HCL 50 MG
50 CAPSULE ORAL
Status: CANCELLED
Start: 2025-07-09

## 2025-07-09 RX ORDER — LEVOFLOXACIN 500 MG/1
500 TABLET, FILM COATED ORAL DAILY
Qty: 3 TABLET | Refills: 0 | Status: SHIPPED | OUTPATIENT
Start: 2025-07-09 | End: 2025-07-12

## 2025-07-09 RX ORDER — HEPARIN SODIUM,PORCINE 10 UNIT/ML
5-20 VIAL (ML) INTRAVENOUS DAILY PRN
Status: CANCELLED | OUTPATIENT
Start: 2025-07-09

## 2025-07-09 RX ORDER — DIPHENHYDRAMINE HYDROCHLORIDE 50 MG/ML
25 INJECTION, SOLUTION INTRAMUSCULAR; INTRAVENOUS
Status: DISCONTINUED | OUTPATIENT
Start: 2025-07-09 | End: 2025-07-09 | Stop reason: HOSPADM

## 2025-07-09 RX ORDER — EPINEPHRINE 1 MG/ML
0.3 INJECTION, SOLUTION INTRAMUSCULAR; SUBCUTANEOUS EVERY 5 MIN PRN
Status: CANCELLED | OUTPATIENT
Start: 2025-07-09

## 2025-07-09 RX ORDER — ALBUTEROL SULFATE 0.83 MG/ML
2.5 SOLUTION RESPIRATORY (INHALATION)
Status: DISCONTINUED | OUTPATIENT
Start: 2025-07-09 | End: 2025-07-09 | Stop reason: HOSPADM

## 2025-07-09 RX ORDER — HEPARIN SODIUM (PORCINE) LOCK FLUSH IV SOLN 100 UNIT/ML 100 UNIT/ML
5 SOLUTION INTRAVENOUS
Status: DISCONTINUED | OUTPATIENT
Start: 2025-07-09 | End: 2025-07-09 | Stop reason: HOSPADM

## 2025-07-09 RX ORDER — MEPERIDINE HYDROCHLORIDE 50 MG/ML
25 INJECTION INTRAMUSCULAR; INTRAVENOUS; SUBCUTANEOUS
Status: DISCONTINUED | OUTPATIENT
Start: 2025-07-09 | End: 2025-07-09 | Stop reason: HOSPADM

## 2025-07-09 RX ORDER — DIPHENHYDRAMINE HYDROCHLORIDE 50 MG/ML
50 INJECTION, SOLUTION INTRAMUSCULAR; INTRAVENOUS
Status: DISCONTINUED | OUTPATIENT
Start: 2025-07-09 | End: 2025-07-09 | Stop reason: HOSPADM

## 2025-07-09 RX ORDER — ALBUTEROL SULFATE 0.83 MG/ML
2.5 SOLUTION RESPIRATORY (INHALATION)
Status: CANCELLED | OUTPATIENT
Start: 2025-07-09

## 2025-07-09 RX ORDER — HEPARIN SODIUM (PORCINE) LOCK FLUSH IV SOLN 100 UNIT/ML 100 UNIT/ML
5 SOLUTION INTRAVENOUS
Status: CANCELLED | OUTPATIENT
Start: 2025-07-09

## 2025-07-09 RX ORDER — METHYLPREDNISOLONE SODIUM SUCCINATE 40 MG/ML
40 INJECTION INTRAMUSCULAR; INTRAVENOUS
Status: CANCELLED
Start: 2025-07-09

## 2025-07-09 RX ORDER — LORAZEPAM 2 MG/ML
0.5 INJECTION INTRAMUSCULAR EVERY 4 HOURS PRN
Status: CANCELLED | OUTPATIENT
Start: 2025-07-09

## 2025-07-09 RX ORDER — ALBUTEROL SULFATE 90 UG/1
1-2 INHALANT RESPIRATORY (INHALATION)
Status: DISCONTINUED | OUTPATIENT
Start: 2025-07-09 | End: 2025-07-09 | Stop reason: HOSPADM

## 2025-07-09 RX ORDER — EPINEPHRINE 1 MG/ML
0.3 INJECTION, SOLUTION INTRAMUSCULAR; SUBCUTANEOUS EVERY 5 MIN PRN
Status: DISCONTINUED | OUTPATIENT
Start: 2025-07-09 | End: 2025-07-09 | Stop reason: HOSPADM

## 2025-07-09 RX ORDER — DIPHENHYDRAMINE HYDROCHLORIDE 50 MG/ML
25 INJECTION, SOLUTION INTRAMUSCULAR; INTRAVENOUS
Status: CANCELLED
Start: 2025-07-09

## 2025-07-09 RX ORDER — DIPHENHYDRAMINE HYDROCHLORIDE 50 MG/ML
50 INJECTION, SOLUTION INTRAMUSCULAR; INTRAVENOUS
Status: CANCELLED
Start: 2025-07-09

## 2025-07-09 RX ORDER — ACETAMINOPHEN 325 MG/1
650 TABLET ORAL
Status: CANCELLED
Start: 2025-07-09

## 2025-07-09 RX ORDER — MEPERIDINE HYDROCHLORIDE 50 MG/ML
25 INJECTION INTRAMUSCULAR; INTRAVENOUS; SUBCUTANEOUS
Status: CANCELLED | OUTPATIENT
Start: 2025-07-09

## 2025-07-09 RX ORDER — ALBUTEROL SULFATE 90 UG/1
1-2 INHALANT RESPIRATORY (INHALATION)
Status: CANCELLED
Start: 2025-07-09

## 2025-07-09 RX ORDER — METHYLPREDNISOLONE SODIUM SUCCINATE 40 MG/ML
40 INJECTION INTRAMUSCULAR; INTRAVENOUS
Status: DISCONTINUED | OUTPATIENT
Start: 2025-07-09 | End: 2025-07-09 | Stop reason: HOSPADM

## 2025-07-09 RX ADMIN — HEPARIN 5 ML: 100 SYRINGE at 13:08

## 2025-07-09 RX ADMIN — SODIUM CHLORIDE 250 ML: 0.9 INJECTION, SOLUTION INTRAVENOUS at 12:22

## 2025-07-09 RX ADMIN — SODIUM CHLORIDE 462 MG: 0.9 INJECTION, SOLUTION INTRAVENOUS at 12:22

## 2025-07-09 ASSESSMENT — PAIN SCALES - GENERAL: PAINLEVEL_OUTOF10: NO PAIN (0)

## 2025-07-09 NOTE — PROGRESS NOTES
[facility] Hematology and Oncology Progress Note    Patient: Adrienne Ivory  MRN: [id number]  Date of Service: Jul 9, 2025             ECOG Performance    0 - Independent          ______________________________________________________________________________  Oncologic history  T2 N3 M0 stage IIIc invasive ductal carcinoma of the left breast ER positive OH positive HER2/wolf 3+.  November 2024  Patient had multiple level 1 level 2 and level 3 axillary lymph nodes involved    Treatment  1.Patient started on neoadjuvant TCHP on 12/12/2024  2.Near complete radiologic remission after 3 cycles of TCHP  3.Completed 6 cycle of TCHP on 3/27/2025  4.Switched to single agent trastuzumab on 4/17/2025 with plans to change further treatment based on    pathologic response  5.  Complete pathologic response at the time of surgery.  Patient underwent bilateral mastectomies on 29 April 2025  6.  Plan to complete 1 year of trastuzumab which will finish in December 2025  7.  Patient completed adjuvant radiation to the breast on 7/8/2025 received 5000 cGy in 25 fractions   8. patient to be started on adjuvant hormonal therapy      History of Present Illness    Ms. Adrienne Ivory is here for follow-up.  She feels fine.  She finished her radiation therapy yesterday she did not had a good experience and felt really anxious and frustrated throughout her radiation however physically she is feeling fine.  She has had a mild skin burn she denies having any nausea or vomiting.  She has had some difficulty with swallowing and also complains of some discomfort in her ear.  She is also noticed some urgency in urination in the last few days  Review of systems  A comprehensive 12 point review of system was done that was negative except what is mentioned in the history of present illness    Past History    Past Medical History:   Diagnosis Date    Abnormal Pap smear, can't excl hi gd sq intraepithelial lesion (ASC-H) 03/01/2015    LSIL  "also    Anxiety state, unspecified     Herpes simplex without mention of complication     HSIL on Pap smear of cervix 07/01/2014    colp - WNL    Human papillomavirus in conditions classified elsewhere and of unspecified site 11/01/2010    + HPV 45 & 82 with ASCUS    Hx of colposcopy with cervical biopsy 11/08/2016    [place] ECC neg.     Obese     PONV (postoperative nausea and vomiting)     Premenstrual tension syndromes        Past Surgical History:   Procedure Laterality Date    C IUD,MIRENA  2/08-3/11    removed for cramping    COLONOSCOPY N/A 12/4/2020    Procedure: COLONOSCOPY, WITH POLYPECTOMY;  Surgeon: Moises Pride MD;  Location: [facility] OR    DILATION AND CURETTAGE, ABLATE ENDOMETRIUM NOVASURE, COMBINED N/A 12/31/2015    Procedure: COMBINED DILATION AND CURETTAGE, ABLATE ENDOMETRIUM NOVASURE;  Surgeon: Shakira Penaloza MD;  Location: [facility] OR    DISSECT LYMPH NODE AXILLA Left 4/29/2025    Procedure: WITH LEFT TARGETED AXILLARY DISSECTION;  Surgeon: Katerine Matthews DO;  Location: [facility] Main OR    HYSTEROSCOPY DIAGNOSTIC N/A 12/31/2015    Procedure: HYSTEROSCOPY DIAGNOSTIC;  Surgeon: Shakira Penaloza MD;  Location: [facility] OR    LEEP TX, CERVICAL  4/3/15    ARNAV 2-3, negative margins    MASTECTOMY SIMPLE Bilateral 4/29/2025    Procedure: BILATERAL MASTECTOMY;  Surgeon: Katerine Matthews DO;  Location: [facility] Main OR    NO HISTORY OF SURGERY         Physical Exam    /60 (BP Location: Right arm, Patient Position: Sitting, Cuff Size: Adult Regular)   Pulse 76   Temp 97.1  F (36.2  C) (Temporal)   Resp 18   Ht 1.651 m (5' 5\")   Wt 79.8 kg (176 lb)   SpO2 99%   BMI 29.29 kg/m      General: alert, awake, not in acute distress  HEENT: Head: Normal, normocephalic, atraumatic.  Eye: Normal external eye, conjunctiva, lids cornea, SITA.  Nose: Normal external nose, mucus membranes and septum.  Pharynx: Normal buccal mucosa. Normal pharynx.  Neck / Thyroid: Supple, no masses, nodes, nodules or " enlargement.  Lymphatics: No abnormally enlarged lymph nodes.  Chest: Normal chest wall and respirations. Clear to auscultation.  No crackles or rhonchi's  Heart: S1 S2 RRR, no murmur.   Abdomen: abdomen is soft without significant tenderness, masses, organomegaly or guarding  Extremities: normal strength, tone, and muscle mass  Skin: normal. no rash or abnormalities  CNS: non focal.    Breast exam on the left side did show shrinkage in the mass I could still feel a lymph node in the axilla      Lab Results    No results found for this or any previous visit (from the past 240 hours).        Imaging    No results found.          Assessment and Plan    Stage IIIc breast cancer HER2 positive, ER/CO positive  Patient is here in follow-up.  She has completed her adjuvant radiation.  She will continue Herceptin till the end of the year to complete 1 year.  She is due for dose today.  She will come back in 3 weeks for her next dose.  Her cardiac echo will be done after the next dose  She is also needs to be started on adjuvant hormonal therapy I will plan to discuss that on her next visit.    Cardiac monitoring  Patient does needs continued cardiac monitoring.  She will have an echo after her next dose and will continue to check an echo after 4 doses of trastuzumab    Anxiety   patient has history of significant anxiety.  She was really anxious about her radiation and remained so throughout her radiation therapy.  She has completed radiation and she reports that her anxiety is better she still has some alprazolam pills that she can use on an as-needed basis    UTI  Patient complains of some urgency of urination.  IUD urine analysis was checked which showed marked increase in leukoesterase and increased WBCs.  Will be sent for culture she was given Levaquin for 3 days  Signed by: Josseline Harkins MD        CC: Kallie Ta NP

## 2025-07-09 NOTE — PROGRESS NOTES
"Oncology Rooming Note    July 9, 2025 10:22 AM   Adrienne Ivory is a 59 year old female who presents for:    Chief Complaint   Patient presents with    Oncology Clinic Visit     Malignant neoplasm of upper-inner quadrant of left breast in female, estrogen receptor positive (     Initial Vitals: Ht 1.651 m (5' 5\")   Wt 79.8 kg (176 lb)   BMI 29.29 kg/m   Estimated body mass index is 29.29 kg/m  as calculated from the following:    Height as of this encounter: 1.651 m (5' 5\").    Weight as of this encounter: 79.8 kg (176 lb). Body surface area is 1.91 meters squared.  Data Unavailable Comment: Data Unavailable   No LMP recorded. Patient has had an ablation.  Allergies reviewed: Yes  Medications reviewed: Yes    Medications: Medication refills not needed today.  Pharmacy name entered into [facility]:    [facility] - [place], [place] - [address] [id number] [place], [place] - [address] [place] [address] AT [address]    Frailty Screening:   Is the patient here for a new oncology consult visit in cancer care? 2. No    PHQ9:  Did this patient require a PHQ9?: No      Clinical concerns: Review labs, MD follow up, infusion       Shelley Dey MA            "

## 2025-07-09 NOTE — PROGRESS NOTES
[facility]: Cancer Care Follow-Up Note                                    Discussion with Patient:                                                      Met with Adrienne  when she was in the infusion area receiving C5 Trastuzumab. She completed radiation treatment  and is happy that her treatment is completed. She expressed some concerns today at clinic regarding  her radiation treatment  and side effects and this was addressed with the radiation team when she was at clinic.   She did report new urinary symptoms today and UA was ordered/collected and Dr. Harkins reviewed the results with her. Prescription for levaquin was sent to her [facility]'s pharmacy. Will watch for the UC results and call her with update.   She will be scheduled to return to clinic in 3 weeks with her next infusion.    She will call  if any questions or concerns arise prior to her next clinic apt        Dates of Treatment:                                                      Infusion given in last 28 days       Administered MAR Action Medication Dose Rate Visit    06/17/2025 15:11 New Bag trastuzumab-qyyp (TRAZIMERA) 462 mg in sodium chloride 0.9 % 297 mL infusion 462 mg 594 mL/hr Infusion Therapy Visit on 06/17/2025 in LTAC, located within [facility]    07/09/2025 12:22 New Bag trastuzumab-qyyp (TRAZIMERA) 462 mg in sodium chloride 0.9 % 297 mL infusion 462 mg 594 mL/hr Infusion Therapy Visit on 07/09/2025 in LTAC[facility] located within [facility]            Assessment:                                                      C5 Trastuzumab-tolerating treatment well    UA collected for  urinary symptoms. Levaquin ordered at this time to treat. UC Pending        Intervention/Education provided during outreach:                                                       See above    Confirmed patient has clinic and triage numbers    Signature:  Radha Stephen RN

## 2025-07-09 NOTE — PROGRESS NOTES
Infusion Nursing Note:  Adrienne Ivory presents today for traztuzumab.    Patient seen by provider today: Yes: Torin   present during visit today: Not Applicable.    Note: /60   Pulse 76   Temp 97.1  F (36.2  C)   Resp 18   SpO2 99% .    Premeds Given: none    Intravenous Access:  Implanted Port.    Treatment Conditions:  Not Applicable.      Post Infusion Assessment:  Patient tolerated infusion without incident.       Discharge Plan:   Patient discharged in stable condition accompanied by: self.  Departure Mode: Ambulatory.      Keyla Hamilton RN

## 2025-07-09 NOTE — LETTER
"7/9/2025      Adrienne Ivory  [address] [place] [address]      Dear Colleague,    Thank you for referring your patient, Adrienne Ivory, to the [facility] [place]. Please see a copy of my visit note below.    Oncology Rooming Note    July 9, 2025 10:22 AM   Adrienne Ivory is a 59 year old female who presents for:    Chief Complaint   Patient presents with     Oncology Clinic Visit     Malignant neoplasm of upper-inner quadrant of left breast in female, estrogen receptor positive (     Initial Vitals: Ht 1.651 m (5' 5\")   Wt 79.8 kg (176 lb)   BMI 29.29 kg/m   Estimated body mass index is 29.29 kg/m  as calculated from the following:    Height as of this encounter: 1.651 m (5' 5\").    Weight as of this encounter: 79.8 kg (176 lb). Body surface area is 1.91 meters squared.  Data Unavailable Comment: Data Unavailable   No LMP recorded. Patient has had an ablation.  Allergies reviewed: Yes  Medications reviewed: Yes    Medications: Medication refills not needed today.  Pharmacy name entered into [facility]:    [facility] - [place], [place] - [address] [id number] [address] [facility] [address]    Frailty Screening:   Is the patient here for a new oncology consult visit in cancer care? 2. No    PHQ9:  Did this patient require a PHQ9?: No      Clinical concerns: Review labs, MD follow up, infusion       Shelley Dey MA              [facility] Hematology and Oncology Progress Note    Patient: Adrienne Ivory  MRN: [id number]  Date of Service: Jul 9, 2025             ECOG Performance    0 - Independent          ______________________________________________________________________________  Oncologic history  T2 N3 M0 stage IIIc invasive ductal carcinoma of the left breast ER positive NM positive HER2/wolf 3+.  November " 2024  Patient had multiple level 1 level 2 and level 3 axillary lymph nodes involved    Treatment  1.Patient started on neoadjuvant TCHP on 12/12/2024  2.Near complete radiologic remission after 3 cycles of TCHP  3.Completed 6 cycle of TCHP on 3/27/2025  4.Switched to single agent trastuzumab on 4/17/2025 with plans to change further treatment based on    pathologic response  5.  Complete pathologic response at the time of surgery.  Patient underwent bilateral mastectomies on 29 April 2025  6.  Plan to complete 1 year of trastuzumab which will finish in December 2025  7.  Patient completed adjuvant radiation to the breast on 7/8/2025 received 5000 cGy in 25 fractions   8. patient to be started on adjuvant hormonal therapy      History of Present Illness    Ms. Adrienne Ivory is here for follow-up.  She feels fine.  She finished her radiation therapy yesterday she did not had a good experience and felt really anxious and frustrated throughout her radiation however physically she is feeling fine.  She has had a mild skin burn she denies having any nausea or vomiting.  She has had some difficulty with swallowing and also complains of some discomfort in her ear.  She is also noticed some urgency in urination in the last few days  Review of systems  A comprehensive 12 point review of system was done that was negative except what is mentioned in the history of present illness    Past History    Past Medical History:   Diagnosis Date     Abnormal Pap smear, can't excl hi gd sq intraepithelial lesion (ASC-H) 03/01/2015    LSIL also     Anxiety state, unspecified      Herpes simplex without mention of complication      HSIL on Pap smear of cervix 07/01/2014    colp - WNL     Human papillomavirus in conditions classified elsewhere and of unspecified site 11/01/2010    + HPV 45 & 82 with ASCUS     Hx of colposcopy with cervical biopsy 11/08/2016    [place] ECC neg.      Obese      PONV (postoperative nausea and vomiting)       "Premenstrual tension syndromes        Past Surgical History:   Procedure Laterality Date     C IUD,MIRENA  2/08-3/11    removed for cramping     COLONOSCOPY N/A 12/4/2020    Procedure: COLONOSCOPY, WITH POLYPECTOMY;  Surgeon: Moises Pride MD;  Location: [facility] OR     DILATION AND CURETTAGE, ABLATE ENDOMETRIUM NOVASURE, COMBINED N/A 12/31/2015    Procedure: COMBINED DILATION AND CURETTAGE, ABLATE ENDOMETRIUM NOVASURE;  Surgeon: Shaikra Penaloza MD;  [facility]     DISSECT LYMPH NODE AXILLA Left 4/29/2025    Procedure: WITH LEFT TARGETED AXILLARY DISSECTION;  Surgeon: Katerine Matthews DO;  Location: [facility] Main OR     HYSTEROSCOPY DIAGNOSTIC N/A 12/31/2015    Procedure: HYSTEROSCOPY DIAGNOSTIC;  Surgeon: Shakira Penaloza MD;  [facility] OR     LEEP TX, CERVICAL  4/3/15    ARNAV 2-3, negative margins     MASTECTOMY SIMPLE Bilateral 4/29/2025    Procedure: BILATERAL MASTECTOMY;  Surgeon: Katerine Matthews DO;  Location: [facility] Main OR     NO HISTORY OF SURGERY         Physical Exam    /60 (BP Location: Right arm, Patient Position: Sitting, Cuff Size: Adult Regular)   Pulse 76   Temp 97.1  F (36.2  C) (Temporal)   Resp 18   Ht 1.651 m (5' 5\")   Wt 79.8 kg (176 lb)   SpO2 99%   BMI 29.29 kg/m      General: alert, awake, not in acute distress  HEENT: Head: Normal, normocephalic, atraumatic.  Eye: Normal external eye, conjunctiva, lids cornea, SITA.  Nose: Normal external nose, mucus membranes and septum.  Pharynx: Normal buccal mucosa. Normal pharynx.  Neck / Thyroid: Supple, no masses, nodes, nodules or enlargement.  Lymphatics: No abnormally enlarged lymph nodes.  Chest: Normal chest wall and respirations. Clear to auscultation.  No crackles or rhonchi's  Heart: S1 S2 RRR, no murmur.   Abdomen: abdomen is soft without significant tenderness, masses, organomegaly or guarding  Extremities: normal strength, tone, and muscle mass  Skin: normal. no rash or abnormalities  CNS: non focal.  "   Breast exam on the left side did show shrinkage in the mass I could still feel a lymph node in the axilla      Lab Results    No results found for this or any previous visit (from the past 240 hours).        Imaging    No results found.          Assessment and Plan    Stage IIIc breast cancer HER2 positive, ER/[place] positive  Patient is here in follow-up.  She has completed her adjuvant radiation.  She will continue Herceptin till the end of the year to complete 1 year.  She is due for dose today.  She will come back in 3 weeks for her next dose.  Her cardiac echo will be done after the next dose  She is also needs to be started on adjuvant hormonal therapy I will plan to discuss that on her next visit.    Cardiac monitoring  Patient does needs continued cardiac monitoring.  She will have an echo after her next dose and will continue to check an echo after 4 doses of trastuzumab    Anxiety   patient has history of significant anxiety.  She was really anxious about her radiation and remained so throughout her radiation therapy.  She has completed radiation and she reports that her anxiety is better she still has some alprazolam pills that she can use on an as-needed basis    Patient complains of some urgency of urination she has had UTIs in the past we will check a UA today.    Signed by: Josseline Harkins MD        CC: Kallie Ta NP     Again, thank you for allowing me to participate in the care of your patient.        Sincerely,        Josseline Harkins MD    Electronically signed

## 2025-07-10 ENCOUNTER — TRANSFERRED RECORDS (OUTPATIENT)
Dept: HEALTH INFORMATION MANAGEMENT | Facility: CLINIC | Age: 60
End: 2025-07-10
Payer: COMMERCIAL

## 2025-07-10 LAB — BACTERIA UR CULT: ABNORMAL

## 2025-07-15 ENCOUNTER — TELEPHONE (OUTPATIENT)
Dept: RADIATION ONCOLOGY | Facility: CLINIC | Age: 60
End: 2025-07-15
Payer: COMMERCIAL

## 2025-07-15 NOTE — TELEPHONE ENCOUNTER
Isaiah called patient to check on her post Radiation Therapy. No answer. Left voice mail for patient to call us if she had questions or concerns, otherwise we will see her at her appointment on 8/5 with Dr Walters.  Office phone number left.     Shakira Perez, RN  Radiation Oncology [facility]

## 2025-07-28 ENCOUNTER — TRANSFERRED RECORDS (OUTPATIENT)
Dept: HEALTH INFORMATION MANAGEMENT | Facility: CLINIC | Age: 60
End: 2025-07-28
Payer: COMMERCIAL

## 2025-07-30 ENCOUNTER — ONCOLOGY VISIT (OUTPATIENT)
Dept: ONCOLOGY | Facility: HOSPITAL | Age: 60
End: 2025-07-30
Attending: INTERNAL MEDICINE
Payer: COMMERCIAL

## 2025-07-30 ENCOUNTER — INFUSION THERAPY VISIT (OUTPATIENT)
Dept: INFUSION THERAPY | Facility: HOSPITAL | Age: 60
End: 2025-07-30
Attending: INTERNAL MEDICINE
Payer: COMMERCIAL

## 2025-07-30 VITALS
SYSTOLIC BLOOD PRESSURE: 141 MMHG | WEIGHT: 181 LBS | BODY MASS INDEX: 30.16 KG/M2 | TEMPERATURE: 97.5 F | DIASTOLIC BLOOD PRESSURE: 78 MMHG | HEART RATE: 74 BPM | HEIGHT: 65 IN | RESPIRATION RATE: 16 BRPM | OXYGEN SATURATION: 96 %

## 2025-07-30 DIAGNOSIS — Z17.0 MALIGNANT NEOPLASM OF UPPER-INNER QUADRANT OF LEFT BREAST IN FEMALE, ESTROGEN RECEPTOR POSITIVE (H): Primary | ICD-10-CM

## 2025-07-30 DIAGNOSIS — R12 HEARTBURN: ICD-10-CM

## 2025-07-30 DIAGNOSIS — Z51.11 ENCOUNTER FOR ANTINEOPLASTIC CHEMOTHERAPY: ICD-10-CM

## 2025-07-30 DIAGNOSIS — C50.412 MALIGNANT NEOPLASM OF UPPER-OUTER QUADRANT OF LEFT BREAST IN FEMALE, ESTROGEN RECEPTOR POSITIVE (H): ICD-10-CM

## 2025-07-30 DIAGNOSIS — C50.212 MALIGNANT NEOPLASM OF UPPER-INNER QUADRANT OF LEFT BREAST IN FEMALE, ESTROGEN RECEPTOR POSITIVE (H): Primary | ICD-10-CM

## 2025-07-30 DIAGNOSIS — Z17.0 MALIGNANT NEOPLASM OF UPPER-OUTER QUADRANT OF LEFT BREAST IN FEMALE, ESTROGEN RECEPTOR POSITIVE (H): ICD-10-CM

## 2025-07-30 LAB
ALBUMIN SERPL BCG-MCNC: 3.9 G/DL (ref 3.5–5.2)
ALP SERPL-CCNC: 67 U/L (ref 40–150)
ALT SERPL W P-5'-P-CCNC: 14 U/L (ref 0–50)
ANION GAP SERPL CALCULATED.3IONS-SCNC: 11 MMOL/L (ref 7–15)
AST SERPL W P-5'-P-CCNC: 20 U/L (ref 0–45)
BASOPHILS # BLD AUTO: 0 10E3/UL (ref 0–0.2)
BASOPHILS NFR BLD AUTO: 1 %
BILIRUB SERPL-MCNC: 0.3 MG/DL
BUN SERPL-MCNC: 11.6 MG/DL (ref 6–20)
CALCIUM SERPL-MCNC: 9.1 MG/DL (ref 8.8–10.4)
CHLORIDE SERPL-SCNC: 101 MMOL/L (ref 98–107)
CREAT SERPL-MCNC: 0.73 MG/DL (ref 0.51–0.95)
EGFRCR SERPLBLD CKD-EPI 2021: >90 ML/MIN/1.73M2
EOSINOPHIL # BLD AUTO: 0.2 10E3/UL (ref 0–0.7)
EOSINOPHIL NFR BLD AUTO: 4 %
ERYTHROCYTE [DISTWIDTH] IN BLOOD BY AUTOMATED COUNT: 13.2 % (ref 10–15)
GLUCOSE SERPL-MCNC: 81 MG/DL (ref 70–99)
HCO3 SERPL-SCNC: 24 MMOL/L (ref 22–29)
HCT VFR BLD AUTO: 32.9 % (ref 35–47)
HGB BLD-MCNC: 11.1 G/DL (ref 11.7–15.7)
IMM GRANULOCYTES # BLD: 0 10E3/UL
IMM GRANULOCYTES NFR BLD: 1 %
LDH SERPL L TO P-CCNC: 163 U/L (ref 0–250)
LYMPHOCYTES # BLD AUTO: 1.1 10E3/UL (ref 0.8–5.3)
LYMPHOCYTES NFR BLD AUTO: 25 %
MCH RBC QN AUTO: 29.8 PG (ref 26.5–33)
MCHC RBC AUTO-ENTMCNC: 33.7 G/DL (ref 31.5–36.5)
MCV RBC AUTO: 88 FL (ref 78–100)
MONOCYTES # BLD AUTO: 0.4 10E3/UL (ref 0–1.3)
MONOCYTES NFR BLD AUTO: 10 %
NEUTROPHILS # BLD AUTO: 2.6 10E3/UL (ref 1.6–8.3)
NEUTROPHILS NFR BLD AUTO: 61 %
NRBC # BLD AUTO: 0 10E3/UL
NRBC BLD AUTO-RTO: 0 /100
PLATELET # BLD AUTO: 209 10E3/UL (ref 150–450)
POTASSIUM SERPL-SCNC: 3.8 MMOL/L (ref 3.4–5.3)
PROT SERPL-MCNC: 6.5 G/DL (ref 6.4–8.3)
RBC # BLD AUTO: 3.72 10E6/UL (ref 3.8–5.2)
SODIUM SERPL-SCNC: 136 MMOL/L (ref 135–145)
WBC # BLD AUTO: 4.3 10E3/UL (ref 4–11)

## 2025-07-30 PROCEDURE — 82310 ASSAY OF CALCIUM: CPT

## 2025-07-30 PROCEDURE — 83615 LACTATE (LD) (LDH) ENZYME: CPT

## 2025-07-30 PROCEDURE — 85041 AUTOMATED RBC COUNT: CPT

## 2025-07-30 PROCEDURE — 250N000011 HC RX IP 250 OP 636: Performed by: INTERNAL MEDICINE

## 2025-07-30 PROCEDURE — 96413 CHEMO IV INFUSION 1 HR: CPT

## 2025-07-30 PROCEDURE — G2211 COMPLEX E/M VISIT ADD ON: HCPCS | Performed by: INTERNAL MEDICINE

## 2025-07-30 PROCEDURE — 258N000003 HC RX IP 258 OP 636: Performed by: INTERNAL MEDICINE

## 2025-07-30 PROCEDURE — 99214 OFFICE O/P EST MOD 30 MIN: CPT | Performed by: INTERNAL MEDICINE

## 2025-07-30 PROCEDURE — 36591 DRAW BLOOD OFF VENOUS DEVICE: CPT

## 2025-07-30 PROCEDURE — 99212 OFFICE O/P EST SF 10 MIN: CPT | Performed by: INTERNAL MEDICINE

## 2025-07-30 RX ORDER — DIPHENHYDRAMINE HCL 50 MG
50 CAPSULE ORAL
Status: CANCELLED
Start: 2025-07-30

## 2025-07-30 RX ORDER — EPINEPHRINE 1 MG/ML
0.3 INJECTION, SOLUTION INTRAMUSCULAR; SUBCUTANEOUS EVERY 5 MIN PRN
Status: CANCELLED | OUTPATIENT
Start: 2025-07-30

## 2025-07-30 RX ORDER — MEPERIDINE HYDROCHLORIDE 50 MG/ML
25 INJECTION INTRAMUSCULAR; INTRAVENOUS; SUBCUTANEOUS
Status: CANCELLED | OUTPATIENT
Start: 2025-07-30

## 2025-07-30 RX ORDER — DIPHENHYDRAMINE HYDROCHLORIDE 50 MG/ML
25 INJECTION, SOLUTION INTRAMUSCULAR; INTRAVENOUS
Status: DISCONTINUED | OUTPATIENT
Start: 2025-07-30 | End: 2025-07-30 | Stop reason: HOSPADM

## 2025-07-30 RX ORDER — DIPHENHYDRAMINE HYDROCHLORIDE 50 MG/ML
50 INJECTION, SOLUTION INTRAMUSCULAR; INTRAVENOUS
Status: DISCONTINUED | OUTPATIENT
Start: 2025-07-30 | End: 2025-07-30 | Stop reason: HOSPADM

## 2025-07-30 RX ORDER — HEPARIN SODIUM (PORCINE) LOCK FLUSH IV SOLN 100 UNIT/ML 100 UNIT/ML
5 SOLUTION INTRAVENOUS
Status: CANCELLED | OUTPATIENT
Start: 2025-07-30

## 2025-07-30 RX ORDER — ACETAMINOPHEN 325 MG/1
650 TABLET ORAL
Status: CANCELLED
Start: 2025-07-30

## 2025-07-30 RX ORDER — EPINEPHRINE 1 MG/ML
0.3 INJECTION, SOLUTION INTRAMUSCULAR; SUBCUTANEOUS EVERY 5 MIN PRN
Status: DISCONTINUED | OUTPATIENT
Start: 2025-07-30 | End: 2025-07-30 | Stop reason: HOSPADM

## 2025-07-30 RX ORDER — ALBUTEROL SULFATE 0.83 MG/ML
2.5 SOLUTION RESPIRATORY (INHALATION)
Status: DISCONTINUED | OUTPATIENT
Start: 2025-07-30 | End: 2025-07-30 | Stop reason: HOSPADM

## 2025-07-30 RX ORDER — ANASTROZOLE 1 MG/1
1 TABLET ORAL DAILY
Qty: 90 TABLET | Refills: 5 | Status: SHIPPED | OUTPATIENT
Start: 2025-07-30

## 2025-07-30 RX ORDER — LORAZEPAM 2 MG/ML
0.5 INJECTION INTRAMUSCULAR EVERY 4 HOURS PRN
Status: CANCELLED | OUTPATIENT
Start: 2025-07-30

## 2025-07-30 RX ORDER — ALBUTEROL SULFATE 90 UG/1
1-2 INHALANT RESPIRATORY (INHALATION)
Status: DISCONTINUED | OUTPATIENT
Start: 2025-07-30 | End: 2025-07-30 | Stop reason: HOSPADM

## 2025-07-30 RX ORDER — DIPHENHYDRAMINE HYDROCHLORIDE 50 MG/ML
50 INJECTION, SOLUTION INTRAMUSCULAR; INTRAVENOUS
Status: CANCELLED
Start: 2025-07-30

## 2025-07-30 RX ORDER — HEPARIN SODIUM (PORCINE) LOCK FLUSH IV SOLN 100 UNIT/ML 100 UNIT/ML
5 SOLUTION INTRAVENOUS
Status: DISCONTINUED | OUTPATIENT
Start: 2025-07-30 | End: 2025-07-30 | Stop reason: HOSPADM

## 2025-07-30 RX ORDER — ALBUTEROL SULFATE 0.83 MG/ML
2.5 SOLUTION RESPIRATORY (INHALATION)
Status: CANCELLED | OUTPATIENT
Start: 2025-07-30

## 2025-07-30 RX ORDER — METHYLPREDNISOLONE SODIUM SUCCINATE 40 MG/ML
40 INJECTION INTRAMUSCULAR; INTRAVENOUS
Status: CANCELLED
Start: 2025-07-30

## 2025-07-30 RX ORDER — ALBUTEROL SULFATE 90 UG/1
1-2 INHALANT RESPIRATORY (INHALATION)
Status: CANCELLED
Start: 2025-07-30

## 2025-07-30 RX ORDER — METHYLPREDNISOLONE SODIUM SUCCINATE 40 MG/ML
40 INJECTION INTRAMUSCULAR; INTRAVENOUS
Status: DISCONTINUED | OUTPATIENT
Start: 2025-07-30 | End: 2025-07-30 | Stop reason: HOSPADM

## 2025-07-30 RX ORDER — HEPARIN SODIUM,PORCINE 10 UNIT/ML
5-20 VIAL (ML) INTRAVENOUS DAILY PRN
Status: CANCELLED | OUTPATIENT
Start: 2025-07-30

## 2025-07-30 RX ORDER — DIPHENHYDRAMINE HYDROCHLORIDE 50 MG/ML
25 INJECTION, SOLUTION INTRAMUSCULAR; INTRAVENOUS
Status: CANCELLED
Start: 2025-07-30

## 2025-07-30 RX ORDER — MEPERIDINE HYDROCHLORIDE 50 MG/ML
25 INJECTION INTRAMUSCULAR; INTRAVENOUS; SUBCUTANEOUS
Status: DISCONTINUED | OUTPATIENT
Start: 2025-07-30 | End: 2025-07-30 | Stop reason: HOSPADM

## 2025-07-30 RX ADMIN — HEPARIN 5 ML: 100 SYRINGE at 13:47

## 2025-07-30 RX ADMIN — SODIUM CHLORIDE 470 MG: 9 INJECTION, SOLUTION INTRAVENOUS at 13:13

## 2025-07-30 ASSESSMENT — PAIN SCALES - GENERAL: PAINLEVEL_OUTOF10: NO PAIN (0)

## 2025-07-30 NOTE — PROGRESS NOTES
"Oncology Rooming Note    July 30, 2025 11:38 AM   Adrienne Ivory is a 59 year old female who presents for:    Chief Complaint   Patient presents with    Oncology Clinic Visit     Malignant neoplasm of upper-outer quadrant of left breast in female, estrogen receptor positive     Initial Vitals: BP (!) 141/78 (BP Location: Left arm, Patient Position: Sitting, Cuff Size: Adult Regular)   Pulse 74   Temp 97.5  F (36.4  C) (Tympanic)   Resp 16   Ht 1.651 m (5' 5\")   Wt 82.1 kg (181 lb)   SpO2 96%   BMI 30.12 kg/m   Estimated body mass index is 30.12 kg/m  as calculated from the following:    Height as of this encounter: 1.651 m (5' 5\").    Weight as of this encounter: 82.1 kg (181 lb). Body surface area is 1.94 meters squared.  No Pain (0) Comment: Data Unavailable   No LMP recorded. Patient has had an ablation.  Allergies reviewed: Yes  Medications reviewed: Yes    Medications: Medication refills not needed today.  Pharmacy name entered into [facility]:    [facility] - [place], [place] - [address] [facility] #[id number] [place], [place] - [address] [place] [address] MAGALY JOYNER AT [address] HOME [facility] [place], [place] - [address] [place] [address]    PHQ9:  Did this patient require a PHQ9?: No      Clinical concerns:  labs and treatment       Floridalma Navarrete            "

## 2025-07-30 NOTE — PROGRESS NOTES
Infusion Nursing Note:  Adrienne Ivory presents today for D1C6 trastuzumab.    Patient seen by provider today: Yes: Dr. Harkins   present during visit today: Not Applicable.    Note: Patient amb into clinic, denies any new complaints.    Premeds Given: none    Intravenous Access:  Labs drawn and Implanted Port accessed by Maritza LEAHY; sluggish return throughout infusion.    Treatment Conditions:  Lab Results   Component Value Date    HGB 11.1 (L) 07/30/2025    WBC 4.3 07/30/2025    ANEU 2.6 07/30/2025     07/30/2025        Lab Results   Component Value Date     07/30/2025    POTASSIUM 3.8 07/30/2025    MAG 1.6 (L) 01/29/2025    CR 0.73 07/30/2025    CONNIE 9.1 07/30/2025    BILITOTAL 0.3 07/30/2025    ALBUMIN 3.9 07/30/2025    ALT 14 07/30/2025    AST 20 07/30/2025       Results reviewed, labs MET treatment parameters, ok to proceed with treatment.      Post Infusion Assessment:  Patient tolerated infusion without incident.  Blood return noted pre and post infusion.  Site patent and intact, free from redness, edema or discomfort.  Access discontinued per protocol.       Discharge Plan:   Patient will return 8/20 for next appointment.   Patient discharged in stable condition accompanied by: self.  Departure Mode: Ambulatory.      Daylin Sal RN

## 2025-07-30 NOTE — LETTER
"7/30/2025      Adrienne Ivory  [address] [place] [address]      Dear Colleague,    Thank you for referring your patient, Adrienne Ivory, to the [facility] [place]. Please see a copy of my visit note below.    Oncology Rooming Note    July 30, 2025 11:38 AM   Adrienne Ivory is a 59 year old female who presents for:    Chief Complaint   Patient presents with     Oncology Clinic Visit     Malignant neoplasm of upper-outer quadrant of left breast in female, estrogen receptor positive     Initial Vitals: BP (!) 141/78 (BP Location: Left arm, Patient Position: Sitting, Cuff Size: Adult Regular)   Pulse 74   Temp 97.5  F (36.4  C) (Tympanic)   Resp 16   Ht 1.651 m (5' 5\")   Wt 82.1 kg (181 lb)   SpO2 96%   BMI 30.12 kg/m   Estimated body mass index is 30.12 kg/m  as calculated from the following:    Height as of this encounter: 1.651 m (5' 5\").    Weight as of this encounter: 82.1 kg (181 lb). Body surface area is 1.94 meters squared.  No Pain (0) Comment: Data Unavailable   No LMP recorded. Patient has had an ablation.  Allergies reviewed: Yes  Medications reviewed: Yes    Medications: Medication refills not needed today.  Pharmacy name entered into [facility]:    [facility] - [place], [place] - [address] [facility] #[id number] AT [address] HOME DELIVERY [address]    PHQ9:  Did this patient require a PHQ9?: No      Clinical concerns:  labs and treatment       Floridalma Navarrete              [facility] Hematology and Oncology Progress Note    Patient: Adrienne Ivory  MRN: [id number]  Date of Service: Jul 30, 2025             ECOG Performance    0 - Independent          ______________________________________________________________________________  Oncologic history  T2 N3 M0 stage IIIc invasive ductal carcinoma of the left breast ER positive " NE positive HER2/wolf 3+.  November 2024  Patient had multiple level 1 level 2 and level 3 axillary lymph nodes involved    Treatment  1.Patient started on neoadjuvant TCHP on 12/12/2024  2.Near complete radiologic remission after 3 cycles of TCHP  3.Completed 6 cycle of TCHP on 3/27/2025  4.Switched to single agent trastuzumab on 4/17/2025 with plans to change further treatment based on    pathologic response  5.  Complete pathologic response at the time of surgery.  Patient underwent bilateral mastectomies on 29 April 2025  6.  Plan to complete 1 year of trastuzumab which will finish in December 2025  7.  Patient completed adjuvant radiation to the breast on 7/8/2025 received 5000 cGy in 25 fractions   8. patient started on adjuvant Arimidex on 7/30/2025    History of Present Illness    Ms. Adrienne Ivory is here for follow-up.  She feels great.  She has no residual side effects from radiation.  She just complains of some tenderness on the chest wall where she was radiated.  She otherwise reports improvement in her appetite and energy level.  Her mood and anxiety associated with the diagnosis is also improving.    Review of systems  A comprehensive 12 point review of system was done that was negative except what is mentioned in the history of present illness    Past History    Past Medical History:   Diagnosis Date     Abnormal Pap smear, can't excl hi gd sq intraepithelial lesion (ASC-H) 03/01/2015    LSIL also     Anxiety state, unspecified      Herpes simplex without mention of complication      HSIL on Pap smear of cervix 07/01/2014    colp - WNL     Human papillomavirus in conditions classified elsewhere and of unspecified site 11/01/2010    + HPV 45 & 82 with ASCUS     Hx of colposcopy with cervical biopsy 11/08/2016    [place] ECC neg.      Obese      PONV (postoperative nausea and vomiting)      Premenstrual tension syndromes        Past Surgical History:   Procedure Laterality Date     C IUD,MIRENA   "2/08-3/11    removed for cramping     COLONOSCOPY N/A 12/4/2020    Procedure: COLONOSCOPY, WITH POLYPECTOMY;  Surgeon: Moises Pride MD;  Location: [facility] OR     DILATION AND CURETTAGE, ABLATE ENDOMETRIUM NOVASURE, COMBINED N/A 12/31/2015    Procedure: COMBINED DILATION AND CURETTAGE, ABLATE ENDOMETRIUM NOVASURE;  Surgeon: Shakira Penaloza MD;  [facility]     DISSECT LYMPH NODE AXILLA Left 4/29/2025    Procedure: WITH LEFT TARGETED AXILLARY DISSECTION;  Surgeon: Katerine Matthews DO;  Location: [facility] OR     HYSTEROSCOPY DIAGNOSTIC N/A 12/31/2015    Procedure: HYSTEROSCOPY DIAGNOSTIC;  Surgeon: Shakira Penaloza MD;  [facility]     LEEP TX, CERVICAL  4/3/15    ARNAV 2-3, negative margins     MASTECTOMY SIMPLE Bilateral 4/29/2025    Procedure: BILATERAL MASTECTOMY;  Surgeon: Katerine Matthews DO;  Location: [facility] Main OR     NO HISTORY OF SURGERY         Physical Exam    BP (!) 141/78 (BP Location: Left arm, Patient Position: Sitting, Cuff Size: Adult Regular)   Pulse 74   Temp 97.5  F (36.4  C) (Tympanic)   Resp 16   Ht 1.651 m (5' 5\")   Wt 82.1 kg (181 lb)   SpO2 96%   BMI 30.12 kg/m      General: alert, awake, not in acute distress  HEENT: Head: Normal, normocephalic, atraumatic.  Eye: Normal external eye, conjunctiva, lids cornea, SITA.  Nose: Normal external nose, mucus membranes and septum.  Pharynx: Normal buccal mucosa. Normal pharynx.  Neck / Thyroid: Supple, no masses, nodes, nodules or enlargement.  Lymphatics: No abnormally enlarged lymph nodes.  Chest: Normal chest wall and respirations. Clear to auscultation.  No crackles or rhonchi's  Heart: S1 S2 RRR, no murmur.   Abdomen: abdomen is soft without significant tenderness, masses, organomegaly or guarding  Extremities: normal strength, tone, and muscle mass  Skin: normal. no rash or abnormalities  CNS: non focal.    Breast exam on the left side did show shrinkage in the mass I could still feel a lymph node in the " axilla      Lab Results    Recent Results (from the past 240 hours)   Comprehensive metabolic panel    Collection Time: 07/30/25 11:15 AM   Result Value Ref Range    Sodium 136 135 - 145 mmol/L    Potassium 3.8 3.4 - 5.3 mmol/L    Carbon Dioxide (CO2) 24 22 - 29 mmol/L    Anion Gap 11 7 - 15 mmol/L    Urea Nitrogen 11.6 6.0 - 20.0 mg/dL    Creatinine 0.73 0.51 - 0.95 mg/dL    GFR Estimate >90 >60 mL/min/1.73m2    Calcium 9.1 8.8 - 10.4 mg/dL    Chloride 101 98 - 107 mmol/L    Glucose 81 70 - 99 mg/dL    Alkaline Phosphatase 67 40 - 150 U/L    AST 20 0 - 45 U/L    ALT 14 0 - 50 U/L    Protein Total 6.5 6.4 - 8.3 g/dL    Albumin 3.9 3.5 - 5.2 g/dL    Bilirubin Total 0.3 <=1.2 mg/dL   Lactate Dehydrogenase    Collection Time: 07/30/25 11:15 AM   Result Value Ref Range    Lactate Dehydrogenase 163 0 - 250 U/L   CBC with platelets and differential    Collection Time: 07/30/25 11:15 AM   Result Value Ref Range    WBC Count 4.3 4.0 - 11.0 10e3/uL    RBC Count 3.72 (L) 3.80 - 5.20 10e6/uL    Hemoglobin 11.1 (L) 11.7 - 15.7 g/dL    Hematocrit 32.9 (L) 35.0 - 47.0 %    MCV 88 78 - 100 fL    MCH 29.8 26.5 - 33.0 pg    MCHC 33.7 31.5 - 36.5 g/dL    RDW 13.2 10.0 - 15.0 %    Platelet Count 209 150 - 450 10e3/uL    % Neutrophils 61 %    % Lymphocytes 25 %    % Monocytes 10 %    % Eosinophils 4 %    % Basophils 1 %    % Immature Granulocytes 1 %    NRBCs per 100 WBC 0 <1 /100    Absolute Neutrophils 2.6 1.6 - 8.3 10e3/uL    Absolute Lymphocytes 1.1 0.8 - 5.3 10e3/uL    Absolute Monocytes 0.4 0.0 - 1.3 10e3/uL    Absolute Eosinophils 0.2 0.0 - 0.7 10e3/uL    Absolute Basophils 0.0 0.0 - 0.2 10e3/uL    Absolute Immature Granulocytes 0.0 <=0.4 10e3/uL    Absolute NRBCs 0.0 10e3/uL           Imaging    No results found.          Assessment and Plan    Stage IIIc breast cancer HER2 positive, ER/[place] positive  Patient is here in follow-up.  She is here for her next dose of trastuzumab.  She is tolerated the radiation well and continues on  trastuzumab without any issues.  She will get a dose today and will come back in 3 weeks and echo will be done prior to that visit.    Patient also needs to be started on hormonal therapy a prescription for anastrozole 1 mg p.o. daily has been called and she will start it from today.  The side effects including but not limited to hot flashes night sweats arthralgias arthritis osteopenia osteoporotic fractures were all discussed with the patient.  Patient will also start taking calcium and vitamin D.  A baseline bone density scan will also be done.    Cardiac monitoring  Patient does needs continued cardiac monitoring.  An echo will be done prior to her next dose.          Signed by: Josseline Harkins MD        CC: Kallie Ta NP     Again, thank you for allowing me to participate in the care of your patient.        Sincerely,        Josseline Harkins MD    Electronically signed

## 2025-07-30 NOTE — PROGRESS NOTES
[facility] Hematology and Oncology Progress Note    Patient: Adrienne Ivory  MRN: [id number]  Date of Service: Jul 30, 2025             ECOG Performance    0 - Independent          ______________________________________________________________________________  Oncologic history  T2 N3 M0 stage IIIc invasive ductal carcinoma of the left breast ER positive HI positive HER2/wolf 3+.  November 2024  Patient had multiple level 1 level 2 and level 3 axillary lymph nodes involved    Treatment  1.Patient started on neoadjuvant TCHP on 12/12/2024  2.Near complete radiologic remission after 3 cycles of TCHP  3.Completed 6 cycle of TCHP on 3/27/2025  4.Switched to single agent trastuzumab on 4/17/2025 with plans to change further treatment based on    pathologic response  5.  Complete pathologic response at the time of surgery.  Patient underwent bilateral mastectomies on 29 April 2025  6.  Plan to complete 1 year of trastuzumab which will finish in December 2025  7.  Patient completed adjuvant radiation to the breast on 7/8/2025 received 5000 cGy in 25 fractions   8. patient started on adjuvant Arimidex on 7/30/2025    History of Present Illness    Ms. Adrienne Ivory is here for follow-up.  She feels great.  She has no residual side effects from radiation.  She just complains of some tenderness on the chest wall where she was radiated.  She otherwise reports improvement in her appetite and energy level.  Her mood and anxiety associated with the diagnosis is also improving.    Review of systems  A comprehensive 12 point review of system was done that was negative except what is mentioned in the history of present illness    Past History    Past Medical History:   Diagnosis Date    Abnormal Pap smear, can't excl hi gd sq intraepithelial lesion (ASC-H) 03/01/2015    LSIL also    Anxiety state, unspecified     Herpes simplex without mention of complication     HSIL on Pap smear of cervix 07/01/2014    colp - WNL     "Human papillomavirus in conditions classified elsewhere and of unspecified site 11/01/2010    + HPV 45 & 82 with ASCUS    Hx of colposcopy with cervical biopsy 11/08/2016    [place] [facility] neg.     Obese     PONV (postoperative nausea and vomiting)     Premenstrual tension syndromes        Past Surgical History:   Procedure Laterality Date    C IUD,MIRENA  2/08-3/11    removed for cramping    COLONOSCOPY N/A 12/4/2020    Procedure: COLONOSCOPY, WITH POLYPECTOMY;  Surgeon: Moises Pride MD;  Location: [facility] OR    DILATION AND CURETTAGE, ABLATE ENDOMETRIUM NOVASURE, COMBINED N/A 12/31/2015    Procedure: COMBINED DILATION AND CURETTAGE, ABLATE ENDOMETRIUM NOVASURE;  Surgeon: Shakira Penaloza MD;  Location: [facility] OR    DISSECT LYMPH NODE AXILLA Left 4/29/2025    Procedure: WITH LEFT TARGETED AXILLARY DISSECTION;  Surgeon: Katerine Matthews DO;  Location: [facility] Main OR    HYSTEROSCOPY DIAGNOSTIC N/A 12/31/2015    Procedure: HYSTEROSCOPY DIAGNOSTIC;  Surgeon: Shakira Penaloza MD;  Location: [facility] OR    LEEP TX, CERVICAL  4/3/15    ARNAV 2-3, negative margins    MASTECTOMY SIMPLE Bilateral 4/29/2025    Procedure: BILATERAL MASTECTOMY;  Surgeon: Katerine Matthews DO;  Location: [facility] Main OR    NO HISTORY OF SURGERY         Physical Exam    BP (!) 141/78 (BP Location: Left arm, Patient Position: Sitting, Cuff Size: Adult Regular)   Pulse 74   Temp 97.5  F (36.4  C) (Tympanic)   Resp 16   Ht 1.651 m (5' 5\")   Wt 82.1 kg (181 lb)   SpO2 96%   BMI 30.12 kg/m      General: alert, awake, not in acute distress  HEENT: Head: Normal, normocephalic, atraumatic.  Eye: Normal external eye, conjunctiva, lids cornea, SITA.  Nose: Normal external nose, mucus membranes and septum.  Pharynx: Normal buccal mucosa. Normal pharynx.  Neck / Thyroid: Supple, no masses, nodes, nodules or enlargement.  Lymphatics: No abnormally enlarged lymph nodes.  Chest: Normal chest wall and respirations. Clear to auscultation.  No crackles or " rhonchi's  Heart: S1 S2 RRR, no murmur.   Abdomen: abdomen is soft without significant tenderness, masses, organomegaly or guarding  Extremities: normal strength, tone, and muscle mass  Skin: normal. no rash or abnormalities  CNS: non focal.    Breast exam on the left side did show shrinkage in the mass I could still feel a lymph node in the axilla      Lab Results    Recent Results (from the past 240 hours)   Comprehensive metabolic panel    Collection Time: 07/30/25 11:15 AM   Result Value Ref Range    Sodium 136 135 - 145 mmol/L    Potassium 3.8 3.4 - 5.3 mmol/L    Carbon Dioxide (CO2) 24 22 - 29 mmol/L    Anion Gap 11 7 - 15 mmol/L    Urea Nitrogen 11.6 6.0 - 20.0 mg/dL    Creatinine 0.73 0.51 - 0.95 mg/dL    GFR Estimate >90 >60 mL/min/1.73m2    Calcium 9.1 8.8 - 10.4 mg/dL    Chloride 101 98 - 107 mmol/L    Glucose 81 70 - 99 mg/dL    Alkaline Phosphatase 67 40 - 150 U/L    AST 20 0 - 45 U/L    ALT 14 0 - 50 U/L    Protein Total 6.5 6.4 - 8.3 g/dL    Albumin 3.9 3.5 - 5.2 g/dL    Bilirubin Total 0.3 <=1.2 mg/dL   Lactate Dehydrogenase    Collection Time: 07/30/25 11:15 AM   Result Value Ref Range    Lactate Dehydrogenase 163 0 - 250 U/L   CBC with platelets and differential    Collection Time: 07/30/25 11:15 AM   Result Value Ref Range    WBC Count 4.3 4.0 - 11.0 10e3/uL    RBC Count 3.72 (L) 3.80 - 5.20 10e6/uL    Hemoglobin 11.1 (L) 11.7 - 15.7 g/dL    Hematocrit 32.9 (L) 35.0 - 47.0 %    MCV 88 78 - 100 fL    MCH 29.8 26.5 - 33.0 pg    MCHC 33.7 31.5 - 36.5 g/dL    RDW 13.2 10.0 - 15.0 %    Platelet Count 209 150 - 450 10e3/uL    % Neutrophils 61 %    % Lymphocytes 25 %    % Monocytes 10 %    % Eosinophils 4 %    % Basophils 1 %    % Immature Granulocytes 1 %    NRBCs per 100 WBC 0 <1 /100    Absolute Neutrophils 2.6 1.6 - 8.3 10e3/uL    Absolute Lymphocytes 1.1 0.8 - 5.3 10e3/uL    Absolute Monocytes 0.4 0.0 - 1.3 10e3/uL    Absolute Eosinophils 0.2 0.0 - 0.7 10e3/uL    Absolute Basophils 0.0 0.0 - 0.2  10e3/uL    Absolute Immature Granulocytes 0.0 <=0.4 10e3/uL    Absolute NRBCs 0.0 10e3/uL           Imaging    No results found.          Assessment and Plan    Stage IIIc breast cancer HER2 positive, ER/NM positive  Patient is here in follow-up.  She is here for her next dose of trastuzumab.  She is tolerated the radiation well and continues on trastuzumab without any issues.  She will get a dose today and will come back in 3 weeks and echo will be done prior to that visit.    Patient also needs to be started on hormonal therapy a prescription for anastrozole 1 mg p.o. daily has been called and she will start it from today.  The side effects including but not limited to hot flashes night sweats arthralgias arthritis osteopenia osteoporotic fractures were all discussed with the patient.  Patient will also start taking calcium and vitamin D.  A baseline bone density scan will also be done.    Cardiac monitoring  Patient does needs continued cardiac monitoring.  An echo will be done prior to her next dose.          Signed by: Josseline Harkins MD        CC: Kallie Ta NP

## 2025-08-05 ENCOUNTER — OFFICE VISIT (OUTPATIENT)
Dept: RADIATION ONCOLOGY | Facility: CLINIC | Age: 60
End: 2025-08-05
Attending: RADIOLOGY
Payer: COMMERCIAL

## 2025-08-05 VITALS
WEIGHT: 179.1 LBS | BODY MASS INDEX: 29.8 KG/M2 | SYSTOLIC BLOOD PRESSURE: 128 MMHG | DIASTOLIC BLOOD PRESSURE: 75 MMHG | OXYGEN SATURATION: 97 % | HEART RATE: 81 BPM

## 2025-08-05 DIAGNOSIS — C50.212 MALIGNANT NEOPLASM OF UPPER-INNER QUADRANT OF LEFT BREAST IN FEMALE, ESTROGEN RECEPTOR POSITIVE (H): Primary | ICD-10-CM

## 2025-08-05 DIAGNOSIS — Z17.0 MALIGNANT NEOPLASM OF UPPER-INNER QUADRANT OF LEFT BREAST IN FEMALE, ESTROGEN RECEPTOR POSITIVE (H): Primary | ICD-10-CM

## 2025-08-05 PROCEDURE — 3078F DIAST BP <80 MM HG: CPT | Performed by: RADIOLOGY

## 2025-08-05 PROCEDURE — 3074F SYST BP LT 130 MM HG: CPT | Performed by: RADIOLOGY

## 2025-08-05 PROCEDURE — 1125F AMNT PAIN NOTED PAIN PRSNT: CPT | Performed by: RADIOLOGY

## 2025-08-05 RX ORDER — TRETINOIN 0.25 MG/G
CREAM TOPICAL AT BEDTIME
COMMUNITY
Start: 2025-07-10

## 2025-08-05 ASSESSMENT — PAIN SCALES - GENERAL: PAINLEVEL_OUTOF10: MODERATE PAIN (4)

## 2025-08-14 ENCOUNTER — HOSPITAL ENCOUNTER (OUTPATIENT)
Dept: CARDIOLOGY | Facility: CLINIC | Age: 60
End: 2025-08-14
Attending: INTERNAL MEDICINE
Payer: COMMERCIAL

## 2025-08-14 DIAGNOSIS — C50.212 MALIGNANT NEOPLASM OF UPPER-INNER QUADRANT OF LEFT BREAST IN FEMALE, ESTROGEN RECEPTOR POSITIVE (H): ICD-10-CM

## 2025-08-14 DIAGNOSIS — Z17.0 MALIGNANT NEOPLASM OF UPPER-INNER QUADRANT OF LEFT BREAST IN FEMALE, ESTROGEN RECEPTOR POSITIVE (H): ICD-10-CM

## 2025-08-14 DIAGNOSIS — Z51.11 ENCOUNTER FOR ANTINEOPLASTIC CHEMOTHERAPY: ICD-10-CM

## 2025-08-14 LAB — LVEF ECHO: NORMAL

## 2025-08-14 PROCEDURE — 93356 MYOCRD STRAIN IMG SPCKL TRCK: CPT

## 2025-08-20 ENCOUNTER — PATIENT OUTREACH (OUTPATIENT)
Dept: ONCOLOGY | Facility: HOSPITAL | Age: 60
End: 2025-08-20

## 2025-08-20 ENCOUNTER — ONCOLOGY VISIT (OUTPATIENT)
Dept: ONCOLOGY | Facility: HOSPITAL | Age: 60
End: 2025-08-20
Attending: INTERNAL MEDICINE
Payer: COMMERCIAL

## 2025-08-20 ENCOUNTER — PATIENT OUTREACH (OUTPATIENT)
Dept: CARE COORDINATION | Facility: CLINIC | Age: 60
End: 2025-08-20

## 2025-08-20 ENCOUNTER — INFUSION THERAPY VISIT (OUTPATIENT)
Dept: INFUSION THERAPY | Facility: HOSPITAL | Age: 60
End: 2025-08-20
Attending: INTERNAL MEDICINE
Payer: COMMERCIAL

## 2025-08-20 VITALS
WEIGHT: 182 LBS | BODY MASS INDEX: 30.32 KG/M2 | RESPIRATION RATE: 16 BRPM | TEMPERATURE: 96.9 F | DIASTOLIC BLOOD PRESSURE: 75 MMHG | OXYGEN SATURATION: 98 % | HEART RATE: 58 BPM | SYSTOLIC BLOOD PRESSURE: 125 MMHG | HEIGHT: 65 IN

## 2025-08-20 VITALS
TEMPERATURE: 96.9 F | OXYGEN SATURATION: 98 % | SYSTOLIC BLOOD PRESSURE: 125 MMHG | DIASTOLIC BLOOD PRESSURE: 75 MMHG | HEART RATE: 58 BPM | RESPIRATION RATE: 16 BRPM

## 2025-08-20 DIAGNOSIS — C50.212 MALIGNANT NEOPLASM OF UPPER-INNER QUADRANT OF LEFT BREAST IN FEMALE, ESTROGEN RECEPTOR POSITIVE (H): Primary | ICD-10-CM

## 2025-08-20 DIAGNOSIS — Z51.11 ENCOUNTER FOR ANTINEOPLASTIC CHEMOTHERAPY: ICD-10-CM

## 2025-08-20 DIAGNOSIS — Z17.0 MALIGNANT NEOPLASM OF UPPER-INNER QUADRANT OF LEFT BREAST IN FEMALE, ESTROGEN RECEPTOR POSITIVE (H): Primary | ICD-10-CM

## 2025-08-20 DIAGNOSIS — F41.9 ANXIETY: ICD-10-CM

## 2025-08-20 PROCEDURE — 99214 OFFICE O/P EST MOD 30 MIN: CPT

## 2025-08-20 PROCEDURE — 250N000011 HC RX IP 250 OP 636

## 2025-08-20 PROCEDURE — G2211 COMPLEX E/M VISIT ADD ON: HCPCS

## 2025-08-20 PROCEDURE — 258N000003 HC RX IP 258 OP 636

## 2025-08-20 PROCEDURE — 96413 CHEMO IV INFUSION 1 HR: CPT

## 2025-08-20 RX ORDER — ALBUTEROL SULFATE 0.83 MG/ML
2.5 SOLUTION RESPIRATORY (INHALATION)
Status: CANCELLED | OUTPATIENT
Start: 2025-08-20

## 2025-08-20 RX ORDER — MEPERIDINE HYDROCHLORIDE 50 MG/ML
25 INJECTION INTRAMUSCULAR; INTRAVENOUS; SUBCUTANEOUS
Status: CANCELLED | OUTPATIENT
Start: 2025-08-20

## 2025-08-20 RX ORDER — HEPARIN SODIUM,PORCINE 10 UNIT/ML
5-20 VIAL (ML) INTRAVENOUS DAILY PRN
Status: CANCELLED | OUTPATIENT
Start: 2025-08-20

## 2025-08-20 RX ORDER — DIPHENHYDRAMINE HYDROCHLORIDE 50 MG/ML
25 INJECTION, SOLUTION INTRAMUSCULAR; INTRAVENOUS
Status: CANCELLED
Start: 2025-08-20

## 2025-08-20 RX ORDER — EPINEPHRINE 1 MG/ML
0.3 INJECTION, SOLUTION INTRAMUSCULAR; SUBCUTANEOUS EVERY 5 MIN PRN
Status: CANCELLED | OUTPATIENT
Start: 2025-08-20

## 2025-08-20 RX ORDER — DIPHENHYDRAMINE HCL 50 MG
50 CAPSULE ORAL
Status: CANCELLED
Start: 2025-08-20

## 2025-08-20 RX ORDER — HEPARIN SODIUM (PORCINE) LOCK FLUSH IV SOLN 100 UNIT/ML 100 UNIT/ML
5 SOLUTION INTRAVENOUS
Status: DISCONTINUED | OUTPATIENT
Start: 2025-08-20 | End: 2025-08-20 | Stop reason: HOSPADM

## 2025-08-20 RX ORDER — DIPHENHYDRAMINE HYDROCHLORIDE 50 MG/ML
50 INJECTION, SOLUTION INTRAMUSCULAR; INTRAVENOUS
Status: CANCELLED
Start: 2025-08-20

## 2025-08-20 RX ORDER — LORAZEPAM 2 MG/ML
0.5 INJECTION INTRAMUSCULAR EVERY 4 HOURS PRN
Status: CANCELLED | OUTPATIENT
Start: 2025-08-20

## 2025-08-20 RX ORDER — ALBUTEROL SULFATE 90 UG/1
1-2 INHALANT RESPIRATORY (INHALATION)
Status: CANCELLED
Start: 2025-08-20

## 2025-08-20 RX ORDER — HEPARIN SODIUM (PORCINE) LOCK FLUSH IV SOLN 100 UNIT/ML 100 UNIT/ML
5 SOLUTION INTRAVENOUS
Status: CANCELLED | OUTPATIENT
Start: 2025-08-20

## 2025-08-20 RX ORDER — ACETAMINOPHEN 325 MG/1
650 TABLET ORAL
Status: CANCELLED
Start: 2025-08-20

## 2025-08-20 RX ORDER — METHYLPREDNISOLONE SODIUM SUCCINATE 40 MG/ML
40 INJECTION INTRAMUSCULAR; INTRAVENOUS
Status: CANCELLED
Start: 2025-08-20

## 2025-08-20 RX ADMIN — HEPARIN 5 ML: 100 SYRINGE at 11:49

## 2025-08-20 RX ADMIN — SODIUM CHLORIDE 483 MG: 0.9 INJECTION, SOLUTION INTRAVENOUS at 11:19

## 2025-08-20 ASSESSMENT — PAIN SCALES - GENERAL: PAINLEVEL_OUTOF10: NO PAIN (0)

## 2025-08-22 ENCOUNTER — ANCILLARY PROCEDURE (OUTPATIENT)
Dept: BONE DENSITY | Facility: CLINIC | Age: 60
End: 2025-08-22
Attending: INTERNAL MEDICINE
Payer: COMMERCIAL

## 2025-08-22 DIAGNOSIS — C50.212 MALIGNANT NEOPLASM OF UPPER-INNER QUADRANT OF LEFT BREAST IN FEMALE, ESTROGEN RECEPTOR POSITIVE (H): ICD-10-CM

## 2025-08-22 DIAGNOSIS — Z17.0 MALIGNANT NEOPLASM OF UPPER-INNER QUADRANT OF LEFT BREAST IN FEMALE, ESTROGEN RECEPTOR POSITIVE (H): ICD-10-CM

## 2025-08-22 DIAGNOSIS — Z51.11 ENCOUNTER FOR ANTINEOPLASTIC CHEMOTHERAPY: ICD-10-CM

## 2025-08-22 PROCEDURE — 77080 DXA BONE DENSITY AXIAL: CPT | Mod: TC | Performed by: STUDENT IN AN ORGANIZED HEALTH CARE EDUCATION/TRAINING PROGRAM

## 2025-08-26 ENCOUNTER — PATIENT OUTREACH (OUTPATIENT)
Dept: ONCOLOGY | Facility: HOSPITAL | Age: 60
End: 2025-08-26
Payer: COMMERCIAL

## 2025-09-02 ENCOUNTER — MYC MEDICAL ADVICE (OUTPATIENT)
Dept: ONCOLOGY | Facility: HOSPITAL | Age: 60
End: 2025-09-02
Payer: COMMERCIAL

## 2025-09-02 DIAGNOSIS — Z17.0 MALIGNANT NEOPLASM OF UPPER-INNER QUADRANT OF LEFT BREAST IN FEMALE, ESTROGEN RECEPTOR POSITIVE (H): Primary | ICD-10-CM

## 2025-09-02 DIAGNOSIS — C50.212 MALIGNANT NEOPLASM OF UPPER-INNER QUADRANT OF LEFT BREAST IN FEMALE, ESTROGEN RECEPTOR POSITIVE (H): Primary | ICD-10-CM

## 2025-09-03 ENCOUNTER — THERAPY VISIT (OUTPATIENT)
Dept: PHYSICAL THERAPY | Facility: CLINIC | Age: 60
End: 2025-09-03
Payer: COMMERCIAL

## 2025-09-03 DIAGNOSIS — Z17.0 MALIGNANT NEOPLASM OF UPPER-INNER QUADRANT OF LEFT BREAST IN FEMALE, ESTROGEN RECEPTOR POSITIVE (H): ICD-10-CM

## 2025-09-03 DIAGNOSIS — C50.212 MALIGNANT NEOPLASM OF UPPER-INNER QUADRANT OF LEFT BREAST IN FEMALE, ESTROGEN RECEPTOR POSITIVE (H): ICD-10-CM

## 2025-09-03 PROCEDURE — 97161 PT EVAL LOW COMPLEX 20 MIN: CPT | Mod: GP | Performed by: PHYSICAL THERAPIST

## 2025-09-03 PROCEDURE — 97140 MANUAL THERAPY 1/> REGIONS: CPT | Mod: GP | Performed by: PHYSICAL THERAPIST

## 2025-09-03 PROCEDURE — 97110 THERAPEUTIC EXERCISES: CPT | Mod: GP | Performed by: PHYSICAL THERAPIST

## 2025-09-03 PROCEDURE — 97112 NEUROMUSCULAR REEDUCATION: CPT | Mod: GP | Performed by: PHYSICAL THERAPIST

## (undated) DEVICE — KIT ENDO TURNOVER/PROCEDURE CARRY-ON 101822

## (undated) DEVICE — NEEDLE HYPO MAGELLAN SAFETY 22GA 1 1/2IN 8881850215

## (undated) DEVICE — DRAPE SHEET REV FOLD 3/4 9349

## (undated) DEVICE — SU DERMABOND PRINEO 42CM CLR422US

## (undated) DEVICE — SUCTION MANIFOLD NEPTUNE 2 SYS 1 PORT 702-025-000

## (undated) DEVICE — SU MONOCRYL+ 4-0 18IN PS2 UND MCP496G

## (undated) DEVICE — SU SILK 2-0 SH 30" K833H

## (undated) DEVICE — PREP CHLORAPREP 26ML TINTED HI-LITE ORANGE 930815

## (undated) DEVICE — SU VICRYL+ 3-0 CT1 36IN UND VCP944H

## (undated) DEVICE — ESU ELEC BLADE 2.75" COATED/INSULATED E1455

## (undated) DEVICE — FCP BIOPSY RADIAL JAW 4 JUMBO 3.2MM CHANNEL M00513370

## (undated) DEVICE — LIGACLIP MEDIUM AESCULAP B2180-1

## (undated) DEVICE — GOWN IMPERVIOUS BREATHABLE SMART LG 89015

## (undated) DEVICE — SU ETHILON 2-0 FS 18" 664G

## (undated) DEVICE — SUTURE VICRYL+ 2-0 27IN SH UND VCP417H

## (undated) DEVICE — ATTACHMENT CAP DISTAL REVEAL 15.0MM BX00711774

## (undated) DEVICE — PROBE ELECTROSURGICAL TRUNODE GAMMA 120-807605

## (undated) DEVICE — TUBING SUCTION 12"X1/4" N612

## (undated) DEVICE — GOWN IMPERVIOUS 2XL BLUE

## (undated) DEVICE — DRAIN BLAKE 15FR SIL 2229

## (undated) DEVICE — CUSTOM PACK GEN MAJOR SBA5BGMHEA

## (undated) DEVICE — BLADE KNIFE SURG 15 371115

## (undated) DEVICE — SOL WATER IRRIG 1000ML BOTTLE 2F7114

## (undated) DEVICE — BLADE KNIFE SURG 10 371110

## (undated) DEVICE — SYR 10ML LL W/O NDL 302995

## (undated) DEVICE — GLOVE BIOGEL PI ULTRATOUCH G SZ 6.0 42160

## (undated) DEVICE — VIAL DECANTER STERILE WHITE DYNJDEC06

## (undated) DEVICE — SOL NACL 0.9% IRRIG 1000ML BOTTLE 2F7124

## (undated) DEVICE — SPECIMEN CONTAINER 3OZ W/FORMALIN 59901

## (undated) DEVICE — SUCTION TIP YANKAUER W/O VENT K86

## (undated) DEVICE — ELECTRODE PATIENT RETURN ADULT L10 FT 2 PLATE CORD 0855C

## (undated) DEVICE — DRAIN RESERVOIR 100ML JP 0070740

## (undated) DEVICE — SPONGE LAP 18X18" X8435

## (undated) RX ORDER — PROPOFOL 10 MG/ML
INJECTION, EMULSION INTRAVENOUS
Status: DISPENSED
Start: 2025-04-29

## (undated) RX ORDER — FENTANYL CITRATE 50 UG/ML
INJECTION, SOLUTION INTRAMUSCULAR; INTRAVENOUS
Status: DISPENSED
Start: 2025-04-29

## (undated) RX ORDER — ALBUTEROL SULFATE 90 UG/1
INHALANT RESPIRATORY (INHALATION)
Status: DISPENSED
Start: 2025-04-29

## (undated) RX ORDER — ONDANSETRON 2 MG/ML
INJECTION INTRAMUSCULAR; INTRAVENOUS
Status: DISPENSED
Start: 2025-04-29

## (undated) RX ORDER — FENTANYL CITRATE 50 UG/ML
INJECTION, SOLUTION INTRAMUSCULAR; INTRAVENOUS
Status: DISPENSED
Start: 2020-12-04

## (undated) RX ORDER — DEXAMETHASONE SODIUM PHOSPHATE 10 MG/ML
INJECTION, EMULSION INTRAMUSCULAR; INTRAVENOUS
Status: DISPENSED
Start: 2025-04-29

## (undated) RX ORDER — LIDOCAINE HYDROCHLORIDE 10 MG/ML
INJECTION, SOLUTION EPIDURAL; INFILTRATION; INTRACAUDAL; PERINEURAL
Status: DISPENSED
Start: 2025-04-29